# Patient Record
Sex: FEMALE | Race: WHITE | NOT HISPANIC OR LATINO | Employment: OTHER
[De-identification: names, ages, dates, MRNs, and addresses within clinical notes are randomized per-mention and may not be internally consistent; named-entity substitution may affect disease eponyms.]

---

## 2017-07-29 ENCOUNTER — HEALTH MAINTENANCE LETTER (OUTPATIENT)
Age: 62
End: 2017-07-29

## 2017-08-15 ENCOUNTER — TRANSFERRED RECORDS (OUTPATIENT)
Dept: HEALTH INFORMATION MANAGEMENT | Facility: CLINIC | Age: 62
End: 2017-08-15

## 2017-11-14 ENCOUNTER — TRANSFERRED RECORDS (OUTPATIENT)
Dept: HEALTH INFORMATION MANAGEMENT | Facility: CLINIC | Age: 62
End: 2017-11-14

## 2017-12-07 LAB — MAMMOGRAM: NORMAL

## 2018-11-13 ENCOUNTER — ALLIED HEALTH/NURSE VISIT (OUTPATIENT)
Dept: TRANSPLANT | Facility: CLINIC | Age: 63
End: 2018-11-13
Attending: INTERNAL MEDICINE
Payer: COMMERCIAL

## 2018-11-13 ENCOUNTER — MEDICAL CORRESPONDENCE (OUTPATIENT)
Dept: TRANSPLANT | Facility: CLINIC | Age: 63
End: 2018-11-13

## 2018-11-13 VITALS
DIASTOLIC BLOOD PRESSURE: 88 MMHG | OXYGEN SATURATION: 97 % | WEIGHT: 163.7 LBS | HEIGHT: 67 IN | HEART RATE: 68 BPM | SYSTOLIC BLOOD PRESSURE: 129 MMHG | BODY MASS INDEX: 25.69 KG/M2 | TEMPERATURE: 97 F

## 2018-11-13 DIAGNOSIS — C90.02 MULTIPLE MYELOMA IN RELAPSE (H): Primary | ICD-10-CM

## 2018-11-13 DIAGNOSIS — Z71.9 ENCOUNTER FOR COUNSELING: Primary | ICD-10-CM

## 2018-11-13 DIAGNOSIS — C90.00 MULTIPLE MYELOMA, REMISSION STATUS UNSPECIFIED (H): Primary | ICD-10-CM

## 2018-11-13 PROCEDURE — 40000268 ZZH STATISTIC NO CHARGES: Mod: ZF

## 2018-11-13 PROCEDURE — G0463 HOSPITAL OUTPT CLINIC VISIT: HCPCS | Mod: ZF

## 2018-11-13 RX ORDER — DEXAMETHASONE 4 MG/1
40 TABLET ORAL
COMMUNITY
Start: 2018-10-19 | End: 2019-03-20

## 2018-11-13 RX ORDER — TIZANIDINE 2 MG/1
2-4 TABLET ORAL
Status: ON HOLD | COMMUNITY
Start: 2018-09-10 | End: 2019-03-30

## 2018-11-13 RX ORDER — PROCHLORPERAZINE MALEATE 10 MG
10 TABLET ORAL
Status: ON HOLD | COMMUNITY
Start: 2018-10-19 | End: 2019-03-30

## 2018-11-13 RX ORDER — ACYCLOVIR 400 MG/1
400 TABLET ORAL 2 TIMES DAILY
Status: ON HOLD | COMMUNITY
Start: 2018-10-19 | End: 2019-03-30

## 2018-11-13 ASSESSMENT — PAIN SCALES - GENERAL: PAINLEVEL: NO PAIN (0)

## 2018-11-13 NOTE — PROGRESS NOTES
BMT Consultation      Adrianna Palomino is a 63 year old female referred by Dr. Crespo for relapsed plasma cell myeloma s/p autologous stem cell transplantation (ASCT) 4/5/2013     HPI: This is a pleasant 63 year old female with a Past Medical History of history of standard risk IgG kappa multiple myeloma s/p autologous stem cell transplantation 4/5/2013, now with relapsed disease referred back here for consideration of second ASCT. Oncology history summarized below:     Oncology history from last note from Rubia Vinson and Dr. Crespo, records reviewed by me as well:  - Initially diagnosed and with standard-risk cytogenetics in 2006  - Started on Dexamethasone for 6 cycles obtaining a complete remission and then maintenance with thalidomide and dexamethasone for 1 year.   - Stopped all therapy in the summer of 2007 due to neuropathy, and was followed on 2 years of maintenance dexamethasone.    - She was noted to slowly relapse but didn't require therapy until she was started on RVD in 11/2012.   - Completed 5 cycles before obtaining a partial remission and came to transplant.    - Underwent an autologous stem cell transplant on 04/05/2013,  total cell dose was 8 x10(6) cells/kg, and she has 8 x10(6) cells/kg left in storage. Post ASCT course was unremarkable.     - Maintenance Regimen post transplant  (10mg q28 days), off for 1 month in 9/2016 to review marrow, resumed: 9/30/2016 and continued till disease progression  - Clinical progression [new bone lesions detected on bone survey from 8/29/2018 with [facility]]  - 8/29/2018 Bone Marrow Biopsy: Normocellular BM (30%) with trilineage hematopoiesis showing a decreased myeloid to erythroid ratio (erythroid hyperplasia). - Plasma cells 5%, polytypic for light chain. Flow cytometry negative for monoclonal plasma cell population   - 10/3/2018 Bone marrow Biopsy: CT Guided Needle Core Biopsy: POSITIVE FOR MALIGNANCY - Plasma cell neoplasm, consistent with  recurrent plasma cell myeloma. Comment Large aggregates of + plasma cells are present. These are kappa monoclonal.   - 10/3/2018: FISH Myeloma Panel: Positive for additional copies of D5S23/S1F773 (5p15.2), CEP9 (centromere 9), CEP11 (centromere 11), and CEP15 (centromere 15) suggesting polysomy, and low level loss of IGH (14q32). FISH Negative for loss of CDKN2C (1p32.3), gain of CKS1B (1q21.3), loss of I24R543 (13q14.3), loss of TP53 (17p13.1), and additional copies of CEP7 (centromere 7), and IGH rearrangement. 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   DSS IIA, SPEP 10/18/2018: 0.9 g/dL, 10/18/2018: 6.69 , Kappa 10.03 mg/dl, lambda 1.5 mg/dl, albumin 3.4 g/dl, creatinine 0.82, calcium 9.3, LDH and B2 microglobulin Unknown/ not available   - 10/19/2018 C1D1 : VRd in relapsed setting (Velcade: 1.3mg/m2 subcutanously Day 1, 8, 15; Revlimid: 10mg (dose reduced) day 1-14; Dexamethasone: 40mg day 1,8,15)      Patient is here today with her  Charles for new patient evaluation.  We reviewed and confirmed the above medical and hematological history with her.  Patient reports that besides having mild fatigue, she has no bony pains or other signs of disease.  She denies specifically on review of system any fevers chills weight loss nausea vomiting bruises bleeding headaches vision changes shortness of breath or chest pain.  She does note however long-standing history of loose to watery bowel movements of 4-5 times a day ever since she was started on maintenance lenalidomide.  She reports taking occasional Imodium to help with that.  She denies any blood in the stool.  She denies any associated abdominal pain.  She reports occasional hot flashes that she attributes to postmenopausal status. She reports bilateral sole decreased sensation, numbness since thalidomide, no imbalance or falls.     For  social history, previous sidestepped noted below, she does admit to 15-pack-year smoking history last smoked in .  She also drinks 2 glasses of either wine or vodka every day.  She does admit to more heavy drinking in the past.  She reports no diagnosis of liver dysfunction.      ROS:    10 point ROS neg other than the symptoms noted above in the HPI.        Past Medical History:   Diagnosis Date     Anemia      Compression fracture     T12, L1, L4     Macular degeneration     Early AMD     Malignant neoplasm (H)      Migraine      Multiple myeloma (H)      Nonsenile cataract        Past Surgical History:   Procedure Laterality Date     ARTHROSCOPY KNEE WITH LATERAL MENISCECTOMY  2013    Procedure: ARTHROSCOPY KNEE WITH LATERAL MENISCECTOMY;  Left Knee Arthroscopy,  Partial Lateral Meniscectomy       EXCISE GANGLION FOOT  10/28/2011    Procedure:EXCISE GANGLION FOOT; Excision of Ganglion Cyst Left Foot; Surgeon:STEPHEN IBRAHIM; Location:[facility]      BONE MARROW/STEM TRANSPLANT AUTOLOGOUS  2013     HC COLONOSCOPY THRU STOMA, DIAGNOSTIC      needs repeat      HC REMOVAL OF TONSILS,12+ Y/O  1974    Tonsils 12+y.o.     ORTHOPEDIC SURGERY       SURGICAL HISTORY OF -       left bunionectomy and hammer toe correction       Family History   Problem Relation Age of Onset     C.A.D. Paternal Grandfather       of MI 51 yo     Diabetes No family hx of      Cerebrovascular Disease No family hx of      Glaucoma No family hx of      Macular Degeneration No family hx of      Breast Cancer Paternal Grandmother      Hypertension Father      Cancer - colorectal Maternal Grandfather      late 60's to early 70's     Circulatory Maternal Grandfather      migraines     Circulatory Mother      migraines     Circulatory Sister      migraines     Cancer Sister      hodgkins       Social History   Substance Use Topics     Smoking status: Former Smoker     Packs/day: 1.00     Years: 8.00     Quit date:  11/14/1990     Smokeless tobacco: Never Used      Comment: occ drags 17 years  smokes     Alcohol use 7.0 oz/week     14 drink(s) per week      Comment: CAGE neg         Allergies   Allergen Reactions     Amoxicillin Rash     Septra [Bactrim] Rash     Sulfa Drugs         Current Outpatient Prescriptions   Medication Sig Dispense Refill     acyclovir (ZOVIRAX) 400 MG tablet Take 400 mg by mouth       Ascorbic Acid (VITAMIN C PO) Take 500 mg by mouth daily       aspirin 325 MG tablet Take 325 mg by mouth daily       Calcium Carbonate (CALCIUM 600 PO) Take by mouth 2 times daily       dexamethasone (DECADRON) 4 MG tablet Take 40 mg by mouth       DOCUSATE SODIUM PO        ferrous fumarate (FERRETTS) 324 MG TABS Take by mouth daily        fish oil-omega-3 fatty acids (FISH OIL) 1000 MG capsule Take 2 g by mouth daily.       fluticasone (FLONASE) 50 MCG/ACT nasal spray Spray 2 sprays into both nostrils daily (Patient taking differently: Spray 2 sprays into both nostrils as needed ) 1 Package 1     LENalidomide (REVLIMID) 10 MG CAPS capsule CHEMOTHERAPY Take 1 capsule (10 mg) by mouth daily 28 capsule 0     loratadine (CLARITIN) 10 MG tablet Take 10 mg by mouth as needed        Misc Natural Products (GLUCOSAMINE CHOND COMPLEX/MSM) TABS Take  by mouth daily.       MULTIVITAMINS OR TABS ONE DAILY 100 3     oxyCODONE-acetaminophen (PERCOCET) 5-325 MG per tablet Take 1-2 tablets by mouth every 4 hours as needed for pain 28 tablet 0     prochlorperazine (COMPAZINE) 10 MG tablet Take 10 mg by mouth       tiZANidine (ZANAFLEX) 2 MG tablet Take 2-4 mg by mouth       triamcinolone (KENALOG) 0.1 % ointment Apply topically 2 times daily To skin lesions as they crop up until they heal. 60 g 3     cyclobenzaprine (FLEXERIL) 10 MG tablet Take 1 tablet by mouth 3 times daily as needed for muscle spasms. (Patient not taking: Reported on 11/13/2018) 30 tablet 2     IBUPROFEN PO Take 200 mg by mouth           Physical Exam:  "    Vital Signs: /88  Pulse 68  Temp 97  F (36.1  C) (Oral)  Ht 1.689 m (5' 6.5\")  Wt 74.3 kg (163 lb 11.2 oz)  LMP 03/01/2005  SpO2 97%  BMI 26.03 kg/m2        KPS:  90%    General Appearance: healthy, alert and no distress  Eyes: PERRL, conjunctiva and lids normal, sclera nonicteric  Ears/Nose/M/Throat: Oral mucosa and posterior oropharynx normal, moist mucous membranes  Neck supple, non-tender, free range of motion, no adenopathy  Cardio/Vascular:regular rate and rhythm, normal S1 and S2, no murmur  Resp Effort And Auscultation: Normal - Clear to auscultation without rales, rhonchi, or wheezing.  GI: soft, nontender, bowel sounds present in all four quadrants, no hepatosplenomegaly  Lymphatics:no significant enlargement of lymph nodes globally   Musculoskeletal: Musculoskeletal normal  Edema: none  Skin: Skin color, texture, turgor normal. No rashes or lesions.  Neurologic: Gait normal. Reflexes normal and symmetric. Sensation grossly WNL.  Psych/Affect: Mood and affect are appropriate.  Vascular Access:  none    Most recent labs reviewed from primary oncology note, summary of monoclonal spikes and free light chain ratios noted below:    SPEP monoclonal spike    9/12/2015: 0.4 g/dL  8/29/2016: 0.5g/dL  6/17/2016: 0.4 g/dL  10/26/2016: 0.4 g/dL  1/27/2017: 0.3 g/dL  5/5/2017: 0.4g/dL  7/31/2017: 0.6 g/dL  11/8/2017: 0.5g/dL  3/12/2018: 0.6 g/dL  5/18/2018: 0.8g/dL  8/17/2018: 0.9 g/dL  10/18/2018: 0.9 g/dL    SFLC ratio  9/12/2015: 3.46  8/29/2016: 3.56  6/17/2016: 2.79  11/1/2016: 3.11  1/27/2017: 3.27  5/5/2017 : 3.96  7/31/2017: 4.52  11/8/2017: 3.76  3/12/2018: 5.2  5/18/2018: 6.29  8/17/2018: 5.74  10/18/2018: 6.69       Active Problems:     Patient Active Problem List   Diagnosis     Other type of migraine     History of colonic polyps     Osteoporosis     Multiple myeloma (H)     Hyperlipidemia LDL goal <160     Dysfunctional alcohol use     Diverticulosis     Elevated liver enzymes     Health " Care Home     Ganglion cyst     Foot pain, left     Advanced directives, counseling/discussion     Compression fracture     Multiple myeloma (H)     Multiple myeloma (H)     Status post bone marrow transplant (H)     Nonexudative senile macular degeneration of retina     Cataracts, bilateral       ASSESSMENT AND PLAN:  This is a pleasant 63 year old female with a Past Medical History of history of standard risk IgG kappa multiple myeloma s/p autologous stem cell transplantation 4/5/2013, now with relapsed standard risk IgG kappa myeloma, with normal cytogenetics and additional copies of D5S23/R8T681 , CEP9 , CEP11, and CEP15 suggesting polysomy, with disease relapse (boiochenmical progression and new lytic lesions), started on VRd in relapsed setting (Velcade: 1.3mg/m2 subcutanously Day 1, 8, 15; Revlimid: 10mg (dose reduced) day 1-14; Dexamethasone: 40mg day 1,8,15) C1D1 10/19/2018  referred back here for consideration of second ASCT.    We reviewed with the patient and her  today the natural history of plasma cell myeloma, being an incurable disease despite recent advances in therapy and autologous stem cell transplantation.  We did discuss the recommendation in the position of the American Society as well as therapy and safety of bone marrow transplantation published in the BBMT in 2015, in particular regarding to using high-dose therapy and autologous HCT should be considered appropriate therapy for any patients relapsing after primary therapy that includes an autologous HCT with initial remission duration of more than 18 months.  We also did discuss the rationale of using this versus an allogeneic stem cell transplantation approach.  The patient understands this    We discussed the favorable prognosis with the absence of any high risk cytogenetics features on her most recent bone marrow biopsy.  We also discussed that the longer of about 5 years that she achieved were initiated for autologous stem cell  transplantation progression free survival is another favorable clinical feature and that we would therefore with the light of the evidence available to recommend a second autologous stem cell transplantation for her with improvement in progression free survival associated with this procedure.    I agree with her receiving reinduction therapy with triple agents including produce home inhibitors and eminence.  Would follow biochemical markers of disease after each cycle.  And aim for 4 cycles of therapy to achieve at least a partial response, preferably a complete response of possible.  We discussed that we would need to work with her primary oncologist to change her current regimen possibly if she is has signs of biochemical or radiological signs of disease progression while on the current therapy.    Of note patient received a total cell dose was 8 x10(6) cells/kg with her first August stem cell transplantation in 2013, and she has 8 x10(6) cells/kg left in storage, that we would use for her second transplantation without the need to mobilize or collect stem cells at this point.  And that procedure associated with organ function evaluation prior to taking her to a second autologous stem cell transplantation we also discussed that we would condition her again with melphalan.  We discussed the most common side effects associated with melphalan that will be reviewed again prior to her transplantation.    We reviewed the risks and benefits associated with autologous stem cell transplantation with a myeloablative dose of melphalan, including but not limited to cytopenia, severe neutropenia and need for blood product support with packed RBCs and platelets with possible side effects associated with blood transfusion and disease transmission, we also discussed risk of organ dysfunction and/or failure, and transverse to the complications of venoocclusive disease of the liver and idiopathic pneumonia syndrome of the lung risk  of relapse and also risk of secondary malignancies, and mortality in the range of 2%.    Other supportive care, managed by Dr. Crespo: Patient is on aspirin for venous thromboembolism prophylaxis while on immunomodulatory agent lenalidomide.  She is also on acyclovir prophylaxis while on 40s on inhibitor.  She is compliant with calcium and vitamin D and is receiving Zometa now that her disease is relapsed and she is being actively treated again.  We discussed with the patient decreasing or even stopping if possible alcohol intake to decrease possible liver organ dysfunction associated with high-dose melphalan specifically relating to venoocclusive disease.    Adrianna understood the above assessment and recommendations.  Multiple questions answered.  No barriers to learning identified.    Summary:  Patient will be followed closely by Dr. Crespo continue with induction chemotherapy before proceeding with evaluation for second autologous stem cell transplantation as detailed above.  We communicated today with  Dr. Crespo the above recommendations.  Patient met with Nasrin our nurse coordinator as well as social work.  She has our contact information and knows to call us with any questions or concerns in the meantime.    Abdi Browning MD    ------------------------------------------------------------------------------------------------------------------------------------------------    Patient Care Team       Relationship Specialty Notifications Start End    Logeais, Ely Rodriguez MD PCP - General Internal Medicine  2/18/13     Comment:  per pt 8/13/14    Phone: [phone] Fax: [phone] [address] [address]    Ananda Xavier MD Referring Physician Hematology  2/18/13     Phone: [phone] Fax: [phone]         [address] [place] [address] [address] [facility] [address]    Avani Mera, [facility]   Admissions 2/26/13     Comment:  BMT  pager [phone]    Phone:  [phone] Pager: [phone] Fax: [phone]       Keira Way, RN Clinic Care Coordinator Hematology Admissions 12/4/13     Phone: [phone] Pager: [phone]        Swati Lacy MD MD Ophthalmology  12/29/15     Phone: [phone] Fax: [phone]         [address] [place] [id number]

## 2018-11-13 NOTE — PATIENT INSTRUCTIONS
You will continue to follow up with  Dr. Crespo and come back to us after 4 cycles if you have adequate response to current therapy

## 2018-11-13 NOTE — NURSING NOTE
"Oncology Rooming Note    November 13, 2018 7:19 AM   Adrianna Palomino is a 63 year old female who presents for:    Chief Complaint   Patient presents with     Consult     NEW-MM     Initial Vitals: /88  Pulse 68  Temp 97  F (36.1  C) (Oral)  Ht 1.689 m (5' 6.5\")  Wt 74.3 kg (163 lb 11.2 oz)  LMP 03/01/2005  SpO2 97%  BMI 26.03 kg/m2 Estimated body mass index is 26.03 kg/(m^2) as calculated from the following:    Height as of this encounter: 1.689 m (5' 6.5\").    Weight as of this encounter: 74.3 kg (163 lb 11.2 oz). Body surface area is 1.87 meters squared.  No Pain (0) Comment: Data Unavailable   Patient's last menstrual period was 03/01/2005.  Allergies reviewed: Yes  Medications reviewed: Yes    Medications: Medication refills not needed today.  Pharmacy name entered into [facility]:    [facility] - [place], [place] - [address] [facility] [place] [facility] [place], [place] [address] [facility] [place] [address] [facility]  [facility] [place], [place] - [address] [facility] [address]  [facility] [address] [place], [place] - [address] [facility] [place], [place] - [address] [facility] [phone]    Clinical concerns: NONE     10 minutes for nursing intake (face to face time)     Kiana Coleman CMA              "

## 2018-11-13 NOTE — PROGRESS NOTES
Met with pt and her spouse to discuss follow up plan. She will return to [facility] for follow up care at this time. SHe was provided with Dr Ganesh Browning and BMT admin office contact phone number to call if she has questions.

## 2018-11-13 NOTE — MR AVS SNAPSHOT
After Visit Summary   11/13/2018    Adrianna Palomino    MRN: [id number]           Patient Information     Date Of Birth          1955        Visit Information        Provider Department      11/13/2018 8:45 AM Beth Pollack MSW;  2 119 CONSULT [facility] Blood and Marrow Transplant        Today's Diagnoses     Encounter for counseling    -  1          [facility] ([facility])  [address] [place] [address]  Phone: [phone]  Clinic Hours:   Monday-Thursday:7am to 7pm   Friday: 7am to 5pm   Weekends and holidays:    8am to noon (in general)  If your fever is 100.5  or greater,   call the clinic.  After hours call the   hospital at [phone] or   [phone]. Ask for the BMT   fellow on-call            Follow-ups after your visit        Who to contact     If you have questions or need follow up information about today's clinic visit or your schedule please contact [facility] BLOOD AND MARROW TRANSPLANT directly at [phone].  Normal or non-critical lab and imaging results will be communicated to you by Ouroboroshart, letter or phone within 4 business days after the clinic has received the results. If you do not hear from us within 7 days, please contact the clinic through Oligasis or phone. If you have a critical or abnormal lab result, we will notify you by phone as soon as possible.  Submit refill requests through Oligasis or call your pharmacy and they will forward the refill request to us. Please allow 3 business days for your refill to be completed.          Additional Information About Your Visit        Ouroboroshart Information     Oligasis gives you secure access to your electronic health record. If you see a primary care provider, you can also send messages to your care team and make appointments. If you have questions, please call your primary care clinic.  If you do not have a primary care provider, please call [phone] and they will assist you.        Care  EveryWhere ID     This is your Care EveryWhere ID. This could be used by other organizations to access your [place] medical records  [id number]        Your Vitals Were     Last Period                   03/01/2005            Blood Pressure from Last 3 Encounters:   11/13/18 129/88   04/21/16 112/79   12/17/15 117/83    Weight from Last 3 Encounters:   11/13/18 74.3 kg (163 lb 11.2 oz)   04/21/16 70.2 kg (154 lb 11.2 oz)   12/17/15 70.7 kg (155 lb 13.8 oz)              Today, you had the following     No orders found for display         Today's Medication Changes          These changes are accurate as of 11/13/18  2:18 PM.  If you have any questions, ask your nurse or doctor.               These medicines have changed or have updated prescriptions.        Dose/Directions    fluticasone 50 MCG/ACT spray   Commonly known as:  FLONASE   This may have changed:    - when to take this  - reasons to take this   Used for:  Rhinosinusitis        Dose:  2 spray   Spray 2 sprays into both nostrils daily   Quantity:  1 Package   Refills:  1                Recent Review Flowsheet Data     BMT Recent Results Latest Ref Rng & Units 6/4/2015 7/16/2015 8/20/2015 9/17/2015 10/16/2015 12/17/2015 4/21/2016    WBC 4.0 - 11.0 10e9/L 1.4(L) 1.9(L) 1.5(L) 1.7(L) 1.8(L) 1.7(L) 1.6(L)    Hemoglobin 11.7 - 15.7 g/dL 9.2(L) 11.9 12.6 12.1 11.3(L) 12.4 12.2    Platelet Count 150 - 450 10e9/L 211 231 177 177 194 189 174    Platelets 150 - 450 10:9/L - - - - - - -    Neutrophils (Absolute) 1.6 - 8.3 10e9/L 0.8(L) 0.9(L) 0.8(L) 0.8(L) 1.0(L) 0.8(L) 0.8(L)    Blasts (Absolute) 10e9/L - - - - - - -    INR 0.86 - 1.14 - - - - - - -    Sodium 133 - 144 mmol/L 136 141 139 - 138 138 140    Potassium 3.4 - 5.3 mmol/L 3.9 3.9 3.9 - 3.9 3.9 3.6    Chloride 94 - 109 mmol/L 105 106 103 - 105 104 103    Glucose 70 - 99 mg/dL 81 64(L) 65(L) - 82 48(LL) 82    Urea Nitrogen 7 - 30 mg/dL 14 11 13 - 17 12 12    Creatinine 0.52 - 1.04 mg/dL 0.91 0.93 0.86 - 0.88  1.02 0.88    Calcium (Total) 8.5 - 10.1 mg/dL 8.9 8.2(L) 8.6 - 8.7 8.8 8.9    Protein (Total) 6.8 - 8.8 g/dL - - 6.6(L) - 6.4(L) 7.0 6.8    Albumin 3.4 - 5.0 g/dL - - 3.6 - 3.6 3.8 3.6    Bilirubin (Direct) 0.0 - 0.2 mg/dL - - - - 0.2 - -    Alkaline Phosphatase 40 - 150 U/L - - 54 - 57 58 55    AST 0 - 45 U/L - - 21 - 31 23 27    ALT 0 - 50 U/L - - 30 - 34 26 36    MCV 78 - 100 fl 101(H) 107(H) 105(H) 107(H) 110(H) 111(H) 109(H)               Primary Care Provider Office Phone # Fax #    Ely Maldonado [phone]       [address] [address]        Equal Access to Services     [facility]: Hadii evan edmondso Sofer, waaxda luqadaha, qaybta kaalmakatelyn lubin, nicki rivera. So [facility] [phone].    ATENCIÓN: Si habla español, tiene a voss disposición servicios gratuitos de asistencia lingüística. [facility] [phone].    We comply with applicable federal civil rights laws and [place] laws. We do not discriminate on the basis of race, color, national origin, age, disability, sex, sexual orientation, or gender identity.            Thank you!     Thank you for choosing [facility] BLOOD AND MARROW TRANSPLANT  for your care. Our goal is always to provide you with excellent care. Hearing back from our patients is one way we can continue to improve our services. Please take a few minutes to complete the written survey that you may receive in the mail after your visit with us. Thank you!             Your Updated Medication List - Protect others around you: Learn how to safely use, store and throw away your medicines at www.disposemymeds.org.          This list is accurate as of 11/13/18  2:18 PM.  Always use your most recent med list.                   Brand Name Dispense Instructions for use Diagnosis    acyclovir 400 MG tablet    ZOVIRAX     Take 400 mg by mouth        aspirin 325 MG tablet      Take 325 mg by mouth daily    Multiple myeloma        CALCIUM 600 PO      Take by mouth 2 times daily    Multiple myeloma, without mention of having achieved remission       cyclobenzaprine 10 MG tablet    FLEXERIL    30 tablet    Take 1 tablet by mouth 3 times daily as needed for muscle spasms.    Multiple myeloma, without mention of having achieved remission, Back pain       dexamethasone 4 MG tablet    DECADRON     Take 40 mg by mouth        DOCUSATE SODIUM PO           ferrous fumarate 324 MG Tabs    FERRETTS     Take by mouth daily    Multiple myeloma, without mention of having achieved remission       fish oil-omega-3 fatty acids 1000 MG capsule      Take 2 g by mouth daily.        fluticasone 50 MCG/ACT spray    FLONASE    1 Package    Spray 2 sprays into both nostrils daily    Rhinosinusitis       GLUCOSAMINE CHOND COMPLEX/MSM Tabs      Take  by mouth daily.        IBUPROFEN PO      Take 200 mg by mouth        LENalidomide 10 MG Caps capsule CHEMO    REVLIMID    28 capsule    Take 1 capsule (10 mg) by mouth daily    Multiple myeloma (H)       loratadine 10 MG tablet    CLARITIN     Take 10 mg by mouth as needed        multivitamin per tablet     100    ONE DAILY    Osteoporosis, unspecified       oxyCODONE-acetaminophen 5-325 MG per tablet    PERCOCET    28 tablet    Take 1-2 tablets by mouth every 4 hours as needed for pain    Back pain, Macrocytosis, Multiple myeloma, without mention of having achieved remission       prochlorperazine 10 MG tablet    COMPAZINE     Take 10 mg by mouth        tiZANidine 2 MG tablet    ZANAFLEX     Take 2-4 mg by mouth        triamcinolone 0.1 % ointment    KENALOG    60 g    Apply topically 2 times daily To skin lesions as they crop up until they heal.    Lymphomatoid papulosis (H)       VITAMIN C PO      Take 500 mg by mouth daily    Multiple myeloma, without mention of having achieved remission

## 2018-11-13 NOTE — MR AVS SNAPSHOT
After Visit Summary   11/13/2018    Adrianna Palomino    MRN: [id number]           Patient Information     Date Of Birth          1955        Visit Information        Provider Department      11/13/2018 7:15 AM Abdi Bill MD [facility] Blood and Marrow Transplant        Today's Diagnoses     Multiple myeloma in relapse (H)    -  1          [facility] ([facility] [place]  [address] [place] [address]  Phone: [phone]  Clinic Hours:   Monday-Thursday:7am to 7pm   Friday: 7am to 5pm   Weekends and holidays:    8am to noon (in general)  If your fever is 100.5  or greater,   call the clinic.  After hours call the   hospital at [phone] or   [phone]. Ask for the BMT   fellow on-call           Care Instructions    You will continue to follow up with  Dr. Crespo and come back to us after 4 cycles if you have adequate response to current therapy          Follow-ups after your visit        Who to contact     If you have questions or need follow up information about today's clinic visit or your schedule please contact [facility] BLOOD AND MARROW TRANSPLANT directly at [phone].  Normal or non-critical lab and imaging results will be communicated to you by MyChart, letter or phone within 4 business days after the clinic has received the results. If you do not hear from us within 7 days, please contact the clinic through Truserahart or phone. If you have a critical or abnormal lab result, we will notify you by phone as soon as possible.  Submit refill requests through Wandoujia or call your pharmacy and they will forward the refill request to us. Please allow 3 business days for your refill to be completed.          Additional Information About Your Visit        Truserahart Information     Wandoujia gives you secure access to your electronic health record. If you see a primary care provider, you can also send messages to your care team and make appointments. If you have questions,  "please call your primary care clinic.  If you do not have a primary care provider, please call [phone] and they will assist you.        Care EveryWhere ID     This is your Care EveryWhere ID. This could be used by other organizations to access your [place] medical records  [id number]        Your Vitals Were     Pulse Temperature Height Last Period Pulse Oximetry BMI (Body Mass Index)    68 97  F (36.1  C) (Oral) 1.689 m (5' 6.5\") 03/01/2005 97% 26.03 kg/m2       Blood Pressure from Last 3 Encounters:   11/13/18 129/88   04/21/16 112/79   12/17/15 117/83    Weight from Last 3 Encounters:   11/13/18 74.3 kg (163 lb 11.2 oz)   04/21/16 70.2 kg (154 lb 11.2 oz)   12/17/15 70.7 kg (155 lb 13.8 oz)              Today, you had the following     No orders found for display         Today's Medication Changes          These changes are accurate as of 11/13/18  9:50 AM.  If you have any questions, ask your nurse or doctor.               These medicines have changed or have updated prescriptions.        Dose/Directions    fluticasone 50 MCG/ACT spray   Commonly known as:  FLONASE   This may have changed:    - when to take this  - reasons to take this   Used for:  Rhinosinusitis        Dose:  2 spray   Spray 2 sprays into both nostrils daily   Quantity:  1 Package   Refills:  1                Recent Review Flowsheet Data     BMT Recent Results Latest Ref Rng & Units 6/4/2015 7/16/2015 8/20/2015 9/17/2015 10/16/2015 12/17/2015 4/21/2016    WBC 4.0 - 11.0 10e9/L 1.4(L) 1.9(L) 1.5(L) 1.7(L) 1.8(L) 1.7(L) 1.6(L)    Hemoglobin 11.7 - 15.7 g/dL 9.2(L) 11.9 12.6 12.1 11.3(L) 12.4 12.2    Platelet Count 150 - 450 10e9/L 211 231 177 177 194 189 174    Platelets 150 - 450 10:9/L - - - - - - -    Neutrophils (Absolute) 1.6 - 8.3 10e9/L 0.8(L) 0.9(L) 0.8(L) 0.8(L) 1.0(L) 0.8(L) 0.8(L)    Blasts (Absolute) 10e9/L - - - - - - -    INR 0.86 - 1.14 - - - - - - -    Sodium 133 - 144 mmol/L 136 141 139 - 138 138 140    Potassium 3.4 - 5.3 " mmol/L 3.9 3.9 3.9 - 3.9 3.9 3.6    Chloride 94 - 109 mmol/L 105 106 103 - 105 104 103    Glucose 70 - 99 mg/dL 81 64(L) 65(L) - 82 48(LL) 82    Urea Nitrogen 7 - 30 mg/dL 14 11 13 - 17 12 12    Creatinine 0.52 - 1.04 mg/dL 0.91 0.93 0.86 - 0.88 1.02 0.88    Calcium (Total) 8.5 - 10.1 mg/dL 8.9 8.2(L) 8.6 - 8.7 8.8 8.9    Protein (Total) 6.8 - 8.8 g/dL - - 6.6(L) - 6.4(L) 7.0 6.8    Albumin 3.4 - 5.0 g/dL - - 3.6 - 3.6 3.8 3.6    Bilirubin (Direct) 0.0 - 0.2 mg/dL - - - - 0.2 - -    Alkaline Phosphatase 40 - 150 U/L - - 54 - 57 58 55    AST 0 - 45 U/L - - 21 - 31 23 27    ALT 0 - 50 U/L - - 30 - 34 26 36    MCV 78 - 100 fl 101(H) 107(H) 105(H) 107(H) 110(H) 111(H) 109(H)               Primary Care Provider Office Phone # Fax #    Ely Maldonado [phone]       [address] [address]        Equal Access to Services     [facility] SANCHE[facility]: Hadii aad ku hadasho Soryannali, waaxda luqadaha, qaybta kaalmada adeegyada, waxay jones giles adeamelia khdemi mann . So [facility] [phone].    ATENCIÓN: Si habla español, tiene a vsos disposición servicios gratuitos de asistencia lingüística. Llame al [phone].    We comply with applicable federal civil rights laws and [place] laws. We do not discriminate on the basis of race, color, national origin, age, disability, sex, sexual orientation, or gender identity.            Thank you!     Thank you for choosing [facility] BLOOD AND MARROW TRANSPLANT  for your care. Our goal is always to provide you with excellent care. Hearing back from our patients is one way we can continue to improve our services. Please take a few minutes to complete the written survey that you may receive in the mail after your visit with us. Thank you!             Your Updated Medication List - Protect others around you: Learn how to safely use, store and throw away your medicines at www.disposemymeds.org.          This list is accurate as of 11/13/18  9:50 AM.   Always use your most recent med list.                   Brand Name Dispense Instructions for use Diagnosis    acyclovir 400 MG tablet    ZOVIRAX     Take 400 mg by mouth        aspirin 325 MG tablet      Take 325 mg by mouth daily    Multiple myeloma       CALCIUM 600 PO      Take by mouth 2 times daily    Multiple myeloma, without mention of having achieved remission       cyclobenzaprine 10 MG tablet    FLEXERIL    30 tablet    Take 1 tablet by mouth 3 times daily as needed for muscle spasms.    Multiple myeloma, without mention of having achieved remission, Back pain       dexamethasone 4 MG tablet    DECADRON     Take 40 mg by mouth        DOCUSATE SODIUM PO           ferrous fumarate 324 MG Tabs    FERRETTS     Take by mouth daily    Multiple myeloma, without mention of having achieved remission       fish oil-omega-3 fatty acids 1000 MG capsule      Take 2 g by mouth daily.        fluticasone 50 MCG/ACT spray    FLONASE    1 Package    Spray 2 sprays into both nostrils daily    Rhinosinusitis       GLUCOSAMINE CHOND COMPLEX/MSM Tabs      Take  by mouth daily.        IBUPROFEN PO      Take 200 mg by mouth        LENalidomide 10 MG Caps capsule CHEMO    REVLIMID    28 capsule    Take 1 capsule (10 mg) by mouth daily    Multiple myeloma (H)       loratadine 10 MG tablet    CLARITIN     Take 10 mg by mouth as needed        multivitamin per tablet     100    ONE DAILY    Osteoporosis, unspecified       oxyCODONE-acetaminophen 5-325 MG per tablet    PERCOCET    28 tablet    Take 1-2 tablets by mouth every 4 hours as needed for pain    Back pain, Macrocytosis, Multiple myeloma, without mention of having achieved remission       prochlorperazine 10 MG tablet    COMPAZINE     Take 10 mg by mouth        tiZANidine 2 MG tablet    ZANAFLEX     Take 2-4 mg by mouth        triamcinolone 0.1 % ointment    KENALOG    60 g    Apply topically 2 times daily To skin lesions as they crop up until they heal.    Lymphomatoid  papulosis (H)       VITAMIN C PO      Take 500 mg by mouth daily    Multiple myeloma, without mention of having achieved remission

## 2018-11-13 NOTE — PROGRESS NOTES
Blood and Marrow Transplant   New Transplant Visit with   Clinical     Assessment completed on 11/13/2018 of living situation, support system, financial status, functional status, coping, stressors, need for resources and social work intervention provided as needed. Information for this assessment was provided by pt and pt's -Lupillo's report in addition to medical chart review and consultation with medical team.     Present:  Patient: Adrianna Palomino  Spouse: Lupillo Ervin  : LEVI Austin, [facility]    Medical Team   Nurse Coordinator: Nasrin Tabor RN  BMT Physician: Abdi Browning MD  Referring Physician: MD Zak    Presenting Information:  Pt is a 63 year old femeale diagnosed with multiple myeloma. Pt was diagnosed on 2006. Pt had an autologous transplant 4/5/2013. Pt presents today for a second stem cell transplant discussion.    Contact Information:  Cell Phone: [phone]  's Phone: [phone]    Special Needs: Pt lives in [place], [place] which is within the required distance of the hospital. Pt does not need to relocate.     Family Information:   Spouse: Lupillo Ervin  Parents: Mother (Lives in  [place], [place])  Siblings: 5 Siblings; 3 Live in [place]  Children: Son (Lives in [place], [place])    Education/Employment:  Currently employed: Yes  Employer: [employer]  Occupation: Patient Rep    Spouse/Partner Employed: No; pt's  is retired.    Finances/Insurance: No financial or insurance concerns identified at this time.    Source of Income: Payroll and Social Security [facility]     discussed juliet options and asked pt to let SUYAPA know if they would like to apply in the future. SUYAPA provided information regarding the insurance authorization process and the role of the BMT Financial . SUYAPA provided contact info for the [employer]  and referred pt to them for future insurance questions.     Caregiver:   SUYAPA discussed  with the pt and pt's -Lupillo the caregiver role and expectation at length. Pt is agreeable to having a full time caregiver for the minimum of 30 days until cleared by the BMT Physician. Pt's identified caregivers are her -Lupillo as he is retired. Pt has been through an autologous transplant before and is aware of the caregiver requirement. Caregiver education and information provided. No caregiver concerns identified.     Healthcare Directive: Yes. Copy already on file.    Resources Provided:  BMT Information Book  Transplant Unit Description and Information     Identified Concerns:  No concerns identified at this time.     Summary:  Pt presents to [facility] regarding a second autologous stem cell transplant. Pt had her first auto transplant on 4/5/2013. Pt and pt's -Lupillo asked good/appropriate questions regarding psychosocial factors related to BMT; all questions were addressed. Pt presented as very pleasant and calm. Pt does not need to relocate. No caregiver concerns at pt's  is retired and plans to be her caregiver. Pt said her son could be back-up caregiver.    Plan:   SW provided contact information and encouraged pt to contact SW with any additional questions, concerns, resources and/or for support. SW will continue to follow pt to provide support and guidance with resources as needed.     LEVI Austin, FELIBERTO  Phone: [phone]  Pager: [phone]

## 2018-11-13 NOTE — MR AVS SNAPSHOT
After Visit Summary   11/13/2018    Adrianna Palomino    MRN: [id number]           Patient Information     Date Of Birth          1955        Visit Information        Provider Department      11/13/2018 8:15 AM Coordinator, Sukumar Bmt Nurse [facility] Blood and Marrow Transplant        Today's Diagnoses     Multiple myeloma, remission status unspecified (H)    -  1          [facility] ([facility])  [address] [place] [address]  Phone: [phone]  Clinic Hours:   Monday-Thursday:7am to 7pm   Friday: 7am to 5pm   Weekends and holidays:    8am to noon (in general)  If your fever is 100.5  or greater,   call the clinic.  After hours call the   hospital at [phone] or   [phone]. Ask for the BMT   fellow on-call            Follow-ups after your visit        Your next 10 appointments already scheduled     Nov 13, 2018  8:45 AM CST   (Arrive by 8:30 AM)   BMT SUYAPA HENDRIX with LEVI Austin,  2 119 CONSULT [facility] Blood and Marrow Transplant ([facility])    [address] [place] [address]  [address] [place] [id number] [phone]              Who to contact     If you have questions or need follow up information about today's clinic visit or your schedule please contact [facility] BLOOD AND MARROW TRANSPLANT directly at [phone].  Normal or non-critical lab and imaging results will be communicated to you by MyChart, letter or phone within 4 business days after the clinic has received the results. If you do not hear from us within 7 days, please contact the clinic through MyChart or phone. If you have a critical or abnormal lab result, we will notify you by phone as soon as possible.  Submit refill requests through [employer] or call your pharmacy and they will forward the refill request to us. Please allow 3 business days for your refill to be completed.          Additional Information About Your Visit        MyChart Information      [employer] gives you secure access to your electronic health record. If you see a primary care provider, you can also send messages to your care team and make appointments. If you have questions, please call your primary care clinic.  If you do not have a primary care provider, please call [phone] and they will assist you.        Care EveryWhere ID     This is your Care EveryWhere ID. This could be used by other organizations to access your [facility] medical records  [id number]        Your Vitals Were     Last Period                   03/01/2005            Blood Pressure from Last 3 Encounters:   11/13/18 129/88   04/21/16 112/79   12/17/15 117/83    Weight from Last 3 Encounters:   11/13/18 74.3 kg (163 lb 11.2 oz)   04/21/16 70.2 kg (154 lb 11.2 oz)   12/17/15 70.7 kg (155 lb 13.8 oz)              Today, you had the following     No orders found for display         Today's Medication Changes          These changes are accurate as of 11/13/18  8:26 AM.  If you have any questions, ask your nurse or doctor.               These medicines have changed or have updated prescriptions.        Dose/Directions    fluticasone 50 MCG/ACT spray   Commonly known as:  FLONASE   This may have changed:    - when to take this  - reasons to take this   Used for:  Rhinosinusitis        Dose:  2 spray   Spray 2 sprays into both nostrils daily   Quantity:  1 Package   Refills:  1                Recent Review Flowsheet Data     BMT Recent Results Latest Ref Rng & Units 6/4/2015 7/16/2015 8/20/2015 9/17/2015 10/16/2015 12/17/2015 4/21/2016    WBC 4.0 - 11.0 10e9/L 1.4(L) 1.9(L) 1.5(L) 1.7(L) 1.8(L) 1.7(L) 1.6(L)    Hemoglobin 11.7 - 15.7 g/dL 9.2(L) 11.9 12.6 12.1 11.3(L) 12.4 12.2    Platelet Count 150 - 450 10e9/L 211 231 177 177 194 189 174    Platelets 150 - 450 10:9/L - - - - - - -    Neutrophils (Absolute) 1.6 - 8.3 10e9/L 0.8(L) 0.9(L) 0.8(L) 0.8(L) 1.0(L) 0.8(L) 0.8(L)    Blasts (Absolute) 10e9/L - - - - - - -    INR 0.86 -  1.14 - - - - - - -    Sodium 133 - 144 mmol/L 136 141 139 - 138 138 140    Potassium 3.4 - 5.3 mmol/L 3.9 3.9 3.9 - 3.9 3.9 3.6    Chloride 94 - 109 mmol/L 105 106 103 - 105 104 103    Glucose 70 - 99 mg/dL 81 64(L) 65(L) - 82 48(LL) 82    Urea Nitrogen 7 - 30 mg/dL 14 11 13 - 17 12 12    Creatinine 0.52 - 1.04 mg/dL 0.91 0.93 0.86 - 0.88 1.02 0.88    Calcium (Total) 8.5 - 10.1 mg/dL 8.9 8.2(L) 8.6 - 8.7 8.8 8.9    Protein (Total) 6.8 - 8.8 g/dL - - 6.6(L) - 6.4(L) 7.0 6.8    Albumin 3.4 - 5.0 g/dL - - 3.6 - 3.6 3.8 3.6    Bilirubin (Direct) 0.0 - 0.2 mg/dL - - - - 0.2 - -    Alkaline Phosphatase 40 - 150 U/L - - 54 - 57 58 55    AST 0 - 45 U/L - - 21 - 31 23 27    ALT 0 - 50 U/L - - 30 - 34 26 36    MCV 78 - 100 fl 101(H) 107(H) 105(H) 107(H) 110(H) 111(H) 109(H)               Primary Care Provider Office Phone # Fax #    Ely Maldonado [phone]       [address] [address]        Equal Access to Services     [facility] AH: Hadii evan ku hadjuneo Sofer, waaxda luqadaha, qaybta kaalmada adepedro whiteay jones rivera. So [facility] [phone].    ATENCIÓN: Si habla español, tiene a voss disposición servicios gratuitos de asistencia lingüística. Llame al [phone].    We comply with applicable federal civil rights laws and [place] laws. We do not discriminate on the basis of race, color, national origin, age, disability, sex, sexual orientation, or gender identity.            Thank you!     Thank you for choosing [facility] BLOOD AND MARROW TRANSPLANT  for your care. Our goal is always to provide you with excellent care. Hearing back from our patients is one way we can continue to improve our services. Please take a few minutes to complete the written survey that you may receive in the mail after your visit with us. Thank you!             Your Updated Medication List - Protect others around you: Learn how to safely use, store and throw away your  medicines at www.disposemymeds.org.          This list is accurate as of 11/13/18  8:26 AM.  Always use your most recent med list.                   Brand Name Dispense Instructions for use Diagnosis    acyclovir 400 MG tablet    ZOVIRAX     Take 400 mg by mouth        aspirin 325 MG tablet      Take 325 mg by mouth daily    Multiple myeloma       CALCIUM 600 PO      Take by mouth 2 times daily    Multiple myeloma, without mention of having achieved remission       cyclobenzaprine 10 MG tablet    FLEXERIL    30 tablet    Take 1 tablet by mouth 3 times daily as needed for muscle spasms.    Multiple myeloma, without mention of having achieved remission, Back pain       dexamethasone 4 MG tablet    DECADRON     Take 40 mg by mouth        DOCUSATE SODIUM PO           ferrous fumarate 324 MG Tabs    FERRETTS     Take by mouth daily    Multiple myeloma, without mention of having achieved remission       fish oil-omega-3 fatty acids 1000 MG capsule      Take 2 g by mouth daily.        fluticasone 50 MCG/ACT spray    FLONASE    1 Package    Spray 2 sprays into both nostrils daily    Rhinosinusitis       GLUCOSAMINE CHOND COMPLEX/MSM Tabs      Take  by mouth daily.        IBUPROFEN PO      Take 200 mg by mouth        LENalidomide 10 MG Caps capsule CHEMO    REVLIMID    28 capsule    Take 1 capsule (10 mg) by mouth daily    Multiple myeloma (H)       loratadine 10 MG tablet    CLARITIN     Take 10 mg by mouth as needed        multivitamin per tablet     100    ONE DAILY    Osteoporosis, unspecified       oxyCODONE-acetaminophen 5-325 MG per tablet    PERCOCET    28 tablet    Take 1-2 tablets by mouth every 4 hours as needed for pain    Back pain, Macrocytosis, Multiple myeloma, without mention of having achieved remission       prochlorperazine 10 MG tablet    COMPAZINE     Take 10 mg by mouth        tiZANidine 2 MG tablet    ZANAFLEX     Take 2-4 mg by mouth        triamcinolone 0.1 % ointment    KENALOG    60 g    Apply  topically 2 times daily To skin lesions as they crop up until they heal.    Lymphomatoid papulosis (H)       VITAMIN C PO      Take 500 mg by mouth daily    Multiple myeloma, without mention of having achieved remission

## 2019-02-08 ENCOUNTER — TRANSFERRED RECORDS (OUTPATIENT)
Dept: HEALTH INFORMATION MANAGEMENT | Facility: CLINIC | Age: 64
End: 2019-02-08

## 2019-02-22 ENCOUNTER — MEDICAL CORRESPONDENCE (OUTPATIENT)
Dept: TRANSPLANT | Facility: CLINIC | Age: 64
End: 2019-02-22

## 2019-03-01 DIAGNOSIS — Z86.2 PERSONAL HISTORY OF DISEASES OF BLOOD AND BLOOD-FORMING ORGANS: ICD-10-CM

## 2019-03-01 DIAGNOSIS — C90.00 MULTIPLE MYELOMA (H): Primary | ICD-10-CM

## 2019-03-04 ENCOUNTER — DOCUMENTATION ONLY (OUTPATIENT)
Dept: CARE COORDINATION | Facility: CLINIC | Age: 64
End: 2019-03-04

## 2019-03-05 DIAGNOSIS — C90.00 MULTIPLE MYELOMA (H): Primary | ICD-10-CM

## 2019-03-07 ENCOUNTER — OFFICE VISIT (OUTPATIENT)
Dept: TRANSPLANT | Facility: CLINIC | Age: 64
End: 2019-03-07
Attending: INTERNAL MEDICINE
Payer: COMMERCIAL

## 2019-03-07 ENCOUNTER — HOSPITAL ENCOUNTER (OUTPATIENT)
Dept: NUCLEAR MEDICINE | Facility: CLINIC | Age: 64
Setting detail: NUCLEAR MEDICINE
Discharge: HOME | End: 2019-03-07
Attending: INTERNAL MEDICINE | Admitting: INTERNAL MEDICINE
Payer: COMMERCIAL

## 2019-03-07 ENCOUNTER — HOSPITAL ENCOUNTER (OUTPATIENT)
Dept: GENERAL RADIOLOGY | Facility: CLINIC | Age: 64
End: 2019-03-07
Attending: INTERNAL MEDICINE
Payer: COMMERCIAL

## 2019-03-07 ENCOUNTER — HOSPITAL ENCOUNTER (OUTPATIENT)
Dept: GENERAL RADIOLOGY | Facility: CLINIC | Age: 64
Discharge: HOME | End: 2019-03-07
Attending: INTERNAL MEDICINE | Admitting: INTERNAL MEDICINE
Payer: COMMERCIAL

## 2019-03-07 ENCOUNTER — MEDICAL CORRESPONDENCE (OUTPATIENT)
Dept: TRANSPLANT | Facility: CLINIC | Age: 64
End: 2019-03-07

## 2019-03-07 ENCOUNTER — APPOINTMENT (OUTPATIENT)
Dept: LAB | Facility: CLINIC | Age: 64
End: 2019-03-07
Attending: INTERNAL MEDICINE
Payer: COMMERCIAL

## 2019-03-07 VITALS
OXYGEN SATURATION: 98 % | TEMPERATURE: 97.8 F | HEART RATE: 70 BPM | WEIGHT: 158.2 LBS | SYSTOLIC BLOOD PRESSURE: 115 MMHG | HEIGHT: 66 IN | BODY MASS INDEX: 25.43 KG/M2 | DIASTOLIC BLOOD PRESSURE: 76 MMHG

## 2019-03-07 DIAGNOSIS — C90.00 MULTIPLE MYELOMA (H): ICD-10-CM

## 2019-03-07 DIAGNOSIS — Z86.2 PERSONAL HISTORY OF DISEASES OF BLOOD AND BLOOD-FORMING ORGANS: ICD-10-CM

## 2019-03-07 LAB
ABO + RH BLD: NORMAL
ABO + RH BLD: NORMAL
ALBUMIN SERPL-MCNC: 3.3 G/DL (ref 3.4–5)
ALBUMIN UR-MCNC: >499 MG/DL
ALP SERPL-CCNC: 53 U/L (ref 40–150)
ALT SERPL W P-5'-P-CCNC: 19 U/L (ref 0–50)
ANION GAP SERPL CALCULATED.3IONS-SCNC: 6 MMOL/L (ref 3–14)
APPEARANCE UR: ABNORMAL
APTT PPP: 26 SEC (ref 22–37)
AST SERPL W P-5'-P-CCNC: 21 U/L (ref 0–45)
B2 MICROGLOB SERPL-MCNC: 2.1 MG/L
BASOPHILS # BLD AUTO: 0 10E9/L (ref 0–0.2)
BASOPHILS NFR BLD AUTO: 1 %
BILIRUB SERPL-MCNC: 0.4 MG/DL (ref 0.2–1.3)
BILIRUB UR QL STRIP: NEGATIVE
BLD GP AB SCN SERPL QL: NORMAL
BLOOD BANK CMNT PATIENT-IMP: NORMAL
BUN SERPL-MCNC: 10 MG/DL (ref 7–30)
CALCIUM SERPL-MCNC: 8.4 MG/DL (ref 8.5–10.1)
CHLORIDE SERPL-SCNC: 102 MMOL/L (ref 94–109)
CO2 SERPL-SCNC: 29 MMOL/L (ref 20–32)
COLOR UR AUTO: YELLOW
CREAT SERPL-MCNC: 0.84 MG/DL (ref 0.52–1.04)
DIFFERENTIAL METHOD BLD: ABNORMAL
EBV VCA IGG SER QL IA: >8 AI (ref 0–0.8)
EOSINOPHIL # BLD AUTO: 0.1 10E9/L (ref 0–0.7)
EOSINOPHIL NFR BLD AUTO: 5.2 %
ERYTHROCYTE [DISTWIDTH] IN BLOOD BY AUTOMATED COUNT: 13.2 % (ref 10–15)
GFR SERPL CREATININE-BSD FRML MDRD: 73 ML/MIN/{1.73_M2}
GLUCOSE SERPL-MCNC: 64 MG/DL (ref 70–99)
GLUCOSE UR STRIP-MCNC: NEGATIVE MG/DL
HCG SERPL QL: NEGATIVE
HCT VFR BLD AUTO: 33.9 % (ref 35–47)
HGB BLD-MCNC: 11.1 G/DL (ref 11.7–15.7)
HGB UR QL STRIP: ABNORMAL
HSV1 IGG SERPL QL IA: <0.2 AI (ref 0–0.8)
HSV2 IGG SERPL QL IA: >8 AI (ref 0–0.8)
IMM GRANULOCYTES # BLD: 0 10E9/L (ref 0–0.4)
IMM GRANULOCYTES NFR BLD: 0.5 %
INR PPP: 0.94 (ref 0.86–1.14)
KAPPA LC UR-MCNC: 2.42 MG/DL (ref 0.33–1.94)
KAPPA LC/LAMBDA SER: 1.79 {RATIO} (ref 0.26–1.65)
KETONES UR STRIP-MCNC: NEGATIVE MG/DL
LAMBDA LC SERPL-MCNC: 1.35 MG/DL (ref 0.57–2.63)
LDH SERPL L TO P-CCNC: 162 U/L (ref 81–234)
LEUKOCYTE ESTERASE UR QL STRIP: NEGATIVE
LYMPHOCYTES # BLD AUTO: 0.3 10E9/L (ref 0.8–5.3)
LYMPHOCYTES NFR BLD AUTO: 14.1 %
MAGNESIUM SERPL-MCNC: 2 MG/DL (ref 1.6–2.3)
MCH RBC QN AUTO: 35.8 PG (ref 26.5–33)
MCHC RBC AUTO-ENTMCNC: 32.7 G/DL (ref 31.5–36.5)
MCV RBC AUTO: 109 FL (ref 78–100)
MONOCYTES # BLD AUTO: 0.3 10E9/L (ref 0–1.3)
MONOCYTES NFR BLD AUTO: 17.7 %
MUCOUS THREADS #/AREA URNS LPF: PRESENT /LPF
NEUTROPHILS # BLD AUTO: 1.2 10E9/L (ref 1.6–8.3)
NEUTROPHILS NFR BLD AUTO: 61.5 %
NITRATE UR QL: NEGATIVE
NRBC # BLD AUTO: 0 10*3/UL
NRBC BLD AUTO-RTO: 0 /100
PH UR STRIP: 6 PH (ref 5–7)
PHOSPHATE SERPL-MCNC: 4 MG/DL (ref 2.5–4.5)
PLATELET # BLD AUTO: 233 10E9/L (ref 150–450)
POTASSIUM SERPL-SCNC: 3.6 MMOL/L (ref 3.4–5.3)
PROT SERPL-MCNC: 6 G/DL (ref 6.8–8.8)
RBC # BLD AUTO: 3.1 10E12/L (ref 3.8–5.2)
RBC #/AREA URNS AUTO: 22 /HPF (ref 0–2)
SODIUM SERPL-SCNC: 137 MMOL/L (ref 133–144)
SOURCE: ABNORMAL
SP GR UR STRIP: 1.01 (ref 1–1.03)
SPECIMEN EXP DATE BLD: NORMAL
SQUAMOUS #/AREA URNS AUTO: <1 /HPF (ref 0–1)
URATE SERPL-MCNC: 4 MG/DL (ref 2.6–6)
UROBILINOGEN UR STRIP-MCNC: 0 MG/DL (ref 0–2)
WBC # BLD AUTO: 1.9 10E9/L (ref 4–11)
WBC #/AREA URNS AUTO: 1 /HPF (ref 0–5)

## 2019-03-07 PROCEDURE — 82784 ASSAY IGA/IGD/IGG/IGM EACH: CPT | Performed by: INTERNAL MEDICINE

## 2019-03-07 PROCEDURE — 84703 CHORIONIC GONADOTROPIN ASSAY: CPT | Performed by: INTERNAL MEDICINE

## 2019-03-07 PROCEDURE — 86704 HEP B CORE ANTIBODY TOTAL: CPT | Performed by: INTERNAL MEDICINE

## 2019-03-07 PROCEDURE — 77075 RADEX OSSEOUS SURVEY COMPL: CPT

## 2019-03-07 PROCEDURE — 83735 ASSAY OF MAGNESIUM: CPT | Performed by: INTERNAL MEDICINE

## 2019-03-07 PROCEDURE — 81376 HLA II TYPING 1 LOCUS LR: CPT | Performed by: INTERNAL MEDICINE

## 2019-03-07 PROCEDURE — 86687 HTLV-I ANTIBODY: CPT | Performed by: INTERNAL MEDICINE

## 2019-03-07 PROCEDURE — 87340 HEPATITIS B SURFACE AG IA: CPT | Performed by: INTERNAL MEDICINE

## 2019-03-07 PROCEDURE — 86665 EPSTEIN-BARR CAPSID VCA: CPT | Performed by: INTERNAL MEDICINE

## 2019-03-07 PROCEDURE — 84100 ASSAY OF PHOSPHORUS: CPT | Performed by: INTERNAL MEDICINE

## 2019-03-07 PROCEDURE — 83883 ASSAY NEPHELOMETRY NOT SPEC: CPT | Performed by: INTERNAL MEDICINE

## 2019-03-07 PROCEDURE — 86780 TREPONEMA PALLIDUM: CPT | Performed by: INTERNAL MEDICINE

## 2019-03-07 PROCEDURE — 86803 HEPATITIS C AB TEST: CPT | Performed by: INTERNAL MEDICINE

## 2019-03-07 PROCEDURE — 86695 HERPES SIMPLEX TYPE 1 TEST: CPT | Performed by: INTERNAL MEDICINE

## 2019-03-07 PROCEDURE — 86900 BLOOD TYPING SEROLOGIC ABO: CPT | Performed by: INTERNAL MEDICINE

## 2019-03-07 PROCEDURE — 87535 HIV-1 PROBE&REVERSE TRNSCRPJ: CPT | Performed by: INTERNAL MEDICINE

## 2019-03-07 PROCEDURE — 81001 URINALYSIS AUTO W/SCOPE: CPT | Performed by: INTERNAL MEDICINE

## 2019-03-07 PROCEDURE — 87798 DETECT AGENT NOS DNA AMP: CPT | Performed by: INTERNAL MEDICINE

## 2019-03-07 PROCEDURE — 71046 X-RAY EXAM CHEST 2 VIEWS: CPT

## 2019-03-07 PROCEDURE — 86696 HERPES SIMPLEX TYPE 2 TEST: CPT | Performed by: INTERNAL MEDICINE

## 2019-03-07 PROCEDURE — 86850 RBC ANTIBODY SCREEN: CPT | Performed by: INTERNAL MEDICINE

## 2019-03-07 PROCEDURE — A9560 TC99M LABELED RBC: HCPCS | Performed by: INTERNAL MEDICINE

## 2019-03-07 PROCEDURE — 82232 ASSAY OF BETA-2 PROTEIN: CPT | Performed by: INTERNAL MEDICINE

## 2019-03-07 PROCEDURE — 84165 PROTEIN E-PHORESIS SERUM: CPT | Performed by: INTERNAL MEDICINE

## 2019-03-07 PROCEDURE — 87521 HEPATITIS C PROBE&RVRS TRNSC: CPT | Performed by: INTERNAL MEDICINE

## 2019-03-07 PROCEDURE — 87516 HEPATITIS B DNA AMP PROBE: CPT | Performed by: INTERNAL MEDICINE

## 2019-03-07 PROCEDURE — 86901 BLOOD TYPING SEROLOGIC RH(D): CPT | Performed by: INTERNAL MEDICINE

## 2019-03-07 PROCEDURE — 34300033 ZZH RX 343: Performed by: INTERNAL MEDICINE

## 2019-03-07 PROCEDURE — 00000402 ZZHCL STATISTIC TOTAL PROTEIN: Performed by: INTERNAL MEDICINE

## 2019-03-07 PROCEDURE — 86334 IMMUNOFIX E-PHORESIS SERUM: CPT | Performed by: INTERNAL MEDICINE

## 2019-03-07 PROCEDURE — 80053 COMPREHEN METABOLIC PANEL: CPT | Performed by: INTERNAL MEDICINE

## 2019-03-07 PROCEDURE — 86753 PROTOZOA ANTIBODY NOS: CPT | Performed by: INTERNAL MEDICINE

## 2019-03-07 PROCEDURE — 84550 ASSAY OF BLOOD/URIC ACID: CPT | Performed by: INTERNAL MEDICINE

## 2019-03-07 PROCEDURE — 86703 HIV-1/HIV-2 1 RESULT ANTBDY: CPT | Performed by: INTERNAL MEDICINE

## 2019-03-07 PROCEDURE — 78472 GATED HEART PLANAR SINGLE: CPT

## 2019-03-07 PROCEDURE — 82785 ASSAY OF IGE: CPT | Performed by: INTERNAL MEDICINE

## 2019-03-07 PROCEDURE — 85610 PROTHROMBIN TIME: CPT | Performed by: INTERNAL MEDICINE

## 2019-03-07 PROCEDURE — 83615 LACTATE (LD) (LDH) ENZYME: CPT | Performed by: INTERNAL MEDICINE

## 2019-03-07 PROCEDURE — 86644 CMV ANTIBODY: CPT | Performed by: INTERNAL MEDICINE

## 2019-03-07 PROCEDURE — 85730 THROMBOPLASTIN TIME PARTIAL: CPT | Performed by: INTERNAL MEDICINE

## 2019-03-07 PROCEDURE — 81370 HLA I & II TYPING LR: CPT | Performed by: INTERNAL MEDICINE

## 2019-03-07 PROCEDURE — 85025 COMPLETE CBC W/AUTO DIFF WBC: CPT | Performed by: INTERNAL MEDICINE

## 2019-03-07 RX ORDER — LOPERAMIDE HCL 2 MG
2 CAPSULE ORAL 4 TIMES DAILY PRN
COMMUNITY
End: 2019-09-19

## 2019-03-07 RX ORDER — SIMETHICONE 125 MG
125 TABLET,CHEWABLE ORAL 2 TIMES DAILY
COMMUNITY
End: 2019-09-19

## 2019-03-07 RX ADMIN — Medication 26.1 MCI.: at 11:43

## 2019-03-07 ASSESSMENT — PAIN SCALES - GENERAL: PAINLEVEL: NO PAIN (0)

## 2019-03-07 ASSESSMENT — MIFFLIN-ST. JEOR: SCORE: 1291.59

## 2019-03-07 NOTE — NURSING NOTE
"Oncology Rooming Note    March 7, 2019 8:52 AM   Adrianna Palomino is a 63 year old female who presents for:    Chief Complaint   Patient presents with     RECHECK     pt here for work up pre- bmt for MM     Initial Vitals: /76   Pulse 70   Temp 97.8  F (36.6  C) (Oral)   Ht 1.68 m (5' 6.14\")   Wt 71.8 kg (158 lb 3.2 oz)   LMP 03/01/2005   SpO2 98%   BMI 25.42 kg/m   Estimated body mass index is 25.42 kg/m  as calculated from the following:    Height as of this encounter: 1.68 m (5' 6.14\").    Weight as of this encounter: 71.8 kg (158 lb 3.2 oz). Body surface area is 1.83 meters squared.  No Pain (0) Comment: Data Unavailable   Patient's last menstrual period was 03/01/2005.  Allergies reviewed: Yes  Medications reviewed: Yes    Medications: Medication refills not needed today.  Pharmacy name entered into [facility]:    [facility] - [place], [place] - [address] [facility] [place], [place] - [address] [facility] [place] [address] [facility]  [facility] - [place], [place] - [address] [facility] [address] [place], [place] - [address] [place] [address] PHARMACY [place], [place] - [address] [facility] [phone]    Clinical concerns: Patient complained of a rash that started earlier this week. She reports it comes and goes on the legs, and is currently present on her arms and upper body. There a little bumps that are mostly skin color but slightly white. No pain itching, swelling present. She says she has had it in the past a while ago but not this severe. SABINA Perales looked at rash. Patient knows to put lotion on it for now and will watch.    ALAINA GUPTA RN              "

## 2019-03-07 NOTE — PROGRESS NOTES
Pharmacy Assessment - Pre-Stem Cell Transplant    Assessments & Recommendations:  1) Hold supplements (iron, fish oil, glucosamine) during acute phase of transplant  2) If not stopped prior, discontinue Aspirin on admission to hospital  3) Pt has sulfa allergy (rash), would suggest Pentamadine for PCP prophy.  4) Pt has been receiving Zometa (most recent dose 2/28/19), monitor Ca level closely and supplement as needed.      History of Present Illness:  Adrianna Palomino is a 63 year old year old female diagnosed with MM.  She has been treated with prior auto transplant in 2013, followed by Revlimid, and most recently Velcade/Revlimid/Dexamethasone.  She is now being work up for repeat Auto Stem Cell Transplant on protocol 2016-35, which utilizes Melphalan as a conditioning regimen.    Pertinent labs/tests:  Viral Testing:  pending  Ejection Fraction: pending  QTc:  pending    Weights:   Wt Readings from Last 3 Encounters:   03/07/19 71.8 kg (158 lb 3.2 oz)   11/13/18 74.3 kg (163 lb 11.2 oz)   04/21/16 70.2 kg (154 lb 11.2 oz)   Ideal body weight: 59.6 kg (131 lb 7.2 oz)  Adjusted ideal body weight: 64.5 kg (142 lb 2.4 oz)  % IBW:  120%  There is no height or weight on file to calculate BMI.    Primary BMT Physician: Ivone Real  BMT RN Coordinator:  Dr. Ganesh Browning    Past Medical History:  Past Medical History:   Diagnosis Date     Anemia      Compression fracture     T12, L1, L4     Macular degeneration 2011    Early AMD     Malignant neoplasm (H)      Migraine      Multiple myeloma (H)      Nonsenile cataract        Medication Allergies:  Allergies   Allergen Reactions     Amoxicillin Rash     Septra [Bactrim] Rash     Sulfa Drugs        Current Medications (pre-admit):  Current Outpatient Medications   Medication Sig Dispense Refill     acyclovir (ZOVIRAX) 400 MG tablet Take 400 mg by mouth 2 times daily        aspirin 325 MG tablet Take 325 mg by mouth daily       Calcium Carbonate (CALCIUM 600 PO) Take by  mouth 2 times daily       dexamethasone (DECADRON) 4 MG tablet Take 40 mg by mouth       ferrous fumarate (FERRETTS) 324 MG TABS Take 324 mg by mouth daily        fish oil-omega-3 fatty acids (FISH OIL) 1000 MG capsule Take 2 g by mouth daily.       fluticasone (FLONASE) 50 MCG/ACT nasal spray Spray 2 sprays into both nostrils daily (Patient not taking: Reported on 3/7/2019) 1 Package 1     LENalidomide (REVLIMID) 10 MG CAPS capsule CHEMOTHERAPY Take 1 capsule (10 mg) by mouth daily 28 capsule 0     loperamide (IMODIUM) 2 MG capsule Take 2 mg by mouth 4 times daily as needed for diarrhea       loratadine (CLARITIN) 10 MG tablet Take 10 mg by mouth as needed        Misc Natural Products (GLUCOSAMINE CHOND COMPLEX/MSM) TABS Take  by mouth daily.       MULTIVITAMINS OR TABS ONE DAILY 100 3     prochlorperazine (COMPAZINE) 10 MG tablet Take 10 mg by mouth       simethicone (MYLICON) 125 MG chewable tablet Take 125 mg by mouth 2 times daily       tiZANidine (ZANAFLEX) 2 MG tablet Take 2-4 mg by mouth         Herbal Medication/Nutritional Supplements:  Per med list above.     Smoking/Past Drug Use:  None reported    Nausea/Vomiting, Pain, or other issues:  Patient reports nausea well controlled in the past but does not remember which medication was most effective.  Patient denies pain and is not currently taking pain medications for breakthrough relief.    Summary:  I met with Adrianna Palomino for approximately 30 minutes. We discussed her current and upcoming transplant medication list including the conditioning chemotherapy, antiemetics, prophylactic antibiotics and vaccinations. Adrianna Palomino and her caregiver participated in our conversation and voiced their understanding of the topics discussed. Thank you for allowing me to participate in the care of this patient.    Pippa Hassan, PharmD

## 2019-03-07 NOTE — PROGRESS NOTES
BMT Teaching Flowsheet    Adrianna Palomino is a 63 year old female  Diagnoses of Multiple myeloma (H) and Personal history of diseases of blood and blood-forming organs were pertinent to this visit.    Teaching Topic: RN had patient sign Hippa form and consents for BMT before drawing work up labs. RN also had patient collect a urine specimen for a UA. RN gave patient a 24 hour urine jug and advised on how to complete this and also to complete it as soon as she is able to. Also RN went through calender of appointments for work up week as well as answered questions about where they are all located. Patient and significant other verbalized understanding and agreed with the plan.    Person(s) involved in teaching: Patient and Spouse  Motivation Level  Asks Questions: Yes  Eager to Learn: Yes  Cooperative: Yes  Receptive (willing/able to accept information): Yes  Any cultural factors/Shinto beliefs that may influence understanding or compliance? No    Patient demonstrates understanding of the following:  - Reason for the appointment, diagnosis and treatment plan: Yes  - Knowledge of proper use of medications and conditions for which they are ordered (with special attention to potential side effects or drug interactions): Yes  - Which situations necessitate calling provider and whom to contact: Yes    Teaching concerns addressed: all questions and concerns were answered    Proper use and care of (medical equipment, care aids, etc.) Yes  Pain management techniques: Yes  Patient instructed on hand hygiene: Yes  How and/when to access community resources: Yes    Infection Control:  Patient demonstrates understanding of the following:  Surgical procedure site care taught Yes  Signs and symptoms of infection taught Yes  Wound care taught Yes  Central venous catheter care taught Yes    Instructional Materials Used/Given: verbal instructions were given and a copy of the calender was given to the patient

## 2019-03-08 ENCOUNTER — OFFICE VISIT (OUTPATIENT)
Dept: TRANSPLANT | Facility: CLINIC | Age: 64
End: 2019-03-08
Attending: INTERNAL MEDICINE
Payer: COMMERCIAL

## 2019-03-08 ENCOUNTER — OFFICE VISIT (OUTPATIENT)
Dept: TRANSPLANT | Facility: CLINIC | Age: 64
End: 2019-03-08
Attending: PHYSICIAN ASSISTANT
Payer: COMMERCIAL

## 2019-03-08 ENCOUNTER — ANCILLARY PROCEDURE (OUTPATIENT)
Dept: CT IMAGING | Facility: CLINIC | Age: 64
End: 2019-03-08
Attending: INTERNAL MEDICINE
Payer: COMMERCIAL

## 2019-03-08 VITALS
HEART RATE: 60 BPM | RESPIRATION RATE: 18 BRPM | SYSTOLIC BLOOD PRESSURE: 129 MMHG | DIASTOLIC BLOOD PRESSURE: 86 MMHG | OXYGEN SATURATION: 96 % | TEMPERATURE: 99.1 F

## 2019-03-08 DIAGNOSIS — C90.00 MULTIPLE MYELOMA WITHOUT REMISSION (H): Primary | ICD-10-CM

## 2019-03-08 DIAGNOSIS — Z86.2 PERSONAL HISTORY OF DISEASES OF BLOOD AND BLOOD-FORMING ORGANS: ICD-10-CM

## 2019-03-08 DIAGNOSIS — C90.00 MULTIPLE MYELOMA, REMISSION STATUS UNSPECIFIED (H): ICD-10-CM

## 2019-03-08 DIAGNOSIS — C90.00 MULTIPLE MYELOMA (H): ICD-10-CM

## 2019-03-08 LAB
APTT PPP: 28 SEC (ref 22–37)
BASOPHILS # BLD AUTO: 0.1 10E9/L (ref 0–0.2)
BASOPHILS NFR BLD AUTO: 1.6 %
DIFFERENTIAL METHOD BLD: ABNORMAL
EOSINOPHIL # BLD AUTO: 0.1 10E9/L (ref 0–0.7)
EOSINOPHIL NFR BLD AUTO: 3.6 %
ERYTHROCYTE [DISTWIDTH] IN BLOOD BY AUTOMATED COUNT: 13.1 % (ref 10–15)
HCT VFR BLD AUTO: 34.5 % (ref 35–47)
HGB BLD-MCNC: 11.8 G/DL (ref 11.7–15.7)
IGA SERPL-MCNC: 9 MG/DL (ref 70–380)
IGE SERPL-ACNC: 14 KIU/L (ref 0–114)
IGG SERPL-MCNC: 537 MG/DL (ref 695–1620)
IGM SERPL-MCNC: 4 MG/DL (ref 60–265)
IMM GRANULOCYTES # BLD: 0 10E9/L (ref 0–0.4)
IMM GRANULOCYTES NFR BLD: 0.3 %
INR PPP: 0.91 (ref 0.86–1.14)
LYMPHOCYTES # BLD AUTO: 0.4 10E9/L (ref 0.8–5.3)
LYMPHOCYTES NFR BLD AUTO: 11.5 %
MCH RBC QN AUTO: 37.6 PG (ref 26.5–33)
MCHC RBC AUTO-ENTMCNC: 34.2 G/DL (ref 31.5–36.5)
MCV RBC AUTO: 110 FL (ref 78–100)
MONOCYTES # BLD AUTO: 0.5 10E9/L (ref 0–1.3)
MONOCYTES NFR BLD AUTO: 17.4 %
NEUTROPHILS # BLD AUTO: 2 10E9/L (ref 1.6–8.3)
NEUTROPHILS NFR BLD AUTO: 65.6 %
NRBC # BLD AUTO: 0 10*3/UL
NRBC BLD AUTO-RTO: 0 /100
PLATELET # BLD AUTO: 244 10E9/L (ref 150–450)
PROT PATTERN SERPL IFE-IMP: ABNORMAL
RADIOLOGIST FLAGS: NORMAL
RBC # BLD AUTO: 3.14 10E12/L (ref 3.8–5.2)
WBC # BLD AUTO: 3.1 10E9/L (ref 4–11)

## 2019-03-08 PROCEDURE — 85025 COMPLETE CBC W/AUTO DIFF WBC: CPT | Performed by: INTERNAL MEDICINE

## 2019-03-08 PROCEDURE — 25000128 H RX IP 250 OP 636: Mod: ZF | Performed by: PHYSICIAN ASSISTANT

## 2019-03-08 PROCEDURE — 88271 CYTOGENETICS DNA PROBE: CPT | Performed by: INTERNAL MEDICINE

## 2019-03-08 PROCEDURE — 88185 FLOWCYTOMETRY/TC ADD-ON: CPT | Performed by: INTERNAL MEDICINE

## 2019-03-08 PROCEDURE — 88237 TISSUE CULTURE BONE MARROW: CPT | Performed by: INTERNAL MEDICINE

## 2019-03-08 PROCEDURE — 40000611 ZZHCL STATISTIC MORPHOLOGY W/INTERP HEMEPATH TC 85060: Performed by: INTERNAL MEDICINE

## 2019-03-08 PROCEDURE — 88275 CYTOGENETICS 100-300: CPT | Performed by: INTERNAL MEDICINE

## 2019-03-08 PROCEDURE — 85610 PROTHROMBIN TIME: CPT | Performed by: INTERNAL MEDICINE

## 2019-03-08 PROCEDURE — 88342 IMHCHEM/IMCYTCHM 1ST ANTB: CPT | Performed by: INTERNAL MEDICINE

## 2019-03-08 PROCEDURE — 93010 ELECTROCARDIOGRAM REPORT: CPT | Mod: ZP | Performed by: INTERNAL MEDICINE

## 2019-03-08 PROCEDURE — 81261 IGH GENE REARRANGE AMP METH: CPT | Performed by: INTERNAL MEDICINE

## 2019-03-08 PROCEDURE — 88311 DECALCIFY TISSUE: CPT | Performed by: INTERNAL MEDICINE

## 2019-03-08 PROCEDURE — 88305 TISSUE EXAM BY PATHOLOGIST: CPT | Performed by: INTERNAL MEDICINE

## 2019-03-08 PROCEDURE — 40001004 ZZHCL STATISTIC FLOW INT 9-15 ABY TC 88188: Performed by: INTERNAL MEDICINE

## 2019-03-08 PROCEDURE — 85730 THROMBOPLASTIN TIME PARTIAL: CPT | Performed by: INTERNAL MEDICINE

## 2019-03-08 PROCEDURE — 40000951 ZZHCL STATISTIC BONE MARROW INTERP TC 85097: Performed by: INTERNAL MEDICINE

## 2019-03-08 PROCEDURE — 88341 IMHCHEM/IMCYTCHM EA ADD ANTB: CPT | Performed by: INTERNAL MEDICINE

## 2019-03-08 PROCEDURE — 00000161 ZZHCL STATISTIC H-SPHEME PROCESS B/S: Performed by: INTERNAL MEDICINE

## 2019-03-08 PROCEDURE — 93005 ELECTROCARDIOGRAM TRACING: CPT | Mod: ZF

## 2019-03-08 PROCEDURE — 88161 CYTOPATH SMEAR OTHER SOURCE: CPT | Performed by: INTERNAL MEDICINE

## 2019-03-08 PROCEDURE — 36415 COLL VENOUS BLD VENIPUNCTURE: CPT

## 2019-03-08 PROCEDURE — 00000058 ZZHCL STATISTIC BONE MARROW ASP PERF TC 38220: Performed by: INTERNAL MEDICINE

## 2019-03-08 PROCEDURE — 88184 FLOWCYTOMETRY/ TC 1 MARKER: CPT | Performed by: INTERNAL MEDICINE

## 2019-03-08 PROCEDURE — 88264 CHROMOSOME ANALYSIS 20-25: CPT | Performed by: INTERNAL MEDICINE

## 2019-03-08 PROCEDURE — 88280 CHROMOSOME KARYOTYPE STUDY: CPT | Performed by: INTERNAL MEDICINE

## 2019-03-08 PROCEDURE — 40000424 ZZHCL STATISTIC BONE MARROW CORE PERF TC 38221: Performed by: INTERNAL MEDICINE

## 2019-03-08 RX ORDER — IOPAMIDOL 755 MG/ML
78 INJECTION, SOLUTION INTRAVASCULAR ONCE
Status: COMPLETED | OUTPATIENT
Start: 2019-03-08 | End: 2019-03-08

## 2019-03-08 RX ADMIN — MIDAZOLAM HYDROCHLORIDE 2 MG: 2 INJECTION, SOLUTION INTRAMUSCULAR; INTRAVENOUS at 13:55

## 2019-03-08 RX ADMIN — IOPAMIDOL 78 ML: 755 INJECTION, SOLUTION INTRAVASCULAR at 16:01

## 2019-03-08 ASSESSMENT — PAIN SCALES - GENERAL
PAINLEVEL: NO PAIN (0)
PAINLEVEL: NO PAIN (0)

## 2019-03-08 NOTE — PROGRESS NOTES
BMT ONC Adult Bone Marrow Biopsy Procedure Note  March 8, 2019  /69 (BP Location: Right arm, Patient Position: Sitting, Cuff Size: Adult Regular)   Pulse 64   Temp 99.1  F (37.3  C) (Oral)   Resp 18   LMP 03/01/2005   SpO2 98%      Learning needs assessment complete within 12 months? YES    DIAGNOSIS: MM- work up prior to 2nd auto.      PROCEDURE: Unilateral Bone Marrow Biopsy and Unilateral Aspirate    LOCATION: [facility] 2nd Floor    Patient s identification was positively verified by verbal identification and invasive procedure safety checklist was completed. Informed consent was obtained. Following the administration of Midazolam as pre-medication, patient was placed in the prone position and prepped and draped in a sterile manner. Approximately 15 cc of 1% Lidocaine was used over the right posterior iliac spine. Following this a 3 mm incision was made. Trephine bone marrow core(s) was (were) obtained from the [facility]. Bone marrow aspirates were obtained from the [facility]. Aspirates were sent for morphology, immunophenotyping, cytogenetics and molecular diagnostics process and hold. A total of approximately 30 ml of marrow was aspirated. Following this procedure a sterile dressing was applied to the bone marrow biopsy site(s). The patient was placed in the supine position to maintain pressure on the biopsy site. Post-procedure wound care instructions were given.     Complications: hard bones- obtained core but had some compression (advanced at least 20-25mm) but only obtained 9mm core. Able to anchor for aspirates be needed Bolivar Berry to assist to muscle fatigue.     Pre-procedural pain: 0 out of 10 on the numeric pain rating scale.     Procedural pain: 4 out of 10 on the numeric pain rating scale.     Post-procedural pain assessment: 0 out of 10 on the numeric pain rating scale.     Interventions: NO    Length of procedure:46 minutes to 1 hour    Procedure performed by: Naomi Thayer PA-C  Assisted by Bolivar  SHELL Berry    [phone]

## 2019-03-08 NOTE — DISCHARGE INSTRUCTIONS

## 2019-03-08 NOTE — NURSING NOTE
Chief Complaint   Patient presents with     Blood Draw     Labs drawn via PIV by Rn in lab. Line flushed with saline. VS taken.      Hortencia Lucas RN

## 2019-03-08 NOTE — NURSING NOTE
Post biopsy vitals completed while pt in sitting position, IV removed successfully, pt instructed to leave coban on for 30 minutes, biopsy dressing dried and intact. Pt discharged to caregiver. Pt tolerated well.  Jennifer Soriano MA

## 2019-03-08 NOTE — NURSING NOTE
BMT Teaching Flowsheet    Adrianna Palomino is a 63 year old female  The primary encounter diagnosis was Multiple myeloma without remission (H). Diagnoses of Multiple myeloma (H) and Personal history of diseases of blood and blood-forming organs were also pertinent to this visit.    Teaching Topic: bmbx    Person(s) involved in teaching: Patient  Motivation Level  Asks Questions: Yes  Eager to Learn: Yes  Cooperative: Yes  Receptive (willing/able to accept information): Yes  Any cultural factors/Roman Catholic beliefs that may influence understanding or compliance? No    Patient demonstrates understanding of the following:  - Reason for the appointment, diagnosis and treatment plan: Yes  - Knowledge of proper use of medications and conditions for which they are ordered (with special attention to potential side effects or drug interactions): Yes  - Which situations necessitate calling provider and whom to contact: Yes    Teaching concerns addressed: activity level, pain management, site care    Time spent with patient: 30 minutes.    Specific Concerns: No, explain: Patient received Versed.  Tolerated well.  No complaints of pain post procedure.  Site is clean dry and intact.

## 2019-03-11 ENCOUNTER — HOSPITAL ENCOUNTER (OUTPATIENT)
Facility: CLINIC | Age: 64
Setting detail: SPECIMEN
Discharge: HOME | End: 2019-03-11
Attending: INTERNAL MEDICINE | Admitting: INTERNAL MEDICINE
Payer: COMMERCIAL

## 2019-03-11 DIAGNOSIS — C90.00 MULTIPLE MYELOMA (H): Primary | ICD-10-CM

## 2019-03-11 DIAGNOSIS — C90.00 MULTIPLE MYELOMA, REMISSION STATUS UNSPECIFIED (H): Primary | ICD-10-CM

## 2019-03-11 LAB
ALBUMIN SERPL ELPH-MCNC: 3.9 G/DL (ref 3.7–5.1)
ALPHA1 GLOB SERPL ELPH-MCNC: 0.3 G/DL (ref 0.2–0.4)
ALPHA2 GLOB SERPL ELPH-MCNC: 0.7 G/DL (ref 0.5–0.9)
B-GLOBULIN SERPL ELPH-MCNC: 0.6 G/DL (ref 0.6–1)
COLLECT DURATION TIME UR: 24 H
COLLECT DURATION TIME UR: 24 H
COPATH REPORT: NORMAL
COPATH REPORT: NORMAL
CREAT 24H UR-MRATE: 1.29 G/(24.H) (ref 0.8–1.8)
CREAT UR-MCNC: 49 MG/DL
DONOR CYTOMEGALOVIRUS ABY: NONREACTIVE
DONOR HEP B CORE ABY: NONREACTIVE
DONOR HEP B SURF AGN: NONREACTIVE
DONOR HEPATITIS C ABY: NONREACTIVE
DONOR HTLV 1&2 ANTIBODY: NONREACTIVE
DONOR TREPONEMA PAL ABY: NONREACTIVE
GAMMA GLOB SERPL ELPH-MCNC: 0.5 G/DL (ref 0.7–1.6)
HIV1+2 AB SERPL QL IA: NONREACTIVE
INTERPRETATION ECG - MUSE: NORMAL
M PROTEIN SERPL ELPH-MCNC: 0.3 G/DL
MPX SERIES: NONREACTIVE
PROT 24H UR-MRATE: 0.63 G/(24.H) (ref 0.04–0.23)
PROT PATTERN SERPL ELPH-IMP: ABNORMAL
PROT UR-MCNC: 0.24 G/L
PROT/CREAT 24H UR: 0.49 G/G CR (ref 0–0.2)
SPECIMEN VOL UR: 2610 ML
SPECIMEN VOL UR: 2610 ML
T CRUZI AB SER DONR QL: NONREACTIVE
WNV RNA SPEC QL NAA+PROBE: NONREACTIVE

## 2019-03-11 PROCEDURE — 81050 URINALYSIS VOLUME MEASURE: CPT | Performed by: INTERNAL MEDICINE

## 2019-03-11 PROCEDURE — 84166 PROTEIN E-PHORESIS/URINE/CSF: CPT | Performed by: INTERNAL MEDICINE

## 2019-03-11 PROCEDURE — 86335 IMMUNFIX E-PHORSIS/URINE/CSF: CPT | Performed by: INTERNAL MEDICINE

## 2019-03-11 PROCEDURE — 00000095 ZZHCL STATISTIC CREATININE CLEARANCE: Performed by: INTERNAL MEDICINE

## 2019-03-11 NOTE — PROGRESS NOTES
BMT Teaching Flowsheet    Adrianna Palomino is a 63 year old female  Diagnoses of Multiple myeloma (H) and Personal history of diseases of blood and blood-forming organs were pertinent to this visit.    Teaching Topic: auto transplant    Person(s) involved in teaching: Patient and Spouse  Motivation Level  Asks Questions: Yes  Eager to Learn: Yes  Cooperative: Yes  Receptive (willing/able to accept information): Yes  Any cultural factors/Hindu beliefs that may influence understanding or compliance? No    Patient and Family demonstrates understanding of the following:  - Reason for the appointment, diagnosis and treatment plan: Yes  - Knowledge of proper use of medications and conditions for which they are ordered (with special attention to potential side effects or drug interactions): Yes  - Which situations necessitate calling provider and whom to contact: Yes    Teaching concerns addressed: none at this time    Proper use and care of (medical equipment, care aids, etc.) Yes  Pain management techniques: Yes  Patient instructed on hand hygiene: Yes  How and/when to access community resources: Yes    Infection Control:  Patient and Family demonstrates understanding of the following:  Surgical procedure site care taught NA  Signs and symptoms of infection taught Yes  Wound care taught NA  Central venous catheter care taught NA    Instructional Materials Used/Given:  teaching book including calendars, consents, med info as well as contact information. Also reviewed the discharge teaching guidelines, and routines of both the inpt as well as outpt clinic. Teachback method  Used. RAC to be placed at admission as pt already has some cells stored from her first transplant.       Time spent with patient: 60 minutes.    Specific Concerns: NA

## 2019-03-12 ENCOUNTER — OFFICE VISIT (OUTPATIENT)
Dept: INTERVENTIONAL RADIOLOGY/VASCULAR | Facility: CLINIC | Age: 64
End: 2019-03-12
Attending: INTERNAL MEDICINE
Payer: COMMERCIAL

## 2019-03-12 ENCOUNTER — APPOINTMENT (OUTPATIENT)
Dept: EDUCATION SERVICES | Facility: CLINIC | Age: 64
End: 2019-03-12
Attending: CLINICAL NURSE SPECIALIST
Payer: COMMERCIAL

## 2019-03-12 VITALS — DIASTOLIC BLOOD PRESSURE: 69 MMHG | HEART RATE: 65 BPM | SYSTOLIC BLOOD PRESSURE: 116 MMHG | OXYGEN SATURATION: 97 %

## 2019-03-12 DIAGNOSIS — C90.00 MULTIPLE MYELOMA (H): ICD-10-CM

## 2019-03-12 DIAGNOSIS — Z86.2 PERSONAL HISTORY OF DISEASES OF BLOOD AND BLOOD-FORMING ORGANS: ICD-10-CM

## 2019-03-12 DIAGNOSIS — C90.00 MULTIPLE MYELOMA, REMISSION STATUS UNSPECIFIED (H): ICD-10-CM

## 2019-03-12 LAB
A* LOCUS: NORMAL
A*: NORMAL
ABSSOP COMMENTS: NORMAL
ABTEST METHOD: NORMAL
ALBUMIN MFR UR ELPH: 86.2 %
ALPHA1 GLOB MFR UR ELPH: 2.6 %
ALPHA2 GLOB MFR UR ELPH: 2.1 %
B* LOCUS: NORMAL
B*: NORMAL
B-GLOBULIN MFR UR ELPH: 5.7 %
BW-1: NORMAL
C* LOCUS: NORMAL
C*: NORMAL
CREAT CL 24H UR+SERPL-VRATE: 118 ML/MIN
CREAT CL/1.73 SQ M 24H UR+SERPL-ARVRAT: 113 ML/MIN/1.7M2 (ref 100–160)
CREAT SERPL-MCNC: 0.75 MG/DL (ref 0.52–1.04)
CREAT SERPL-MCNC: 0.75 MG/DL (ref 0.52–1.04)
CREAT UR-MCNC: 49 MG/DL
DPA1* NMDP: NORMAL
DPA1*: NORMAL
DPB1* LOCUS NMDP: NORMAL
DPB1* NMDP: NORMAL
DPB1*: NORMAL
DPB1*LOCUS: NORMAL
DQA1*: NORMAL
DQA1*LOCUS: NORMAL
DQB1* LOCUS: NORMAL
DQB1*: NORMAL
DRB1* LOCUS: NORMAL
DRB1*: NORMAL
DRB3* LOCUS: NORMAL
DRB5*: NORMAL
DRSSO COMMENTS: NORMAL
DRSSO TEST METHOD: NORMAL
GAMMA GLOB MFR UR ELPH: 3.4 %
GFR SERPL CREATININE-BSD FRML MDRD: 85 ML/MIN/{1.73_M2}
HEIGHT IN CM: 168 CM
M PROTEIN MFR UR ELPH: 0 %
PROT ELPH PNL UR ELPH: NORMAL
PROT PATTERN UR ELPH-IMP: ABNORMAL

## 2019-03-12 PROCEDURE — 82565 ASSAY OF CREATININE: CPT | Performed by: INTERNAL MEDICINE

## 2019-03-12 ASSESSMENT — ENCOUNTER SYMPTOMS
CONSTIPATION: 0
BRUISES/BLEEDS EASILY: 1
DISTURBANCES IN COORDINATION: 0
LIGHT-HEADEDNESS: 1
ABDOMINAL PAIN: 0
PALPITATIONS: 1
DIARRHEA: 1
NAUSEA: 0
JAUNDICE: 0
DIZZINESS: 1
BLOATING: 1
LOSS OF CONSCIOUSNESS: 0
SKIN CHANGES: 0
SLEEP DISTURBANCES DUE TO BREATHING: 0
SPEECH CHANGE: 0
PARALYSIS: 0
NAIL CHANGES: 0
VOMITING: 0
HYPERTENSION: 1
SYNCOPE: 0
HEADACHES: 1
HYPOTENSION: 1
ORTHOPNEA: 0
SWOLLEN GLANDS: 0
SEIZURES: 0
POOR WOUND HEALING: 0
BLOOD IN STOOL: 0
EXERCISE INTOLERANCE: 0
BOWEL INCONTINENCE: 0
LEG PAIN: 0
WEAKNESS: 0
HEARTBURN: 0
TINGLING: 0
NUMBNESS: 0
RECTAL PAIN: 0
MEMORY LOSS: 0
TREMORS: 0

## 2019-03-12 NOTE — LETTER
3/12/2019       RE: Adrianna Palomino  [address]  [place] [address]     Dear Colleague,    Thank you for referring your patient, Adrianna Palomino, to the [facility] INTERVENTIONAL RADIOLOGY at [facility]. Please see a copy of my visit note below.    First Name: Adrianna  Age: 63 year old   Referring Physician: Dr. Crespo   REASON FOR REFERRAL: Education and evaluation for tunneled catheter placement  Patient is undergoing w/u for autologous BMT, using her own stem cells as the donor  (cells have already been collected)    HPI:  This is a  patient with a Past Medical History of standard risk IgG kappa multiple myeloma s/p autologous stem cell transplantation 4/5/2013, now with relapsed standard risk IgG kappa myeloma, with normal cytogenetics and additional copies of D5S23/Y3P455 , CEP9 , CEP11, and CEP15 suggesting polysomy, with disease relapse (boiochenmical progression and new lytic lesions), started on  VRD therapy, has received 4 cycles.  She is now undergoing work up for autologous BMT (she collected the stem cells with her first transplant.    LINE HX:    1. 3/15/13-5/10/13:  Right internal jugular vein large bore double lumen tunneled catheter.  Removed when it was no longer needed.    PAST MEDICAL HISTORY:   Past Medical History:   Diagnosis Date     Anemia      Compression fracture     T12, L1, L4     Macular degeneration 2011    Early AMD     Malignant neoplasm (H)      Migraine      Multiple myeloma (H) 2006     Nonsenile cataract      PAST SURGICAL HISTORY:   Past Surgical History:   Procedure Laterality Date     ARTHROSCOPY KNEE WITH LATERAL MENISCECTOMY  9/27/2013    Procedure: ARTHROSCOPY KNEE WITH LATERAL MENISCECTOMY;  Left Knee Arthroscopy,  Partial Lateral Meniscectomy       EXCISE GANGLION FOOT  10/28/2011    Procedure:EXCISE GANGLION FOOT; Excision of Ganglion Cyst Left Foot; Surgeon:STEPHEN IBRAHIM; Location:[place] [facility]     HC BONE  MARROW/STEM TRANSPLANT AUTOLOGOUS  2013     HC COLONOSCOPY THRU STOMA, DIAGNOSTIC  2005    needs repeat 2010     HC REMOVAL OF TONSILS,12+ Y/O  1974    Tonsils 12+y.o.     ORTHOPEDIC SURGERY       SURGICAL HISTORY OF -       left bunionectomy and hammer toe correction     FAMILY HISTORY:   Family History   Problem Relation Age of Onset     C.A.D. Paternal Grandfather          of MI 51 yo     Breast Cancer Paternal Grandmother      Hypertension Father      Cancer - colorectal Maternal Grandfather         late 60's to early 70's     Circulatory Maternal Grandfather         migraines     Circulatory Mother         migraines     Circulatory Sister         migraines     Cancer Sister         hodgkins     Diabetes No family hx of      Cerebrovascular Disease No family hx of      Glaucoma No family hx of      Macular Degeneration No family hx of      SOCIAL HISTORY:   Social History     Tobacco Use     Smoking status: Former Smoker     Packs/day: 1.00     Years: 15.00     Pack years: 15.00     Last attempt to quit: 1995     Years since quittin.3     Smokeless tobacco: Never Used     Tobacco comment: occ drags 17 years  smokes   Substance Use Topics     Alcohol use: Yes     Alcohol/week: 7.0 oz     Types: 14 Standard drinks or equivalent per week     Comment: CAGE neg     PROBLEM LIST:   Patient Active Problem List    Diagnosis Date Noted     Multiple myeloma (H) 2006     Priority: High     2006 on thalidomide/steroids with good results, Ananda Cortes - hematology =- [facility]  updating diagnosis code for icd10 cutover       Nonexudative senile macular degeneration of retina 2014     Priority: Medium     Cataracts, bilateral 2014     Priority: Medium     Status post bone marrow transplant (H) 2013     Priority: Medium     Compression fracture 2012     Priority: Medium     T6, T8, T9, T12, L1, L4       Health Care Home 2011     Priority:  Medium     High priority patient - 4/19/11    DX V65.8 REPLACED WITH [id number] (04/08/2013)       Elevated liver enzymes 10/15/2010     Priority: Medium     ? secondary to ETOH use       Diverticulosis 10/08/2010     Priority: Medium     colonoscopy 2010  asymptomatic        Dysfunctional alcohol use 09/10/2010     Priority: Medium     Hyperlipidemia LDL goal <160 09/29/2008     Priority: Medium     Has great HDL       Osteoporosis 12/07/2005     Priority: Medium     Problem list name updated by automated process. Provider to review       Other type of migraine 05/03/2005     Priority: Medium     Diagnosis updated by automated process. Provider to review and confirm.       History of colonic polyps 05/03/2005     Priority: Medium     2005 - only diverticulosis, adenomatous polyps previous colonoscopy   Recheck 2010    Problem list name updated by automated process. Provider to review       Advanced directives, counseling/discussion 10/04/2011     Priority: Low     Advance Directive Problem List Overview:   Name Relationship Phone    Primary Health Care Agent            Alternative Health Care Agent          Discussed advance care planning with patient; information given to patient to review. 10/4/2011         MEDICATIONS:   Prescription Medications as of 3/12/2019       Rx Number Disp Refills Start End Last Dispensed Date Next Fill Date Owning Pharmacy    acyclovir (ZOVIRAX) 400 MG tablet    10/19/2018        Sig: Take 400 mg by mouth 2 times daily     Class: Historical    Route: Oral    aspirin 325 MG tablet            Sig: Take 325 mg by mouth daily    Class: Historical    Route: Oral    Calcium Carbonate (CALCIUM 600 PO)            Sig: Take by mouth 2 times daily    Class: Historical    Route: Oral    dexamethasone (DECADRON) 4 MG tablet    10/19/2018        Sig: Take 40 mg by mouth    Class: Historical    Route: Oral    ferrous fumarate (FERRETTS) 324 MG TABS            Sig: Take 324 mg by mouth  daily     Class: Historical    Route: Oral    fish oil-omega-3 fatty acids (FISH OIL) 1000 MG capsule            Sig: Take 2 g by mouth daily.    Class: Historical    Route: Oral    loperamide (IMODIUM) 2 MG capsule            Sig: Take 2 mg by mouth 4 times daily as needed for diarrhea    Class: Historical    Route: Oral    loratadine (CLARITIN) 10 MG tablet            Sig: Take 10 mg by mouth as needed     Class: Historical    Route: Oral    Misc Natural Products (GLUCOSAMINE CHOND COMPLEX/MSM) TABS            Sig: Take  by mouth daily.    Class: Historical    Route: Oral    MULTIVITAMINS OR TABS  100 3 8/25/2009    [facility] [place], [place] - [address] [facility]    Sig: ONE DAILY    Class: Historical    Route: Oral    prochlorperazine (COMPAZINE) 10 MG tablet    10/19/2018        Sig: Take 10 mg by mouth    Class: Historical    Route: Oral    simethicone (MYLICON) 125 MG chewable tablet            Sig: Take 125 mg by mouth 2 times daily    Class: Historical    Route: Oral    tiZANidine (ZANAFLEX) 2 MG tablet    9/10/2018        Sig: Take 2-4 mg by mouth    Class: Historical    Route: Oral    fluticasone (FLONASE) 50 MCG/ACT nasal spray  1 Package 1 1/9/2015    [facility] [place], [place] - [address] [place] [address] [id number]    Sig: Spray 2 sprays into both nostrils daily    Class: E-Prescribe    Route: Both Nostrils    LENalidomide (REVLIMID) 10 MG CAPS capsule CHEMOTHERAPY  28 capsule 0 5/17/2016    [facility] - [place], [place] - [address] [place] [address]    Sig: Take 1 capsule (10 mg) by mouth daily    Class: E-Prescribe    Notes to Pharmacy: auth # [id number].  Adult female not of reproductive capacity.    Route: Oral        ALLERGIES:    Allergies   Allergen Reactions     Amoxicillin Rash     Septra [Bactrim] Rash     Sulfa Drugs      VITALS: /69   Pulse 65   LMP 03/01/2005   SpO2 97%       Physical Examination: Vital signs are reviewed and the are  stable  Constitutional: Pleasant, older woman, in no acute physical distress, came with her , Charles to the appt  Neck: Neck supple. No adenopathy.   Musculoskeletal: extremities normal- no gross deformities noted, gait normal and normal muscle tone  Skin: no suspicious lesions or rashes  Neurologic: negative  Psychiatric: affect normal/bright and mentation appears normal.    RESULTS:  BMP RESULTS:  Lab Results   Component Value Date     03/07/2019    POTASSIUM 3.6 03/07/2019    CHLORIDE 102 03/07/2019    CO2 29 03/07/2019    ANIONGAP 6 03/07/2019    GLC 64 (L) 03/07/2019    BUN 10 03/07/2019    CR 0.75 03/12/2019    GFRESTIMATED 85 03/12/2019    GFRESTBLACK >90 03/12/2019    LEE 8.4 (L) 03/07/2019        CBC RESULTS:  Lab Results   Component Value Date    WBC 3.1 (L) 03/08/2019    RBC 3.14 (L) 03/08/2019    HGB 11.8 03/08/2019    HCT 34.5 (L) 03/08/2019     (H) 03/08/2019    MCH 37.6 (H) 03/08/2019    MCHC 34.2 03/08/2019    RDW 13.1 03/08/2019     03/08/2019       INR/PTT:  Lab Results   Component Value Date    INR 0.91 03/08/2019    PTT 28 03/08/2019       ASSESSMENT/PLAN:  Type of catheter: 9.5 Fr.  Double lumen tunneled power Carrion     Preferred Location: Right  Internal Jugular Vein     Platelet count is   Lab Results   Component Value Date     03/08/2019    , and coagulation factors are   Lab Results   Component Value Date    INR 0.91 03/08/2019    ,  Lab Results   Component Value Date    PTT 28 03/08/2019    The patient is at low risk for bleeding during the procedure.    PROVIDER NOTE:  The tunneled catheter placement procedure and its risks including but not limited to bleeding, infection, fibrin sheath formation and blood clots was explained to Maeve and her , Charles..    CONSENT: Affirmation of informed written consent was not obtained.     INSTRUCTIONS/GUIDELINES:   Eating and drinking restriction guidelines were reviewed., Anti-bacterial scrub was given and  instructions for its use were reviewed to decrease the risk of infection on the day of the procedure., I explained the expected length of time the tunneled catheter could remain in place., I explained that when they went to the [facility] they would be taught about exit site dressing care, flushing of the lumens and how to care for the catheter when bathing. and The role of Interventional Radiology was reviewed.     30 minutes was spent with Maeve and her , Charles.  25 minutes was spent in counseling.    Deisy Vila MS, APRN, CNS, CRN  Clinical Nurse Specialist  Interventional Radiology  [phone] (voice mail)  [phone] (pager)    CC  Patient Care Team:  Ely Maldonado MD as PCP - General (Internal Medicine)  Avani Mera LICSW as   Swati Lacy MD as MD (Ophthalmology)  Bro Crespo as Referring Physician (Hematology & Oncology)  BRO CRESPO

## 2019-03-12 NOTE — PROGRESS NOTES
First Name: Adrianna  Age: 63 year old   Referring Physician: Dr. Crespo   REASON FOR REFERRAL: Education and evaluation for tunneled catheter placement  Patient is undergoing w/u for autologous BMT, using her own stem cells as the donor  (cells have already been collected)    HPI:  This is a patient with a Past Medical History of standard risk IgG kappa multiple myeloma s/p autologous stem cell transplantation 4/5/2013, now with relapsed standard risk IgG kappa myeloma, with normal cytogenetics and additional copies of D5S23/N3B997 , CEP9 , CEP11, and CEP15 suggesting polysomy, with disease relapse (boiochenmical progression and new lytic lesions), started on VRD therapy, has received 4 cycles.  She is now undergoing work up for autologous BMT (she collected the stem cells with her first transplant.    LINE HX:    1. 3/15/13-5/10/13:  Right internal jugular vein large bore double lumen tunneled catheter.  Removed when it was no longer needed.    PAST MEDICAL HISTORY:   Past Medical History:   Diagnosis Date     Anemia      Compression fracture     T12, L1, L4     Macular degeneration 2011    Early AMD     Malignant neoplasm (H)      Migraine      Multiple myeloma (H) 2006     Nonsenile cataract      PAST SURGICAL HISTORY:   Past Surgical History:   Procedure Laterality Date     ARTHROSCOPY KNEE WITH LATERAL MENISCECTOMY  9/27/2013    Procedure: ARTHROSCOPY KNEE WITH LATERAL MENISCECTOMY;  Left Knee Arthroscopy,  Partial Lateral Meniscectomy       EXCISE GANGLION FOOT  10/28/2011    Procedure:EXCISE GANGLION FOOT; Excision of Ganglion Cyst Left Foot; Surgeon:STEPHEN IBRAHIM; Location:[facility]      BONE MARROW/STEM TRANSPLANT AUTOLOGOUS  4-5-2013     HC COLONOSCOPY THRU STOMA, DIAGNOSTIC  2005    needs repeat 2010      REMOVAL OF TONSILS,12+ Y/O  1974    Tonsils 12+y.o.     ORTHOPEDIC SURGERY       SURGICAL HISTORY OF -   1995    left bunionectomy and hammer toe correction     FAMILY HISTORY:   Family History    Problem Relation Age of Onset     C.A.D. Paternal Grandfather          of MI 49 yo     Breast Cancer Paternal Grandmother      Hypertension Father      Cancer - colorectal Maternal Grandfather         late 60's to early 70's     Circulatory Maternal Grandfather         migraines     Circulatory Mother         migraines     Circulatory Sister         migraines     Cancer Sister         hodgkins     Diabetes No family hx of      Cerebrovascular Disease No family hx of      Glaucoma No family hx of      Macular Degeneration No family hx of      SOCIAL HISTORY:   Social History     Tobacco Use     Smoking status: Former Smoker     Packs/day: 1.00     Years: 15.00     Pack years: 15.00     Last attempt to quit: 1995     Years since quittin.3     Smokeless tobacco: Never Used     Tobacco comment: occ drags 17 years  smokes   Substance Use Topics     Alcohol use: Yes     Alcohol/week: 7.0 oz     Types: 14 Standard drinks or equivalent per week     Comment: CAGE neg     PROBLEM LIST:   Patient Active Problem List    Diagnosis Date Noted     Multiple myeloma (H) 2006     Priority: High     2006 on thalidomide/steroids with good results, Ananda Cortes - hematology =- [facility]  updating diagnosis code for icd10 cutover       Nonexudative senile macular degeneration of retina 2014     Priority: Medium     Cataracts, bilateral 2014     Priority: Medium     Status post bone marrow transplant (H) 2013     Priority: Medium     Compression fracture 2012     Priority: Medium     T6, T8, T9, T12, L1, L4       Health Care Home 2011     Priority: Medium     High priority patient - 11    DX V65.8 REPLACED WITH [facility] (2013)       Elevated liver enzymes 10/15/2010     Priority: Medium     ? secondary to ETOH use       Diverticulosis 10/08/2010     Priority: Medium     colonoscopy 2010  asymptomatic        Dysfunctional alcohol use  09/10/2010     Priority: Medium     Hyperlipidemia LDL goal <160 09/29/2008     Priority: Medium     Has great HDL       Osteoporosis 12/07/2005     Priority: Medium     Problem list name updated by automated process. Provider to review       Other type of migraine 05/03/2005     Priority: Medium     Diagnosis updated by automated process. Provider to review and confirm.       History of colonic polyps 05/03/2005     Priority: Medium     2005 - only diverticulosis, adenomatous polyps previous colonoscopy   Recheck 2010    Problem list name updated by automated process. Provider to review       Advanced directives, counseling/discussion 10/04/2011     Priority: Low     Advance Directive Problem List Overview:   Name Relationship Phone    Primary Health Care Agent            Alternative Health Care Agent          Discussed advance care planning with patient; information given to patient to review. 10/4/2011         MEDICATIONS:   Prescription Medications as of 3/12/2019       Rx Number Disp Refills Start End Last Dispensed Date Next Fill Date Owning Pharmacy    acyclovir (ZOVIRAX) 400 MG tablet    10/19/2018        Sig: Take 400 mg by mouth 2 times daily     Class: Historical    Route: Oral    aspirin 325 MG tablet            Sig: Take 325 mg by mouth daily    Class: Historical    Route: Oral    Calcium Carbonate (CALCIUM 600 PO)            Sig: Take by mouth 2 times daily    Class: Historical    Route: Oral    dexamethasone (DECADRON) 4 MG tablet    10/19/2018        Sig: Take 40 mg by mouth    Class: Historical    Route: Oral    ferrous fumarate (FERRETTS) 324 MG TABS            Sig: Take 324 mg by mouth daily     Class: Historical    Route: Oral    fish oil-omega-3 fatty acids (FISH OIL) 1000 MG capsule            Sig: Take 2 g by mouth daily.    Class: Historical    Route: Oral    loperamide (IMODIUM) 2 MG capsule            Sig: Take 2 mg by mouth 4 times daily as needed for diarrhea    Class: Historical     Route: Oral    loratadine (CLARITIN) 10 MG tablet            Sig: Take 10 mg by mouth as needed     Class: Historical    Route: Oral    Misc Natural Products (GLUCOSAMINE CHOND COMPLEX/MSM) TABS            Sig: Take  by mouth daily.    Class: Historical    Route: Oral    MULTIVITAMINS OR TABS  100 3 8/25/2009    [facility] [place], [place] - [address]    Sig: ONE DAILY    Class: Historical    Route: Oral    prochlorperazine (COMPAZINE) 10 MG tablet    10/19/2018        Sig: Take 10 mg by mouth    Class: Historical    Route: Oral    simethicone (MYLICON) 125 MG chewable tablet            Sig: Take 125 mg by mouth 2 times daily    Class: Historical    Route: Oral    tiZANidine (ZANAFLEX) 2 MG tablet    9/10/2018        Sig: Take 2-4 mg by mouth    Class: Historical    Route: Oral    fluticasone (FLONASE) 50 MCG/ACT nasal spray  1 Package 1 1/9/2015    [facility] [place], [place] - [address] [place] [address] [id number]    Sig: Spray 2 sprays into both nostrils daily    Class: E-Prescribe    Route: Both Nostrils    LENalidomide (REVLIMID) 10 MG CAPS capsule CHEMOTHERAPY  28 capsule 0 5/17/2016    [facility] - [place], [place] - [address] [facility]    Sig: Take 1 capsule (10 mg) by mouth daily    Class: E-Prescribe    Notes to Pharmacy: auth # [id number].  Adult female not of reproductive capacity.    Route: Oral        ALLERGIES:    Allergies   Allergen Reactions     Amoxicillin Rash     Septra [Bactrim] Rash     Sulfa Drugs      VITALS: /69   Pulse 65   LMP 03/01/2005   SpO2 97%     ROS:  Answers for HPI/ROS submitted by the patient on 3/12/2019   General Symptoms: No  Skin Symptoms: Yes  HENT Symptoms: No  EYE SYMPTOMS: No  HEART SYMPTOMS: Yes  LUNG SYMPTOMS: No  INTESTINAL SYMPTOMS: Yes  URINARY SYMPTOMS: No  GYNECOLOGIC SYMPTOMS: No  BREAST SYMPTOMS: No  SKELETAL SYMPTOMS: No  BLOOD SYMPTOMS: Yes  NERVOUS SYSTEM SYMPTOMS: Yes  MENTAL HEALTH SYMPTOMS:  No  Changes in hair: No  Changes in moles/birth marks: No  Itching: No  Rashes: Yes  Changes in nails: No  Acne: No  Hair in places you don't want it: No  Change in facial hair: No  Warts: No  Non-healing sores: No  Scarring: No  Flaking of skin: No  Color changes of hands/feet in cold : No  Sun sensitivity: No  Skin thickening: No  Chest pain or pressure: No  Fast or irregular heartbeat: Yes  Pain in legs with walking: No  Trouble breathing while lying down: No  Fingers or toes appear blue: No  High blood pressure: Yes  Low blood pressure: Yes  Fainting: No  Murmurs: No  Pacemaker: No  Varicose veins: No  Edema or swelling: Yes  Wake up at night with shortness of breath: No  Light-headedness: Yes  Exercise intolerance: No  Heart burn or indigestion: No  Nausea: No  Vomiting: No  Abdominal pain: No  Bloating: Yes  Constipation: No  Diarrhea: Yes  Blood in stool: No  Black stools: No  Rectal or Anal pain: No  Fecal incontinence: No  Yellowing of skin or eyes: No  Vomit with blood: No  Change in stools: No  Anemia: No  Swollen glands: No  Easy bleeding or bruising: Yes  Trouble with coordination: No  Dizziness or trouble with balance: Yes  Fainting or black-out spells: No  Memory loss: No  Headache: Yes  Seizures: No  Speech problems: No  Tingling: No  Tremor: No  Weakness: No  Difficulty walking: No  Paralysis: No  Numbness: No    Physical Examination: Vital signs are reviewed and the are stable  Constitutional: Pleasant, older woman, in no acute physical distress, came with her , Charles to the appt  Neck: Neck supple. No adenopathy.   Musculoskeletal: extremities normal- no gross deformities noted, gait normal and normal muscle tone  Skin: no suspicious lesions or rashes  Neurologic: negative  Psychiatric: affect normal/bright and mentation appears normal.    RESULTS:  BMP RESULTS:  Lab Results   Component Value Date     03/07/2019    POTASSIUM 3.6 03/07/2019    CHLORIDE 102 03/07/2019    CO2 29  03/07/2019    ANIONGAP 6 03/07/2019    GLC 64 (L) 03/07/2019    BUN 10 03/07/2019    CR 0.75 03/12/2019    GFRESTIMATED 85 03/12/2019    GFRESTBLACK >90 03/12/2019    LEE 8.4 (L) 03/07/2019        CBC RESULTS:  Lab Results   Component Value Date    WBC 3.1 (L) 03/08/2019    RBC 3.14 (L) 03/08/2019    HGB 11.8 03/08/2019    HCT 34.5 (L) 03/08/2019     (H) 03/08/2019    MCH 37.6 (H) 03/08/2019    MCHC 34.2 03/08/2019    RDW 13.1 03/08/2019     03/08/2019       INR/PTT:  Lab Results   Component Value Date    INR 0.91 03/08/2019    PTT 28 03/08/2019         ASSESSMENT/PLAN:  Type of catheter: 9.5 Fr.  Double lumen tunneled power Carrion     Preferred Location: Right  Internal Jugular Vein     Platelet count is   Lab Results   Component Value Date     03/08/2019    , and coagulation factors are   Lab Results   Component Value Date    INR 0.91 03/08/2019    ,  Lab Results   Component Value Date    PTT 28 03/08/2019   . The patient is at low risk for bleeding during the procedure.    PROVIDER NOTE:  The tunneled catheter placement procedure and its risks including but not limited to bleeding, infection, fibrin sheath formation and blood clots was explained to Maeve and her , Charles..    CONSENT: Affirmation of informed written consent was not obtained.     INSTRUCTIONS/GUIDELINES:   Eating and drinking restriction guidelines were reviewed., Anti-bacterial scrub was given and instructions for its use were reviewed to decrease the risk of infection on the day of the procedure., I explained the expected length of time the tunneled catheter could remain in place., I explained that when they went to the [facility] they would be taught about exit site dressing care, flushing of the lumens and how to care for the catheter when bathing. and The role of Interventional Radiology was reviewed.     30 minutes was spent with Maeve and her , Charles.  25 minutes was spent in  counseling.  Deisy Vila MS, APRN, CNS, CRN  Clinical Nurse Specialist  Interventional Radiology  [phone] (voice mail)  [phone] (pager)    CC  Patient Care Team:  Ely Maldonado MD as PCP - General (Internal Medicine)  Avani Mera LICSW as   Upton, Swati Fournier MD as MD (Ophthalmology)  Bro Crespo as Referring Physician (Hematology & Oncology)  BRO CRESPO

## 2019-03-12 NOTE — PATIENT INSTRUCTIONS
Tunneled Catheter Placement Instructions    1. The BMT clinic will let you know the day of the procedure, where to    report to and the time to arrive.    2. No solid foods or milk products for 6 hours prior to the procedure    3. May have clear liquids up to 2 hours prior to the procedure (water, apple juice, broth, coffee or tea without milk or sugar, jell-o, white grape juice)    4. Anti-bacterial scrub  -Two times the day before your procedure, and once the day of your procedure  -Think of a big square:  mid chest to ear lobes, both sides of the chest and neck area  -Use a clean washcloth each time.  Wet the washcloth.  Place a capful of the scrub on the wet washcloth and rub it all in, wash the area and leave it on for 5 minutes    -After 5 minutes, rinse off the washcloth, then rinse off the skin and pat the skin dry.with a clean towel  -Or if you prefer, you may wash the scrub off in the shower  -No lotion or powders on the neck or chest area the day before or the day of the procedure.  But you may use deodorant.

## 2019-03-13 ENCOUNTER — ALLIED HEALTH/NURSE VISIT (OUTPATIENT)
Dept: TRANSPLANT | Facility: CLINIC | Age: 64
End: 2019-03-13
Attending: INTERNAL MEDICINE
Payer: COMMERCIAL

## 2019-03-13 VITALS
HEART RATE: 65 BPM | OXYGEN SATURATION: 95 % | TEMPERATURE: 98.6 F | DIASTOLIC BLOOD PRESSURE: 80 MMHG | WEIGHT: 159.6 LBS | SYSTOLIC BLOOD PRESSURE: 121 MMHG | BODY MASS INDEX: 25.65 KG/M2

## 2019-03-13 DIAGNOSIS — Z86.2 PERSONAL HISTORY OF DISEASES OF BLOOD AND BLOOD-FORMING ORGANS: ICD-10-CM

## 2019-03-13 DIAGNOSIS — C90.00 MULTIPLE MYELOMA (H): ICD-10-CM

## 2019-03-13 DIAGNOSIS — C90.00 MULTIPLE MYELOMA NOT HAVING ACHIEVED REMISSION (H): ICD-10-CM

## 2019-03-13 DIAGNOSIS — C90.00 MULTIPLE MYELOMA (H): Primary | ICD-10-CM

## 2019-03-13 DIAGNOSIS — C90.01 MULTIPLE MYELOMA IN REMISSION (H): Primary | ICD-10-CM

## 2019-03-13 DIAGNOSIS — R31.9 HEMATURIA: ICD-10-CM

## 2019-03-13 LAB
ALBUMIN UR-MCNC: NEGATIVE MG/DL
APPEARANCE UR: CLEAR
BACTERIA #/AREA URNS HPF: ABNORMAL /HPF
BILIRUB UR QL STRIP: NEGATIVE
COLOR UR AUTO: ABNORMAL
COPATH REPORT: NORMAL
GLUCOSE UR STRIP-MCNC: NEGATIVE MG/DL
HGB UR QL STRIP: ABNORMAL
KETONES UR STRIP-MCNC: NEGATIVE MG/DL
LEUKOCYTE ESTERASE UR QL STRIP: NEGATIVE
NITRATE UR QL: NEGATIVE
PH UR STRIP: 5 PH (ref 5–7)
RBC #/AREA URNS AUTO: 2 /HPF (ref 0–2)
SOURCE: ABNORMAL
SP GR UR STRIP: 1.01 (ref 1–1.03)
SQUAMOUS #/AREA URNS AUTO: <1 /HPF (ref 0–1)
UROBILINOGEN UR STRIP-MCNC: 0 MG/DL (ref 0–2)
WBC #/AREA URNS AUTO: <1 /HPF (ref 0–5)

## 2019-03-13 PROCEDURE — G0463 HOSPITAL OUTPT CLINIC VISIT: HCPCS | Mod: ZF

## 2019-03-13 PROCEDURE — 81001 URINALYSIS AUTO W/SCOPE: CPT | Performed by: INTERNAL MEDICINE

## 2019-03-13 PROCEDURE — 00000346 ZZHCL STATISTIC REVIEW OUTSIDE SLIDES TC 88321: Performed by: INTERNAL MEDICINE

## 2019-03-13 ASSESSMENT — PAIN SCALES - GENERAL: PAINLEVEL: NO PAIN (0)

## 2019-03-13 NOTE — NURSING NOTE
"Oncology Rooming Note    March 13, 2019 11:26 AM   Adrianna Palomino is a 63 year old female who presents for:    Chief Complaint   Patient presents with     RECHECK     RTN [facility]- MM     Initial Vitals: /80   Pulse 65   Temp 98.6  F (37  C) (Oral)   Wt 72.4 kg (159 lb 9.6 oz)   LMP 03/01/2005   SpO2 95%   BMI 25.65 kg/m   Estimated body mass index is 25.65 kg/m  as calculated from the following:    Height as of 3/7/19: 1.68 m (5' 6.14\").    Weight as of this encounter: 72.4 kg (159 lb 9.6 oz). Body surface area is 1.84 meters squared.  No Pain (0) Comment: Data Unavailable   Patient's last menstrual period was 03/01/2005.  Allergies reviewed: Yes  Medications reviewed: Yes    Medications: Medication refills not needed today.  Pharmacy name entered into [employer]:    [facility] [place], [place] - [address] [facility] - [place], [place] - [address] [facility] [place] [address] [facility]  [facility] - [place], [place] - [address]  [facility] [id number] [place], [place] - [address] [facility] [place], [place] - [phone]    Clinical concerns: None       Kiana Coleman CMA              "

## 2019-03-13 NOTE — PROGRESS NOTES
Blood and Marrow Transplant Program      Adrianna Palomino is a 63 year old female with IgG kappa MM, referred by Dr. Crespo for relapsed plasma cell myeloma s/p autologous stem cell transplantation (ASCT) 4/5/2013, here for formal review prior to HCT.      HPI: This is a pleasant 63 year old female with a Past Medical History of history of standard risk IgG kappa multiple myeloma and collagenous colitis s/p autologous stem cell transplantation 4/5/2013, now with relapsed disease referred back here for consideration of second ASCT. Oncology history summarized below:      Oncology history from notes from Rubia Vinson and Dr. Crespo, records reviewed by me as well:  - Initially diagnosed and with standard-risk cytogenetics in 2006  - Started on Dexamethasone for 6 cycles obtaining a complete remission and then maintenance with thalidomide and dexamethasone for 1 year.   - Stopped all therapy in the summer of 2007 due to neuropathy, and was followed on 2 years of maintenance dexamethasone.    - She was noted to slowly relapse but didn't require therapy until she was started on RVD in 11/2012.   - Completed 5 cycles before obtaining a partial remission and came to transplant.    - Underwent an autologous stem cell transplant on 04/05/2013,  total cell dose was 8 x10(6) cells/kg, and she has 8 x10(6) cells/kg left in storage. Post ASCT course was unremarkable.     - Maintenance Regimen post transplant  (10mg q28 days), off for 1 month in 9/2016 to review marrow, resumed: 9/30/2016 and continued till disease progression  - Clinical progression [new bone lesions detected on bone survey from 8/29/2018 with [facility] Jace]  - 8/29/2018 Bone Marrow Biopsy: Normocellular BM (30%) with trilineage hematopoiesis showing a decreased myeloid to erythroid ratio (erythroid hyperplasia). - Plasma cells 5%, polytypic for light chain. Flow cytometry negative for monoclonal plasma cell population   - 10/3/2018 Bone marrow Biopsy: CT  Guided Needle Core Biopsy: POSITIVE FOR MALIGNANCY - Plasma cell neoplasm, consistent with recurrent plasma cell myeloma. Comment Large aggregates of + plasma cells are present. These are kappa monoclonal.   - 10/3/2018: FISH Myeloma Panel: Positive for additional copies of D5S23/Z4I076 (5p15.2), CEP9 (centromere 9), CEP11 (centromere 11), and CEP15 (centromere 15) suggesting polysomy, and low level loss of IGH (14q32). FISH Negative for loss of CDKN2C (1p32.3), gain of CKS1B (1q21.3), loss of Q48N651 (13q14.3), loss of TP53 (17p13.1), and additional copies of CEP7 (centromere 7), and IGH rearrangement. 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   DSS IIA, SPEP 10/18/2018: 0.9 g/dL, 10/18/2018: 6.69 , Kappa 10.03 mg/dl, lambda 1.5 mg/dl, albumin 3.4 g/dl, creatinine 0.82, calcium 9.3, LDH and B2 microglobulin Unknown/ not available   - 10/19/2018 C1D1 : VRd in relapsed setting (Velcade: 1.3mg/m2 subcutanously Day 1, 8, 15; Revlimid: 10mg (dose reduced) day 1-14; Dexamethasone: 40mg day 1,8,15)- completed 4 cycles, complicated by salmonella stool infections prior to C3.  - M biochemical markers:  3/11/2019:   UPEP Predominantly albumin is seen which suggests glomerular damage. No obvious monoclonal protein seen and  the absence of monoclonal immunoglobulins was confirmed by immunofixation of this same urine sample.    Urine creatinine and creatiunine clearance are WNL, 24 hour urine total protein elevated at 0.63 and total urine  protein/creatiine is elevated at 0.49   SPEP 3/7/2019 Small monoclonal protein (0.3 g/dL) seen in the gamma fraction.  Monoclonal IgG immunoglobulin of  kappa light chain type.    Kappa slightly elevated at 2.42 mg/dl   Lambda light chain WNL   K/L ratio 1.79        Interval History        Patient is here today with her  Charles for workup close.  We reviewed and  confirmed the above medical and hematological history with her.    Patient reports that besides having mild fatigue, she has no bony pains or other signs of disease.  She recovered well from the Salmonella infection, now taking imodium once a day with formed BM, no blood no mucous- at baseline of note she reported having diagnosis of collagenous colitis many years ago, biopsy proven.  She denies specifically on review of system any fevers chills weight loss nausea vomiting bruises bleeding headaches vision changes shortness of breath or chest pain.  She does note however long-standing history of loose to watery bowel movements of 4-5 times a day ever since she was started on maintenance lenalidomide.  She reports taking occasional Imodium to help with that.  She denies any blood in the stool.  She denies any associated abdominal pain.  She reports occasional hot flashes that she attributes to postmenopausal status- not worse recently. She reports bilateral sole decreased sensation, numbness since thalidomide, no pain, not requiring therpay, no imbalance or falls.        PMH: collagenous colitis- inactive   PSH: tonsillectomy in her 20's, bunion removal  SH: For social history, previous sidestepped noted below, she does admit to 15-pack-year smoking history last smoked in 1995.  She also drinks 2 glasses of either wine or vodka every day.  She does admit to more heavy drinking in the past.  She reports no diagnosis of liver dysfunction.  Family History: Father with heart disease , no known history of blood cancers        Bone Marrow Workup Results:  Blood Counts Recent Labs   Lab Test 03/08/19  1244   WBC 3.1*   ANEU 2.0   ALYM 0.4*   MAO 0.5   AEOS 0.1   HGB 11.8   HCT 34.5*         Bone Marrow 3/8/2019  - Kappa monotypic plasma cells detected by flow cytometry, compatible   with minimal residual disease     - Variable marrow cellularity (overall 5%) with erythroid predominant   trilineage hematopoiesis;  subcortical   biopsy   -Concurrent flow cytometry ([id number]) detected 0.02% kappa monotypic   plasma cells. There is no morphologic   correlate for the flow cytometric results.   -Based on the cellularity of the aspirate smears, the core biopsy is not   representative of the patient's   overall cellularity.  The H&E sections appear subcortical.  In the part   that has marrow, there are no   obvious plasma cell clusters.     Flow cytometry:  The immunophenotypic findings are consistent with persistent/recurrent   plasma cell neoplasm.   Percentages reported below are based on the total number of CD45 positive   viable leukocytes. If applicable,   percentage of plasma cells is from total viable nucleated cells.   0.02% plasma cells which express dim CD19, CD38, dim CD45, CD56 and   monotypic cytoplasmic kappa immunoglobulin   light chains but lack CD20.  The plasma cell percentage by flow cytometry   is expected to be less than by   morphology due to specimen processing.    Blood Type Recent Labs   Lab Test 03/07/19  0927   ABO A      Chemistries Recent Labs   Lab Test 03/12/19  1055  03/07/19 0927   NA  --   --  137   POTASSIUM  --   --  3.6   CHLORIDE  --   --  102   CO2  --   --  29   BUN  --   --  10   CR 0.75   < > 0.84    < > = values in this interval not displayed.      Liver Tests Recent Labs   Lab Test 03/07/19 0927   BILITOTAL 0.4   ALKPHOS 53   AST 21   ALT 19      PET/CT:    Chest X-Ray:              X-ray Bone Survey 3/7/2019  Impression:  1. No focal airspace consolidation or suspicious nodules.  2. Torturous and likely ectatic aorta. Consider chest CT for further  evaluation.  3. Chronic appearing rib deformities.    3/7/2019  Impression:   Multiple lucent lesions involving the bilateral upper and lower  extremities, skull and pelvis, not substantially changed since  8/29/2018.   Chest CT 3/8/2019  FINDINGS: Mild peripheral fibrotic change. Right lower lobe linear atelectasis or scar. 4 mm nodule in  the right upper lobe image 84 series 4 stable since 2005.  The heart, Main pulmonary artery and aorta are not enlarged. The thoracic aorta is tortuous. No mediastinal, hilar or axillary adenopathy. No pleural or pericardial effusion.  Evaluation of the upper abdomen is limited. High origin of the celiac axis.  Bones: Multilevel compression deformities throughout the spine. Lytic foci throughout the bony thorax.                                                              IMPRESSION: No evidence of aortic aneurysm. Tortuous thoracic aorta  likely related to craniocaudal shortening due to multiple compression  deformities.     PFTs FVC%  Recent Labs   Lab Test 03/12/19  0953   20003 104       FEV1%  Recent Labs   Lab Test 03/12/19 0953   20016 87       DLCO%  Recent Labs   Lab Test 03/12/19  0953   20143 109      ECHO or MUGA: MUGA 3/7/2019    The left ventricular ejection fraction is normal at 64.2%.     There is normal left ventricular size.  There is normal wall motion of  the left ventricle. The right ventricle is moving normally. The  pulmonary arteries appear normal.     The wall motion on all views appears to be within normal limits.                                                                      IMPRESSION:     1. Normal left ventricular ejection fraction at 60 4.%.     2. A ciuv-kc-vqni comparison to the previous study does not show any  degradation in LV function.     EKG 3/8/2019  NSR QTc 458   Serologies: CMV nonreactive, EBV IgG postive, HSV 2 IgG positive ,Toxo (if Bactrim allergic)      Vitamin D Recent Labs   Lab Test 05/01/14  1010   VITDT 88*      MM biochemical markers:  3/11/2019:  UPEP Predominantly albumin is seen which suggests glomerular damage. No obvious monoclonal   protein seen and the absence of monoclonal immunoglobulins was confirmed by immunofixation    of this same urine sample.   Urine creatinine and creatiunine clearance are WNL, 24 hour urine total protein elevated at 0.63 and  total urine protein/creatiine is elevated at 0.49    SPEP 3/7/2019  Small monoclonal protein (0.3 g/dL) seen in the gamma fraction.  Monoclonal IgG immunoglobulin of kappa light chain type.     IgG 537  Kappa/lambda free light chains:  [place] slightlyu elevated at 2.42 mg/dl  Lambda light chain WNL  K/L ratio 1.79    UA with large urine and albumin >499, RBC 22  B2 microglobulin 2.1 WNL  LDH WNL    Family History:   Family History   Problem Relation Age of Onset     C.A.D. Paternal Grandfather          of MI 51 yo     Breast Cancer Paternal Grandmother      Hypertension Father      Cancer - colorectal Maternal Grandfather         late 60's to early 70's     Circulatory Maternal Grandfather         migraines     Circulatory Mother         migraines     Circulatory Sister         migraines     Cancer Sister         hodgkins     Diabetes No family hx of      Cerebrovascular Disease No family hx of      Glaucoma No family hx of      Macular Degeneration No family hx of        Social History:   Social History     Socioeconomic History     Marital status:      Spouse name: Not on file     Number of children: 1     Years of education: Not on file     Highest education level: Not on file   Occupational History     Occupation: patient rep     Employer: [employer]   Social Needs     Financial resource strain: Not on file     Food insecurity:     Worry: Not on file     Inability: Not on file     Transportation needs:     Medical: Not on file     Non-medical: Not on file   Tobacco Use     Smoking status: Former Smoker     Packs/day: 1.00     Years: 15.00     Pack years: 15.00     Last attempt to quit: 1995     Years since quittin.3     Smokeless tobacco: Never Used     Tobacco comment: occ drags 17 years  smokes   Substance and Sexual Activity     Alcohol use: Yes     Alcohol/week: 7.0 oz     Types: 14 Standard drinks or equivalent per week     Comment: CAGE neg     Drug use: No      Sexual activity: Yes     Partners: Male     Comment:    Lifestyle     Physical activity:     Days per week: Not on file     Minutes per session: Not on file     Stress: Not on file   Relationships     Social connections:     Talks on phone: Not on file     Gets together: Not on file     Attends [employer] service: Not on file     Active member of club or organization: Not on file     Attends meetings of clubs or organizations: Not on file     Relationship status: Not on file     Intimate partner violence:     Fear of current or ex partner: Not on file     Emotionally abused: Not on file     Physically abused: Not on file     Forced sexual activity: Not on file   Other Topics Concern      Service No     Blood Transfusions No     Caffeine Concern Yes     Comment: 2 cups a day     Occupational Exposure No     Hobby Hazards No     Sleep Concern No     Stress Concern No     Weight Concern Yes     Comment: steroid weight 1.5 years     Special Diet Yes     Comment: multi vit     Back Care No     Exercise Yes     Comment: garden     Bike Helmet No     Seat Belt Yes     Self-Exams No     Parent/sibling w/ CABG, MI or angioplasty before 65F 55M? No   Social History Narrative    Dairy/d 3 servings/d.     Caffeine 2 servings/d    Exercise 3 x week    Sunscreen used - Yes    Seatbelts used - Yes    Working smoke/CO detectors in the home - Yes    Guns stored in the home - Yes-BB gun    Self Breast Exams - Not on regular basis    Self Testicular Exam - NA    Eye Exam up to date - Yes    Dental Exam up to date - Yes    Pap Smear up to date - No    Mammogram up to date - Yes-last fall    PSA up to date - NA    Dexa Scan up to date - NA    Flex Sig / Colonoscopy up to date - Polyps in the past/dx with diverticulosis/needs colonoscopy this year.    Immunizations up to date - Yes-2004    Abuse: Current or Past(Physical, Sexual or Emotional)- No    Do you feel safe in your environment - Yes       Past Medical History:    Past Medical History:   Diagnosis Date     Anemia      Compression fracture     T12, L1, L4     Macular degeneration 2011    Early AMD     Malignant neoplasm (H)      Migraine      Multiple myeloma (H) 2006     Nonsenile cataract         Past Surgical History:   Past Surgical History:   Procedure Laterality Date     ARTHROSCOPY KNEE WITH LATERAL MENISCECTOMY  9/27/2013    Procedure: ARTHROSCOPY KNEE WITH LATERAL MENISCECTOMY;  Left Knee Arthroscopy,  Partial Lateral Meniscectomy       EXCISE GANGLION FOOT  10/28/2011    Procedure:EXCISE GANGLION FOOT; Excision of Ganglion Cyst Left Foot; Surgeon:STEPHEN IBRAHIM; Location:[facility]      BONE MARROW/STEM TRANSPLANT AUTOLOGOUS  4-5-2013     HC COLONOSCOPY THRU STOMA, DIAGNOSTIC  2005    needs repeat 2010     HC REMOVAL OF TONSILS,12+ Y/O  1974    Tonsils 12+y.o.     ORTHOPEDIC SURGERY       SURGICAL HISTORY OF -   1995    left bunionectomy and hammer toe correction       Allergies:   Allergies   Allergen Reactions     Amoxicillin Rash     Septra [Bactrim] Rash     Sulfa Drugs        Home Medications      Prior to Admission medications    Medication Sig Start Date End Date Taking? Authorizing Provider   acyclovir (ZOVIRAX) 400 MG tablet Take 400 mg by mouth 2 times daily  10/19/18   Reported, Patient   aspirin 325 MG tablet Take 325 mg by mouth daily    Reported, Patient   Calcium Carbonate (CALCIUM 600 PO) Take by mouth 2 times daily    Reported, Patient   dexamethasone (DECADRON) 4 MG tablet Take 40 mg by mouth 10/19/18   Reported, Patient   ferrous fumarate (FERRETTS) 324 MG TABS Take 324 mg by mouth daily     Reported, Patient   fish oil-omega-3 fatty acids (FISH OIL) 1000 MG capsule Take 2 g by mouth daily.    Reported, Patient   fluticasone (FLONASE) 50 MCG/ACT nasal spray Spray 2 sprays into both nostrils daily  Patient not taking: Reported on 3/7/2019 1/9/15   Karolyn Vo MD   LENalidomide (REVLIMID) 10 MG CAPS capsule CHEMOTHERAPY Take 1 capsule (10  mg) by mouth daily  Patient not taking: Reported on 3/12/2019 5/17/16   Ananda Xavier MD   loperamide (IMODIUM) 2 MG capsule Take 2 mg by mouth 4 times daily as needed for diarrhea    Reported, Patient   loratadine (CLARITIN) 10 MG tablet Take 10 mg by mouth as needed     Reported, Patient   Misc Natural Products (GLUCOSAMINE CHOND COMPLEX/MSM) TABS Take  by mouth daily.    Reported, Patient   MULTIVITAMINS OR TABS ONE DAILY 8/25/09   Jade Martinez MD   prochlorperazine (COMPAZINE) 10 MG tablet Take 10 mg by mouth 10/19/18   Reported, Patient   simethicone (MYLICON) 125 MG chewable tablet Take 125 mg by mouth 2 times daily    Reported, Patient   tiZANidine (ZANAFLEX) 2 MG tablet Take 2-4 mg by mouth 9/10/18   Reported, Patient       Review of Systems    Review of Systems:  11 point ROS negative except as above    PHYSICAL EXAM      Weight     Wt Readings from Last 3 Encounters:   03/07/19 71.8 kg (158 lb 3.2 oz)   11/13/18 74.3 kg (163 lb 11.2 oz)   04/21/16 70.2 kg (154 lb 11.2 oz)          LMP 03/01/2005      KPS: 80    General: NAD   Eyes: NISHA, sclera anicteric   Nose/Mouth/Throat: OP clear, buccal mucosa moist, no ulcerations   Lungs: CTA bilaterally  Cardiovascular: RRR, no M/R/G   Abdominal/Rectal: +BS, soft, NT, ND, No HSM   Lymphatics: No edema  Skin: No rashes or petechaie  Neuro: A&O     OVERALL ASSESSMENT AND PLAN       This is a pleasant 63 year old female with a Past Medical History of  collagenous colitis and history of standard risk IgG kappa multiple myeloma s/p autologous stem cell transplantation 4/5/2013, now with relapsed standard risk IgG kappa myeloma, with normal cytogenetics and additional copies of D5S23/Z3E377 , CEP9 , CEP11, and CEP15 suggesting polysomy, with disease relapse (boiochenmical progression and new lytic lesions),s/p VRd in relapsed setting (Velcade: 1.3mg/m2 subcutanously Day 1, 8, 15; Revlimid: 10mg (dose reduced) day 1-14; Dexamethasone: 40mg day 1,8,15) C1D1  10/19/2018, completed 4 cycles of re-induction with last dose of revlimid on 3/11/2019, in ID,  here for formal review prior to HCT.referred back here for consideration of second ASCT.     We reviewed with the patient and her  today the natural history of plasma cell myeloma, being an incurable disease despite recent advances in therapy and autologous stem cell transplantation.  We did discuss the recommendation in the position of the American Society as well as therapy and safety of bone marrow transplantation published in the BBMT in 2015, in particular regarding to using high-dose therapy and autologous HCT should be considered appropriate therapy for any patients relapsing after primary therapy that includes an autologous HCT with initial remission duration of more than 18 months.  We also did discuss the rationale of using this versus an allogeneic stem cell transplantation approach.  The patient understands this     We discussed the favorable prognosis with the absence of any high risk cytogenetics features on her most recent bone marrow biopsy.  We also discussed that the longer of about 5 years that she achieved were initiated for autologous stem cell transplantation progression free survival is another favorable clinical feature and that we would therefore with the light of the evidence available to recommend a second autologous stem cell transplantation for her with improvement in progression free survival associated with this procedure.      Of note patient received a total cell dose was 8 x10(6) cells/kg with her first August stem cell transplantation in 2013, and she has 8 x10(6) cells/kg left in storage, that we would use for her second transplantation without the need to mobilize or collect stem cells at this point.      We reviewed the risks and benefits associated with autologous stem cell transplantation with a myeloablative dose of melphalan, including but not limited to cytopenia, severe  neutropenia and need for blood product support with packed RBCs and platelets with possible side effects associated with blood transfusion and disease transmission, we also discussed risk of organ dysfunction and/or failure, and transverse to the complications of venoocclusive disease of the liver and idiopathic pneumonia syndrome of the lung risk of relapse and also risk of secondary malignancies, and mortality in the range of 2%      Adrianna Palomino is a 63 year old female with a malignancy that is currently incurable by standard chemotherapeutic approaches. To date, the only modality that offers a chance at long-term progression free survival is autologous hematopoietic stem cell transplantation.  Based upon my assessment of disease risk and comorbidities, Adrianna is a suitable candidate for Autologous HCT.      We had a detailed discussion about the rationale for transplant in this situation, requirements for proceeding, which include insurance approval, availability of a full-time caregiver along with residence within 30 minutes of the [facility]  through at least day +30 post transplant, and compliance with the supportive care medication regimen prescribed by providers at the [facility].      In today's visit, we reviewed with the patient the protocols that Adrianna Palomino is eligible for. We discussed the potential risks and potential benefits of each protocol, individually. We explained potential alternatives to the proposed treatment. We explained to the patient that participation is voluntary and that consent may be withdrawn at any time. The patient had opportunity to ask questions that were answered to the best of my ability and to the patient's apparent satisfaction.    The patient's testing shows no evidence of infection that require continue management during the treatment procedures.    I discussed with Dr. Jain CT chest and PFTs who kindly reviewed the images and chart: 4 mm nodule is not  concerning and does not need dedicated follow-up since it is < 6 mm and has been stable since 2005.  The peripheral fibrotic changes are very mild and should not effect her transplant. PFT's from 2013- mild obstruction with a significant change after albuterol suggesting some underlying asthma or a mixed COPD/asthma process; lung volumes also showed mild hyperinflation. These have improved on the most recent PFTS (likely related to smoking cessation dn improvement with time)- cleared to proceed with hematocrit and understands that her baseline PFTs going to ASCT is at normal bu meets parametns to proceed for hematocrit.    Urine studies, show microscopic hematuria, repeat with normal RBC but moderate blood. Urine also with proteinuria and increase urine protein/creatinine ratio- will ask for nephrology input prior to proceeding. Patient agreeable.    The patient completed the last round on treatment on 3/11/2019 (compelted C4 lenalidomide) need 2 week treatmet free interval before melphalan.   I discussed with her using a lower dose melphalan of 140mg m2 given the history for previous ASCT, risk of secondary maliagncies with alkylating agent exposure as well as proteinuria despite CrCl>30.     The patient's Karnofsky performance score was 80    The patient's HCT, CI, score was 0. We counseled the patient about the impact of this on the risk of treatment related and overall mortality. The score fit within treatment protocol eligibility criteria.    Link to HCT calculator: [email or web address]      The patient received necessary reproductive counseling and the need for effective contraception during the treatment procedures.    We then proceeded with reviewing and signing consents. The patient signed for the following consents for treatment and protocols BMT 2016-35, CIBMTR databse, blood product consent. The patient received a copy of the consents.      Adrianna Palomino had all  questions answered and is ready to proceed as discussed in detail today.    Abdi Browning     Plan:   - 3/11/2019 (compelted C4 lenalidomide) need 2 week treatmet free interval before melphalan.   - Urine studies, show microscopic hematuria, repeat with normal RBC but moderate blood. Urine also with proteinuria and increase urine protein/creatinine ratio- will ask for nephrology input prior to proceeding. Apt on Wednesday will follow up on recommendations.       Patient Care Team:  Ely Maldonado MD as PCP - General (Internal Medicine)  Avani Mera LICSW as   Swati Lacy MD as MD (Ophthalmology)  Bro Crespo as Referring Physician (Hematology & Oncology)

## 2019-03-14 LAB
DLCOCOR-%PRED-PRE: 109 %
DLCOCOR-PRE: 24.48 ML/MIN/MMHG
DLCOUNC-%PRED-PRE: 103 %
DLCOUNC-PRE: 23.19 ML/MIN/MMHG
DLCOUNC-PRED: 22.44 ML/MIN/MMHG
ERV-%PRED-PRE: 59 %
ERV-PRE: 0.55 L
ERV-PRED: 0.93 L
EXPTIME-PRE: 7.94 SEC
FEF2575-%PRED-POST: 96 %
FEF2575-%PRED-PRE: 53 %
FEF2575-POST: 2.11 L/SEC
FEF2575-PRE: 1.18 L/SEC
FEF2575-PRED: 2.19 L/SEC
FEFMAX-%PRED-PRE: 93 %
FEFMAX-PRE: 6.25 L/SEC
FEFMAX-PRED: 6.67 L/SEC
FEV1-%PRED-PRE: 87 %
FEV1-PRE: 2.2 L
FEV1FEV6-PRE: 68 %
FEV1FEV6-PRED: 80 %
FEV1FVC-PRE: 66 %
FEV1FVC-PRED: 79 %
FEV1SVC-PRE: 66 %
FEV1SVC-PRED: 69 %
FIFMAX-PRE: 5.14 L/SEC
FRCPLETH-%PRED-PRE: 137 %
FRCPLETH-PRE: 4 L
FRCPLETH-PRED: 2.9 L
FVC-%PRED-PRE: 104 %
FVC-PRE: 3.32 L
FVC-PRED: 3.19 L
IC-%PRED-PRE: 102 %
IC-PRE: 2.79 L
IC-PRED: 2.72 L
RVPLETH-%PRED-PRE: 163 %
RVPLETH-PRE: 3.45 L
RVPLETH-PRED: 2.11 L
TLCPLETH-%PRED-PRE: 122 %
TLCPLETH-PRE: 6.79 L
TLCPLETH-PRED: 5.53 L
VA-%PRED-PRE: 100 %
VA-PRE: 5.54 L
VC-%PRED-PRE: 91 %
VC-PRE: 3.34 L
VC-PRED: 3.65 L

## 2019-03-18 NOTE — PROGRESS NOTES
Blood and Marrow Transplant   Psychosocial Assessment with   Clinical     Assessment completed on 3/13/2019 of living situation, support system, financial status, functional status, coping, stressors, need for resources and social work intervention provided as needed. Information for this assessment was provided by pt and pt's -Lupillo's report in addition to medical chart review and consultation with medical team.     Present at assessment:   Patient: Adrianna Palomino  Spouse: Lupillo Ervin  : LEVI Austin, [facility]    Diagnosis: Multiple Myeloma     Date of Diagnosis: 2006    Transplant type: Autologous; this will be pt's 2nd autologous transplant. First auto was 4/5/13.    Donor: Autologous     Physician: Abdi Browning MD    Nurse Coordinator: Nasrin Gomez RN    Permanent Address:   [address] [place] [address]    Contact Information:  Pt's Cell Phone: [phone]  -Lupillo Ervin's Phone: [phone]  Son-Johnathon Ervin's Phone: [phone] (cell), [phone] (home)    Presenting Information:  Adrianna is a 63 year old female diagnosed with multiple myeloma who presents for evaluation for autologous transplant at the [facility] ([facility] Pt was accompanied to today's visit by her -Lupillo.     Decision Making: Self    Health Care Directive: Yes. Pt has a health care directive already on file.     Relationship Status: . Pt and pt's described relationship as stable and supportive.     Special Needs: None identified at this time.     Family/Support System: Pt endorsed a good support system including family and close friends who will be available to support pt throughout transplant process.     Spouse: Lupillo Ervin  Children: Son-Johnathon Ervin (Lives in [place], [place]; within a mile of parents)  Parents: Mother (Lives in [place], [place])  Siblings: 5 Siblings; 3 Live in the area, 1 Lives in  [place] and 1 Lives in [place]  Friends: Pt endorsed a good support system through her friends.    Caregiver: SW discussed with pt and pt's -Lupillo the caregiver role and expectation at length. Pt is agreeable to having a full time caregiver for a minimum of 30 days until cleared by the BMT physician. Pt and pt's  confirmed understanding of the caregiver requirement. Pt completed caregiver contract; SW to add to medical record. Pt's primary caregiver will be her -Lupillo with her son-Johnathon as a secondary. Pt signed the caregiver contract which will be scanned into the EMR. Caregiver education and resources provided. No caregiver concerns identified.     Caregiver Contact Information:  -Lupillo Ervin's Phone: [phone]  SonAnthony Ervin's Phone: [phone] (cell), [phone] (home)    Transportation Mode: Private Vehicle. Pt is aware of driving restrictions post-BMT and the need for the caregiver is to drive until cleared to drive by the BMT physician. SW provided information on parking info and monthly parking pass options.     Insurance: Pt has [employer] health insurance; pt has a -Philomena through [employer] Pt denied specific insurance concerns at this time. SW reiterated information about the [employer]  should specific insurance questions arise as pt moves through transplant process. Future Insurance questions referred to [employer] -Madina Melendez (P: [phone]).     Sources of Income: Pt is supported by PTO now and then will transition to Short Term Disability, 's Social Security alf and pension. Pt denied anticipation of financial hardship related to BMT at this time. SW provided information on juliet options and encouraged pt to contact this SW for support should financial situation change.     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     Mental  "Health: Pt denied a history of mental health concerns, specific diagnoses or medications at this time. SW explained that it's not uncommon for patients going through transplant to experience symptoms of depression/anxiety.     PHQ-9:  Pt scored a 3 which indicates no sign of depression on the depression severity scale. Pt endorses this is an accurate reflection of her emotional state.    GAD7:  Pt scored a 3 which indicates no sign of anxiety on the Generalized Anxiety Disorder Questionnaire. Pt endorses this is an accurate reflection of her emotional state.    Chemical Use:   Tobacco - Pt reported a hx of tobacco use. Pt said she quit smoking, fully, 5-6 years ago.   Alcohol - Pt reported 2-3 alcoholic beverages daily. SW discussed that pt cannot drink alcohol post-transplant until cleared by the MD. Pt reported that she has no concerns with being able to stop drinking.   Marijuana - No hx of marijuana.  Other Drugs - No hx of other drugs.    Trauma/Loss/Abuse History: Multiple losses associated with cancer diagnosis and treatment, including health, employment, changes to physical appearance, etc.     Spirituality: Pt belongs to a dayanna community in [place], [place]. SW explained that there are Chaplains on the unit and pt can request to meet with a  at anytime.    Coping: Pt noted that she is currently feeling \"hopeful\". Pt shared that her main coping mechanisms are talking with family/friends, prayer/spiritual practices, and working on her hobby. SW and pt discussed additional positive coping mechanisms that pt can utilize while in the hospital.     Caregiver Coping: Pt's -Lupillo noted that he is feeling \"positive, hopeful and nervous\" at this time. Pt's  noted that he renzo by positive self-talk, use of alcohol and taking naps. SW asked pt's  if he has any concerns with alcohol use as SW explained to pt's  that since he will be the primary caregiver and driving patient to clinic " on a regular basis. Pt's  has no concerns with nor using alcohol and being the primary caregiver.    Education Provided: Transplant process expectations, Caregiver requirements, Caregiver self-care, Financial issues related to transplant, Financial resources/grants available, Common psychosocial stressors pre/post transplant, Support group(s) available, Hospital resources available, and Social Work role.    Interventions Provided: Psychosocial Support and Education     Assessment and Recommendations for Team:  Pt is a 63 year old female diagnosed with MM who is here undergoing preparation for a planned her 2nd autologous transplant. Pt is pleasant, calm and able to articulate concerns/coping mechanisms in an appropriate manner. Pt feels comfortable communicating with the medical team. Pt has a strong supportive network of family and friends who are involved. Pt has developed good coping mechanisms. Pt and pt's family will benefit from ongoing psychosocial support in regards to coping with the adjustment to the BMT process.     Pt has a good support system and a well-developed caregiver plan. Pt verbalizes understanding of the transplant process and wanting to proceed. SW provided contact information and encouraged pt to contact SW with questions, concerns, resources and for support.    Per this assessment, SW did not identify any barriers to this patient moving forward with transplant.    Follow up Planned:   Psychosocial support    Beth SIMS, [facility] Center[facility]  Phone: [phone]  Pager: [phone]

## 2019-03-20 ENCOUNTER — OFFICE VISIT (OUTPATIENT)
Dept: NEPHROLOGY | Facility: CLINIC | Age: 64
End: 2019-03-20
Attending: INTERNAL MEDICINE
Payer: COMMERCIAL

## 2019-03-20 VITALS
BODY MASS INDEX: 25.23 KG/M2 | HEART RATE: 66 BPM | OXYGEN SATURATION: 94 % | DIASTOLIC BLOOD PRESSURE: 93 MMHG | SYSTOLIC BLOOD PRESSURE: 146 MMHG | TEMPERATURE: 97.8 F | WEIGHT: 157 LBS | RESPIRATION RATE: 16 BRPM

## 2019-03-20 DIAGNOSIS — C90.01 MULTIPLE MYELOMA IN REMISSION (H): ICD-10-CM

## 2019-03-20 DIAGNOSIS — R31.29 MICROSCOPIC HEMATURIA: ICD-10-CM

## 2019-03-20 DIAGNOSIS — R31.9 HEMATURIA: ICD-10-CM

## 2019-03-20 DIAGNOSIS — R80.3 BENCE JONES PROTEINURIA: Primary | ICD-10-CM

## 2019-03-20 DIAGNOSIS — N18.2 CHRONIC KIDNEY DISEASE, STAGE 2 (MILD): ICD-10-CM

## 2019-03-20 DIAGNOSIS — Z86.2 PERSONAL HISTORY OF DISEASES OF BLOOD AND BLOOD-FORMING ORGANS: ICD-10-CM

## 2019-03-20 DIAGNOSIS — R80.1 PERSISTENT PROTEINURIA: ICD-10-CM

## 2019-03-20 DIAGNOSIS — C90.00 MULTIPLE MYELOMA (H): Primary | ICD-10-CM

## 2019-03-20 DIAGNOSIS — M81.8 OTHER OSTEOPOROSIS WITHOUT CURRENT PATHOLOGICAL FRACTURE: ICD-10-CM

## 2019-03-20 LAB
ALBUMIN SERPL-MCNC: 3.5 G/DL (ref 3.4–5)
ALBUMIN UR-MCNC: 30 MG/DL
ANION GAP SERPL CALCULATED.3IONS-SCNC: 6 MMOL/L (ref 3–14)
APPEARANCE UR: CLEAR
BILIRUB UR QL STRIP: NEGATIVE
BUN SERPL-MCNC: 13 MG/DL (ref 7–30)
CALCIUM SERPL-MCNC: 8.8 MG/DL (ref 8.5–10.1)
CHLORIDE SERPL-SCNC: 105 MMOL/L (ref 94–109)
CO2 SERPL-SCNC: 27 MMOL/L (ref 20–32)
COLOR UR AUTO: YELLOW
CREAT SERPL-MCNC: 0.75 MG/DL (ref 0.52–1.04)
CREAT UR-MCNC: 75 MG/DL
CREAT UR-MCNC: 78 MG/DL
ERYTHROCYTE [DISTWIDTH] IN BLOOD BY AUTOMATED COUNT: 12.5 % (ref 10–15)
GFR SERPL CREATININE-BSD FRML MDRD: 84 ML/MIN/{1.73_M2}
GLUCOSE SERPL-MCNC: 86 MG/DL (ref 70–99)
GLUCOSE UR STRIP-MCNC: NEGATIVE MG/DL
HCT VFR BLD AUTO: 37.7 % (ref 35–47)
HGB BLD-MCNC: 12.1 G/DL (ref 11.7–15.7)
HGB UR QL STRIP: ABNORMAL
KETONES UR STRIP-MCNC: NEGATIVE MG/DL
LEUKOCYTE ESTERASE UR QL STRIP: NEGATIVE
MCH RBC QN AUTO: 35.6 PG (ref 26.5–33)
MCHC RBC AUTO-ENTMCNC: 32.1 G/DL (ref 31.5–36.5)
MCV RBC AUTO: 111 FL (ref 78–100)
MICROALBUMIN UR-MCNC: 185 MG/L
MICROALBUMIN/CREAT UR: 236.57 MG/G CR (ref 0–25)
MUCOUS THREADS #/AREA URNS LPF: PRESENT /LPF
NITRATE UR QL: NEGATIVE
PH UR STRIP: 5 PH (ref 5–7)
PHOSPHATE SERPL-MCNC: 3.1 MG/DL (ref 2.5–4.5)
PLATELET # BLD AUTO: 191 10E9/L (ref 150–450)
POTASSIUM SERPL-SCNC: 3.8 MMOL/L (ref 3.4–5.3)
PROT UR-MCNC: 0.25 G/L
PROT/CREAT 24H UR: 0.34 G/G CR (ref 0–0.2)
RBC # BLD AUTO: 3.4 10E12/L (ref 3.8–5.2)
RBC #/AREA URNS AUTO: 16 /HPF (ref 0–2)
SODIUM SERPL-SCNC: 137 MMOL/L (ref 133–144)
SOURCE: ABNORMAL
SP GR UR STRIP: 1.01 (ref 1–1.03)
SQUAMOUS #/AREA URNS AUTO: <1 /HPF (ref 0–1)
UROBILINOGEN UR STRIP-MCNC: 0 MG/DL (ref 0–2)
WBC # BLD AUTO: 2.5 10E9/L (ref 4–11)
WBC #/AREA URNS AUTO: 0 /HPF (ref 0–5)

## 2019-03-20 PROCEDURE — 81001 URINALYSIS AUTO W/SCOPE: CPT | Performed by: INTERNAL MEDICINE

## 2019-03-20 PROCEDURE — 84156 ASSAY OF PROTEIN URINE: CPT | Performed by: INTERNAL MEDICINE

## 2019-03-20 PROCEDURE — 36415 COLL VENOUS BLD VENIPUNCTURE: CPT | Performed by: INTERNAL MEDICINE

## 2019-03-20 PROCEDURE — 85027 COMPLETE CBC AUTOMATED: CPT | Performed by: INTERNAL MEDICINE

## 2019-03-20 PROCEDURE — 82043 UR ALBUMIN QUANTITATIVE: CPT | Performed by: INTERNAL MEDICINE

## 2019-03-20 PROCEDURE — G0463 HOSPITAL OUTPT CLINIC VISIT: HCPCS | Mod: ZF

## 2019-03-20 PROCEDURE — 80069 RENAL FUNCTION PANEL: CPT | Performed by: INTERNAL MEDICINE

## 2019-03-20 ASSESSMENT — PAIN SCALES - GENERAL: PAINLEVEL: NO PAIN (0)

## 2019-03-20 NOTE — PROGRESS NOTES
Nephrology Clinic    Adrianna Palomino MRN:[id number] YOB: 1955  Date of Service: 03/21/2019  Primary care provider: Ely Maldonado  Requesting physician: Abdi Bill    ASSESSMENT AND RECOMMENDATIONS:   Adrianna Palomino is a 63 year old female with PMH of MM , osteoporosis , lumbar fracture , chronic hematuria and collagenous collitis  who presents for evaluation of proteinuria and hematuria.    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   -- She will need close surveillance of her creatinine and urine proteins and ideally will benefit from an ACEI , counseled in appointment toay that she will need to check BP at home and call with reads in 3-4 days since the BP in office was high   -- ACEi not initiated today secondary to surgery plans for next week, will follow on home BP reads and initiate low dose diltiazem if needed  -- She will need to be monitored closely early post transplant and will follow in 1 month post transplant or sooner  -- complements , ARIADNA, ANCA added today are pending (can be done with next labs)  -- Renal USG also ordered today    Electrolytes stable    HTN  Hypervolemia  BP goal <130/80   BP elevated in office , isolated high read today, she describes a high BP at home sporadically, on no meds  Leg edema since dexamethasone reinitiated not with mild improvement since last 2 wks.   -- will monitor and address  BP with home reads after follow up call in 3-4 days    Multiple Myeloma since 2006  Last BM Bx 10/2018 with active disease,   Treated with thalidomide and dexa in 2006 and then in 2012 treated with RVD followed by Revlimid for maintenance till 3/2019, medications on hold for Sx plans next wk    She will follow up 1 month post op    Seen and discussed with Dr Mccray    REASON FOR CONSULT: proteinuria, hematuria    HISTORY OF PRESENT ILLNESS:   Adrianna Palomino is a 63 year old female who presents for evaluation of proteinuria and hematuria  PMH of MM , osteoporosis  MM diagnosed in 2006 when she had presented with back discomfort and abnormal Dexa scan, and induced with thalidomide and dexa . She was in remission and stopped thalidomide for neuropathy in 2007 and was on dexa for maintenance, relapsed in 2012 and received 5 cycles of RVD with autoTx 4/2013. Post Tx on maintenance with Revlamid 10mg since then.  She had new bone lesions in 8/2018 follow up and underwent bone Bx with normocellular BM with polyclonal plasma cells 5%. Repeat Bx in 10/2018 with diagnosis of recurrence with kappa monoclonal plasma cells in 10/2018 Bx treated with RVD started 10/2018 x 4 cycles and continued Revlimid and dexa till 3/12 now off all treatment with plans for auto Tx next week.  She also has been on Zometa S3teeptt for osteoporosis since 2013 and last dose 2/2019  Mspike increased to 0.9 in 8/2018. FLC increased kappa (2.4) and Kappa to lambda ratio (1.8), which is consistent with the MM ( light chain kappa)(3/7/19)  She reports no new issues today and feels well. She reports leg swelling that has been improving since she has been off Dexamethasone. No change in appetite and home BP have been around 130/80s. She has been off dexa and Revlimid since 3/12 and is planned for a BM auto Tx next wk.    PAST MEDICAL HISTORY:  Past Medical History:   Diagnosis Date     Anemia      Compression fracture     T12, L1, L4     Macular  degeneration 2011    Early AMD     Malignant neoplasm (H)      Migraine      Multiple myeloma (H) 2006     Nonsenile cataract      PAST SURGICAL HISTORY:  Past Surgical History:   Procedure Laterality Date     ARTHROSCOPY KNEE WITH LATERAL MENISCECTOMY  9/27/2013    Procedure: ARTHROSCOPY KNEE WITH LATERAL MENISCECTOMY;  Left Knee Arthroscopy,  Partial Lateral Meniscectomy       EXCISE GANGLION FOOT  10/28/2011    Procedure:EXCISE GANGLION FOOT; Excision of Ganglion Cyst Left Foot; Surgeon:STEPHEN IBRAHIM; Location:[facility] OR      BONE MARROW/STEM TRANSPLANT AUTOLOGOUS  4-5-2013     HC COLONOSCOPY THRU STOMA, DIAGNOSTIC  2005    needs repeat 2010     HC REMOVAL OF TONSILS,12+ Y/O  1974    Tonsils 12+y.o.     ORTHOPEDIC SURGERY       SURGICAL HISTORY OF -   1995    left bunionectomy and hammer toe correction     MEDICATIONS:  Prescription Medications as of 3/21/2019       Rx Number Disp Refills Start End Last Dispensed Date Next Fill Date Owning Pharmacy    acyclovir (ZOVIRAX) 400 MG tablet    10/19/2018        Sig: Take 400 mg by mouth 2 times daily     Class: Historical    Route: Oral    aspirin 325 MG tablet            Sig: Take 325 mg by mouth daily    Class: Historical    Route: Oral    Calcium Carbonate (CALCIUM 600 PO)            Sig: Take by mouth 2 times daily    Class: Historical    Route: Oral    ferrous fumarate (FERRETTS) 324 MG TABS            Sig: Take 324 mg by mouth 2 times daily     Class: Historical    Route: Oral    fish oil-omega-3 fatty acids (FISH OIL) 1000 MG capsule            Sig: Take 2 g by mouth daily.    Class: Historical    Route: Oral    fluticasone (FLONASE) 50 MCG/ACT nasal spray  1 Package 1 1/9/2015    [place], [place] - [address] [place] [address] [phone]    Sig: Spray 2 sprays into both nostrils daily    Class: E-Prescribe    Route: Both Nostrils    loperamide (IMODIUM) 2 MG capsule            Sig: Take 2 mg by mouth 4 times daily as needed for  diarrhea    Class: Historical    Route: Oral    loratadine (CLARITIN) 10 MG tablet            Sig: Take 10 mg by mouth as needed     Class: Historical    Route: Oral    Misc Natural Products (GLUCOSAMINE CHOND COMPLEX/MSM) TABS            Sig: Take  by mouth daily.    Class: Historical    Route: Oral    MULTIVITAMINS OR TABS  100 3 2009    [facility] [place] [address] [facility]    Sig: ONE DAILY    Class: Historical    Route: Oral    prochlorperazine (COMPAZINE) 10 MG tablet    10/19/2018        Sig: Take 10 mg by mouth    Class: Historical    Route: Oral    simethicone (MYLICON) 125 MG chewable tablet            Sig: Take 125 mg by mouth 2 times daily    Class: Historical    Route: Oral    tiZANidine (ZANAFLEX) 2 MG tablet    9/10/2018        Sig: Take 2-4 mg by mouth    Class: Historical    Route: Oral         ALLERGIES:    Allergies   Allergen Reactions     Amoxicillin Rash     Septra [Bactrim] Rash     Sulfa Drugs      REVIEW OF SYSTEMS:  A comprehensive review of systems was performed and found to be negative except as described here or above.   SOCIAL HISTORY:   Social History     Socioeconomic History     Marital status:      Spouse name: Not on file     Number of children: 1     Years of education: Not on file     Highest education level: Not on file   Occupational History     Occupation: patient rep     Employer: [employer]   Social Needs     Financial resource strain: Not on file     Food insecurity:     Worry: Not on file     Inability: Not on file     Transportation needs:     Medical: Not on file     Non-medical: Not on file   Tobacco Use     Smoking status: Former Smoker     Packs/day: 1.00     Years: 15.00     Pack years: 15.00     Last attempt to quit: 1995     Years since quittin.3     Smokeless tobacco: Never Used     Tobacco comment: occ drags 17 years  smokes   Substance and Sexual Activity     Alcohol use: Yes      Alcohol/week: 7.0 oz     Types: 14 Standard drinks or equivalent per week     Comment: CAGE neg     Drug use: No     Sexual activity: Yes     Partners: Male     Comment:    Lifestyle     Physical activity:     Days per week: Not on file     Minutes per session: Not on file     Stress: Not on file   Relationships     Social connections:     Talks on phone: Not on file     Gets together: Not on file     Attends Hindu service: Not on file     Active member of club or organization: Not on file     Attends meetings of clubs or organizations: Not on file     Relationship status: Not on file     Intimate partner violence:     Fear of current or ex partner: Not on file     Emotionally abused: Not on file     Physically abused: Not on file     Forced sexual activity: Not on file   Other Topics Concern      Service No     Blood Transfusions No     Caffeine Concern Yes     Comment: 2 cups a day     Occupational Exposure No     Hobby Hazards No     Sleep Concern No     Stress Concern No     Weight Concern Yes     Comment: steroid weight 1.5 years     Special Diet Yes     Comment: multi vit     Back Care No     Exercise Yes     Comment: garden     Bike Helmet No     Seat Belt Yes     Self-Exams No     Parent/sibling w/ CABG, MI or angioplasty before 65F 55M? No   Social History Narrative    Dairy/d 3 servings/d.     Caffeine 2 servings/d    Exercise 3 x week    Sunscreen used - Yes    Seatbelts used - Yes    Working smoke/CO detectors in the home - Yes    Guns stored in the home - Yes-BB gun    Self Breast Exams - Not on regular basis    Self Testicular Exam - NA    Eye Exam up to date - Yes    Dental Exam up to date - Yes    Pap Smear up to date - No    Mammogram up to date - Yes-last fall    PSA up to date - NA    Dexa Scan up to date - NA    Flex Sig / Colonoscopy up to date - Polyps in the past/dx with diverticulosis/needs colonoscopy this year.    Immunizations up to date - Yes-2004    Abuse: Current or  Past(Physical, Sexual or Emotional)- No    Do you feel safe in your environment - Yes     FAMILY MEDICAL HISTORY:   Family History   Problem Relation Age of Onset     C.A.D. Paternal Grandfather          of MI 51 yo     Breast Cancer Paternal Grandmother      Hypertension Father      Cancer - colorectal Maternal Grandfather         late 60's to early 70's     Circulatory Maternal Grandfather         migraines     Circulatory Mother         migraines     Circulatory Sister         migraines     Cancer Sister         hodgkins     Diabetes No family hx of      Cerebrovascular Disease No family hx of      Glaucoma No family hx of      Macular Degeneration No family hx of      PHYSICAL EXAM:   BP (!) 146/93   Pulse 66   Temp 97.8  F (36.6  C) (Oral)   Resp 16   Wt 71.2 kg (157 lb)   LMP 2005   SpO2 94%   BMI 25.23 kg/m    GENERAL APPEARANCE: alert and no distress  EYES: nonicteric  HENT: mouth without ulcers or lesions  NECK: supple, no adenopathy  RESP: lungs clear to auscultation   CV: regular rhythm, normal rate, no rub  ABDOMEN: soft, nontender, normal bowel sounds, no HSM   Extremities: no clubbing, cyanosis, or edema  MS: no evidence of inflammation in joints, no muscle tenderness  SKIN: no rash  NEURO: mentation intact and speech normal  PSYCH: affect normal/bright   LABS:   CMP  Recent Labs   Lab Test 19  1414 19  1055 19  0535 19  0927 16  1245 12/17/15  0815 10/16/15  1045  13  0936  13  0339  03/15/13  1220  13  0955     --   --  137 140 138 138   < > 135   < > 135   < > 137  --  137   POTASSIUM 3.8  --   --  3.6 3.6 3.9 3.9   < > 4.0   < > 4.3   < > 3.7  --  3.8   CHLORIDE 105  --   --  102 103 104 105   < > 103   < > 100   < > 102  --  98   CO2 27  --   --  29 28 28 27   < > 23   < > 24   < > 25  --  29   ANIONGAP 6  --   --  6 9 7 6   < > 10   < > 10   < > 11  --  10   GLC 86  --   --  64* 82 48* 82   < > 100*   < > 121*   < > 105*  --   66   BUN 13  --   --  10 12 12 17   < > 6*   < > 13   < > 8  --  12   CR 0.75 0.75 0.75 0.84 0.88 1.02 0.88   < > 0.62   < > 0.67   < > 0.67   < > 0.80   GFRESTIMATED 84 85  --  73 65 55* 66   < > >90   < > >90   < > >90   < > 74   GFRESTBLACK >90 >90  --  85 79 67 80   < > >90   < > >90   < > >90   < > 89   LEE 8.8  --   --  8.4* 8.9 8.8 8.7   < > 9.0   < > 8.4*   < > 8.9   < > 9.3   MAG  --   --   --  2.0  --   --   --   --  1.7  --  2.1  --  2.0  --  2.1   PHOS 3.1  --   --  4.0  --   --   --   --   --   --   --   --   --   --  3.9   PROTTOTAL  --   --   --  6.0* 6.8 7.0 6.4*   < > 6.2*   < >  --    < > 7.4  --  7.2   ALBUMIN 3.5  --   --  3.3* 3.6 3.8 3.6   < > 3.4   < >  --    < > 4.3   < > 4.0   BILITOTAL  --   --   --  0.4 0.6 0.6 1.0   < > 0.5   < >  --    < > 0.9  --  0.4   ALKPHOS  --   --   --  53 55 58 57   < > 35*   < >  --    < > 54  --  60   AST  --   --   --  21 27 23 31   < > 35   < >  --    < > 31  --  30   ALT  --   --   --  19 36 26 34   < > 39   < >  --    < > 23  --  24    < > = values in this interval not displayed.     CBC  Recent Labs   Lab Test 03/20/19  1414 03/08/19  1244 03/07/19  0927 04/21/16  1245   HGB 12.1 11.8 11.1* 12.2   WBC 2.5* 3.1* 1.9* 1.6*   RBC 3.40* 3.14* 3.10* 3.33*   HCT 37.7 34.5* 33.9* 36.3   * 110* 109* 109*   MCH 35.6* 37.6* 35.8* 36.6*   MCHC 32.1 34.2 32.7 33.6   RDW 12.5 13.1 13.2 12.3    244 233 174     INR  Recent Labs   Lab Test 03/08/19  1132 03/07/19  0927 04/03/13  1215 03/15/13  1220   INR 0.91 0.94 1.01 1.05   PTT 28 26 38* 35     ABG  Recent Labs   Lab Test 02/25/13  0833   PH 7.49*   PCO2 35   PO2 71*   HCO3 28   O2PER 21      URINE STUDIES  Recent Labs   Lab Test 03/20/19  1418 03/13/19  1131 03/07/19  0927 06/11/15  1133   COLOR Yellow Straw Yellow Light Yellow   APPEARANCE Clear Clear Slightly Cloudy Clear   URINEGLC Negative Negative Negative Negative   URINEBILI Negative Negative Negative Negative   URINEKETONE Negative Negative  Negative Negative   SG 1.013 1.008 1.015 1.006   UBLD Large* Moderate* Large* Moderate*   URINEPH 5.0 5.0 6.0 5.0   PROTEIN 30* Negative >499* Negative   NITRITE Negative Negative Negative Negative   LEUKEST Negative Negative Negative Negative   RBCU 16* 2 22* 3*   WBCU 0 <1 1 0     Recent Labs   Lab Test 03/20/19  1418 03/11/19  0535 04/28/15  0615 04/04/14  0645 10/02/13  0640 07/08/13  0750 05/04/13  0834 02/21/13  0825   UTPG 0.34* 0.49* 0.18 0.23* 0.27* 0.17 0.14 0.19     PTH  No lab results found.  IRON STUDIES  Recent Labs   Lab Test 12/17/15  0815 08/20/15  0836 07/16/15  1048 06/04/15  0952 05/06/15  0844 02/09/15  0927   IRON 104 98 63 55 93 17*    402 410 516* 453* 352   IRONSAT 27 24 15 11* 21 5*   KORIN 117 72 55 6* 9 213     IMAGING:  All imaging studies reviewed by me.   Tobi Rendon MD

## 2019-03-20 NOTE — Clinical Note
3/20/2019      RE: Adrianna Palomino  [address]  [place] [address]       Nephrology Clinic    Adrianna Palomino MRN:[id number] YOB: 1955  Date of Service: 03/21/2019  Primary care provider: Ely Maldonado  Requesting physician: Abdi Bill    ASSESSMENT AND RECOMMENDATIONS:   Adrianna Palomino is a 63 year old female with PMH of MM , osteoporosis , lumbar fracture , chronic hematuria and collagenous collitis  who presents for evaluation of proteinuria and hematuria.    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   -- She will need close surveillance of her creatinine and urine proteins and ideally will benefit from an ACEI , counseled in appointment toay that she will need to check BP at home and call with reads in 3-4 days since the BP in office was high   -- ACEi not initiated today secondary to surgery plans for next week, will follow on home BP reads and initiate low dose diltiazem if needed  -- She will need to be monitored closely early post transplant and will follow in 1 month post transplant or sooner  -- complements , ARIADNA, ANCA added today are pending (can be done with next labs)  -- Renal USG also ordered today    Electrolytes stable    HTN  Hypervolemia  BP goal <130/80   BP elevated in office , isolated high read today, she describes a high BP at home sporadically, on no meds  Leg edema since  dexamethasone reinitiated not with mild improvement since last 2 wks.   -- will monitor and address BP with home reads after follow up call in 3-4 days    Multiple Myeloma since 2006  Last BM Bx 10/2018 with active disease,   Treated with thalidomide and dexa in 2006 and then in 2012 treated with RVD followed by Revlimid for maintenance till 3/2019, medications on hold for Sx plans next wk    She will follow up 1 month post op    Seen and discussed with Dr Mccray    REASON FOR CONSULT: proteinuria, hematuria    HISTORY OF PRESENT ILLNESS:   Adrianna Palomino is a 63 year old female who presents for evaluation of proteinuria and hematuria  PMH of MM , osteoporosis  MM diagnosed in 2006 when she had presented with back discomfort and abnormal Dexa scan, and induced with thalidomide and dexa . She was in remission and stopped thalidomide for neuropathy in 2007 and was on dexa for maintenance, relapsed in 2012 and received 5 cycles of RVD with autoTx 4/2013. Post Tx on maintenance with Revlamid 10mg since then.  She had new bone lesions in 8/2018 follow up and underwent bone Bx with normocellular BM with polyclonal plasma cells 5%. Repeat Bx in 10/2018 with diagnosis of recurrence with kappa monoclonal plasma cells in 10/2018 Bx treated with RVD started 10/2018 x 4 cycles and continued Revlimid and dexa till 3/12 now off all treatment with plans for auto Tx next week.  She also has been on Zometa [id number] for osteoporosis since 2013 and last dose 2/2019  Mspike increased to 0.9 in 8/2018. FLC increased kappa (2.4) and Kappa to lambda ratio (1.8), which is consistent with the MM ( light chain kappa)(3/7/19)  She reports no new issues today and feels well. She reports leg swelling that has been improving since she has been off Dexamethasone. No change in appetite and home BP have been around 130/80s. She has been off dexa and Revlimid since 3/12 and is planned for a BM auto Tx next wk.    PAST MEDICAL HISTORY:  Past  Medical History:   Diagnosis Date     Anemia      Compression fracture     T12, L1, L4     Macular degeneration 2011    Early AMD     Malignant neoplasm (H)      Migraine      Multiple myeloma (H) 2006     Nonsenile cataract      PAST SURGICAL HISTORY:  Past Surgical History:   Procedure Laterality Date     ARTHROSCOPY KNEE WITH LATERAL MENISCECTOMY  9/27/2013    Procedure: ARTHROSCOPY KNEE WITH LATERAL MENISCECTOMY;  Left Knee Arthroscopy,  Partial Lateral Meniscectomy       EXCISE GANGLION FOOT  10/28/2011    Procedure:EXCISE GANGLION FOOT; Excision of Ganglion Cyst Left Foot; Surgeon:STEPHEN IBRAHIM; Location:[facility]      BONE MARROW/STEM TRANSPLANT AUTOLOGOUS  4-5-2013     HC COLONOSCOPY THRU STOMA, DIAGNOSTIC  2005    needs repeat 2010     HC REMOVAL OF TONSILS,12+ Y/O  1974    Tonsils 12+y.o.     ORTHOPEDIC SURGERY       SURGICAL HISTORY OF -   1995    left bunionectomy and hammer toe correction     MEDICATIONS:  Prescription Medications as of 3/21/2019       Rx Number Disp Refills Start End Last Dispensed Date Next Fill Date Owning Pharmacy    acyclovir (ZOVIRAX) 400 MG tablet    10/19/2018        Sig: Take 400 mg by mouth 2 times daily     Class: Historical    Route: Oral    aspirin 325 MG tablet            Sig: Take 325 mg by mouth daily    Class: Historical    Route: Oral    Calcium Carbonate (CALCIUM 600 PO)            Sig: Take by mouth 2 times daily    Class: Historical    Route: Oral    ferrous fumarate (FERRETTS) 324 MG TABS            Sig: Take 324 mg by mouth 2 times daily     Class: Historical    Route: Oral    fish oil-omega-3 fatty acids (FISH OIL) 1000 MG capsule            Sig: Take 2 g by mouth daily.    Class: Historical    Route: Oral    fluticasone (FLONASE) 50 MCG/ACT nasal spray  1 Package 1 1/9/2015    [place], [place] - [address] [place] [address] [phone]    Sig: Spray 2 sprays into both nostrils daily    Class: E-Prescribe    Route: Both Nostrils     loperamide (IMODIUM) 2 MG capsule            Sig: Take 2 mg by mouth 4 times daily as needed for diarrhea    Class: Historical    Route: Oral    loratadine (CLARITIN) 10 MG tablet            Sig: Take 10 mg by mouth as needed     Class: Historical    Route: Oral    Misc Natural Products (GLUCOSAMINE CHOND COMPLEX/MSM) TABS            Sig: Take  by mouth daily.    Class: Historical    Route: Oral    MULTIVITAMINS OR TABS  100 3 2009    [facility] - [place], [place] - [address]    Sig: ONE DAILY    Class: Historical    Route: Oral    prochlorperazine (COMPAZINE) 10 MG tablet    10/19/2018        Sig: Take 10 mg by mouth    Class: Historical    Route: Oral    simethicone (MYLICON) 125 MG chewable tablet            Sig: Take 125 mg by mouth 2 times daily    Class: Historical    Route: Oral    tiZANidine (ZANAFLEX) 2 MG tablet    9/10/2018        Sig: Take 2-4 mg by mouth    Class: Historical    Route: Oral         ALLERGIES:    Allergies   Allergen Reactions     Amoxicillin Rash     Septra [Bactrim] Rash     Sulfa Drugs      REVIEW OF SYSTEMS:  A comprehensive review of systems was performed and found to be negative except as described here or above.   SOCIAL HISTORY:   Social History     Socioeconomic History     Marital status:      Spouse name: Not on file     Number of children: 1     Years of education: Not on file     Highest education level: Not on file   Occupational History     Occupation: patient rep     Employer: [employer]   Social Needs     Financial resource strain: Not on file     Food insecurity:     Worry: Not on file     Inability: Not on file     Transportation needs:     Medical: Not on file     Non-medical: Not on file   Tobacco Use     Smoking status: Former Smoker     Packs/day: 1.00     Years: 15.00     Pack years: 15.00     Last attempt to quit: 1995     Years since quittin.3     Smokeless tobacco: Never Used     Tobacco comment: occ  drags 17 years  smokes   Substance and Sexual Activity     Alcohol use: Yes     Alcohol/week: 7.0 oz     Types: 14 Standard drinks or equivalent per week     Comment: CAGE neg     Drug use: No     Sexual activity: Yes     Partners: Male     Comment:    Lifestyle     Physical activity:     Days per week: Not on file     Minutes per session: Not on file     Stress: Not on file   Relationships     Social connections:     Talks on phone: Not on file     Gets together: Not on file     Attends Shinto service: Not on file     Active member of club or organization: Not on file     Attends meetings of clubs or organizations: Not on file     Relationship status: Not on file     Intimate partner violence:     Fear of current or ex partner: Not on file     Emotionally abused: Not on file     Physically abused: Not on file     Forced sexual activity: Not on file   Other Topics Concern      Service No     Blood Transfusions No     Caffeine Concern Yes     Comment: 2 cups a day     Occupational Exposure No     Hobby Hazards No     Sleep Concern No     Stress Concern No     Weight Concern Yes     Comment: steroid weight 1.5 years     Special Diet Yes     Comment: multi vit     Back Care No     Exercise Yes     Comment: garden     Bike Helmet No     Seat Belt Yes     Self-Exams No     Parent/sibling w/ CABG, MI or angioplasty before 65F 55M? No   Social History Narrative    Dairy/d 3 servings/d.     Caffeine 2 servings/d    Exercise 3 x week    Sunscreen used - Yes    Seatbelts used - Yes    Working smoke/CO detectors in the home - Yes    Guns stored in the home - Yes-BB gun    Self Breast Exams - Not on regular basis    Self Testicular Exam - NA    Eye Exam up to date - Yes    Dental Exam up to date - Yes    Pap Smear up to date - No    Mammogram up to date - Yes-last fall    PSA up to date - NA    Dexa Scan up to date - NA    Flex Sig / Colonoscopy up to date - Polyps in the past/dx with  diverticulosis/needs colonoscopy this year.    Immunizations up to date - Yes-    Abuse: Current or Past(Physical, Sexual or Emotional)- No    Do you feel safe in your environment - Yes     FAMILY MEDICAL HISTORY:   Family History   Problem Relation Age of Onset     C.A.D. Paternal Grandfather          of MI 51 yo     Breast Cancer Paternal Grandmother      Hypertension Father      Cancer - colorectal Maternal Grandfather         late 60's to early 70's     Circulatory Maternal Grandfather         migraines     Circulatory Mother         migraines     Circulatory Sister         migraines     Cancer Sister         hodgkins     Diabetes No family hx of      Cerebrovascular Disease No family hx of      Glaucoma No family hx of      Macular Degeneration No family hx of      PHYSICAL EXAM:   BP (!) 146/93   Pulse 66   Temp 97.8  F (36.6  C) (Oral)   Resp 16   Wt 71.2 kg (157 lb)   LMP 2005   SpO2 94%   BMI 25.23 kg/m     GENERAL APPEARANCE: alert and no distress  EYES: nonicteric  HENT: mouth without ulcers or lesions  NECK: supple, no adenopathy  RESP: lungs clear to auscultation   CV: regular rhythm, normal rate, no rub  ABDOMEN: soft, nontender, normal bowel sounds, no HSM   Extremities: no clubbing, cyanosis, or edema  MS: no evidence of inflammation in joints, no muscle tenderness  SKIN: no rash  NEURO: mentation intact and speech normal  PSYCH: affect normal/bright   LABS:   CMP  Recent Labs   Lab Test 19  1414 19  1055 19  0535 19  0927 16  1245 12/17/15  0815 10/16/15  1045  13  0936  13  0339  03/15/13  1220  13  0955     --   --  137 140 138 138   < > 135   < > 135   < > 137  --  137   POTASSIUM 3.8  --   --  3.6 3.6 3.9 3.9   < > 4.0   < > 4.3   < > 3.7  --  3.8   CHLORIDE 105  --   --  102 103 104 105   < > 103   < > 100   < > 102  --  98   CO2 27  --   --  29 28 28 27   < > 23   < > 24   < > 25  --  29   ANIONGAP 6  --   --  6 9 7 6    < > 10   < > 10   < > 11  --  10   GLC 86  --   --  64* 82 48* 82   < > 100*   < > 121*   < > 105*  --  66   BUN 13  --   --  10 12 12 17   < > 6*   < > 13   < > 8  --  12   CR 0.75 0.75 0.75 0.84 0.88 1.02 0.88   < > 0.62   < > 0.67   < > 0.67   < > 0.80   GFRESTIMATED 84 85  --  73 65 55* 66   < > >90   < > >90   < > >90   < > 74   GFRESTBLACK >90 >90  --  85 79 67 80   < > >90   < > >90   < > >90   < > 89   LEE 8.8  --   --  8.4* 8.9 8.8 8.7   < > 9.0   < > 8.4*   < > 8.9   < > 9.3   MAG  --   --   --  2.0  --   --   --   --  1.7  --  2.1  --  2.0  --  2.1   PHOS 3.1  --   --  4.0  --   --   --   --   --   --   --   --   --   --  3.9   PROTTOTAL  --   --   --  6.0* 6.8 7.0 6.4*   < > 6.2*   < >  --    < > 7.4  --  7.2   ALBUMIN 3.5  --   --  3.3* 3.6 3.8 3.6   < > 3.4   < >  --    < > 4.3   < > 4.0   BILITOTAL  --   --   --  0.4 0.6 0.6 1.0   < > 0.5   < >  --    < > 0.9  --  0.4   ALKPHOS  --   --   --  53 55 58 57   < > 35*   < >  --    < > 54  --  60   AST  --   --   --  21 27 23 31   < > 35   < >  --    < > 31  --  30   ALT  --   --   --  19 36 26 34   < > 39   < >  --    < > 23  --  24    < > = values in this interval not displayed.     CBC  Recent Labs   Lab Test 03/20/19  1414 03/08/19  1244 03/07/19  0927 04/21/16  1245   HGB 12.1 11.8 11.1* 12.2   WBC 2.5* 3.1* 1.9* 1.6*   RBC 3.40* 3.14* 3.10* 3.33*   HCT 37.7 34.5* 33.9* 36.3   * 110* 109* 109*   MCH 35.6* 37.6* 35.8* 36.6*   MCHC 32.1 34.2 32.7 33.6   RDW 12.5 13.1 13.2 12.3    244 233 174     INR  Recent Labs   Lab Test 03/08/19  1132 03/07/19  0927 04/03/13  1215 03/15/13  1220   INR 0.91 0.94 1.01 1.05   PTT 28 26 38* 35     ABG  Recent Labs   Lab Test 02/25/13  0833   PH 7.49*   PCO2 35   PO2 71*   HCO3 28   O2PER 21      URINE STUDIES  Recent Labs   Lab Test 03/20/19  1418 03/13/19  1131 03/07/19  0927 06/11/15  1133   COLOR Yellow Straw Yellow Light Yellow   APPEARANCE Clear Clear Slightly Cloudy Clear   URINEGLC Negative Negative  Negative Negative   URINEBILI Negative Negative Negative Negative   URINEKETONE Negative Negative Negative Negative   SG 1.013 1.008 1.015 1.006   UBLD Large* Moderate* Large* Moderate*   URINEPH 5.0 5.0 6.0 5.0   PROTEIN 30* Negative >499* Negative   NITRITE Negative Negative Negative Negative   LEUKEST Negative Negative Negative Negative   RBCU 16* 2 22* 3*   WBCU 0 <1 1 0     Recent Labs   Lab Test 03/20/19  1418 03/11/19  0535 04/28/15  0615 04/04/14  0645 10/02/13  0640 07/08/13  0750 05/04/13  0834 02/21/13  0825   UTPG 0.34* 0.49* 0.18 0.23* 0.27* 0.17 0.14 0.19     PTH  No lab results found.  IRON STUDIES  Recent Labs   Lab Test 12/17/15  0815 08/20/15  0836 07/16/15  1048 06/04/15  0952 05/06/15  0844 02/09/15  0927   IRON 104 98 63 55 93 17*    402 410 516* 453* 352   IRONSAT 27 24 15 11* 21 5*   KORIN 117 72 55 6* 9 213     IMAGING:  All imaging studies reviewed by me.   MD Tobi Major MD

## 2019-03-20 NOTE — PATIENT INSTRUCTIONS
BP goal is to keep <130/80, your BP today was 146/93, monitor blood pressures at home and call us if they are persistently in this range  We would ideally like to start a lose dose of LISINOPRIL for long term control of proteinuria but I will hold ethel for now consideering surgery next wk , if BP is high we can start a different medication(Ditiazem)   You will follow up in 1 month post transplant or sooner for post op follow up  Avoid NSAIDs  We may consider a biopsy if the kidney functions are deranged or after MM treatment you still have proteins and blood in urine

## 2019-03-20 NOTE — NURSING NOTE
"Chief Complaint   Patient presents with     Consult     Pre BMT pt here due to Hematuria and Proteinuria        Vital signs:  Temp: 97.8  F (36.6  C) Temp src: Oral BP: (!) 146/93 Pulse: 66   Resp: 16 SpO2: 94 %       Weight: 71.2 kg (157 lb)  Estimated body mass index is 25.23 kg/m  as calculated from the following:    Height as of 3/7/19: 1.68 m (5' 6.14\").    Weight as of this encounter: 71.2 kg (157 lb).        [facility]  3/20/2019 3:06 PM      "

## 2019-03-20 NOTE — LETTER
3/20/2019     RE: Adrianna Palomino  [address]  [place] [address]     Dear Colleague,    Thank you for referring your patient, Adrianna Palomino, to the [facility] NEPHROLOGY at [facility]. Please see a copy of my visit note below.    Nephrology Clinic    Adrianna Palomino MRN:[id number] YOB: 1955  Date of Service: 03/21/2019  Primary care provider: Ely Maldonado  Requesting physician: Abdi Bill    ASSESSMENT AND RECOMMENDATIONS:   Adrianna Palomino is a 63 year old female with PMH of MM , osteoporosis , lumbar fracture , chronic hematuria and collagenous collitis  who presents for evaluation of proteinuria and hematuria.    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   -- She will need close surveillance of her creatinine and urine proteins and ideally will benefit from an ACEI , counseled in appointment toay that she will need to check BP at home and call with reads in 3-4 days since the BP in office was high   -- ACEi not initiated today secondary to surgery plans for next week, will follow on home BP reads and initiate low dose diltiazem if needed  -- She will need to be monitored closely early post transplant and will follow in 1 month post transplant or sooner  -- complements , ARIADNA, ANCA added today are pending (can be done with next labs)  -- Renal USG also ordered  today    Electrolytes stable    HTN  Hypervolemia  BP goal <130/80   BP elevated in office , isolated high read today, she describes a high BP at home sporadically, on no meds  Leg edema since dexamethasone reinitiated not with mild improvement since last 2 wks.   -- will monitor and address BP with home reads after follow up call in 3-4 days    Multiple Myeloma since 2006  Last BM Bx 10/2018 with active disease,   Treated with thalidomide and dexa in 2006 and then in 2012 treated with RVD followed by Revlimid for maintenance till 3/2019, medications on hold for Sx plans next wk    She will follow up 1 month post op    Seen and discussed with Dr Mccray    REASON FOR CONSULT: proteinuria, hematuria    HISTORY OF PRESENT ILLNESS:   Adrianna Palomino is a 63 year old female who presents for evaluation of proteinuria and hematuria  PMH of MM , osteoporosis  MM diagnosed in 2006 when she had presented with back discomfort and abnormal Dexa scan, and induced with thalidomide and dexa . She was in remission and stopped thalidomide for neuropathy in 2007 and was on dexa for maintenance, relapsed in 2012 and received 5 cycles of RVD with autoTx 4/2013. Post Tx on maintenance with Revlamid 10mg since then.  She had new bone lesions in 8/2018 follow up and underwent bone Bx with normocellular BM with polyclonal plasma cells 5%. Repeat Bx in 10/2018 with diagnosis of recurrence with kappa monoclonal plasma cells in 10/2018 Bx treated with RVD started 10/2018 x 4 cycles and continued Revlimid and dexa till 3/12 now off all treatment with plans for auto Tx next week.  She also has been on Zometa [id number] for osteoporosis since 2013 and last dose 2/2019  Mspike increased to 0.9 in 8/2018. FLC increased kappa (2.4) and Kappa to lambda ratio (1.8), which is consistent with the MM ( light chain kappa)(3/7/19)  She reports no new issues today and feels well. She reports leg swelling that has been improving since she has been off  Dexamethasone. No change in appetite and home BP have been around 130/80s. She has been off dexa and Revlimid since 3/12 and is planned for a BM auto Tx next wk.    PAST MEDICAL HISTORY:  Past Medical History:   Diagnosis Date     Anemia      Compression fracture     T12, L1, L4     Macular degeneration 2011    Early AMD     Malignant neoplasm (H)      Migraine      Multiple myeloma (H) 2006     Nonsenile cataract      PAST SURGICAL HISTORY:  Past Surgical History:   Procedure Laterality Date     ARTHROSCOPY KNEE WITH LATERAL MENISCECTOMY  9/27/2013    Procedure: ARTHROSCOPY KNEE WITH LATERAL MENISCECTOMY;  Left Knee Arthroscopy,  Partial Lateral Meniscectomy       EXCISE GANGLION FOOT  10/28/2011    Procedure:EXCISE GANGLION FOOT; Excision of Ganglion Cyst Left Foot; Surgeon:STEPHEN IBRAHIM; Location:[facility]      BONE MARROW/STEM TRANSPLANT AUTOLOGOUS  4-5-2013     HC COLONOSCOPY THRU STOMA, DIAGNOSTIC  2005    needs repeat 2010     HC REMOVAL OF TONSILS,12+ Y/O  1974    Tonsils 12+y.o.     ORTHOPEDIC SURGERY       SURGICAL HISTORY OF -   1995    left bunionectomy and hammer toe correction     MEDICATIONS:  Prescription Medications as of 3/21/2019       Rx Number Disp Refills Start End Last Dispensed Date Next Fill Date Owning Pharmacy    acyclovir (ZOVIRAX) 400 MG tablet    10/19/2018        Sig: Take 400 mg by mouth 2 times daily     Class: Historical    Route: Oral    aspirin 325 MG tablet            Sig: Take 325 mg by mouth daily    Class: Historical    Route: Oral    Calcium Carbonate (CALCIUM 600 PO)            Sig: Take by mouth 2 times daily    Class: Historical    Route: Oral    ferrous fumarate (FERRETTS) 324 MG TABS            Sig: Take 324 mg by mouth 2 times daily     Class: Historical    Route: Oral    fish oil-omega-3 fatty acids (FISH OIL) 1000 MG capsule            Sig: Take 2 g by mouth daily.    Class: Historical    Route: Oral    fluticasone (FLONASE) 50 MCG/ACT nasal spray  1 Package 1  1/9/2015    [facility] [place], [place] - [id number]    Sig: Spray 2 sprays into both nostrils daily    Class: E-Prescribe    Route: Both Nostrils    loperamide (IMODIUM) 2 MG capsule            Sig: Take 2 mg by mouth 4 times daily as needed for diarrhea    Class: Historical    Route: Oral    loratadine (CLARITIN) 10 MG tablet            Sig: Take 10 mg by mouth as needed     Class: Historical    Route: Oral    Misc Natural Products (GLUCOSAMINE CHOND COMPLEX/MSM) TABS            Sig: Take  by mouth daily.    Class: Historical    Route: Oral    MULTIVITAMINS OR TABS  100 3 8/25/2009    [facility] [place], [place] - [address]    Sig: ONE DAILY    Class: Historical    Route: Oral    prochlorperazine (COMPAZINE) 10 MG tablet    10/19/2018        Sig: Take 10 mg by mouth    Class: Historical    Route: Oral    simethicone (MYLICON) 125 MG chewable tablet            Sig: Take 125 mg by mouth 2 times daily    Class: Historical    Route: Oral    tiZANidine (ZANAFLEX) 2 MG tablet    9/10/2018        Sig: Take 2-4 mg by mouth    Class: Historical    Route: Oral         ALLERGIES:    Allergies   Allergen Reactions     Amoxicillin Rash     Septra [Bactrim] Rash     Sulfa Drugs      REVIEW OF SYSTEMS:  A comprehensive review of systems was performed and found to be negative except as described here or above.   SOCIAL HISTORY:   Social History     Socioeconomic History     Marital status:      Spouse name: Not on file     Number of children: 1     Years of education: Not on file     Highest education level: Not on file   Occupational History     Occupation: patient rep     Employer: [employer]   Social Needs     Financial resource strain: Not on file     Food insecurity:     Worry: Not on file     Inability: Not on file     Transportation needs:     Medical: Not on file     Non-medical: Not on file   Tobacco Use     Smoking status: Former  Smoker     Packs/day: 1.00     Years: 15.00     Pack years: 15.00     Last attempt to quit: 1995     Years since quittin.3     Smokeless tobacco: Never Used     Tobacco comment: occ drags 17 years  smokes   Substance and Sexual Activity     Alcohol use: Yes     Alcohol/week: 7.0 oz     Types: 14 Standard drinks or equivalent per week     Comment: CAGE neg     Drug use: No     Sexual activity: Yes     Partners: Male     Comment:    Lifestyle     Physical activity:     Days per week: Not on file     Minutes per session: Not on file     Stress: Not on file   Relationships     Social connections:     Talks on phone: Not on file     Gets together: Not on file     Attends [employer] service: Not on file     Active member of club or organization: Not on file     Attends meetings of clubs or organizations: Not on file     Relationship status: Not on file     Intimate partner violence:     Fear of current or ex partner: Not on file     Emotionally abused: Not on file     Physically abused: Not on file     Forced sexual activity: Not on file   Other Topics Concern      Service No     Blood Transfusions No     Caffeine Concern Yes     Comment: 2 cups a day     Occupational Exposure No     Hobby Hazards No     Sleep Concern No     Stress Concern No     Weight Concern Yes     Comment: steroid weight 1.5 years     Special Diet Yes     Comment: multi vit     Back Care No     Exercise Yes     Comment: garden     Bike Helmet No     Seat Belt Yes     Self-Exams No     Parent/sibling w/ CABG, MI or angioplasty before 65F 55M? No   Social History Narrative    Dairy/d 3 servings/d.     Caffeine 2 servings/d    Exercise 3 x week    Sunscreen used - Yes    Seatbelts used - Yes    Working smoke/CO detectors in the home - Yes    Guns stored in the home - Yes-BB gun    Self Breast Exams - Not on regular basis    Self Testicular Exam - NA    Eye Exam up to date - Yes    Dental Exam up to date - Yes    Pap Smear  up to date - No    Mammogram up to date - Yes-last fall    PSA up to date - NA    Dexa Scan up to date - NA    Flex Sig / Colonoscopy up to date - Polyps in the past/dx with diverticulosis/needs colonoscopy this year.    Immunizations up to date - Yes-    Abuse: Current or Past(Physical, Sexual or Emotional)- No    Do you feel safe in your environment - Yes     FAMILY MEDICAL HISTORY:   Family History   Problem Relation Age of Onset     C.A.D. Paternal Grandfather          of MI 51 yo     Breast Cancer Paternal Grandmother      Hypertension Father      Cancer - colorectal Maternal Grandfather         late 60's to early 70's     Circulatory Maternal Grandfather         migraines     Circulatory Mother         migraines     Circulatory Sister         migraines     Cancer Sister         hodgkins     Diabetes No family hx of      Cerebrovascular Disease No family hx of      Glaucoma No family hx of      Macular Degeneration No family hx of      PHYSICAL EXAM:   BP (!) 146/93   Pulse 66   Temp 97.8  F (36.6  C) (Oral)   Resp 16   Wt 71.2 kg (157 lb)   LMP 2005   SpO2 94%   BMI 25.23 kg/m     GENERAL APPEARANCE: alert and no distress  EYES: nonicteric  HENT: mouth without ulcers or lesions  NECK: supple, no adenopathy  RESP: lungs clear to auscultation   CV: regular rhythm, normal rate, no rub  ABDOMEN: soft, nontender, normal bowel sounds, no HSM   Extremities: no clubbing, cyanosis, or edema  MS: no evidence of inflammation in joints, no muscle tenderness  SKIN: no rash  NEURO: mentation intact and speech normal  PSYCH: affect normal/bright   LABS:   CMP  Recent Labs   Lab Test 19  1414 19  1055 19  0535 19  0927 16  1245 12/17/15  0815 10/16/15  1045  13  0936  13  0339  03/15/13  1220  13  0955     --   --  137 140 138 138   < > 135   < > 135   < > 137  --  137   POTASSIUM 3.8  --   --  3.6 3.6 3.9 3.9   < > 4.0   < > 4.3   < > 3.7  --  3.8    CHLORIDE 105  --   --  102 103 104 105   < > 103   < > 100   < > 102  --  98   CO2 27  --   --  29 28 28 27   < > 23   < > 24   < > 25  --  29   ANIONGAP 6  --   --  6 9 7 6   < > 10   < > 10   < > 11  --  10   GLC 86  --   --  64* 82 48* 82   < > 100*   < > 121*   < > 105*  --  66   BUN 13  --   --  10 12 12 17   < > 6*   < > 13   < > 8  --  12   CR 0.75 0.75 0.75 0.84 0.88 1.02 0.88   < > 0.62   < > 0.67   < > 0.67   < > 0.80   GFRESTIMATED 84 85  --  73 65 55* 66   < > >90   < > >90   < > >90   < > 74   GFRESTBLACK >90 >90  --  85 79 67 80   < > >90   < > >90   < > >90   < > 89   LEE 8.8  --   --  8.4* 8.9 8.8 8.7   < > 9.0   < > 8.4*   < > 8.9   < > 9.3   MAG  --   --   --  2.0  --   --   --   --  1.7  --  2.1  --  2.0  --  2.1   PHOS 3.1  --   --  4.0  --   --   --   --   --   --   --   --   --   --  3.9   PROTTOTAL  --   --   --  6.0* 6.8 7.0 6.4*   < > 6.2*   < >  --    < > 7.4  --  7.2   ALBUMIN 3.5  --   --  3.3* 3.6 3.8 3.6   < > 3.4   < >  --    < > 4.3   < > 4.0   BILITOTAL  --   --   --  0.4 0.6 0.6 1.0   < > 0.5   < >  --    < > 0.9  --  0.4   ALKPHOS  --   --   --  53 55 58 57   < > 35*   < >  --    < > 54  --  60   AST  --   --   --  21 27 23 31   < > 35   < >  --    < > 31  --  30   ALT  --   --   --  19 36 26 34   < > 39   < >  --    < > 23  --  24    < > = values in this interval not displayed.     CBC  Recent Labs   Lab Test 03/20/19  1414 03/08/19  1244 03/07/19  0927 04/21/16  1245   HGB 12.1 11.8 11.1* 12.2   WBC 2.5* 3.1* 1.9* 1.6*   RBC 3.40* 3.14* 3.10* 3.33*   HCT 37.7 34.5* 33.9* 36.3   * 110* 109* 109*   MCH 35.6* 37.6* 35.8* 36.6*   MCHC 32.1 34.2 32.7 33.6   RDW 12.5 13.1 13.2 12.3    244 233 174     INR  Recent Labs   Lab Test 03/08/19  1132 03/07/19  0927 04/03/13  1215 03/15/13  1220   INR 0.91 0.94 1.01 1.05   PTT 28 26 38* 35     ABG  Recent Labs   Lab Test 02/25/13  0833   PH 7.49*   PCO2 35   PO2 71*   HCO3 28   O2PER 21      URINE STUDIES  Recent Labs   Lab Test  03/20/19  1418 03/13/19  1131 03/07/19  0927 06/11/15  1133   COLOR Yellow Straw Yellow Light Yellow   APPEARANCE Clear Clear Slightly Cloudy Clear   URINEGLC Negative Negative Negative Negative   URINEBILI Negative Negative Negative Negative   URINEKETONE Negative Negative Negative Negative   SG 1.013 1.008 1.015 1.006   UBLD Large* Moderate* Large* Moderate*   URINEPH 5.0 5.0 6.0 5.0   PROTEIN 30* Negative >499* Negative   NITRITE Negative Negative Negative Negative   LEUKEST Negative Negative Negative Negative   RBCU 16* 2 22* 3*   WBCU 0 <1 1 0     Recent Labs   Lab Test 03/20/19  1418 03/11/19  0535 04/28/15  0615 04/04/14  0645 10/02/13  0640 07/08/13  0750 05/04/13  0834 02/21/13  0825   UTPG 0.34* 0.49* 0.18 0.23* 0.27* 0.17 0.14 0.19     PTH  No lab results found.  IRON STUDIES  Recent Labs   Lab Test 12/17/15  0815 08/20/15  0836 07/16/15  1048 06/04/15  0952 05/06/15  0844 02/09/15  0927   IRON 104 98 63 55 93 17*    402 410 516* 453* 352   IRONSAT 27 24 15 11* 21 5*   KORIN 117 72 55 6* 9 213     IMAGING:  All imaging studies reviewed by me.   Tobi Rendon MD

## 2019-03-21 PROBLEM — R80.1 PERSISTENT PROTEINURIA: Status: ACTIVE | Noted: 2019-03-21

## 2019-03-21 PROBLEM — R31.29 MICROSCOPIC HEMATURIA: Status: ACTIVE | Noted: 2019-03-21

## 2019-03-21 PROBLEM — N18.2 CHRONIC KIDNEY DISEASE, STAGE 2 (MILD): Status: ACTIVE | Noted: 2019-03-21

## 2019-03-21 ASSESSMENT — ANXIETY QUESTIONNAIRES
7. FEELING AFRAID AS IF SOMETHING AWFUL MIGHT HAPPEN: NOT AT ALL
GAD7 TOTAL SCORE: 3
5. BEING SO RESTLESS THAT IT IS HARD TO SIT STILL: NOT AT ALL
6. BECOMING EASILY ANNOYED OR IRRITABLE: SEVERAL DAYS
1. FEELING NERVOUS, ANXIOUS, OR ON EDGE: SEVERAL DAYS
IF YOU CHECKED OFF ANY PROBLEMS ON THIS QUESTIONNAIRE, HOW DIFFICULT HAVE THESE PROBLEMS MADE IT FOR YOU TO DO YOUR WORK, TAKE CARE OF THINGS AT HOME, OR GET ALONG WITH OTHER PEOPLE: NOT DIFFICULT AT ALL
3. WORRYING TOO MUCH ABOUT DIFFERENT THINGS: NOT AT ALL
2. NOT BEING ABLE TO STOP OR CONTROL WORRYING: NOT AT ALL

## 2019-03-21 ASSESSMENT — PATIENT HEALTH QUESTIONNAIRE - PHQ9
SUM OF ALL RESPONSES TO PHQ QUESTIONS 1-9: 3
5. POOR APPETITE OR OVEREATING: SEVERAL DAYS

## 2019-03-22 LAB — COPATH REPORT: NORMAL

## 2019-03-22 ASSESSMENT — ANXIETY QUESTIONNAIRES: GAD7 TOTAL SCORE: 3

## 2019-03-27 ENCOUNTER — TRANSFERRED RECORDS (OUTPATIENT)
Dept: TRANSPLANT | Facility: CLINIC | Age: 64
End: 2019-03-27

## 2019-03-27 DIAGNOSIS — C90.00 MULTIPLE MYELOMA (H): Primary | ICD-10-CM

## 2019-03-28 ENCOUNTER — APPOINTMENT (OUTPATIENT)
Dept: INTERVENTIONAL RADIOLOGY/VASCULAR | Facility: CLINIC | Age: 64
DRG: 016 | End: 2019-03-28
Attending: INTERNAL MEDICINE
Payer: COMMERCIAL

## 2019-03-28 ENCOUNTER — AMBULATORY - HEALTHEAST (OUTPATIENT)
Dept: OTHER | Facility: CLINIC | Age: 64
End: 2019-03-28

## 2019-03-28 ENCOUNTER — HOSPITAL ENCOUNTER (INPATIENT)
Facility: CLINIC | Age: 64
LOS: 2 days | Discharge: HOME | DRG: 016 | End: 2019-03-30
Attending: INTERNAL MEDICINE | Admitting: INTERNAL MEDICINE
Payer: COMMERCIAL

## 2019-03-28 DIAGNOSIS — C90.00 MULTIPLE MYELOMA (H): ICD-10-CM

## 2019-03-28 DIAGNOSIS — Z94.81 STATUS POST BONE MARROW TRANSPLANT (H): ICD-10-CM

## 2019-03-28 DIAGNOSIS — C90.00 MULTIPLE MYELOMA NOT HAVING ACHIEVED REMISSION (H): Primary | ICD-10-CM

## 2019-03-28 LAB
ABO + RH BLD: NORMAL
ABO + RH BLD: NORMAL
ALBUMIN SERPL-MCNC: 3.6 G/DL (ref 3.4–5)
ALP SERPL-CCNC: 57 U/L (ref 40–150)
ALT SERPL W P-5'-P-CCNC: 21 U/L (ref 0–50)
ANION GAP SERPL CALCULATED.3IONS-SCNC: 10 MMOL/L (ref 3–14)
APTT PPP: 33 SEC (ref 22–37)
AST SERPL W P-5'-P-CCNC: 26 U/L (ref 0–45)
BASOPHILS # BLD AUTO: 0 10E9/L (ref 0–0.2)
BASOPHILS NFR BLD AUTO: 1.3 %
BILIRUB SERPL-MCNC: 0.5 MG/DL (ref 0.2–1.3)
BLD GP AB SCN SERPL QL: NORMAL
BLOOD BANK CMNT PATIENT-IMP: NORMAL
BUN SERPL-MCNC: 11 MG/DL (ref 7–30)
CALCIUM SERPL-MCNC: 8.7 MG/DL (ref 8.5–10.1)
CHLORIDE SERPL-SCNC: 105 MMOL/L (ref 94–109)
CO2 SERPL-SCNC: 25 MMOL/L (ref 20–32)
CREAT SERPL-MCNC: 0.77 MG/DL (ref 0.52–1.04)
DIFFERENTIAL METHOD BLD: ABNORMAL
EOSINOPHIL # BLD AUTO: 0.1 10E9/L (ref 0–0.7)
EOSINOPHIL NFR BLD AUTO: 2.2 %
ERYTHROCYTE [DISTWIDTH] IN BLOOD BY AUTOMATED COUNT: 12.2 % (ref 10–15)
GFR SERPL CREATININE-BSD FRML MDRD: 82 ML/MIN/{1.73_M2}
GLUCOSE SERPL-MCNC: 85 MG/DL (ref 70–99)
HCT VFR BLD AUTO: 37.9 % (ref 35–47)
HGB BLD-MCNC: 12.5 G/DL (ref 11.7–15.7)
IMM GRANULOCYTES # BLD: 0 10E9/L (ref 0–0.4)
IMM GRANULOCYTES NFR BLD: 0 %
INR PPP: 0.95 (ref 0.86–1.14)
LYMPHOCYTES # BLD AUTO: 0.4 10E9/L (ref 0.8–5.3)
LYMPHOCYTES NFR BLD AUTO: 19.2 %
MAGNESIUM SERPL-MCNC: 2 MG/DL (ref 1.6–2.3)
MCH RBC QN AUTO: 36.4 PG (ref 26.5–33)
MCHC RBC AUTO-ENTMCNC: 33 G/DL (ref 31.5–36.5)
MCV RBC AUTO: 111 FL (ref 78–100)
MONOCYTES # BLD AUTO: 0.4 10E9/L (ref 0–1.3)
MONOCYTES NFR BLD AUTO: 16.5 %
NEUTROPHILS # BLD AUTO: 1.4 10E9/L (ref 1.6–8.3)
NEUTROPHILS NFR BLD AUTO: 60.8 %
NRBC # BLD AUTO: 0 10*3/UL
NRBC BLD AUTO-RTO: 0 /100
PLATELET # BLD AUTO: 165 10E9/L (ref 150–450)
POTASSIUM SERPL-SCNC: 3.8 MMOL/L (ref 3.4–5.3)
PROT SERPL-MCNC: 6.5 G/DL (ref 6.8–8.8)
RBC # BLD AUTO: 3.43 10E12/L (ref 3.8–5.2)
SODIUM SERPL-SCNC: 140 MMOL/L (ref 133–144)
SPECIMEN EXP DATE BLD: NORMAL
URATE SERPL-MCNC: 4 MG/DL (ref 2.6–6)
WBC # BLD AUTO: 2.2 10E9/L (ref 4–11)

## 2019-03-28 PROCEDURE — 85730 THROMBOPLASTIN TIME PARTIAL: CPT | Performed by: PHYSICIAN ASSISTANT

## 2019-03-28 PROCEDURE — C1769 GUIDE WIRE: HCPCS

## 2019-03-28 PROCEDURE — 85610 PROTHROMBIN TIME: CPT | Performed by: PHYSICIAN ASSISTANT

## 2019-03-28 PROCEDURE — 02H633Z INSERTION OF INFUSION DEVICE INTO RIGHT ATRIUM, PERCUTANEOUS APPROACH: ICD-10-PCS | Performed by: PHYSICIAN ASSISTANT

## 2019-03-28 PROCEDURE — 99153 MOD SED SAME PHYS/QHP EA: CPT

## 2019-03-28 PROCEDURE — 25000132 ZZH RX MED GY IP 250 OP 250 PS 637: Performed by: INTERNAL MEDICINE

## 2019-03-28 PROCEDURE — 85025 COMPLETE CBC W/AUTO DIFF WBC: CPT | Performed by: PHYSICIAN ASSISTANT

## 2019-03-28 PROCEDURE — 27210738 ZZH ACCESSORY CR2

## 2019-03-28 PROCEDURE — 86900 BLOOD TYPING SEROLOGIC ABO: CPT | Performed by: PHYSICIAN ASSISTANT

## 2019-03-28 PROCEDURE — 25000131 ZZH RX MED GY IP 250 OP 636 PS 637: Performed by: INTERNAL MEDICINE

## 2019-03-28 PROCEDURE — 84550 ASSAY OF BLOOD/URIC ACID: CPT | Performed by: PHYSICIAN ASSISTANT

## 2019-03-28 PROCEDURE — 27210904 ZZH KIT CR6

## 2019-03-28 PROCEDURE — 25800030 ZZH RX IP 258 OP 636: Performed by: INTERNAL MEDICINE

## 2019-03-28 PROCEDURE — 83735 ASSAY OF MAGNESIUM: CPT | Performed by: PHYSICIAN ASSISTANT

## 2019-03-28 PROCEDURE — 25000125 ZZHC RX 250: Performed by: PHYSICIAN ASSISTANT

## 2019-03-28 PROCEDURE — 27210908 ZZH NEEDLE CR4

## 2019-03-28 PROCEDURE — 20600000 ZZH R&B BMT

## 2019-03-28 PROCEDURE — 36558 INSERT TUNNELED CV CATH: CPT | Mod: LT

## 2019-03-28 PROCEDURE — 25000128 H RX IP 250 OP 636: Performed by: PHYSICIAN ASSISTANT

## 2019-03-28 PROCEDURE — 25000128 H RX IP 250 OP 636: Performed by: INTERNAL MEDICINE

## 2019-03-28 PROCEDURE — 25800025 ZZH RX 258: Performed by: INTERNAL MEDICINE

## 2019-03-28 PROCEDURE — 25000132 ZZH RX MED GY IP 250 OP 250 PS 637: Performed by: PHYSICIAN ASSISTANT

## 2019-03-28 PROCEDURE — 99152 MOD SED SAME PHYS/QHP 5/>YRS: CPT

## 2019-03-28 PROCEDURE — 40000141 ZZH STATISTIC PERIPHERAL IV START W/O US GUIDANCE

## 2019-03-28 PROCEDURE — 80053 COMPREHEN METABOLIC PANEL: CPT | Performed by: PHYSICIAN ASSISTANT

## 2019-03-28 PROCEDURE — C1751 CATH, INF, PER/CENT/MIDLINE: HCPCS

## 2019-03-28 PROCEDURE — 3E03305 INTRODUCTION OF OTHER ANTINEOPLASTIC INTO PERIPHERAL VEIN, PERCUTANEOUS APPROACH: ICD-10-PCS | Performed by: FAMILY MEDICINE

## 2019-03-28 PROCEDURE — 27210732 ZZH ACCESSORY CR1

## 2019-03-28 PROCEDURE — 86850 RBC ANTIBODY SCREEN: CPT | Performed by: PHYSICIAN ASSISTANT

## 2019-03-28 PROCEDURE — 86901 BLOOD TYPING SEROLOGIC RH(D): CPT | Performed by: PHYSICIAN ASSISTANT

## 2019-03-28 RX ORDER — DEXAMETHASONE 4 MG/1
8 TABLET ORAL DAILY
Status: COMPLETED | OUTPATIENT
Start: 2019-03-29 | End: 2019-03-30

## 2019-03-28 RX ORDER — SODIUM CHLORIDE 9 MG/ML
INJECTION, SOLUTION INTRAVENOUS CONTINUOUS
Status: ACTIVE | OUTPATIENT
Start: 2019-03-29 | End: 2019-03-29

## 2019-03-28 RX ORDER — DIPHENHYDRAMINE HYDROCHLORIDE 50 MG/ML
25 INJECTION INTRAMUSCULAR; INTRAVENOUS ONCE
Status: COMPLETED | OUTPATIENT
Start: 2019-03-28 | End: 2019-03-28

## 2019-03-28 RX ORDER — DIPHENHYDRAMINE HCL 25 MG
25 CAPSULE ORAL ONCE
Status: CANCELLED
Start: 2019-03-29 | End: 2019-03-29

## 2019-03-28 RX ORDER — FLUCONAZOLE 200 MG/1
200 TABLET ORAL DAILY
Status: DISCONTINUED | OUTPATIENT
Start: 2019-03-28 | End: 2019-03-30 | Stop reason: HOSPADM

## 2019-03-28 RX ORDER — LOPERAMIDE HCL 2 MG
2 CAPSULE ORAL 4 TIMES DAILY PRN
Status: DISCONTINUED | OUTPATIENT
Start: 2019-03-28 | End: 2019-03-30 | Stop reason: HOSPADM

## 2019-03-28 RX ORDER — ACETAMINOPHEN 325 MG/1
650 TABLET ORAL ONCE
Status: CANCELLED
Start: 2019-03-29 | End: 2019-03-29

## 2019-03-28 RX ORDER — POTASSIUM CHLORIDE 29.8 MG/ML
20 INJECTION INTRAVENOUS
Status: DISCONTINUED | OUTPATIENT
Start: 2019-03-28 | End: 2019-03-30 | Stop reason: HOSPADM

## 2019-03-28 RX ORDER — DEXAMETHASONE 0.5 MG/1
8 TABLET ORAL DAILY
Status: CANCELLED
Start: 2019-03-29

## 2019-03-28 RX ORDER — LORAZEPAM 0.5 MG/1
.5-1 TABLET ORAL EVERY 4 HOURS PRN
Status: DISCONTINUED | OUTPATIENT
Start: 2019-03-28 | End: 2019-03-30 | Stop reason: HOSPADM

## 2019-03-28 RX ORDER — ACETAMINOPHEN 325 MG/1
325-650 TABLET ORAL EVERY 4 HOURS PRN
Status: DISCONTINUED | OUTPATIENT
Start: 2019-03-28 | End: 2019-03-30 | Stop reason: HOSPADM

## 2019-03-28 RX ORDER — PROCHLORPERAZINE MALEATE 5 MG
5-10 TABLET ORAL EVERY 6 HOURS PRN
Status: DISCONTINUED | OUTPATIENT
Start: 2019-03-28 | End: 2019-03-30 | Stop reason: HOSPADM

## 2019-03-28 RX ORDER — FLUMAZENIL 0.1 MG/ML
0.2 INJECTION, SOLUTION INTRAVENOUS
Status: DISCONTINUED | OUTPATIENT
Start: 2019-03-28 | End: 2019-03-28

## 2019-03-28 RX ORDER — ONDANSETRON 4 MG/1
8 TABLET, FILM COATED ORAL EVERY 8 HOURS
Status: CANCELLED
Start: 2019-03-28

## 2019-03-28 RX ORDER — DIPHENHYDRAMINE HCL 25 MG
25 CAPSULE ORAL ONCE
Status: COMPLETED | OUTPATIENT
Start: 2019-03-29 | End: 2019-03-29

## 2019-03-28 RX ORDER — NICOTINE POLACRILEX 4 MG
15-30 LOZENGE BUCCAL
Status: DISCONTINUED | OUTPATIENT
Start: 2019-03-28 | End: 2019-03-30 | Stop reason: HOSPADM

## 2019-03-28 RX ORDER — DEXTROSE MONOHYDRATE 25 G/50ML
25-50 INJECTION, SOLUTION INTRAVENOUS
Status: DISCONTINUED | OUTPATIENT
Start: 2019-03-28 | End: 2019-03-28

## 2019-03-28 RX ORDER — DEXAMETHASONE 0.5 MG/1
10 TABLET ORAL ONCE
Status: CANCELLED
Start: 2019-03-28

## 2019-03-28 RX ORDER — HEPARIN SODIUM,PORCINE 10 UNIT/ML
5 VIAL (ML) INTRAVENOUS EVERY 24 HOURS
Status: DISCONTINUED | OUTPATIENT
Start: 2019-03-28 | End: 2019-03-30 | Stop reason: HOSPADM

## 2019-03-28 RX ORDER — SODIUM CHLORIDE 9 MG/ML
INJECTION, SOLUTION INTRAVENOUS CONTINUOUS
Status: ACTIVE | OUTPATIENT
Start: 2019-03-28 | End: 2019-03-28

## 2019-03-28 RX ORDER — POTASSIUM CL/LIDO/0.9 % NACL 10MEQ/0.1L
10 INTRAVENOUS SOLUTION, PIGGYBACK (ML) INTRAVENOUS
Status: DISCONTINUED | OUTPATIENT
Start: 2019-03-28 | End: 2019-03-30 | Stop reason: HOSPADM

## 2019-03-28 RX ORDER — MAGNESIUM SULFATE HEPTAHYDRATE 40 MG/ML
4 INJECTION, SOLUTION INTRAVENOUS EVERY 4 HOURS PRN
Status: DISCONTINUED | OUTPATIENT
Start: 2019-03-28 | End: 2019-03-30 | Stop reason: HOSPADM

## 2019-03-28 RX ORDER — LEVOFLOXACIN 250 MG/1
250 TABLET, FILM COATED ORAL
Status: DISCONTINUED | OUTPATIENT
Start: 2019-03-28 | End: 2019-03-30 | Stop reason: HOSPADM

## 2019-03-28 RX ORDER — NICOTINE POLACRILEX 4 MG
15-30 LOZENGE BUCCAL
Status: DISCONTINUED | OUTPATIENT
Start: 2019-03-28 | End: 2019-03-28

## 2019-03-28 RX ORDER — LIDOCAINE 40 MG/G
CREAM TOPICAL
Status: DISCONTINUED | OUTPATIENT
Start: 2019-03-28 | End: 2019-03-30 | Stop reason: HOSPADM

## 2019-03-28 RX ORDER — ALLOPURINOL 300 MG/1
300 TABLET ORAL ONCE
Status: COMPLETED | OUTPATIENT
Start: 2019-03-28 | End: 2019-03-28

## 2019-03-28 RX ORDER — POTASSIUM CHLORIDE 1.5 G/1.58G
20-40 POWDER, FOR SOLUTION ORAL
Status: DISCONTINUED | OUTPATIENT
Start: 2019-03-28 | End: 2019-03-30 | Stop reason: HOSPADM

## 2019-03-28 RX ORDER — NALOXONE HYDROCHLORIDE 0.4 MG/ML
.1-.4 INJECTION, SOLUTION INTRAMUSCULAR; INTRAVENOUS; SUBCUTANEOUS
Status: DISCONTINUED | OUTPATIENT
Start: 2019-03-28 | End: 2019-03-28

## 2019-03-28 RX ORDER — PANTOPRAZOLE SODIUM 40 MG/1
40 TABLET, DELAYED RELEASE ORAL DAILY
Status: DISCONTINUED | OUTPATIENT
Start: 2019-03-28 | End: 2019-03-30 | Stop reason: HOSPADM

## 2019-03-28 RX ORDER — PENTAMIDINE ISETHIONATE 300 MG/300MG
300 INHALANT RESPIRATORY (INHALATION)
Status: DISCONTINUED | OUTPATIENT
Start: 2019-04-27 | End: 2019-03-30 | Stop reason: HOSPADM

## 2019-03-28 RX ORDER — HEPARIN SODIUM,PORCINE 10 UNIT/ML
5-10 VIAL (ML) INTRAVENOUS
Status: DISCONTINUED | OUTPATIENT
Start: 2019-03-28 | End: 2019-03-30 | Stop reason: HOSPADM

## 2019-03-28 RX ORDER — MEPERIDINE HYDROCHLORIDE 25 MG/ML
25-50 INJECTION INTRAMUSCULAR; INTRAVENOUS; SUBCUTANEOUS
Status: CANCELLED | OUTPATIENT
Start: 2019-03-29 | End: 2019-03-30

## 2019-03-28 RX ORDER — ALLOPURINOL 100 MG/1
300 TABLET ORAL ONCE
Status: CANCELLED
Start: 2019-03-28 | End: 2019-03-28

## 2019-03-28 RX ORDER — SIMETHICONE 80 MG
125 TABLET,CHEWABLE ORAL 2 TIMES DAILY PRN
Status: DISCONTINUED | OUTPATIENT
Start: 2019-03-28 | End: 2019-03-30 | Stop reason: HOSPADM

## 2019-03-28 RX ORDER — POTASSIUM CHLORIDE 750 MG/1
20-40 TABLET, EXTENDED RELEASE ORAL
Status: DISCONTINUED | OUTPATIENT
Start: 2019-03-28 | End: 2019-03-30 | Stop reason: HOSPADM

## 2019-03-28 RX ORDER — ACETAMINOPHEN 325 MG/1
650 TABLET ORAL ONCE
Status: COMPLETED | OUTPATIENT
Start: 2019-03-29 | End: 2019-03-29

## 2019-03-28 RX ORDER — CEFAZOLIN SODIUM 2 G/100ML
2 INJECTION, SOLUTION INTRAVENOUS
Status: DISCONTINUED | OUTPATIENT
Start: 2019-03-28 | End: 2019-03-28

## 2019-03-28 RX ORDER — ACYCLOVIR 800 MG/1
800 TABLET ORAL 2 TIMES DAILY
Status: DISCONTINUED | OUTPATIENT
Start: 2019-03-28 | End: 2019-03-30 | Stop reason: HOSPADM

## 2019-03-28 RX ORDER — FENTANYL CITRATE 50 UG/ML
25-50 INJECTION, SOLUTION INTRAMUSCULAR; INTRAVENOUS EVERY 5 MIN PRN
Status: DISCONTINUED | OUTPATIENT
Start: 2019-03-28 | End: 2019-03-28

## 2019-03-28 RX ORDER — ALBUTEROL SULFATE 0.83 MG/ML
2.5 SOLUTION RESPIRATORY (INHALATION)
Status: DISCONTINUED | OUTPATIENT
Start: 2019-04-27 | End: 2019-03-30 | Stop reason: HOSPADM

## 2019-03-28 RX ORDER — MEPERIDINE HYDROCHLORIDE 25 MG/ML
25-50 INJECTION INTRAMUSCULAR; INTRAVENOUS; SUBCUTANEOUS
Status: ACTIVE | OUTPATIENT
Start: 2019-03-29 | End: 2019-03-30

## 2019-03-28 RX ORDER — ONDANSETRON 8 MG/1
8 TABLET, FILM COATED ORAL EVERY 8 HOURS
Status: COMPLETED | OUTPATIENT
Start: 2019-03-28 | End: 2019-03-29

## 2019-03-28 RX ORDER — POTASSIUM CHLORIDE 7.45 MG/ML
10 INJECTION INTRAVENOUS
Status: DISCONTINUED | OUTPATIENT
Start: 2019-03-28 | End: 2019-03-30 | Stop reason: HOSPADM

## 2019-03-28 RX ORDER — HEPARIN SODIUM,PORCINE 10 UNIT/ML
5-10 VIAL (ML) INTRAVENOUS EVERY 24 HOURS
Status: DISCONTINUED | OUTPATIENT
Start: 2019-03-28 | End: 2019-03-30 | Stop reason: HOSPADM

## 2019-03-28 RX ORDER — CLINDAMYCIN PHOSPHATE 900 MG/50ML
900 INJECTION, SOLUTION INTRAVENOUS ONCE
Status: DISCONTINUED | OUTPATIENT
Start: 2019-03-28 | End: 2019-03-28

## 2019-03-28 RX ORDER — SODIUM CHLORIDE 9 MG/ML
INJECTION, SOLUTION INTRAVENOUS CONTINUOUS
Status: CANCELLED
Start: 2019-03-28

## 2019-03-28 RX ORDER — LORATADINE 10 MG/1
10 TABLET ORAL DAILY PRN
Status: DISCONTINUED | OUTPATIENT
Start: 2019-03-28 | End: 2019-03-30 | Stop reason: HOSPADM

## 2019-03-28 RX ORDER — FLUTICASONE PROPIONATE 50 MCG
2 SPRAY, SUSPENSION (ML) NASAL DAILY
Status: DISCONTINUED | OUTPATIENT
Start: 2019-03-28 | End: 2019-03-30 | Stop reason: HOSPADM

## 2019-03-28 RX ORDER — TIZANIDINE 2 MG/1
2-4 TABLET ORAL EVERY 6 HOURS PRN
Status: DISCONTINUED | OUTPATIENT
Start: 2019-03-28 | End: 2019-03-30 | Stop reason: HOSPADM

## 2019-03-28 RX ORDER — DEXTROSE MONOHYDRATE 25 G/50ML
25-50 INJECTION, SOLUTION INTRAVENOUS
Status: DISCONTINUED | OUTPATIENT
Start: 2019-03-28 | End: 2019-03-30 | Stop reason: HOSPADM

## 2019-03-28 RX ORDER — LORAZEPAM 2 MG/ML
.5-1 INJECTION INTRAMUSCULAR EVERY 4 HOURS PRN
Status: DISCONTINUED | OUTPATIENT
Start: 2019-03-28 | End: 2019-03-30 | Stop reason: HOSPADM

## 2019-03-28 RX ADMIN — FENTANYL CITRATE 25 MCG: 50 INJECTION INTRAMUSCULAR; INTRAVENOUS at 12:45

## 2019-03-28 RX ADMIN — DIPHENHYDRAMINE HYDROCHLORIDE 25 MG: 50 INJECTION, SOLUTION INTRAMUSCULAR; INTRAVENOUS at 12:44

## 2019-03-28 RX ADMIN — DEXTROSE AND SODIUM CHLORIDE: 5; 450 INJECTION, SOLUTION INTRAVENOUS at 19:52

## 2019-03-28 RX ADMIN — DEXTROSE AND SODIUM CHLORIDE: 5; 450 INJECTION, SOLUTION INTRAVENOUS at 10:33

## 2019-03-28 RX ADMIN — PANTOPRAZOLE SODIUM 40 MG: 40 TABLET, DELAYED RELEASE ORAL at 14:33

## 2019-03-28 RX ADMIN — Medication 600 MG: at 14:35

## 2019-03-28 RX ADMIN — ALLOPURINOL 300 MG: 300 TABLET ORAL at 14:33

## 2019-03-28 RX ADMIN — ONDANSETRON HYDROCHLORIDE 8 MG: 8 TABLET, FILM COATED ORAL at 14:34

## 2019-03-28 RX ADMIN — FLUCONAZOLE 200 MG: 200 TABLET ORAL at 17:36

## 2019-03-28 RX ADMIN — LEVOFLOXACIN 250 MG: 250 TABLET, FILM COATED ORAL at 17:36

## 2019-03-28 RX ADMIN — DEXAMETHASONE 10 MG: 2 TABLET ORAL at 14:34

## 2019-03-28 RX ADMIN — ONDANSETRON HYDROCHLORIDE 8 MG: 8 TABLET, FILM COATED ORAL at 23:04

## 2019-03-28 RX ADMIN — MIDAZOLAM 0.5 MG: 1 INJECTION INTRAMUSCULAR; INTRAVENOUS at 12:45

## 2019-03-28 RX ADMIN — SODIUM CHLORIDE: 9 INJECTION, SOLUTION INTRAVENOUS at 15:56

## 2019-03-28 RX ADMIN — LIDOCAINE HYDROCHLORIDE 5 ML: 10 INJECTION, SOLUTION EPIDURAL; INFILTRATION; INTRACAUDAL; PERINEURAL at 13:23

## 2019-03-28 RX ADMIN — ACYCLOVIR 800 MG: 800 TABLET ORAL at 19:52

## 2019-03-28 RX ADMIN — Medication 600 MG: at 17:36

## 2019-03-28 RX ADMIN — CLINDAMYCIN PHOSPHATE 900 MG: 18 INJECTION, SOLUTION INTRAVENOUS at 12:43

## 2019-03-28 RX ADMIN — CEFAZOLIN SODIUM 2 G: 2 INJECTION, SOLUTION INTRAVENOUS at 12:35

## 2019-03-28 RX ADMIN — SODIUM CHLORIDE, PRESERVATIVE FREE 5 ML: 5 INJECTION INTRAVENOUS at 12:48

## 2019-03-28 RX ADMIN — MELPHALAN HYDROCHLORIDE 250 MG: KIT at 15:52

## 2019-03-28 ASSESSMENT — ACTIVITIES OF DAILY LIVING (ADL)
DRESS: 0-->INDEPENDENT
TRANSFERRING: 0-->INDEPENDENT
BATHING: 0-->INDEPENDENT
RETIRED_EATING: 0-->INDEPENDENT
TRANSFERRING: 0-->INDEPENDENT
TOILETING: 0-->INDEPENDENT
RETIRED_COMMUNICATION: 0-->UNDERSTANDS/COMMUNICATES WITHOUT DIFFICULTY
AMBULATION: 0-->INDEPENDENT
BATHING: 0-->INDEPENDENT
SWALLOWING: 0-->SWALLOWS FOODS/LIQUIDS WITHOUT DIFFICULTY
COGNITION: 0 - NO COGNITION ISSUES REPORTED
ADLS_ACUITY_SCORE: 13
FALL_HISTORY_WITHIN_LAST_SIX_MONTHS: NO
COGNITION: 0 - NO COGNITION ISSUES REPORTED
DRESS: 0-->INDEPENDENT
AMBULATION: 0-->INDEPENDENT
RETIRED_COMMUNICATION: 0-->UNDERSTANDS/COMMUNICATES WITHOUT DIFFICULTY
ADLS_ACUITY_SCORE: 11
SWALLOWING: 0-->SWALLOWS FOODS/LIQUIDS WITHOUT DIFFICULTY
FALL_HISTORY_WITHIN_LAST_SIX_MONTHS: NO
ADLS_ACUITY_SCORE: 11
TOILETING: 0-->INDEPENDENT
RETIRED_EATING: 0-->INDEPENDENT

## 2019-03-28 ASSESSMENT — MIFFLIN-ST. JEOR: SCORE: 1265.43

## 2019-03-28 NOTE — PROGRESS NOTES
Integrative Therapies Encounter   I was asked to see Adrianna today by the  medical staff.      The patient indicates openness to Massage Therapy or other Integrative therapies if massage is contraindicated.  I administered the appropriate pre-session survey per established protocol.    Summary of Visit  Adrianna tells me she has pain at a particular spot on her left upper back, especially when inhaling or doing certain movements.  I provide manual therapies appropriate for her complaints, platelet status, and overall condition.      Objective Findings  Trigger points and areas of palpatory pain identified in the left Rhomboid, Trapezius, Levator scapula, Scalene, and cervical paraspinals.     Therapies Administered  Pain Neutralization Technique      Duration of Visit: 10  Total time spent: 15      Thank you for including Massage Therapy in the care of this patient    DANIELLE Cheek on 3/28/2019 at 2:55 PM

## 2019-03-28 NOTE — CONSULTS
Patient is on IR schedule 3/28/2019 for a Image guided placement of DL tunneled CVC.   Past labs have been WNLs, team planning to update INR and Platelets labs today.  Orders for NPO, scrubs and antibiotics have been entered.  Consent will be done prior to procedure.     Please contact the IR charge RN at [phone] for estimated time of procedure.     Case discussed with Dr. Madrigal from IR and Tita Cummings PA-C. Pt with hx of multiple myeloma who presents for planned admission for BMT and requires double lumen CVC placement for IV medications and blood products. Line does not need to be apheresis capable.     Jess Campuzano, JOSEFINA, APRN  Interventional Radiology  Pager: [phone]

## 2019-03-28 NOTE — PROGRESS NOTES
SPIRITUAL HEALTH SERVICES  SPIRITUAL ASSESSMENT Progress Note  [facility] 5C     REFERRAL SOURCE: BMT [place] Screen    I met with Adrianna De Los Santso to introduce myself and explain the role of spiritual health services. I also offered a blessing service prior to transplant. Adrianna's  has been notified and will be providing spiritual support to her.    PLAN: No further [facility] follow up is anticipated.    Siri Bermudez  Oncology   Pager [phone]    [facility] remains available 24/7 for emergent requests/referrals, either by having the switchboard page the on-call  or by entering an ASAP/STAT consult in Epic (this will also page the on-call ).

## 2019-03-28 NOTE — PLAN OF CARE
Admitted from home for auto transplant, after admission, went IR to RAC placement, patient tolerated well for RAC placement, after that administrated high dose of chemo (Melphalan), tolerated well, tomorrow patient receive auto stem cell

## 2019-03-28 NOTE — H&P
BMT History & Physical     Admission  Name: Adrianna Palomino  Date:  3/28/2019  Service: BMT   Chief Complaint: multiple myeloma    Informant:  Patient and Chart  Resuscitation Status: Full Code    Patient ID:  Adrianna Palomino is a 63 year old female, currently day -1 of her HCT.    Transplant Essential Data:  Diagnosis MM Multiple myeloma  HCT Type Autologous    Prep Regimen Melphalan   Donor Source No data was found    GVHD Prophylaxis No  Clinical Trials None     Oncology Treatment History:   - Initially diagnosed and with standard-risk cytogenetics in 2006  - Started on Dexamethasone for 6 cycles obtaining a complete remission and then maintenance with thalidomide and dexamethasone for 1 year.   - Stopped all therapy in the summer of 2007 due to neuropathy, and was followed on 2 years of maintenance dexamethasone.    - She was noted to slowly relapse but didn't require therapy until she was started on RVD in 11/2012.   - Completed 5 cycles before obtaining a partial remission and came to transplant.    - Underwent an autologous stem cell transplant on 04/05/2013,  total cell dose was 8 x10(6) cells/kg, and she has 8 x10(6) cells/kg left in storage. Post ASCT course was unremarkable.     - Maintenance Regimen post transplant  (10mg q28 days), off for 1 month in 9/2016 to review marrow, resumed: 9/30/2016 and continued till disease progression  - Clinical progression [new bone lesions detected on bone survey from 8/29/2018 with [facility]]  - 8/29/2018 Bone Marrow Biopsy: Normocellular BM (30%) with trilineage hematopoiesis showing a decreased myeloid to erythroid ratio (erythroid hyperplasia). - Plasma cells 5%, polytypic for light chain. Flow cytometry negative for monoclonal plasma cell population   - 10/3/2018 Bone marrow Biopsy: CT Guided Needle Core Biopsy: POSITIVE FOR MALIGNANCY - Plasma cell neoplasm, consistent with recurrent plasma cell myeloma. Comment Large aggregates of + plasma  cells are present. These are kappa monoclonal.   - 10/3/2018: FISH Myeloma Panel: Positive for additional copies of D5S23/Z0G274 (5p15.2), CEP9 (centromere 9), CEP11 (centromere 11), and CEP15 (centromere 15) suggesting polysomy, and low level loss of IGH (14q32). FISH Negative for loss of CDKN2C (1p32.3), gain of CKS1B (1q21.3), loss of Q14P459 (13q14.3), loss of TP53 (17p13.1), and additional copies of CEP7 (centromere 7), and IGH rearrangement. 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   DSS IIA, SPEP 10/18/2018: 0.9 g/dL, 10/18/2018: 6.69 , Kappa 10.03 mg/dl, lambda 1.5 mg/dl, albumin 3.4 g/dl, creatinine 0.82, calcium 9.3, LDH and B2 microglobulin Unknown/ not available   - 10/19/2018 C1D1 : VRd in relapsed setting (Velcade: 1.3mg/m2 subcutanously Day 1, 8, 15; Revlimid: 10mg (dose reduced) day 1-14; Dexamethasone: 40mg day 1,8,15)- completed 4 cycles, complicated by salmonella stool infections prior to C3.  - M biochemical markers:  3/11/2019:              UPEP Predominantly albumin is seen which suggests glomerular damage. No obvious monoclonal protein seen and           the absence of monoclonal immunoglobulins was confirmed by immunofixation of this same urine sample.               Urine creatinine and creatiunine clearance are WNL, 24 hour urine total protein elevated at 0.63 and total urine         protein/creatiine is elevated at 0.49              SPEP 3/7/2019 Small monoclonal protein (0.3 g/dL) seen in the gamma fraction.  Monoclonal IgG immunoglobulin of  kappa light chain type.               Kappa slightly elevated at 2.42 mg/dl              Lambda light chain WNL              K/L ratio 1.79    Admitted for transplant. Will have her line placed this afternoon. She is NPO, last thing to eat was dinner last night around 5pm. No fevers or cold symptoms. No n/v/d/c. Had hx colitis and  usually has loose stools, but none this morning. Doesn't believe she has had any reactions to cephalosporins in the past, thinks she had keflex and tolerated well. Left sided shoulder pain comes and goes. Wonders if related to bulging disc in her back.      Treatment/Chemotherapy Number of Cycles Date Range Outcomes & Complications   Dex, thalidomide 6 [id number] neuropathy   RVD 5 2012 MS   Auto BMT  4/5/13    revlimid maintenance      RVD 4 Oct 2018- Feb 2019        Bone Marrow Workup Results:  Blood Counts Recent Labs   Lab Test 03/20/19  1414 03/08/19  1244   WBC 2.5* 3.1*   ANEU  --  2.0   ALYM  --  0.4*   MAO  --  0.5   AEOS  --  0.1   HGB 12.1 11.8   HCT 37.7 34.5*    244      Bone Marrow 3/8/2019  - Kappa monotypic plasma cells detected by flow cytometry, compatible   with minimal residual disease     - Variable marrow cellularity (overall 5%) with erythroid predominant   trilineage hematopoiesis; subcortical   biopsy   -Concurrent flow cytometry ([id number]) detected 0.02% kappa monotypic   plasma cells. There is no morphologic   correlate for the flow cytometric results.   -Based on the cellularity of the aspirate smears, the core biopsy is not   representative of the patient's   overall cellularity.  The H&E sections appear subcortical.  In the part   that has marrow, there are no   obvious plasma cell clusters.      Flow cytometry:  The immunophenotypic findings are consistent with persistent/recurrent   plasma cell neoplasm.   Percentages reported below are based on the total number of CD45 positive   viable leukocytes. If applicable,   percentage of plasma cells is from total viable nucleated cells.   0.02% plasma cells which express dim CD19, CD38, dim CD45, CD56 and   monotypic cytoplasmic kappa immunoglobulin   light chains but lack CD20.  The plasma cell percentage by flow cytometry   is expected to be less than by   morphology due to specimen processing.    Blood Type Recent Labs   Lab Test  03/07/19  0927   ABO A      Chemistries Recent Labs   Lab Test 03/20/19  1414      POTASSIUM 3.8   CHLORIDE 105   CO2 27   BUN 13   CR 0.75      Liver Tests Recent Labs   Lab Test 03/07/19 0927   BILITOTAL 0.4   ALKPHOS 53   AST 21   ALT 19      Chest X-Ray: 3/7/2019  Impression:  1. No focal airspace consolidation or suspicious nodules.  2. Torturous and likely ectatic aorta. Consider chest CT for further  evaluation.  3. Chronic appearing rib deformities.     Bone Survey 3/7/2019  Impression:   Multiple lucent lesions involving the bilateral upper and lower  extremities, skull and pelvis, not substantially changed since  8/29/2018.   PFTs FVC%  Recent Labs   Lab Test 03/12/19 0953 20003 104       FEV1%  Recent Labs   Lab Test 03/12/19 0953 20016 87       DLCO%  Recent Labs   Lab Test 03/12/19 0953 20143 109      ECHO or MUGA: MUGA 3/7/2019     The left ventricular ejection fraction is normal at 64.2%.     There is normal left ventricular size.  There is normal wall motion of  the left ventricle. The right ventricle is moving normally. The  pulmonary arteries appear normal.     The wall motion on all views appears to be within normal limits.        IMPRESSION:     1. Normal left ventricular ejection fraction at 60 4.%.     2. A avhl-dy-sann comparison to the previous study does not show any  degradation in LV function.     EKG 3/8/2019  NSR QTc 458   Serologies: CMV nonreactive, EBV IgG postive, HSV 2 IgG positive      Vitamin D Recent Labs   Lab Test 05/01/14  1010   VITDT 88*        Chest CT: 3/8/2019  FINDINGS: Mild peripheral fibrotic change. Right lower lobe linear atelectasis or scar. 4 mm nodule in the right upper lobe image 84 series 4 stable since 2005.  The heart, Main pulmonary artery and aorta are not enlarged. The thoracic aorta is tortuous. No mediastinal, hilar or axillary adenopathy. No pleural or pericardial effusion.  Evaluation of the upper abdomen is limited. High origin of the  celiac axis.  Bones: Multilevel compression deformities throughout the spine. Lytic foci throughout the bony thorax.                                                              IMPRESSION: No evidence of aortic aneurysm. Tortuous thoracic aorta  likely related to craniocaudal shortening due to multiple compression  deformities.      MM biochemical markers:  3/11/2019:  UPEP Predominantly albumin is seen which suggests glomerular damage. No obvious monoclonal   protein seen and the absence of monoclonal immunoglobulins was confirmed by immunofixation    of this same urine sample.   Urine creatinine and creatiunine clearance are WNL, 24 hour urine total protein elevated at 0.63 and total urine protein/creatiine is elevated at 0.49     SPEP 3/7/2019  Small monoclonal protein (0.3 g/dL) seen in the gamma fraction.  Monoclonal IgG immunoglobulin of kappa light chain type.      IgG 537  Kappa/lambda free light chains:  [place] slightlyu elevated at 2.42 mg/dl  Lambda light chain WNL  K/L ratio 1.79     UA with large urine and albumin >499, RBC 22  B2 microglobulin 2.1 WNL  LDH WNL    Family History:   Family History   Problem Relation Age of Onset     C.A.D. Paternal Grandfather          of MI 49 yo     Breast Cancer Paternal Grandmother      Hypertension Father      Cancer - colorectal Maternal Grandfather         late 60's to early 70's     Circulatory Maternal Grandfather         migraines     Circulatory Mother         migraines     Circulatory Sister         migraines     Cancer Sister         hodgkins     Diabetes No family hx of      Cerebrovascular Disease No family hx of      Glaucoma No family hx of      Macular Degeneration No family hx of        Social History:   Social History     Socioeconomic History     Marital status:      Spouse name: Not on file     Number of children: 1     Years of education: Not on file     Highest education level: Not on file   Occupational History     Occupation: patient  rep     Employer: [employer]   Social Needs     Financial resource strain: Not on file     Food insecurity:     Worry: Not on file     Inability: Not on file     Transportation needs:     Medical: Not on file     Non-medical: Not on file   Tobacco Use     Smoking status: Former Smoker     Packs/day: 1.00     Years: 15.00     Pack years: 15.00     Last attempt to quit: 1995     Years since quittin.3     Smokeless tobacco: Never Used     Tobacco comment: occ drags 17 years  smokes   Substance and Sexual Activity     Alcohol use: Yes     Alcohol/week: 7.0 oz     Types: 14 Standard drinks or equivalent per week     Comment: CAGE neg     Drug use: No     Sexual activity: Yes     Partners: Male     Comment:    Lifestyle     Physical activity:     Days per week: Not on file     Minutes per session: Not on file     Stress: Not on file   Relationships     Social connections:     Talks on phone: Not on file     Gets together: Not on file     Attends Islam service: Not on file     Active member of club or organization: Not on file     Attends meetings of clubs or organizations: Not on file     Relationship status: Not on file     Intimate partner violence:     Fear of current or ex partner: Not on file     Emotionally abused: Not on file     Physically abused: Not on file     Forced sexual activity: Not on file   Other Topics Concern      Service No     Blood Transfusions No     Caffeine Concern Yes     Comment: 2 cups a day     Occupational Exposure No     Hobby Hazards No     Sleep Concern No     Stress Concern No     Weight Concern Yes     Comment: steroid weight 1.5 years     Special Diet Yes     Comment: multi vit     Back Care No     Exercise Yes     Comment: garden     Bike Helmet No     Seat Belt Yes     Self-Exams No     Parent/sibling w/ CABG, MI or angioplasty before 65F 55M? No   Social History Narrative    Dairy/d 3 servings/d.     Caffeine 2 servings/d     Exercise 3 x week    Sunscreen used - Yes    Seatbelts used - Yes    Working smoke/CO detectors in the home - Yes    Guns stored in the home - Yes-BB gun    Self Breast Exams - Not on regular basis    Self Testicular Exam - NA    Eye Exam up to date - Yes    Dental Exam up to date - Yes    Pap Smear up to date - No    Mammogram up to date - Yes-last fall    PSA up to date - NA    Dexa Scan up to date - NA    Flex Sig / Colonoscopy up to date - Polyps in the past/dx with diverticulosis/needs colonoscopy this year.    Immunizations up to date - Yes-2004    Abuse: Current or Past(Physical, Sexual or Emotional)- No    Do you feel safe in your environment - Yes       Past Medical History:   Past Medical History:   Diagnosis Date     Anemia      Compression fracture     T12, L1, L4     Macular degeneration 2011    Early AMD     Malignant neoplasm (H)      Migraine      Multiple myeloma (H) 2006     Nonsenile cataract         Past Surgical History:   Past Surgical History:   Procedure Laterality Date     ARTHROSCOPY KNEE WITH LATERAL MENISCECTOMY  9/27/2013    Procedure: ARTHROSCOPY KNEE WITH LATERAL MENISCECTOMY;  Left Knee Arthroscopy,  Partial Lateral Meniscectomy       EXCISE GANGLION FOOT  10/28/2011    Procedure:EXCISE GANGLION FOOT; Excision of Ganglion Cyst Left Foot; Surgeon:STEPHEN IBRAHIM; Location:[facility]      BONE MARROW/STEM TRANSPLANT AUTOLOGOUS  4-5-2013      COLONOSCOPY THRU STOMA, DIAGNOSTIC  2005    needs repeat 2010      REMOVAL OF TONSILS,12+ Y/O  1974    Tonsils 12+y.o.     ORTHOPEDIC SURGERY       SURGICAL HISTORY OF -   1995    left bunionectomy and hammer toe correction       Allergies:   Allergies   Allergen Reactions     Amoxicillin Rash     Septra [Bactrim] Rash     Sulfa Drugs        Home Medications      Prior to Admission medications    Medication Sig Start Date End Date Taking? Authorizing Provider   acyclovir (ZOVIRAX) 400 MG tablet Take 400 mg by mouth 2 times daily  10/19/18    Reported, Patient   aspirin 325 MG tablet Take 325 mg by mouth daily    Reported, Patient   Calcium Carbonate (CALCIUM 600 PO) Take by mouth 2 times daily    Reported, Patient   ferrous fumarate (FERRETTS) 324 MG TABS Take 324 mg by mouth 2 times daily     Reported, Patient   fish oil-omega-3 fatty acids (FISH OIL) 1000 MG capsule Take 2 g by mouth daily.    Reported, Patient   fluticasone (FLONASE) 50 MCG/ACT nasal spray Spray 2 sprays into both nostrils daily 1/9/15   Karolyn Vo MD   loperamide (IMODIUM) 2 MG capsule Take 2 mg by mouth 4 times daily as needed for diarrhea    Reported, Patient   loratadine (CLARITIN) 10 MG tablet Take 10 mg by mouth as needed     Reported, Patient   Misc Natural Products (GLUCOSAMINE CHOND COMPLEX/MSM) TABS Take  by mouth daily.    Reported, Patient   MULTIVITAMINS OR TABS ONE DAILY 8/25/09   Jade Martinez MD   prochlorperazine (COMPAZINE) 10 MG tablet Take 10 mg by mouth 10/19/18   Reported, Patient   simethicone (MYLICON) 125 MG chewable tablet Take 125 mg by mouth 2 times daily    Reported, Patient   tiZANidine (ZANAFLEX) 2 MG tablet Take 2-4 mg by mouth 9/10/18   Reported, Patient       Review of Systems    Review of Systems:  CONSTITUTIONAL: NEGATIVE for fever, chills, change in weight  INTEGUMENTARY/SKIN: NEGATIVE for worrisome rashes, moles or lesions  EYES: NEGATIVE for vision changes or irritation  ENT/MOUTH: NEGATIVE for ear, mouth and throat problems  RESP: NEGATIVE for significant cough or SOB  BREAST: NEGATIVE for masses, tenderness or discharge  CV: NEGATIVE for chest pain, palpitations or peripheral edema  GI: NEGATIVE for nausea, abdominal pain, heartburn, or change in bowel habits  : NEGATIVE for frequency, dysuria, or hematuria  MUSCULOSKELETAL: NEGATIVE for significant arthralgias or myalgia  NEURO: NEGATIVE for weakness, dizziness or paresthesias  ENDOCRINE: NEGATIVE for temperature intolerance, skin/hair changes  HEME/ALLERGY: NEGATIVE for  bleeding problems  PSYCHIATRIC: NEGATIVE for changes in mood or affect    PHYSICAL EXAM      Weight     Wt Readings from Last 3 Encounters:   03/20/19 71.2 kg (157 lb)   03/13/19 72.4 kg (159 lb 9.6 oz)   03/07/19 71.8 kg (158 lb 3.2 oz)          LMP 03/01/2005      General: NAD   Eyes: NISHA, sclera anicteric   Nose/Mouth/Throat: OP clear, buccal mucosa moist, no ulcerations   Lungs: CTA bilaterally  Cardiovascular: RRR, no M/R/G   Abdominal/Rectal: +BS, soft, NT, ND, No HSM   Lymphatics: No edema  Skin: No rashes or petechaie  M/S: left shoulder nontender to palpation. Active ROM normal without limitation  Neuro: A&O   Additional Findings: Carrion site NT, no drainage.    ASSESSMENT BY SYSTEMS   Adrianna VALVERDE Georgette is a 63 year old female with standard risk IgG kappa multiple myeloma, s/p auto BMT in 2013. 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 with melphalan prep    D-1 melphalan  D0 = transplant with 8 million CD34+ cells/kg (from prior collections in 2013 - stored)  *must be  from melphalan by 24 hours. Flush ordered for transplant on Friday from noon to 8pm, adjust per final melphalan timing    1.  BMT:   - Prep as above. Allopurinol through day 0.   - Flush and pre-meds prior to transplant.   - GCSF starts d+5 and cont to until ANC>2500 x2 consecutive days.   - Re-stage per protocol.    2.  HEME: Keep Hgb>8 and plts>10K. No pre-meds.                            3.  ID:   - ancef for prophy with line placement -> order was changed to clinda in IR  - prophy levo, Fluc, and LD ACV (CMV-, HSV+, EBV+) prophy. Bactrim or appropriate PCP therapy to start d+28.                                             4.  GI:   - Zofran and dexamethasone prior to chemo to prevent N/V. Ativan and compazine available PRN break-through symptoms.   - Protonix for GI prophy.     5.  FEN/Renal:   - Monitor creat and lytes. Replete lytes PRN per SS.  - Monitor weight, I+O, lytes per protocol with IVF flush.    Tita Cummings        BMT Attending Admit Note  The patient was discussed on morning rounds with the nurses and mid-level provider, BMT fellow and was seen and examined by me. All labs and imaging were reviewed from her transplant workup. I agree with the above note and have been responsible for the care plan and interpretation of progress. Overall, the patient is a 62 y/o women with IgG K MM here for her 2nd auto HCT; the first being April 2013; which is when her PBSC graft was collected and still has a dose of 8 x 10**6 CD34+ cells/kg. She got a new line today.  On exam, lungs are clear, abdomen is benign and there is no lower extremity edema. There are no rashes. BP is stable and the patient is afebrile. Blood counts are adequate.  I spent time discussing the risks, options and benefits of 2nd auto. She understands and is well informed.  The patient has been informed on progress and all questions have been answered. We also discussed plans for the next few days.  Ghanshyam Romero MD

## 2019-03-28 NOTE — PROGRESS NOTES
Interventional Radiology Pre-Procedure Sedation Assessment   Time of Assessment: 12:09 PM    Expected Level: Moderate Sedation    Indication: Sedation is required for the following type of Procedure: Venous Access    Sedation and procedural consent: Risks, benefits and alternatives were discussed with Patient    PO Intake: Appropriately NPO for procedure    ASA Class: Class 2 - MILD SYSTEMIC DISEASE, NO ACUTE PROBLEMS, NO FUNCTIONAL LIMITATIONS.    Mallampati: Grade 2:  Soft palate, base of uvula, tonsillar pillars, and portion of posterior pharyngeal wall visible    Lungs: Lungs Clear with good breath sounds bilaterally    Heart: Normal heart sounds and rate    History and physical reviewed and no updates needed. I have reviewed the lab findings, diagnostic data, medications, and the plan for sedation. I have determined this patient to be an appropriate candidate for the planned sedation and procedure and have reassessed the patient IMMEDIATELY PRIOR to sedation and procedure.    Shayan Magana PA-C

## 2019-03-28 NOTE — PROGRESS NOTES
Psychosocial Assessment completed in the BMT Clinic and copied here for staff review.    Blood and Marrow Transplant   Psychosocial Assessment with   Clinical      Assessment completed on 3/13/2019 of living situation, support system, financial status, functional status, coping, stressors, need for resources and social work intervention provided as needed. Information for this assessment was provided by pt and pt's -Lupillo's report in addition to medical chart review and consultation with medical team.      Present at assessment:   Patient: Adrianna Palomino  Spouse: Lupillo Ervin  : LEVI [place], [facility]     Diagnosis: Multiple Myeloma      Date of Diagnosis: 2006     Transplant type: Autologous; this will be pt's 2nd autologous transplant. First auto was 4/5/13.     Donor: Autologous      Physician: Abdi Browning MD     Nurse Coordinator: Nasrin Gomez RN     Permanent Address:   [address] [place] [address]     Contact Information:  Pt's Cell Phone: [phone]  -Lupillo Ervin's Phone: [phone]  Son-Johnathon Ervin's Phone: [phone] (cell), [phone] (home)     Presenting Information:  Adrianna is a 63 year old female diagnosed with multiple myeloma who presents for evaluation for autologous transplant at the [facility] ([facility] Pt was accompanied to today's visit by her -Lupillo.      Decision Making: Self     Health Care Directive: Yes. Pt has a health care directive already on file.      Relationship Status: . Pt and pt's described relationship as stable and supportive.      Special Needs: None identified at this time.      Family/Support System: Pt endorsed a good support system including family and close friends who will be available to support pt throughout transplant process.      Spouse: Lupillo Ervin  Children: Son-Johnathon Ervin (Lives in [place], [place]; within a mile of  parents)  Parents: Mother (Lives in [place], [place])  Siblings: 5 Siblings; 3 Live in the area, 1 Lives in [place] and 1 Lives in [place]  Friends: Pt endorsed a good support system through her friends.     Caregiver: SW discussed with pt and pt's Felicitas the caregiver role and expectation at length. Pt is agreeable to having a full time caregiver for a minimum of 30 days until cleared by the BMT physician. Pt and pt's  confirmed understanding of the caregiver requirement. Pt completed caregiver contract; SW to add to medical record. Pt's primary caregiver will be her -Lupillo with her son-Johnathon as a secondary. Pt signed the caregiver contract which will be scanned into the EMR. Caregiver education and resources provided. No caregiver concerns identified.      Caregiver Contact Information:  Felicitas Ervin's Phone: [phone]  SonAnthony Ervin's Phone: [phone] (cell), [phone] (home)     Transportation Mode: Private Vehicle. Pt is aware of driving restrictions post-BMT and the need for the caregiver is to drive until cleared to drive by the BMT physician. SW provided information on parking info and monthly parking pass options.      Insurance: Pt has [employer] health insurance; pt has a -[place] through [employer]. Pt denied specific insurance concerns at this time. SW reiterated information about the [employer]  should specific insurance questions arise as pt moves through transplant process. Future Insurance questions referred to [employer] -Madina Melendez (P: [phone]).      Sources of Income: Pt is supported by PTO now and then will transition to Short Term Disability, 's Social Security correction and pension. Pt denied anticipation of financial hardship related to BMT at this time. SW provided information on juliet options and encouraged pt to contact this SW for support should financial  "situation change.      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 Pt's  is retired.      Mental Health: Pt denied a history of mental health concerns, specific diagnoses or medications at this time. SW explained that it's not uncommon for patients going through transplant to experience symptoms of depression/anxiety.      PHQ-9:  Pt scored a 3 which indicates no sign of depression on the depression severity scale. Pt endorses this is an accurate reflection of her emotional state.     GAD7:  Pt scored a 3 which indicates no sign of anxiety on the Generalized Anxiety Disorder Questionnaire. Pt endorses this is an accurate reflection of her emotional state.     Chemical Use:   Tobacco - Pt reported a hx of tobacco use. Pt said she quit smoking, fully, 5-6 years ago.   Alcohol - Pt reported 2-3 alcoholic beverages daily. SW discussed that pt cannot drink alcohol post-transplant until cleared by the MD. Pt reported that she has no concerns with being able to stop drinking.   Marijuana - No hx of marijuana.  Other Drugs - No hx of other drugs.     Trauma/Loss/Abuse History: Multiple losses associated with cancer diagnosis and treatment, including health, employment, changes to physical appearance, etc.      Spirituality: Pt belongs to a dayanna community in [place], [place]. SW explained that there are Chaplains on the unit and pt can request to meet with a  at anytime.     Coping: Pt noted that she is currently feeling \"hopeful\". Pt shared that her main coping mechanisms are talking with family/friends, prayer/spiritual practices, and working on her hobby. SW and pt discussed additional positive coping mechanisms that pt can utilize while in the hospital.      Caregiver Coping: Pt's -Lupillo noted that he is feeling \"positive, hopeful and nervous\" at this time. Pt's  noted that he renzo by positive self-talk, use of alcohol and taking naps. SW asked pt's  if he has any " concerns with alcohol use as SW explained to pt's  that since he will be the primary caregiver and driving patient to clinic on a regular basis. Pt's  has no concerns with nor using alcohol and being the primary caregiver.     Education Provided: Transplant process expectations, Caregiver requirements, Caregiver self-care, Financial issues related to transplant, Financial resources/grants available, Common psychosocial stressors pre/post transplant, Support group(s) available, Hospital resources available, and Social Work role.     Interventions Provided: Psychosocial Support and Education      Assessment and Recommendations for Team:  Pt is a 63 year old female diagnosed with MM who is here undergoing preparation for a planned her 2nd autologous transplant. Pt is pleasant, calm and able to articulate concerns/coping mechanisms in an appropriate manner. Pt feels comfortable communicating with the medical team. Pt has a strong supportive network of family and friends who are involved. Pt has developed good coping mechanisms. Pt and pt's family will benefit from ongoing psychosocial support in regards to coping with the adjustment to the BMT process.      Pt has a good support system and a well-developed caregiver plan. Pt verbalizes understanding of the transplant process and wanting to proceed. SW provided contact information and encouraged pt to contact SW with questions, concerns, resources and for support.     Per this assessment, SW did not identify any barriers to this patient moving forward with transplant.     Follow up Planned:   Psychosocial support     Beth SIMS, [facility]  Phone: [phone]  Pager: [phone]

## 2019-03-28 NOTE — PROGRESS NOTES
Patient Name: Adrianna Palomino  Medical Record Number: [id number]  Today's Date: 3/28/2019    Procedure: Tunneled Line Placement  Proceduralist: Alban Magana PA-C    Sedation start time: 1220  Sedation end time: 1250  Sedation medications administered: 0.5 mg Versed, 25 mcg Fentanyl   Total sedation time: 30 min     Procedure start time: 1220  Puncture time: 1225  Procedure end time: 1250    Report given to: 5C RN    Other Notes: Pt arrived to IR room 1 from . Consent reviewed. Pt denies any questions or concerns regarding procedure. Pt positioned supine and monitored per protocol. Pt tolerated procedure without any noted complications. Pt transferred back to .

## 2019-03-28 NOTE — PROCEDURES
Interventional Radiology Brief Post Procedure Note    Procedure: IR CVC TUNNEL PLACEMENT > 5 YRS OF AGE    Proceduralist: MACIEJ Ferrara PA-C    Assistant: None    Time Out: Prior to the start of the procedure and with procedural staff participation, I verbally confirmed the patient s identity using two indicators, relevant allergies, that the procedure was appropriate and matched the consent or emergent situation, and that the correct equipment/implants were available. Immediately prior to starting the procedure I conducted the Time Out with the procedural staff and re-confirmed the patient s name, procedure, and site/side. (The Joint Commission universal protocol was followed.)  Yes    Medications   Medication Event Details Admin User Admin Time   ceFAZolin (ANCEF) intermittent infusion 2 g in 100 mL dextrose PRE-MIX Medication Given Dose: 2 g; Rate: 200 mL/hr; Route: Intravenous; Scheduled Time: 12:35 PM Darwin Bob RN 3/28/2019 12:35 PM   ceFAZolin (ANCEF) intermittent infusion 2 g in 100 mL dextrose PRE-MIX Medication Hold Dose: 0 g; Rate: 0 mL/hr; Route: Intravenous; Reason: Contraindicated; Scheduled Time: 12:36 PM; Comment: held for reaction to amoxicillin Darwin Bob RN 3/28/2019 12:36 PM   clindamycin (CLEOCIN) infusion 900 mg Medication New Bag Dose: 900 mg; Rate: 50 mL/hr; Route: Intravenous; Scheduled Time: 12:45 PM Darwin Bob RN 3/28/2019 12:43 PM   diphenhydrAMINE (BENADRYL) injection 25 mg Medication Given Dose: 25 mg; Route: Intravenous; Scheduled Time: 12:45 PM Darwin Bob RN 3/28/2019 12:44 PM   fentaNYL (PF) (SUBLIMAZE) injection 25-50 mcg Medication Given Dose: 25 mcg; Route: Intravenous Darwin Bob RN 3/28/2019 12:45 PM   midazolam (VERSED) injection 0.5-1 mg Medication Given Dose: 0.5 mg; Route: Intravenous Darwin Bob RN 3/28/2019 12:45 PM   heparin lock flush 10 UNIT/ML injection 5-10 mL Medication Given Dose: 5 mL; Route: Intracatheter; Scheduled Time:  12:48 PM Darwin Bob, RN 3/28/2019 12:48 PM       Sedation: IR Nurse Monitored Care   Post Procedure Summary:  Prior to the start of the procedure and with procedural staff participation, I verbally confirmed the patient s identity using two indicators, relevant allergies, that the procedure was appropriate and matched the consent or emergent situation, and that the correct equipment/implants were available. Immediately prior to starting the procedure I conducted the Time Out with the procedural staff and re-confirmed the patient s name, procedure, and site/side. (The Joint Commission universal protocol was followed.)  Yes       Sedatives: Fentanyl and Midazolam (Versed)    Vital signs, airway and pulse oximetry were monitored and remained stable throughout the procedure and sedation was maintained until the procedure was complete.  The patient was monitored by staff until sedation discharge criteria were met.    Patient tolerance: Patient tolerated the procedure well with no immediate complications.    Time of sedation in minutes: 30 Minutes minutes from beginning to end of physician one to one monitoring.          Findings: Completed placement of 9.5 Serbian 25 cm dual lumen, Power Carrion brand, tunneled central venous access catheter via RIJV. Tip lying in the right atrium. Okay to use immediately. Dx: Multiple myeloma, remission status unspecified. [place]. 30 0.5 25 <2    Estimated Blood Loss: Minimal    Fluoroscopy Time:  minute(s)    SPECIMENS: None    Complications: 1. None     Condition: Stable    Plan:     Comments: See dictated procedure note for full details.    Shayan Magana PA-C

## 2019-03-29 ENCOUNTER — APPOINTMENT (OUTPATIENT)
Dept: OCCUPATIONAL THERAPY | Facility: CLINIC | Age: 64
DRG: 016 | End: 2019-03-29
Attending: PHYSICIAN ASSISTANT
Payer: COMMERCIAL

## 2019-03-29 ENCOUNTER — CARE COORDINATION (OUTPATIENT)
Dept: ONCOLOGY | Facility: CLINIC | Age: 64
End: 2019-03-29

## 2019-03-29 LAB
ANION GAP SERPL CALCULATED.3IONS-SCNC: 11 MMOL/L (ref 3–14)
BASOPHILS # BLD AUTO: 0 10E9/L (ref 0–0.2)
BASOPHILS NFR BLD AUTO: 0 %
BUN SERPL-MCNC: 9 MG/DL (ref 7–30)
CALCIUM SERPL-MCNC: 8.5 MG/DL (ref 8.5–10.1)
CHLORIDE SERPL-SCNC: 104 MMOL/L (ref 94–109)
CO2 SERPL-SCNC: 24 MMOL/L (ref 20–32)
CREAT SERPL-MCNC: 0.8 MG/DL (ref 0.52–1.04)
DIFFERENTIAL METHOD BLD: ABNORMAL
EOSINOPHIL # BLD AUTO: 0 10E9/L (ref 0–0.7)
EOSINOPHIL NFR BLD AUTO: 0 %
ERYTHROCYTE [DISTWIDTH] IN BLOOD BY AUTOMATED COUNT: 11.9 % (ref 10–15)
GFR SERPL CREATININE-BSD FRML MDRD: 78 ML/MIN/{1.73_M2}
GLUCOSE SERPL-MCNC: 142 MG/DL (ref 70–99)
HCT VFR BLD AUTO: 36.7 % (ref 35–47)
HGB BLD-MCNC: 12.1 G/DL (ref 11.7–15.7)
IMM GRANULOCYTES # BLD: 0 10E9/L (ref 0–0.4)
IMM GRANULOCYTES NFR BLD: 0 %
LYMPHOCYTES # BLD AUTO: 0.2 10E9/L (ref 0.8–5.3)
LYMPHOCYTES NFR BLD AUTO: 8.3 %
MAGNESIUM SERPL-MCNC: 1.8 MG/DL (ref 1.6–2.3)
MCH RBC QN AUTO: 36.2 PG (ref 26.5–33)
MCHC RBC AUTO-ENTMCNC: 33 G/DL (ref 31.5–36.5)
MCV RBC AUTO: 110 FL (ref 78–100)
MONOCYTES # BLD AUTO: 0.1 10E9/L (ref 0–1.3)
MONOCYTES NFR BLD AUTO: 2.9 %
NEUTROPHILS # BLD AUTO: 2.2 10E9/L (ref 1.6–8.3)
NEUTROPHILS NFR BLD AUTO: 88.8 %
NRBC # BLD AUTO: 0 10*3/UL
NRBC BLD AUTO-RTO: 0 /100
PLATELET # BLD AUTO: 159 10E9/L (ref 150–450)
POTASSIUM SERPL-SCNC: 4 MMOL/L (ref 3.4–5.3)
RBC # BLD AUTO: 3.34 10E12/L (ref 3.8–5.2)
SODIUM SERPL-SCNC: 139 MMOL/L (ref 133–144)
WBC # BLD AUTO: 2.4 10E9/L (ref 4–11)

## 2019-03-29 PROCEDURE — 97165 OT EVAL LOW COMPLEX 30 MIN: CPT | Mod: GO

## 2019-03-29 PROCEDURE — 25000131 ZZH RX MED GY IP 250 OP 636 PS 637: Performed by: INTERNAL MEDICINE

## 2019-03-29 PROCEDURE — 97530 THERAPEUTIC ACTIVITIES: CPT | Mod: GO

## 2019-03-29 PROCEDURE — 97110 THERAPEUTIC EXERCISES: CPT | Mod: GO

## 2019-03-29 PROCEDURE — 83735 ASSAY OF MAGNESIUM: CPT | Performed by: PHYSICIAN ASSISTANT

## 2019-03-29 PROCEDURE — 85025 COMPLETE CBC W/AUTO DIFF WBC: CPT | Performed by: PHYSICIAN ASSISTANT

## 2019-03-29 PROCEDURE — 25000132 ZZH RX MED GY IP 250 OP 250 PS 637: Performed by: INTERNAL MEDICINE

## 2019-03-29 PROCEDURE — 20600000 ZZH R&B BMT

## 2019-03-29 PROCEDURE — 40000893 ZZH STATISTIC PT IP EVAL DEFER: Performed by: PHYSICAL THERAPIST

## 2019-03-29 PROCEDURE — 25000128 H RX IP 250 OP 636: Performed by: NURSE PRACTITIONER

## 2019-03-29 PROCEDURE — 25800030 ZZH RX IP 258 OP 636: Performed by: PHYSICIAN ASSISTANT

## 2019-03-29 PROCEDURE — 25000132 ZZH RX MED GY IP 250 OP 250 PS 637: Performed by: PHYSICIAN ASSISTANT

## 2019-03-29 PROCEDURE — 30243Y0 TRANSFUSION OF AUTOLOGOUS HEMATOPOIETIC STEM CELLS INTO CENTRAL VEIN, PERCUTANEOUS APPROACH: ICD-10-PCS | Performed by: INTERNAL MEDICINE

## 2019-03-29 PROCEDURE — 38208 THAW PRESERVED STEM CELLS: CPT | Performed by: INTERNAL MEDICINE

## 2019-03-29 PROCEDURE — 80048 BASIC METABOLIC PNL TOTAL CA: CPT | Performed by: PHYSICIAN ASSISTANT

## 2019-03-29 PROCEDURE — 25000128 H RX IP 250 OP 636: Performed by: PHYSICIAN ASSISTANT

## 2019-03-29 PROCEDURE — 40000871 ZZH STATISTIC AUTOLOGOUS BM-INITIAL INFUSION

## 2019-03-29 RX ORDER — HYDRALAZINE HYDROCHLORIDE 20 MG/ML
5-10 INJECTION INTRAMUSCULAR; INTRAVENOUS EVERY 4 HOURS PRN
Status: DISCONTINUED | OUTPATIENT
Start: 2019-03-29 | End: 2019-03-30 | Stop reason: HOSPADM

## 2019-03-29 RX ADMIN — ACYCLOVIR 800 MG: 800 TABLET ORAL at 20:39

## 2019-03-29 RX ADMIN — ONDANSETRON HYDROCHLORIDE 8 MG: 8 TABLET, FILM COATED ORAL at 06:50

## 2019-03-29 RX ADMIN — HYDRALAZINE HYDROCHLORIDE 5 MG: 20 INJECTION INTRAMUSCULAR; INTRAVENOUS at 16:33

## 2019-03-29 RX ADMIN — DIPHENHYDRAMINE HYDROCHLORIDE 25 MG: 25 CAPSULE ORAL at 16:03

## 2019-03-29 RX ADMIN — ACETAMINOPHEN 650 MG: 325 TABLET, FILM COATED ORAL at 16:03

## 2019-03-29 RX ADMIN — LORAZEPAM 0.5 MG: 0.5 TABLET ORAL at 15:56

## 2019-03-29 RX ADMIN — ACETAMINOPHEN 650 MG: 325 TABLET, FILM COATED ORAL at 08:13

## 2019-03-29 RX ADMIN — ACYCLOVIR 800 MG: 800 TABLET ORAL at 08:13

## 2019-03-29 RX ADMIN — Medication 600 MG: at 08:12

## 2019-03-29 RX ADMIN — FLUCONAZOLE 200 MG: 200 TABLET ORAL at 08:13

## 2019-03-29 RX ADMIN — LEVOFLOXACIN 250 MG: 250 TABLET, FILM COATED ORAL at 10:13

## 2019-03-29 RX ADMIN — LOPERAMIDE HYDROCHLORIDE 2 MG: 2 CAPSULE ORAL at 08:13

## 2019-03-29 RX ADMIN — Medication 10 ML: at 08:04

## 2019-03-29 RX ADMIN — HYDRALAZINE HYDROCHLORIDE 5 MG: 20 INJECTION INTRAMUSCULAR; INTRAVENOUS at 13:24

## 2019-03-29 RX ADMIN — DEXAMETHASONE 8 MG: 4 TABLET ORAL at 08:13

## 2019-03-29 RX ADMIN — PANTOPRAZOLE SODIUM 40 MG: 40 TABLET, DELAYED RELEASE ORAL at 08:13

## 2019-03-29 RX ADMIN — PROCHLORPERAZINE EDISYLATE 10 MG: 5 INJECTION INTRAMUSCULAR; INTRAVENOUS at 16:48

## 2019-03-29 RX ADMIN — SODIUM CHLORIDE: 9 INJECTION, SOLUTION INTRAVENOUS at 12:12

## 2019-03-29 RX ADMIN — SIMETHICONE CHEW TAB 80 MG 160 MG: 80 TABLET ORAL at 08:13

## 2019-03-29 ASSESSMENT — ACTIVITIES OF DAILY LIVING (ADL)
ADLS_ACUITY_SCORE: 11
PREVIOUS_RESPONSIBILITIES: MEAL PREP;HOUSEKEEPING;LAUNDRY;SHOPPING;MEDICATION MANAGEMENT;YARDWORK;FINANCES;DRIVING;WORK
ADLS_ACUITY_SCORE: 11

## 2019-03-29 ASSESSMENT — PAIN DESCRIPTION - DESCRIPTORS: DESCRIPTORS: HEADACHE

## 2019-03-29 ASSESSMENT — MIFFLIN-ST. JEOR: SCORE: 1262.25

## 2019-03-29 NOTE — PROGRESS NOTES
Type of transplant: Donor: Autologous  Product:      BMT INFUSION DOCUMENTATION (last 48 hours)      BMT/Cellular Product Infusion     Row Name 03/29/19 1300                [REMOVED] Product 03/29/19 1400 HPC, Apheresis    Product Details Product Release Date: 03/29/19 -AD Product Release Time: 1400  -CS Product Type: HPC, Apheresis  -AD DIN: [id number]  -AD Product Description Code: N/A  -AD Volume Dispensed (mL): 92 mL  -AD Completion Date (RN to complete): 03/29/19  -SR Completion Time (RN to complete): 1659  -SR    Checked by (Patient RN)  Tita Gilliam  -SR       Checked by (Witness)  ANSLEY MCKEON  -BABITA       Product Volume Infused (mL)  92 mL  -SR       Flush Volume (mL)  150 mL  -SR       Volume Dispensed (mL)  --         User Key  (r) = Recorded By, (t) = Taken By, (c) = Cosigned By    Initials Name Effective Dates    AD , Sayra HOWARD 04/29/14 -     Josephine Garcia 04/29/14 -     Tita Maya RN 08/21/17 -     Ansley Dinh 04/29/14 -         Preparation: RN Documentation  Patient was premedicated as ordered: yes  Line Type: central line, right  Patient Stable Prior to Infusion: yes  Time Infusion Started: 1639  Teaching: side effects/monitoring  Tolerated/Reaction: Patient tolerance of product infusion  Immediate suspected transfusion reaction to the product: none  Did patient have prior history of similar signs/symptoms during this hospitalization?: no  Symptoms during/after infusion: (n/a)  Did the patient tolerate the infusion well: yes  Medications and treatment for symptoms: compazine for nausea  Did the symptoms resolve?: yes  Enter comments if clots, leaks, broken bag, infusion delays, other issues with bag/infusion: n/a  Flush until: 2300  Plan: Continue to monitor, possible discharge tomorrow.

## 2019-03-29 NOTE — PLAN OF CARE
Afebrile. Slightly hypertensive last evening, better this morning. OVSS. Denies pain. Denies n/v. Post melphalan flushed until 0215 this morning. Planning for auto transplant this afternoon at 1615. Voiding adequately. Did report 2 small loose stools overnight (normal for her). Moving independently. Continue plan of care.         Adult Inpatient Plan of Care  Plan of Care Review  3/29/2019 0615 - No Change by Amber Wood RN  Flowsheets  Taken 3/29/2019 0615   Plan of Care Reviewed With  patient  Progress  no change     Adult Inpatient Plan of Care  Patient-Specific Goal (Individualization)  3/29/2019 0615 - No Change by Amber Wood RN     Adult Inpatient Plan of Care  Absence of Hospital-Acquired Illness or Injury  3/29/2019 0615 - No Change by Amber Wood RN     Adult Inpatient Plan of Care  Optimal Comfort and Wellbeing  3/29/2019 0615 - No Change by Amber Wood RN     Adult Inpatient Plan of Care  Readiness for Transition of Care  3/29/2019 0615 - No Change by Amber Wood RN     Adult Inpatient Plan of Care  Rounds/Family Conference  3/29/2019 0615 - No Change by Amber Wood RN     Adjustment to Transplant (Stem Cell/Bone Marrow Transplant)  Optimal Coping with Transplant  3/29/2019 0615 - No Change by Amber Wood RN     Bladder Irritation (Stem Cell/Bone Marrow Transplant)  Symptom-Free Urinary Elimination  3/29/2019 0615 - No Change by Amber Wood RN     Diarrhea (Stem Cell/Bone Marrow Transplant)  Diarrhea Symptom Control  3/29/2019 0615 - No Change by Amber Wood RN     Fatigue (Stem Cell/Bone Marrow Transplant)  Energy Level Supports Daily Activity  3/29/2019 0615 - No Change by Amber Wood RN     Hematologic Alteration (Stem Cell/Bone Marrow Transplant)  Blood Counts Within Acceptable Range  3/29/2019 0615 - No Change by Amber Wood RN     Hypersensitivity Reaction (Stem Cell/Bone Marrow Transplant)  Absence of Hypersensitivity Reaction  3/29/2019 0615  - No Change by Amber Wood RN     Infection Risk (Stem Cell/Bone Marrow Transplant)  Absence of Infection Signs/Symptoms  3/29/2019 0615 - No Change by Amber Wood RN     Mucositis (Stem Cell/Bone Marrow Transplant)  Mucous Membrane Health and Integrity  3/29/2019 0615 - No Change by Amber Wood RN     Nausea and Vomiting (Stem Cell/Bone Marrow Transplant)  Nausea and Vomiting Symptom Relief  3/29/2019 0615 - No Change by Amber Wood RN     Nutrition Intake Altered (Stem Cell/Bone Marrow Transplant)  Optimal Nutrition Intake  3/29/2019 0615 - No Change by Amber Wood RN

## 2019-03-29 NOTE — PLAN OF CARE
Afebrile, hypertensive 140/100s given 5mg IV hydralazine x2, other VSS. Pt tolerated transplant with some nausea, had small emesis during transplant, given ativan and IV compazine. Pre-medicated with tylenol and benadryl. Continue NS flush at 150ml/hr until 2300. Reported one loose stool, given imodium x1. Continue to monitor.         Fatigue (Stem Cell/Bone Marrow Transplant)  Energy Level Supports Daily Activity  3/29/2019 1835 - No Change by Tita Gilliam RN     Adjustment to Transplant (Stem Cell/Bone Marrow Transplant)  Optimal Coping with Transplant  3/29/2019 1835 - Improving by Tita Gilliam RN     Nausea and Vomiting (Stem Cell/Bone Marrow Transplant)  Nausea and Vomiting Symptom Relief  3/29/2019 1835 - Declining by Tita Gilliam RN     Nutrition Intake Altered (Stem Cell/Bone Marrow Transplant)  Optimal Nutrition Intake  3/29/2019 1835 - Declining by Tita Gilliam, RN

## 2019-03-29 NOTE — PROGRESS NOTES
"  BMT Progress Note    Patient ID:  Adrianna Palomino is a 63 year old female, currently day 0 with BMT today for Multiple Myeloma.     Interval History: Afebrile overnight, denies chills, fatigue or lethargy. Denies any oral sores after Melphalan yesterday, increased ice chip intake. Endorses mild, intermittent nausea that resolved with a dose of Zofran; still maintaining oral intake. Patient states she did have a couple of loose stools overnight, but also has a history of colitis so this is not unusual for her. Patient is ready for stem cell transplant today (has half of her original collection from 2013 available for infusion).     Review of Systems    10-point review of systems negative except as above.     PHYSICAL EXAM      Weight     Wt Readings from Last 3 Encounters:   03/29/19 69.8 kg (153 lb 12.8 oz)   03/20/19 71.2 kg (157 lb)   03/13/19 72.4 kg (159 lb 9.6 oz)          BP (!) 124/91 (BP Location: Right arm)   Pulse 96   Temp 96.7  F (35.9  C) (Axillary)   Resp 18   Ht 1.665 m (5' 5.55\")   Wt 69.8 kg (153 lb 12.8 oz)   LMP 03/01/2005   SpO2 94%   BMI 25.17 kg/m       General: NAD   Eyes: PERRL, sclera anicteric   Nose/Mouth/Throat: OP clear, buccal mucosa moist, no ulcerations   Lungs: CTA bilaterally  Cardiovascular: RRR, no M/R/G   Abdominal/Rectal: +BS, soft, NT, ND  Lymphatics: No edema  Skin: No rashes or petechaie  M/S: left shoulder nontender to palpation. Active ROM normal without limitation  Neuro: A&O x 4  Psych: Pleasant & cooperative  Additional Findings: Carrion site NT, no drainage.    ASSESSMENT BY SYSTEMS   Adrianna Palomino is a 63 year old female with standard risk IgG kappa multiple myeloma, s/p auto BMT in 2013. Now day 0 of her second auto PBSCT with melphalan prep    1.  BMT:   - D-1 melphalan  - D0 (3/29/19) Confirmed with cell therapy planned dose of 8.00 x 10^6 CD34+ cells/kg, one bag (from prior collections in 2013 - stored) *must be  from melphalan by 24 " hours - transplant planned for 1615 today. Flush ordered for transplant, adjusted per Melphalan timing.   - One dose of Allopurinol yesterday (3/28).   - Flush and pre-meds prior to transplant.   - GCSF starts d+5 and cont to until ANC>2500 x2 consecutive days.   - Re-stage per protocol.  - Likely discharge tomorrow    2.  HEME: Keep Hgb>8 and plts>10K. No pre-meds.  - WBC 2.4, ANC 2.2, platelets 159 as of 3/29/19                            3.  ID:   - ancef for prophy with line placement -> order was changed to clinda in IR  - prophy levo, Fluc, and LD ACV (CMV-, HSV+, EBV+) prophy. Bactrim or appropriate PCP therapy to start d+28.            4. Cardiac:       - Hypertension: 127-162/; added Hydralazine PRN order. Consider diuresis if needed later today/tomorrow after additional fluids with transplant (no peripheral edema on exam today, although net + 1.5 L in last 24-hours).                              5.  GI: Mild nausea, resolved with scheduled Zofran. Maintaining PO intake.   - Zofran and dexamethasone prior to chemo to prevent N/V. Ativan and compazine available PRN break-through symptoms.   - Protonix for GI prophy.   - Monitor for mucositis - no symptoms currrently    6.  FEN/Renal:   - Monitor creat and lytes. Replete lytes PRN per SS.  - Monitor weight, I+O, lytes per protocol with IVF flush.    The above evaluation was completed by EZIO Miller and written by DARIEL Hillman student.       EZIO Miller  [phone]     BMT Attending Admit Note  The patient was discussed on morning rounds with the nurses and mid-level provider, BMT fellow and was seen and examined by me. All labs and imaging were reviewed. I reviewed events over the last 24 hours including vitals and flow sheets. I agree with the above note and have been responsible for the care plan and interpretation of progress.Overall, the patient is a 64 y/o women with IgG K MM here for her 2nd auto HCT; the first being  April 2013; which is when her PBSC graft was collected and still has a dose of 8 x 10**6 CD34+ cells/kg. Today she is doing well. She tolerated her melphalan and has had minimal acute GI side effects.    Procedure Note: At 430 today, I saw her for a second time and she was doing well. After unit identification and a review of side effects, her autograft was infused. She tolerated this well except for some expected nausea associated with the DSMO. Her  was present for the infusion.     On exam, lungs are clear, abdomen is benign and there is no lower extremity edema. There are no rashes. BP is stable and the patient is afebrile. Blood counts are stable as is renal function.    The patient has been informed on progress and all questions have been answered. We also discussed plans for the next few days.    Ghanshyam Romero MD

## 2019-03-29 NOTE — PROGRESS NOTES
03/29/19 1200   Quick Adds   Type of Visit Initial Occupational Therapy Evaluation   Living Environment   Lives With spouse   Living Arrangements house   Home Accessibility stairs to enter home;stairs within home   Number of Stairs, Main Entrance 2   Number of Stairs, Within Home, Primary other (see comments)  (15)   Transportation Anticipated car, drives self;family or friend will provide   Living Environment Comment Pt lives in a house with her . She reports there are 2 stairs to enter and 15 stairs within the home to reach the upper level where her bed and bath are located and down to the basement where laundry is located.    Self-Care   Usual Activity Tolerance good   Current Activity Tolerance moderate   Regular Exercise No   Equipment Currently Used at Home none   Activity/Exercise/Self-Care Comment Pt does not report using any AE at baseline.    Functional Level   Ambulation 0-->independent   Transferring 0-->independent   Toileting 0-->independent   Bathing 0-->independent   Dressing 0-->independent   Eating 0-->independent   Communication 0-->understands/communicates without difficulty   Swallowing 0-->swallows foods/liquids without difficulty   Cognition 0 - no cognition issues reported   Fall history within last six months no   Which of the above functional risks had a recent onset or change? none   Prior Functional Level Comment Pt was previously independent in all functional mobility and ADLs at baseline.    General Information   Onset of Illness/Injury or Date of Surgery - Date 03/28/19   Referring Physician Tita Cummings PA-C   Patient/Family Goals Statement return home    Additional Occupational Profile Info/Pertinent History of Current Problem per chart, Adrianna Palomino is a 63 year old female with standard risk IgG kappa multiple myeloma, s/p auto BMT in 2013. 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 with melphalan prep   Precautions/Limitations spinal precautions;immunosuppressed    Weight-Bearing Status - LUE full weight-bearing   Weight-Bearing Status - RUE full weight-bearing   Weight-Bearing Status - LLE full weight-bearing   Weight-Bearing Status - RLE full weight-bearing   General Info Comments Activity: up ad jazzy    Cognitive Status Examination   Orientation orientation to person, place and time   Level of Consciousness alert   Follows Commands (Cognition) WNL   Memory intact   Attention No deficits were identified   Visual Perception   Visual Perception Wears glasses   Visual Perception Comments Pt reports her vision has become mildly more blurry since going through chemotherapy.    Sensory Examination   Sensory Comments Pt has some peripheral neuropathy in feet at baseline.    Pain Assessment   Patient Currently in Pain No   Integumentary/Edema   Integumentary/Edema no deficits were identifed   Posture   Posture not impaired   Range of Motion (ROM)   ROM Quick Adds No deficits were identified   Strength   Manual Muscle Testing Quick Adds No deficits were identified   Hand Strength   Hand Strength Comments Bilateral  strength WFL    Coordination   Upper Extremity Coordination No deficits were identified   Gross Motor Coordination No deficits identified    Fine Motor Coordination Not tested    Mobility   Bed Mobility Comments independent    Transfer Skill: Bed to Chair/Chair to Bed   Level of [place]: Bed to Chair independent   Transfer Skill: Sit to Stand   Level of [place]: Sit/Stand independent   Transfer Skill: Toilet Transfer   Level of [place]: Toilet independent   Balance   Balance Comments No balance deficits identified with ambulation today.    Instrumental Activities of Daily Living (IADL)   Previous Responsibilities meal prep;housekeeping;laundry;shopping;medication management;yardwork;finances;driving;work   IADL Comments Pt was previously independent in all IADLs at baseline, is active in her garden and reports she shovels snow regularly.    Activities  "of Daily Living Analysis   Impairments Contributing to Impaired Activities of Daily Living strength decreased  (fatigue, immunosuppression, spinal precautions)   General Therapy Interventions   Planned Therapy Interventions progressive activity/exercise;home program guidelines;risk factor education;strengthening   Clinical Impression   Criteria for Skilled Therapeutic Interventions Met yes, treatment indicated   OT Diagnosis Decreased IADL-I    Influenced by the following impairments general deconditioning, fatigue, immunsuppression and spinal precautions    Assessment of Occupational Performance 1-3 Performance Deficits   Identified Performance Deficits home management, community mobility   Clinical Decision Making (Complexity) Low complexity   Therapy Frequency other (see comments)  (1x eval and treat)   Predicted Duration of Therapy Intervention (days/wks) 3/29/2019   Anticipated Discharge Disposition Home with Assist   Risks and Benefits of Treatment have been explained. Yes   Patient, Family & other staff in agreement with plan of care Yes   Clinical Impression Comments Pt presents to OT today with general deconditioning, fatigue, immunosupression, and spinal precautions, all leading to decreased IADL-I. Pt to benefit from skilled OT services to address the following problem list.    [facility] AM-PAC  \"6 Clicks\" Daily Activity Inpatient Short Form   1. Putting on and taking off regular lower body clothing? 4 - None   2. Bathing (including washing, rinsing, drying)? 4 - None   3. Toileting, which includes using toilet, bedpan or urinal? 4 - None   4. Putting on and taking off regular upper body clothing? 4 - None   5. Taking care of personal grooming such as brushing teeth? 4 - None   6. Eating meals? 4 - None   Daily Activity Raw Score (Score out of 24.Lower scores equate to lower levels of function) 24   Total Evaluation Time   Total Evaluation Time (Minutes) 3     "

## 2019-03-29 NOTE — PLAN OF CARE
Discharge Planner OT   Patient plan for discharge: home with assist   Current status: Eval completed and tx initiated. Pt was provided with written handout titled activity guidelines for pts with cancer and went over collaboratively. Pt was educated on pertinent lab values, their normal ranges, and implications for safe activity, discussed how low HgB can impact energy levels and to avoid resistive activity when platelets are less than 50. Th wrote current lab values into chart on back of handout and provided pt with normal reference ranges of lab values. Th also provided pt with written handout on proper posture and body mechanics. Went over basic guidelines of spinal precautions 2/2 MM diagnosis and importance of avoiding excessive bending and twisting of the spine. Th discussed how to perform all functional mobility and ADLs/IADLs safely. Pt ambulated 400 feet with SBA and no AE, steady on feet overall.   Barriers to return to prior living situation: medical status, deconditioning, spinal precautions   Recommendations for discharge: home with assist  Rationale for recommendations: Assist as needed for heavier IADLs pending lab values and to maintain spinal precautions.        Entered by: Valentina Mccarty 03/29/2019 12:47 PM     Occupational Therapy Discharge Summary    Reason for therapy discharge:    Discharged to home.    Progress towards therapy goal(s). [facility] for goal details.  Goals met    Therapy recommendation(s):    No further therapy is recommended.

## 2019-03-29 NOTE — PROCEDURES
BMT/Cellular Autologous Product Infusion       Patient Vitals for the past 24 hrs:   Temp Temp src Pulse Resp Heart Rate BP   03/28/19 1230 -- -- 62 16 -- (!) 159/99   03/28/19 1240 -- -- 62 14 62 (!) 155/101   03/28/19 1250 -- -- 61 18 -- (!) 157/105   03/28/19 1535 97.4  F (36.3  C) Axillary 65 18 -- (!) 145/99   03/1955 98  F (36.7  C) Axillary 102 18 -- (!) 150/103   03/28/19 2038 -- -- 110 -- -- (!) 162/106   03/28/19 2305 97.8  F (36.6  C) Axillary 106 18 -- (!) 145/107   03/29/19 0411 97.8  F (36.6  C) Axillary 78 18 -- 127/86   03/29/19 0833 96.7  F (35.9  C) Axillary 96 18 -- (!) 124/91         BMT INFUSION DOCUMENTATION (last 48 hours)      BMT/Cellular Product Infusion    No documentation.                       Baseline Pre-Infusion Evaluation (to be completed by Provider):   Dyspnea: Grade 0 - none  Hypoxia: Grade 0 - not present  Fever: Grade 0 - afebrile  Chills: Grade 0 - none  Febrile Neutropenia: Grade 0 - not present  Sinus Bradycardia: Grade 0 - none  Hypertension: Grade 0 - none  Hypotension: Grade 0 - none  Chest Pain: Grade 0 - none  Bronchospasm: Grade 0 - none  Pain: Grade 0 - none  Rash: Grade 0 - None  Neurologic Specify: none    If adverse reactions, events or complications occur (fever greater than 2 degrees fahrenheit increase, and severe reactions of the following types: chills, dyspnea, bronchospasm, hyper/hypotension, hypoxia, bradycardia, chest pain, back/flank pain, hypoxia, and any other reaction deemed severe or life threatening; any instance of product bag breakage or unusual product appearance)    Any other events that are >= grade 3, then immediately contact the BMT Attending physician, the Cell Therapy Laboratory Medical Director (pager [phone]) and the Cell Therapy Laboratory ([phone]).  After midnight, holidays & weekends contact the [facility] Blood Bank on the appropriate campus ([facility] [place]: [phone]; [facility]: [phone]).    BMT/Cellular  Autologous Product Infusion         Patient Vitals for the past 24 hrs:   Temp Temp src Pulse Resp Heart Rate BP   03/29/19 1240 98.9  F (37.2  C) Oral 103 18 -- (!) 146/105   03/29/19 1624 98  F (36.7  C) Axillary 104 18 -- (!) 141/100   03/29/19 1659 97.9  F (36.6  C) Axillary 100 18 -- 136/89   03/29/19 1809 97.8  F (36.6  C) Axillary -- 18 91 135/87   03/29/19 2024 98.3  F (36.8  C) Axillary 91 18 92 120/85   03/29/19 2340 98  F (36.7  C) Axillary 81 16 -- 127/86   03/30/19 0315 98  F (36.7  C) Axillary 80 16 -- 122/88   03/30/19 0908 96.9  F (36.1  C) Axillary 105 16 -- 124/85         BMT INFUSION DOCUMENTATION (last 48 hours)      BMT/Cellular Product Infusion     Row Name 03/29/19 1300                [REMOVED] Product 03/29/19 1400 HPC, Apheresis    Product Details Product Release Date: 03/29/19  -AD Product Release Time: 1400  -CS Product Type: HPC, Apheresis  -AD DIN: [id number]  -AD Product Description Code: N/A  -AD Volume Dispensed (mL): 92 mL  -AD Completion Date (RN to complete): 03/29/19  -SR Completion Time (RN to complete): 1659  -SR    Checked by (Patient RN)  Tita Gilliam  -SR       Checked by (Witness)  ANSLEY MCKEON  -BABITA       Product Volume Infused (mL)  92 mL  -SR       Flush Volume (mL)  150 mL  -SR       Volume Dispensed (mL)  --         User Key  (r) = Recorded By, (t) = Taken By, (c) = Cosigned By    Initials Name Effective Dates    Sayra DAVE Do 04/29/14 -     Josephine Garcia 04/29/14 -     SR Tita Gilliam RN 08/21/17 -     Ansley Dinh 04/29/14 -         Allogeneic Donor Eligibility Determination and Summary of Records: N/A - Autologous        Type of Infusion: Autologous      Baseline Pre-Infusion Evaluation (to be completed by Provider):   Dyspnea: Grade 0 - none  Hypoxia: Grade 0 - not present  Fever: Grade 0 - afebrile  Chills: Grade 0 - none  Febrile Neutropenia: Grade 0 - not present  Sinus Bradycardia: Grade 0 - none  Hypertension: Grade 2 - stage 1 hypertension  (systolic -159 mm Hg or diastolic BP 90-99 mm Hg); medical intervention indicated; recurrent or persistent (>/ 24 hours); symptomatic increase by >/ 20 mm Hg (diastolic) or to > 140/90 mm Hg if previously WNL; monotherapy indicated  Hypotension: Grade 0 - none  Chest Pain: Grade 0 - none  Bronchospasm: Grade 0 - none  Pain: Grade 0 - none  Rash: Grade 0 - None  Neurologic Specify: none    If adverse reactions, events or complications occur (fever greater than 2 degrees fahrenheit increase, and severe reactions of the following types: chills, dyspnea, bronchospasm, hyper/hypotension, hypoxia, bradycardia, chest pain, back/flank pain, hypoxia, and any other reaction deemed severe or life threatening; any instance of product bag breakage or unusual product appearance)    Any other events that are >= grade 3, then immediately contact the BMT Attending physician, the Cell Therapy Laboratory Medical Director (pager [phone]) and the Cell Therapy Laboratory ([phone]).  After midnight, holidays & weekends contact the [facility] Blood Bank on the appropriate campus ([facility] [place]: [phone]; [facility] [place]: [phone]).    EZIO Nino CNP, APRN CNP

## 2019-03-29 NOTE — PROGRESS NOTES
BMT SOCIAL WORK DISCHARGE NOTE:    Focus: Discharge Plan    Data: Pt is a 63 year old female who is Day 0 Autologous PBSCT for her diagnosis of Multiple Myeloma.    Readmission Risk (SALO): Elevated    Interventions: Per medical team, Adrianna is likely medically stable for discharge tomorrow. SW met with pt to assess coping, provide psychosocial support and provide discharge packet with post-transplant resources for patient and caregiver. Pt shared she was familiar with the process from her previous PBSCT. SW provided empathetic listening, validation of concerns, and encouragement. SW encouraged pt to contact SW for support, questions and/or resources.     Education Provided: Discharge Resource Packet, Caregiver Self-Care Education, Expected Emotional Responses to Transition Home, and how to contact BMT SW in clinic.    Assessment: Pt presented as engaged and interactive.  Pt appears to be coping appropriately. Pt continues to be supported by her friends and family.    Plan: Pt to discharge home with her  as primary caregiver. She had a question about FMLA paperwork and will follow-up with BMT NC next week. SW will continue to provide psychosocial support and assistance with resources as needed. SUYAPA will continue to collaborate with multidisciplinary team regarding pt's plan of care.     Avani SIMS [facility]  Phone: [phone]  Pager: [phone]

## 2019-03-29 NOTE — PROGRESS NOTES
BMT Teaching Flowsheet    Adrianna Palomino is a 63 year old female  The primary encounter diagnosis was Multiple myeloma not having achieved remission (H). A diagnosis of Multiple myeloma (H) was also pertinent to this visit.    Teaching Topic: Autologous stem cell transplant discharge teaching     Person(s) involved in teaching: Patient and Spouse  Motivation Level  Asks Questions: Yes  Eager to Learn: Yes  Cooperative: Yes  Receptive (willing/able to accept information): Yes  Any cultural factors/[place]ars[place] beliefs that may influence understanding or compliance? No    Patient and Caregiver demonstrates understanding of the following:  - Reason for the appointment, diagnosis and treatment plan: Yes  - Knowledge of proper use of medications and conditions for which they are ordered (with special attention to potential side effects or drug interactions): No, explain: Bedside RN to complete medication teaching with patient and caregiver prior to discharge.   - Which situations necessitate calling provider and whom to contact: Yes    Teaching concerns addressed: None    Proper use and care of (medical equipment, care aids, etc.) Yes  Pain management techniques: Yes  Patient instructed on hand hygiene: Yes  How and/when to access community resources: Yes    Infection Control:  Patient and Caregiver demonstrates understanding of the following:  Surgical procedure site care taught NA  Signs and symptoms of infection taught Yes  Wound care taught NA  Central venous catheter care taught No, explain: Patient and caregiver previously received teaching, and decline further need     Instructional Materials Used/Given: Discharge teaching completed with patient and family. Written guidelines provided including clinic follow up, signs and symptoms to report, infection prevention, and contact list. Reviewed potential side effects s/p transplant and what to expect during recovery period. Discussed clinic routines. Discussed  activities to avoid to prevent infections and mask guidelines. [facility] for line care supplies. Pt and family verbalize understanding of material via teach back and all questions have been answered.     Time spent with patient: 40 minutes.    Specific Concerns: NA

## 2019-03-29 NOTE — PROGRESS NOTES
Care Coordination - Discharge Note     Line Company:  [facility] [phone] for line care supplies   Referral made for line care:  Yes  IV medications needed at discharge:  None   Pharmacy concerns:  None (of note: Voriconazole requires prior auth)    PT/OT recommended:  No  Referral made for PT/OT:  NA    D/c location:  Home - [place]  Has placement need been communicated to Social Work?  NA    Teaching time arranged with family:  Completed 3/29  Notify nurse to schedule line care class/ DM teaching, prior to d/c:  No, Patient and caregiver previously received teaching, and decline further need   Medicare form required:  No    Caregiver:  Lupillo Ervin () [phone]  Johnathonjackeline Ervin (son) [phone] (cell), [phone] (home)    Outpatient Nurse Coordinator will be notified upon patient discharge.

## 2019-03-29 NOTE — PLAN OF CARE
PT 5C: DEFER- Orders received for PT evaluation. Per chart review and discussion with OT, pt up without assist, no IP PT needs prior to discharge home likely today. OT completed 1x eval/treatment for education on lab values, activity/education modification, spinal precautions and strength/endurance programs. PT order completed.

## 2019-03-30 ENCOUNTER — HOME INFUSION (PRE-WILLOW HOME INFUSION) (OUTPATIENT)
Dept: PHARMACY | Facility: CLINIC | Age: 64
End: 2019-03-30

## 2019-03-30 VITALS
HEART RATE: 92 BPM | BODY MASS INDEX: 25.22 KG/M2 | DIASTOLIC BLOOD PRESSURE: 89 MMHG | OXYGEN SATURATION: 96 % | SYSTOLIC BLOOD PRESSURE: 140 MMHG | HEIGHT: 66 IN | WEIGHT: 156.9 LBS | RESPIRATION RATE: 18 BRPM | TEMPERATURE: 97.6 F

## 2019-03-30 LAB
ANION GAP SERPL CALCULATED.3IONS-SCNC: 8 MMOL/L (ref 3–14)
BASOPHILS # BLD AUTO: 0 10E9/L (ref 0–0.2)
BASOPHILS NFR BLD AUTO: 0 %
BUN SERPL-MCNC: 14 MG/DL (ref 7–30)
CA-I BLD-MCNC: 4.3 MG/DL (ref 4.4–5.2)
CALCIUM SERPL-MCNC: 7.4 MG/DL (ref 8.5–10.1)
CHLORIDE SERPL-SCNC: 106 MMOL/L (ref 94–109)
CO2 SERPL-SCNC: 25 MMOL/L (ref 20–32)
CREAT SERPL-MCNC: 0.76 MG/DL (ref 0.52–1.04)
DIFFERENTIAL METHOD BLD: ABNORMAL
EOSINOPHIL # BLD AUTO: 0 10E9/L (ref 0–0.7)
EOSINOPHIL NFR BLD AUTO: 0 %
ERYTHROCYTE [DISTWIDTH] IN BLOOD BY AUTOMATED COUNT: 12.2 % (ref 10–15)
GFR SERPL CREATININE-BSD FRML MDRD: 84 ML/MIN/{1.73_M2}
GLUCOSE SERPL-MCNC: 126 MG/DL (ref 70–99)
HCT VFR BLD AUTO: 32.5 % (ref 35–47)
HGB BLD-MCNC: 10.7 G/DL (ref 11.7–15.7)
LYMPHOCYTES # BLD AUTO: 0.2 10E9/L (ref 0.8–5.3)
LYMPHOCYTES NFR BLD AUTO: 2.6 %
MACROCYTES BLD QL SMEAR: PRESENT
MCH RBC QN AUTO: 36.5 PG (ref 26.5–33)
MCHC RBC AUTO-ENTMCNC: 32.9 G/DL (ref 31.5–36.5)
MCV RBC AUTO: 111 FL (ref 78–100)
MONOCYTES # BLD AUTO: 0.2 10E9/L (ref 0–1.3)
MONOCYTES NFR BLD AUTO: 3.5 %
NEUTROPHILS # BLD AUTO: 5.7 10E9/L (ref 1.6–8.3)
NEUTROPHILS NFR BLD AUTO: 93.9 %
OVALOCYTES BLD QL SMEAR: SLIGHT
PLATELET # BLD AUTO: 156 10E9/L (ref 150–450)
PLATELET # BLD EST: ABNORMAL 10*3/UL
POIKILOCYTOSIS BLD QL SMEAR: SLIGHT
POTASSIUM SERPL-SCNC: 3.7 MMOL/L (ref 3.4–5.3)
RBC # BLD AUTO: 2.93 10E12/L (ref 3.8–5.2)
SODIUM SERPL-SCNC: 140 MMOL/L (ref 133–144)
WBC # BLD AUTO: 6.1 10E9/L (ref 4–11)

## 2019-03-30 PROCEDURE — 25000128 H RX IP 250 OP 636: Performed by: NURSE PRACTITIONER

## 2019-03-30 PROCEDURE — 25000131 ZZH RX MED GY IP 250 OP 636 PS 637: Performed by: INTERNAL MEDICINE

## 2019-03-30 PROCEDURE — 25000132 ZZH RX MED GY IP 250 OP 250 PS 637: Performed by: PHYSICIAN ASSISTANT

## 2019-03-30 PROCEDURE — 80048 BASIC METABOLIC PNL TOTAL CA: CPT | Performed by: PHYSICIAN ASSISTANT

## 2019-03-30 PROCEDURE — 25800030 ZZH RX IP 258 OP 636: Performed by: NURSE PRACTITIONER

## 2019-03-30 PROCEDURE — 82330 ASSAY OF CALCIUM: CPT | Performed by: NURSE PRACTITIONER

## 2019-03-30 PROCEDURE — 85025 COMPLETE CBC W/AUTO DIFF WBC: CPT | Performed by: PHYSICIAN ASSISTANT

## 2019-03-30 PROCEDURE — 25000128 H RX IP 250 OP 636: Performed by: PHYSICIAN ASSISTANT

## 2019-03-30 PROCEDURE — 25000131 ZZH RX MED GY IP 250 OP 636 PS 637: Performed by: NURSE PRACTITIONER

## 2019-03-30 RX ORDER — PENTAMIDINE ISETHIONATE 300 MG/300MG
300 INHALANT RESPIRATORY (INHALATION)
COMMUNITY
Start: 2019-04-27 | End: 2019-09-19

## 2019-03-30 RX ORDER — ONDANSETRON 8 MG/1
8 TABLET, FILM COATED ORAL EVERY 6 HOURS PRN
Qty: 90 TABLET | Refills: 1 | Status: SHIPPED | OUTPATIENT
Start: 2019-03-30 | End: 2019-09-19

## 2019-03-30 RX ORDER — TIZANIDINE 2 MG/1
2-4 TABLET ORAL EVERY 6 HOURS PRN
COMMUNITY
Start: 2019-03-30 | End: 2019-03-30

## 2019-03-30 RX ORDER — ACETAMINOPHEN 325 MG/1
325-650 TABLET ORAL EVERY 4 HOURS PRN
COMMUNITY
Start: 2019-03-30

## 2019-03-30 RX ORDER — LEVOFLOXACIN 250 MG/1
250 TABLET, FILM COATED ORAL DAILY
Qty: 30 TABLET | Refills: 3 | Status: SHIPPED | OUTPATIENT
Start: 2019-03-30 | End: 2019-04-12

## 2019-03-30 RX ORDER — HEPARIN SODIUM,PORCINE 10 UNIT/ML
5 VIAL (ML) INTRAVENOUS
Status: CANCELLED | OUTPATIENT
Start: 2019-04-03

## 2019-03-30 RX ORDER — TIZANIDINE 2 MG/1
2-4 TABLET ORAL EVERY 6 HOURS PRN
COMMUNITY
Start: 2019-03-30 | End: 2019-04-12

## 2019-03-30 RX ORDER — PANTOPRAZOLE SODIUM 40 MG/1
40 TABLET, DELAYED RELEASE ORAL DAILY
Qty: 30 TABLET | Refills: 1 | Status: SHIPPED | OUTPATIENT
Start: 2019-03-31 | End: 2019-04-12

## 2019-03-30 RX ORDER — PROCHLORPERAZINE MALEATE 5 MG
5-10 TABLET ORAL EVERY 6 HOURS PRN
Qty: 30 TABLET | Refills: 1 | Status: SHIPPED | OUTPATIENT
Start: 2019-03-30 | End: 2019-09-19

## 2019-03-30 RX ORDER — ACYCLOVIR 800 MG/1
800 TABLET ORAL 2 TIMES DAILY
Qty: 150 TABLET | Refills: 3 | Status: SHIPPED | OUTPATIENT
Start: 2019-03-30

## 2019-03-30 RX ORDER — FLUCONAZOLE 200 MG/1
200 TABLET ORAL DAILY
Qty: 30 TABLET | Refills: 1 | Status: SHIPPED | OUTPATIENT
Start: 2019-03-31 | End: 2019-04-12

## 2019-03-30 RX ORDER — ONDANSETRON 8 MG/1
8 TABLET, FILM COATED ORAL EVERY 6 HOURS PRN
Status: DISCONTINUED | OUTPATIENT
Start: 2019-03-30 | End: 2019-03-30 | Stop reason: HOSPADM

## 2019-03-30 RX ADMIN — PANTOPRAZOLE SODIUM 40 MG: 40 TABLET, DELAYED RELEASE ORAL at 08:34

## 2019-03-30 RX ADMIN — ACYCLOVIR 800 MG: 800 TABLET ORAL at 08:33

## 2019-03-30 RX ADMIN — LEVOFLOXACIN 250 MG: 250 TABLET, FILM COATED ORAL at 09:52

## 2019-03-30 RX ADMIN — Medication 5 ML: at 05:48

## 2019-03-30 RX ADMIN — Medication 600 MG: at 08:34

## 2019-03-30 RX ADMIN — PROCHLORPERAZINE EDISYLATE 10 MG: 5 INJECTION INTRAMUSCULAR; INTRAVENOUS at 12:46

## 2019-03-30 RX ADMIN — FLUCONAZOLE 200 MG: 200 TABLET ORAL at 08:34

## 2019-03-30 RX ADMIN — ONDANSETRON HYDROCHLORIDE 8 MG: 8 TABLET, FILM COATED ORAL at 13:53

## 2019-03-30 RX ADMIN — DEXAMETHASONE 8 MG: 4 TABLET ORAL at 08:34

## 2019-03-30 RX ADMIN — CALCIUM GLUCONATE 1 G: 98 INJECTION, SOLUTION INTRAVENOUS at 09:52

## 2019-03-30 RX ADMIN — SODIUM CHLORIDE, PRESERVATIVE FREE 10 ML: 5 INJECTION INTRAVENOUS at 08:34

## 2019-03-30 ASSESSMENT — ACTIVITIES OF DAILY LIVING (ADL)
ADLS_ACUITY_SCORE: 11

## 2019-03-30 ASSESSMENT — MIFFLIN-ST. JEOR: SCORE: 1276.31

## 2019-03-30 NOTE — PLAN OF CARE
Afebrile, VSS. One small emesis, intermittent nausea but able to eat breakfast. Given zofran and IV compazine, encouraged pt to use anti-emetics at home to prevent nausea.  No bowel movement reported. Spouse and pt present for discharge medications review, pill box set up completed and general discharge education, no further questions/concerns. Plan for clinic visit 3/31 at 0930.     Pt discharged to: Home  Via: Own vehicle  Accompanied by: Spouse, Charles  Belongings: Sent with pt  Teaching: Discharge medication review, pill box set up complete, BMT discharge education  Clinic appointment: 3/31 0930        Adequate for Discharge  Adult Inpatient Plan of Care  Plan of Care Review  3/30/2019 1350 - Adequate for Discharge by Tita Gilliam RN  3/30/2019 0705 - No Change by Liza Correia RN  Patient-Specific Goal (Individualization)  3/30/2019 1350 - Adequate for Discharge by Tita Gilliam RN  3/30/2019 0705 - No Change by Liza Corriea RN  Absence of Hospital-Acquired Illness or Injury  3/30/2019 1350 - Adequate for Discharge by Tita Gilliam RN  Optimal Comfort and Wellbeing  3/30/2019 1350 - Adequate for Discharge by Tita Gilliam RN  Readiness for Transition of Care  3/30/2019 1350 - Adequate for Discharge by Tita Gilliam RN  Rounds/Family Conference  3/30/2019 1350 - Adequate for Discharge by Tita Gilliam RN  Adjustment to Transplant (Stem Cell/Bone Marrow Transplant)  Optimal Coping with Transplant  3/30/2019 1350 - Adequate for Discharge by Tita Gilliam RN  Bladder Irritation (Stem Cell/Bone Marrow Transplant)  Symptom-Free Urinary Elimination  3/30/2019 1350 - Adequate for Discharge by Tita Gilliam RN  Diarrhea (Stem Cell/Bone Marrow Transplant)  Diarrhea Symptom Control  3/30/2019 1350 - Adequate for Discharge by Tita Gilliam RN  3/30/2019 0705 - No Change by Liza Correia RN  Fatigue (Stem Cell/Bone Marrow Transplant)  Energy Level Supports Daily Activity  3/30/2019 1350 - Adequate  for Discharge by Tita Gilliam RN  3/30/2019 0705 - No Change by Liza Correia RN  Hematologic Alteration (Stem Cell/Bone Marrow Transplant)  Blood Counts Within Acceptable Range  3/30/2019 1350 - Adequate for Discharge by Tita Gilliam RN  3/30/2019 0705 - No Change by Liza Correia RN  Hypersensitivity Reaction (Stem Cell/Bone Marrow Transplant)  Absence of Hypersensitivity Reaction  3/30/2019 1350 - Adequate for Discharge by Tita Gilliam RN  Infection Risk (Stem Cell/Bone Marrow Transplant)  Absence of Infection Signs/Symptoms  3/30/2019 1350 - Adequate for Discharge by Tita Gilliam RN  3/30/2019 0705 - No Change by Liza Correia RN  Mucositis (Stem Cell/Bone Marrow Transplant)  Mucous Membrane Health and Integrity  3/30/2019 1350 - Adequate for Discharge by Tita Gilliam RN  Nausea and Vomiting (Stem Cell/Bone Marrow Transplant)  Nausea and Vomiting Symptom Relief  3/30/2019 1350 - Adequate for Discharge by Tita Gilliam RN  3/30/2019 0705 - No Change by Liza Correia RN  Nutrition Intake Altered (Stem Cell/Bone Marrow Transplant)  Optimal Nutrition Intake  3/30/2019 1350 - Adequate for Discharge by Tita Gilliam RN  3/30/2019 0705 - No Change by Liza Correia RN

## 2019-03-30 NOTE — PROGRESS NOTES
"  BMT Progress Note    Patient ID:  Adrianna Palomino is a 63 year old female, currently day + 1 with BMT today for Multiple Myeloma.     Interval History: Adrianna states she felt nauseated and vomited with the stem cell infusion yesterday afternoon. Symptoms resolved. Mild nausea this morning well controlled with PRN zofran. Able to eat breakfast and take medications without issues this morning. No oral sores or new consitutional symptoms. Aware for discharge and follow up daily in the BMT clinic today.     Review of Systems    10-point review of systems negative except as above.     PHYSICAL EXAM      Weight     Wt Readings from Last 3 Encounters:   03/30/19 71.2 kg (156 lb 14.4 oz)   03/20/19 71.2 kg (157 lb)   03/13/19 72.4 kg (159 lb 9.6 oz)          /85 (BP Location: Right arm)   Pulse 105   Temp 96.9  F (36.1  C) (Axillary)   Resp 16   Ht 1.665 m (5' 5.55\")   Wt 71.2 kg (156 lb 14.4 oz)   LMP 03/01/2005   SpO2 97%   BMI 25.67 kg/m       General: NAD   Eyes: PERRL, sclera anicteric   Nose/Mouth/Throat: OP clear, buccal mucosa moist, no ulcerations   Lungs: CTA bilaterally  Cardiovascular: RRR, no M/R/G   Abdominal/Rectal: +BS, soft, NT, ND  Lymphatics: No edema  Skin: No rashes or petechaie  M/S: left shoulder nontender to palpation. Active ROM normal without limitation  Neuro: A&O x 4  Psych: Pleasant & cooperative  Additional Findings: Carrion site NT, no drainage.    ASSESSMENT BY SYSTEMS   Adrianna Palomino is a 63 year old female with standard risk IgG kappa multiple myeloma, s/p auto BMT in 2013. Now day +1 of her second auto PBSCT with melphalan prep    1.  BMT:   - D-1 melphalan  - D0 (3/29/19) Confirmed with cell therapy planned dose of 8.00 x 10^6 CD34+ cells/kg, one bag (from prior collections in 2013 - stored). Transplant completed at 1615 3/30, experienced HTN requiring IV hydralazine administration and nausea with emesis. No other acute complications.   - One dose of " Allopurinol yesterday (3/28).   - Flush and pre-meds prior to transplant.   - GCSF starts d+5 and cont to until ANC>2500 x2 consecutive days.   - Re-stage per protocol.    2.  HEME: Keep Hgb>8 and plts>10K. No pre-meds.  - No transfusion needs                             3.  ID: No focal s/sx infectious process  - ancef for prophy with line placement -> order was changed to clinda in IR  - prophy levo, Fluc, and LD ACV (CMV-, HSV+, EBV+) prophy. Bactrim or appropriate PCP therapy to start d+28.            4. Cardiac:       - Hypertension: 's with transplant 3/29, s/p PRN hydralazine x 2. Normotensive prior to discharge 3/30.                               5.  GI: Nausea with emesis x 1 during transplant transfusion, intermittent mild nausea well controlled  - Discharged with PRN Zofran and complazine  - Protonix for GI prophy.   - Monitor for mucositis - no symptoms currrently    6.  FEN/Renal: Cr and electrolytes WNL, eating and drinking adequate amounts  - Monitor creat and lytes. Replete lytes PRN per SS.  - Monitor weight, I+O, lytes per protocol with IVF flush.    Carmen Ramos, MSN, APRN, ACNP-BC  Pager: [phone]      BMT Attending Admit Note  The patient was discussed on morning rounds with the nurses and mid-level provider, BMT fellow and was seen and examined by me. All labs and imaging were reviewed. I reviewed events over the last 24 hours including vitals and flow sheets. I agree with the above note and have been responsible for the care plan and interpretation of progress.Overall, the patient is a 62 y/o women with IgG K MM here for her 2nd auto HCT; the first being April 2013; which is when her PBSC graft was collected and still has a dose of 8 x 10**6 CD34+ cells/kg. Today she is doing well. She tolerated her melphalan and has had minimal acute GI side effects. Today she is day +1.    She feels well and has minimal GI side effects and zofran has helped. She did have HTN with her infusion and  was treated with hydralazine. She had no other side effects. She is ready for discontinue today. I spent 45 minutes on discontinue planning including a review of the hospital course, meds, Follow-up and what to do should she become ill. She is well informed.     On exam, lungs are clear, abdomen is benign and there is no lower extremity edema. There are no rashes. BP is stable and the patient is afebrile. Blood counts are stable as is renal function. She will leave this afternoon.    The patient has been informed on progress and all questions have been answered. We also discussed plans for the next few days.    Ghanshyam Romero MD

## 2019-03-30 NOTE — PLAN OF CARE
"23:00 to 07:30. Blood pressure 122/88, pulse 80, temperature 98  F (36.7  C), temperature source Axillary, resp. rate 16, height 1.665 m (5' 5.55\"), weight 69.8 kg (153 lb 12.8 oz), last menstrual period 03/01/2005, SpO2 95 %, not currently breastfeeding.  Pt had uneventful night. Slept good and offered no complaints. Denies pain/N/V/D. Did not need any replacements this morning. Heparin Locked. Possible discharge today. Continue to monitor   Adult Inpatient Plan of Care  Plan of Care Review  3/30/2019 0705 - No Change by Liza Correia RN  3/29/2019 1835 - Improving by Tita Gilliam RN     Diarrhea (Stem Cell/Bone Marrow Transplant)  Diarrhea Symptom Control  3/30/2019 0705 - No Change by Liza Correia RN     Fatigue (Stem Cell/Bone Marrow Transplant)  Energy Level Supports Daily Activity  3/30/2019 0705 - No Change by Liza Correia RN  3/29/2019 2155 - No Change by Elda Shetty RN  3/29/2019 1835 - No Change by Tita Gilliam RN     Hematologic Alteration (Stem Cell/Bone Marrow Transplant)  Blood Counts Within Acceptable Range  3/30/2019 0705 - No Change by Liza Correia RN     Infection Risk (Stem Cell/Bone Marrow Transplant)  Absence of Infection Signs/Symptoms  3/30/2019 0705 - No Change by Liza Correia RN     Nausea and Vomiting (Stem Cell/Bone Marrow Transplant)  Nausea and Vomiting Symptom Relief  3/30/2019 0705 - No Change by Liza Correia RN  3/29/2019 1835 - Declining by Tita Gilliam RN     Improving  Adjustment to Transplant (Stem Cell/Bone Marrow Transplant)  Optimal Coping with Transplant  3/29/2019 1835 - Improving by Tita Gilliam RN     "

## 2019-03-30 NOTE — DISCHARGE SUMMARY
[facility] Discharge Summary   Adrianna Palomino MRN# [id number]   Age: 63 year old  YOB: 1955   Date of Admission: 3/28/2019  Date of Discharge:  March 30, 2019  Admitting Physician: Abdi Browning MD  Discharge Physician: Dr. Nick MD   Discharge Diagnoses:      1. History of Multiple Myeloma, underwent autologous stem cell transplant this admission  2. CINV  3. History of CRI    Discharge Medications:       Adrianna Palomino   Home Medication Instructions DORETHA:[id number]    Printed on:03/30/19 1002   Medication Information                      acetaminophen (TYLENOL) 325 MG tablet  Take 1-2 tablets (325-650 mg) by mouth every 4 hours as needed for mild pain             acyclovir (ZOVIRAX) 800 MG tablet  Take 1 tablet (800 mg) by mouth 2 times daily             Calcium Carbonate (CALCIUM 600 PO)  Take by mouth 2 times daily             ferrous fumarate (FERRETTS) 324 MG TABS  Take 324 mg by mouth 2 times daily              filgrastim 15 mcg/mL, in Dextrose, (NEUPOGEN) 15 mcg/ml  GCSF will be given daily in the BMT clinic starting on day + 5 after transplant             fluconazole (DIFLUCAN) 200 MG tablet  Take 1 tablet (200 mg) by mouth daily             fluticasone (FLONASE) 50 MCG/ACT nasal spray  Spray 2 sprays into both nostrils daily             levofloxacin (LEVAQUIN) 250 MG tablet  Take 1 tablet (250 mg) by mouth daily             loperamide (IMODIUM) 2 MG capsule  Take 2 mg by mouth 4 times daily as needed for diarrhea             loratadine (CLARITIN) 10 MG tablet  Take 10 mg by mouth as needed              MULTIVITAMINS OR TABS  ONE DAILY             pantoprazole (PROTONIX) 40 MG EC tablet  Take 1 tablet (40 mg) by mouth daily             pentamidine (NEBUPENT) 300 MG neb solution  Inhale 300 mg into the lungs every 30 days This will be given in the BMT clinic.             prochlorperazine (COMPAZINE) 5 MG tablet  Take 1-2 tablets (5-10 mg) by mouth every 6 hours as  "needed for nausea or vomiting             simethicone (MYLICON) 125 MG chewable tablet  Take 125 mg by mouth 2 times daily             tiZANidine (ZANAFLEX) 2 MG tablet  Take 1-2 tablets (2-4 mg) by mouth every 6 hours as needed for muscle spasms               Brief History of Illness:    **Adopted from H&P:  Admitted for transplant. She had her line placed for cell infusion that morning.  No fevers or cold symptoms. No n/v/d/c. Had hx colitis and usually has loose stools, but none this morning. Doesn't believe she has had any reactions to cephalosporins in the past, thinks she had keflex and tolerated well. Left sided shoulder pain comes and goes.    Her disease treatment history prior to admission is detailed below.   Treatment/Chemotherapy Number of Cycles Date Range Outcomes & Complications   Dex, thalidomide 6 [id number] neuropathy   RVD 5 2012 MO   Auto BMT   4/5/13     revlimid maintenance         RVD 4 Oct 2018- Feb 2019        Physical Exam:    /85 (BP Location: Right arm)   Pulse 105   Temp 96.9  F (36.1  C) (Axillary)   Resp 16   Ht 1.665 m (5' 5.55\")   Wt 71.2 kg (156 lb 14.4 oz)   LMP 03/01/2005   SpO2 97%   BMI 25.67 kg/m    # Discharge Pain Plan:     - Patient currently has NO PAIN and is not being prescribed pain medications on discharge.    VASCULAR ACCESS: Central line in place   Hospital Course:    Adrianna was admitted for an autologous stem cell transplant. She was administered a single dose of Melphalan 24 hours prior to administering 8 x 10^6 CD34+ stem cells. Transplant was completed 3/29 in the afternoon. Her only acute complication was hypertension and nausea with emesis during the transfusion. Mild CINV controlled with oral Zofran. Eating, drinking, and voiding spontaneously. She was discharged 3/30 and will be followed closely in the BMT clinic for sings of chemotherapy related toxicity.    CODE STATUS: Full Code   Discharge Instructions and Follow-Up:    Discharge diet: " Regular diet as tolerated  Discharge activity: Activity as tolerated   Discharge follow-up:   - 3/31 at 9:30 am check in time for a provider visit and labs in the BMT clinic. Future appointments can be scheduled after your visit tomorrow based on a schedule that will work best for you and your caregivers.     Discharge Disposition:    Discharged to home.    Carmen Ramos  3/30/2019

## 2019-03-30 NOTE — INTERIM SUMMARY
BMT Hospital Discharge Summary of Care  Treatment Team:  Patient Care Team:  Katiuska Dawson as PCP - General (Internal Medicine)  Avani Mera LICSW as   Swati Lacy MD as MD (Ophthalmology)  Bro Crespo as Referring Physician (Hematology & Oncology)  Abdi Bill MD as BMT Physician (Transplant)  Ivone Real RN as BMT Nurse Coordinator (Transplant)  Beth Pollack MSW as  (BMT - Adult)  Discharge Diagnosis: S/P BMT    Hospital Discharge on 03/30/19  Discharge Location Home under the care of family    Activity at Discharge As tolerated    Nutrition Regular diet as tolerated     Blood Transfusion Parameters Transfuse if Hemoglobin < or equal 8.0 mg/dL  Red Blood Cell Order: 2 units, irradiated and leukoreduced   Transfuse if Platelet count < or equal 10,000 uL  Platelet order: 1 adult dose, irradiated and leukoreduced  Transfusion Pre-meds:  None     Blood Counts Recent Labs   Lab Test 03/30/19  0313   WBC 6.1   ANEU 5.7   ALYM 0.2*   MAO 0.2   AEOS 0.0   HGB 10.7*   HCT 32.5*         IV Medications Outpatient Pharmacy G-CSF to be given in clinic: (dose) 480 mcg/SQ daily starting day + 5 in the BMT clinic on 4/3 until ANC > 2,500 for 2 consecutive days      Electrolyte Replacement  Parameters in Clinic Intravenous Electrolyte Replacement:    Potassium Chloride  Give only if serum creatinine <2. Reference range 3.4-5.3 mmol/L        3.0 - 3.3 mmol/L Oral: 40 mEq by mouth x 1  PIV/CVC on TPN: 30 mEq over 1 hour x 1 doses & 20 mEq by mouth  CVC NOT on TPN: 40 mEq IV x 1   2.5-2.9 mmol/L  Oral: 40 mEq by mouth every 2 hrs x 2  PIV/CVC on TPN: 30 mEq IV & 40 mEq by mouth   CVC NOT on TPN: 60 mEq IV        <2.5 PIV/CVC on TPN: 30 mEq IV & 40 mEq by mouth   CVC NOT on TPN: 60 mEq IV      Magnesium Sulfate  Reference range 1.6-2.3 mg/dl       1.2-1.5 mg/dl  Oral:400 mg by mouth twice daily x 2 days   IV: 2 gm over 1 hour        0.9-1.1 IV Only: 4 gm IV over 2  hours   < 0.9  Discretion of provider and pharmacist        Home Infusion  IV Medications through home infusion: None   Line Care Care: [place] - Flush each line with 5mL of 10 unit/mL heparin every 24 hours  Supplies Through: [facility]  Fax: [phone]  Ph: [phone]      Physical Therapy & Support Services None     Laboratory Tests at Next Clinic Visit Hemogram (CBC) differential, platelet count  Basic Metabolic Panel  Magnesium     Healthy Lifestyle Follow general guidelines for physical activity as recommended by the Office of Disease Prevention & Health Promotion (2017) www.health.gov/paguidelines:    Avoid Inactivity  Some physical activity is better than none -- and any amount has health benefits.    Do Aerobic Activity  For substantial health benefits, do one of the followin minutes (2 hours and 30 minutes) each week of moderate-intensity aerobic physical activity (such as brisk walking or tennis)    75 minutes (1 hour and 15 minutes) each week of vigorous-intensity aerobic physical activity (such as jogging or swimming laps)    An equivalent combination of moderate- and vigorous-intensity aerobic physical activity  Do aerobic physical activity in episodes of at least 10 minutes and, if possible, spread it out through the week.  For even greater health benefits, do one of the following:    Increase moderate-intensity aerobic physical activity to 300 minutes (5 hours) each week    Increase vigorous-intensity aerobic physical activity for 150 minutes (2 hours and 30 minutes) each week  Doing more will lead to even greater health benefits.    Strengthen Muscles  Do muscle-strengthening activities (such as lifting weights or using resistance bands) that are moderate or high intensity and involve all major muscle groups on 2 or more days a week.    Healthy Lifestyle    Eat a healthy diet with a wide variety of foods    Don t smoke (passive or active exposure), chew tobacco, or use  illegal drugs.     Discuss drinking alcohol with your provider. If cleared to use alcohol, do so in moderation, generally less than two drinks per day.     Maintain a healthy weight    Avoid excessive sun exposure and wear sunscreen protection for anticipated periods of long exposure.    Information above directly cited from these sources:  1. MAITE Muniz., LILLY Metzger, JOSE Beach, SAMINA Holland, ES Finn, ES Goode.,   IBRAHIMA Self. (2013). Prevalence of Hematopoietic Cell Transplant Survivors in the [place]. Biology of Blood and Marrow Transplantation?: Journal of the American Society for Blood and Marrow Transplantation, 19(10), 9293-4854. doi   10.1016/j.bbmt.2013.07.020  2. Office of Disease Prevention and Health Promotion. (2017). Physical activity   guidelines. Retrieved from https://health.gov/paguidelBrieFix/.     When to Call  (Refer to Discharge Teaching Materials)   Fever > 100.5 F    Bleeding that does not stop after a few minutes    Severe nausea, vomiting, or diarrhea     New fall or injury    Change in designated caregiver    Medication question    Any other urgent concern   Follow Up Visits BMT Clinic Appointment Date/Time:   BMT Clinic (date, time, provider):   - [place] 3/31 for labs and provider visit at 9:30 am      Hospital Discharge Medications:   Adrianna Palomino   Home Medication Instructions DORETHA:[id number]    Printed on:03/30/19 0934   Medication Information                      acetaminophen (TYLENOL) 325 MG tablet  Take 1-2 tablets (325-650 mg) by mouth every 4 hours as needed for mild pain             acyclovir (ZOVIRAX) 800 MG tablet  Take 1 tablet (800 mg) by mouth 2 times daily             Calcium Carbonate (CALCIUM 600 PO)  Take by mouth 2 times daily             ferrous fumarate (FERRETTS) 324 MG TABS  Take 324 mg by mouth 2 times daily              filgrastim 15 mcg/mL, in Dextrose, (NEUPOGEN) 15 mcg/ml  GCSF will be given daily in the BMT clinic starting on day + 5 after  transplant             fluconazole (DIFLUCAN) 200 MG tablet  Take 1 tablet (200 mg) by mouth daily             fluticasone (FLONASE) 50 MCG/ACT nasal spray  Spray 2 sprays into both nostrils daily             levofloxacin (LEVAQUIN) 250 MG tablet  Take 1 tablet (250 mg) by mouth daily             loperamide (IMODIUM) 2 MG capsule  Take 2 mg by mouth 4 times daily as needed for diarrhea             loratadine (CLARITIN) 10 MG tablet  Take 10 mg by mouth as needed              MULTIVITAMINS OR TABS  ONE DAILY             pantoprazole (PROTONIX) 40 MG EC tablet  Take 1 tablet (40 mg) by mouth daily             pentamidine (NEBUPENT) 300 MG neb solution  Inhale 300 mg into the lungs every 30 days This will be given in the BMT clinic.             prochlorperazine (COMPAZINE) 5 MG tablet  Take 1-2 tablets (5-10 mg) by mouth every 6 hours as needed for nausea or vomiting             simethicone (MYLICON) 125 MG chewable tablet  Take 125 mg by mouth 2 times daily             tiZANidine (ZANAFLEX) 2 MG tablet  Take 1-2 tablets (2-4 mg) by mouth every 6 hours as needed for muscle spasms                    Carmen Ramos

## 2019-03-31 ENCOUNTER — ONCOLOGY VISIT (OUTPATIENT)
Dept: TRANSPLANT | Facility: CLINIC | Age: 64
End: 2019-03-31
Attending: INTERNAL MEDICINE
Payer: COMMERCIAL

## 2019-03-31 VITALS
SYSTOLIC BLOOD PRESSURE: 138 MMHG | TEMPERATURE: 98.2 F | OXYGEN SATURATION: 96 % | RESPIRATION RATE: 18 BRPM | DIASTOLIC BLOOD PRESSURE: 87 MMHG | HEART RATE: 77 BPM | BODY MASS INDEX: 25.53 KG/M2 | WEIGHT: 156 LBS

## 2019-03-31 DIAGNOSIS — C90.00 MULTIPLE MYELOMA NOT HAVING ACHIEVED REMISSION (H): ICD-10-CM

## 2019-03-31 DIAGNOSIS — C90.01 MULTIPLE MYELOMA IN REMISSION (H): Primary | ICD-10-CM

## 2019-03-31 LAB
ANION GAP SERPL CALCULATED.3IONS-SCNC: 7 MMOL/L (ref 3–14)
BASOPHILS # BLD AUTO: 0 10E9/L (ref 0–0.2)
BASOPHILS NFR BLD AUTO: 0 %
BUN SERPL-MCNC: 17 MG/DL (ref 7–30)
CALCIUM SERPL-MCNC: 8.8 MG/DL (ref 8.5–10.1)
CHLORIDE SERPL-SCNC: 103 MMOL/L (ref 94–109)
CO2 SERPL-SCNC: 26 MMOL/L (ref 20–32)
CREAT SERPL-MCNC: 0.7 MG/DL (ref 0.52–1.04)
DIFFERENTIAL METHOD BLD: ABNORMAL
EOSINOPHIL # BLD AUTO: 0 10E9/L (ref 0–0.7)
EOSINOPHIL NFR BLD AUTO: 0 %
ERYTHROCYTE [DISTWIDTH] IN BLOOD BY AUTOMATED COUNT: 11.8 % (ref 10–15)
GFR SERPL CREATININE-BSD FRML MDRD: >90 ML/MIN/{1.73_M2}
GLUCOSE SERPL-MCNC: 98 MG/DL (ref 70–99)
HCT VFR BLD AUTO: 35.5 % (ref 35–47)
HGB BLD-MCNC: 11.7 G/DL (ref 11.7–15.7)
IMM GRANULOCYTES # BLD: 0 10E9/L (ref 0–0.4)
IMM GRANULOCYTES NFR BLD: 0.3 %
LYMPHOCYTES # BLD AUTO: 0.1 10E9/L (ref 0.8–5.3)
LYMPHOCYTES NFR BLD AUTO: 1 %
MAGNESIUM SERPL-MCNC: 2.3 MG/DL (ref 1.6–2.3)
MCH RBC QN AUTO: 35.9 PG (ref 26.5–33)
MCHC RBC AUTO-ENTMCNC: 33 G/DL (ref 31.5–36.5)
MCV RBC AUTO: 109 FL (ref 78–100)
MONOCYTES # BLD AUTO: 0.1 10E9/L (ref 0–1.3)
MONOCYTES NFR BLD AUTO: 1 %
NEUTROPHILS # BLD AUTO: 5.6 10E9/L (ref 1.6–8.3)
NEUTROPHILS NFR BLD AUTO: 97.7 %
NRBC # BLD AUTO: 0 10*3/UL
NRBC BLD AUTO-RTO: 0 /100
PLATELET # BLD AUTO: 139 10E9/L (ref 150–450)
POTASSIUM SERPL-SCNC: 3.3 MMOL/L (ref 3.4–5.3)
RBC # BLD AUTO: 3.26 10E12/L (ref 3.8–5.2)
SODIUM SERPL-SCNC: 135 MMOL/L (ref 133–144)
WBC # BLD AUTO: 5.8 10E9/L (ref 4–11)

## 2019-03-31 PROCEDURE — 25000132 ZZH RX MED GY IP 250 OP 250 PS 637: Mod: ZF | Performed by: INTERNAL MEDICINE

## 2019-03-31 PROCEDURE — 80048 BASIC METABOLIC PNL TOTAL CA: CPT | Performed by: NURSE PRACTITIONER

## 2019-03-31 PROCEDURE — 83735 ASSAY OF MAGNESIUM: CPT | Performed by: NURSE PRACTITIONER

## 2019-03-31 PROCEDURE — 25000128 H RX IP 250 OP 636: Mod: ZF | Performed by: INTERNAL MEDICINE

## 2019-03-31 PROCEDURE — 85025 COMPLETE CBC W/AUTO DIFF WBC: CPT | Performed by: NURSE PRACTITIONER

## 2019-03-31 PROCEDURE — G0463 HOSPITAL OUTPT CLINIC VISIT: HCPCS

## 2019-03-31 PROCEDURE — 36591 DRAW BLOOD OFF VENOUS DEVICE: CPT

## 2019-03-31 RX ORDER — HEPARIN SODIUM,PORCINE 10 UNIT/ML
5 VIAL (ML) INTRAVENOUS
Status: DISCONTINUED | OUTPATIENT
Start: 2019-03-31 | End: 2019-03-31 | Stop reason: HOSPADM

## 2019-03-31 RX ORDER — POTASSIUM CHLORIDE 1500 MG/1
40 TABLET, EXTENDED RELEASE ORAL ONCE
Status: COMPLETED | OUTPATIENT
Start: 2019-03-31 | End: 2019-03-31

## 2019-03-31 RX ADMIN — POTASSIUM CHLORIDE 40 MEQ: 1500 TABLET, EXTENDED RELEASE ORAL at 10:11

## 2019-03-31 RX ADMIN — Medication 5 ML: at 09:29

## 2019-03-31 RX ADMIN — Medication 5 ML: at 09:28

## 2019-03-31 ASSESSMENT — PAIN SCALES - GENERAL: PAINLEVEL: NO PAIN (0)

## 2019-03-31 NOTE — PROGRESS NOTES
"Patient ID:  Adrianna Palomino is a 63 year old female, currently day + 2 with BMT today for Multiple Myeloma.      Interval History: Adrianna states she felt nauseated and vomited with the stem cell infusion yesterday afternoon. Symptoms resolved. Mild nausea this morning well controlled with PRN zofran. Able to eat breakfast and take medications without issues this morning. No oral sores or new consitutional symptoms. Aware for discharge and follow up daily in the BMT clinic today.      Review of Systems                                                                                                                           10-point review of systems negative except as above.      PHYSICAL EXAM                                                                                                                                      Weight          Wt Readings from Last 3 Encounters:   03/30/19 71.2 kg (156 lb 14.4 oz)   03/20/19 71.2 kg (157 lb)   03/13/19 72.4 kg (159 lb 9.6 oz)             /85 (BP Location: Right arm)   Pulse 105   Temp 96.9  F (36.1  C) (Axillary)   Resp 16   Ht 1.665 m (5' 5.55\")   Wt 71.2 kg (156 lb 14.4 oz)   LMP 03/01/2005   SpO2 97%   BMI 25.67 kg/m        General: NAD   Eyes: PERRL, sclera anicteric   Nose/Mouth/Throat: OP clear, buccal mucosa moist, no ulcerations   Lungs: CTA bilaterally  Cardiovascular: RRR, no M/R/G   Abdominal/Rectal: +BS, soft, NT, ND  Lymphatics: No edema  Skin: No rashes or petechaie  M/S: left shoulder nontender to palpation. Active ROM normal without limitation  Neuro: A&O x 4  Psych: Pleasant & cooperative  Additional Findings: Carrion site NT, no drainage.     ASSESSMENT BY SYSTEMS   Adrianna Palomino is a 63 year old female with standard risk IgG kappa multiple myeloma, s/p auto BMT in 2013. Now day +1 of her second auto PBSCT with melphalan prep     1.  BMT:   - D-1 melphalan  - D0 (3/29/19) Confirmed with cell therapy planned dose of 8.00 x " 10^6 CD34+ cells/kg, one bag (from prior collections in 2013 - stored). Transplant completed at 1615 3/30, experienced HTN requiring IV hydralazine administration and nausea with emesis. No other acute complications.   - One dose of Allopurinol yesterday (3/28).   - Flush and pre-meds prior to transplant.   - GCSF starts d+5 and cont to until ANC>2500 x2 consecutive days.   - Re-stage per protocol.     2.  HEME: Keep Hgb>8 and plts>10K. No pre-meds.  - No transfusion needs                             3.  ID: No focal s/sx infectious process  - ancef for prophy with line placement -> order was changed to clinda in IR  - prophy levo, Fluc, and LD ACV (CMV-, HSV+, EBV+) prophy. Bactrim or appropriate PCP therapy to start d+28.             4. Cardiac:       - Hypertension: 's with transplant 3/29, s/p PRN hydralazine x 2. Normotensive prior to discharge 3/30.                               5.  GI: Nausea with emesis x 1 during transplant transfusion, intermittent mild nausea well controlled  - Discharged with PRN Zofran and complazine  - Protonix for GI prophy.   - Monitor for mucositis - no symptoms currrently     6.  FEN/Renal: Cr and electrolytes WNL, eating and drinking adequate amounts  - Monitor creat and lytes. Replete lytes PRN per SS.  - Monitor weight, I+O, lytes per protocol with IVF flush.   K+ 3.3: start oral potassium 20 meq po BID.    Galileo Shah M.D.

## 2019-03-31 NOTE — NURSING NOTE
"Oncology Rooming Note    March 31, 2019 10:34 AM   Adrianna Palomino is a 63 year old female who presents for:    Chief Complaint   Patient presents with     Blood Draw     Labs drawn from CVC by RN in lab. Line flushed with saline and heparin. Vs taken     RECHECK     MM~ here for provider visit     Initial Vitals: /87 (BP Location: Right arm, Patient Position: Sitting, Cuff Size: Adult Regular)   Pulse 77   Temp 98.2  F (36.8  C) (Oral)   Resp 18   Wt 70.8 kg (156 lb)   LMP 03/01/2005   SpO2 96%   BMI 25.53 kg/m   Estimated body mass index is 25.53 kg/m  as calculated from the following:    Height as of 3/28/19: 1.665 m (5' 5.55\").    Weight as of this encounter: 70.8 kg (156 lb). Body surface area is 1.81 meters squared.  No Pain (0) Comment: Data Unavailable   Patient's last menstrual period was 03/01/2005.  Allergies reviewed: Yes  Medications reviewed: Yes    Medications: Medication refills not needed today.  Pharmacy name entered into [employer]:    [facility]  [facility] [place], [place] -  [facility] [id number]    Clinical concerns: no md was NOT notified.  Patient received Potassium oral for low level.  Patient has no other complaints.      Kinza Gayle RN              "

## 2019-03-31 NOTE — NURSING NOTE
Chief Complaint   Patient presents with     Blood Draw     Labs drawn from CVC by RN in lab. Line flushed with saline and heparin. Vs taken     Diane Malone RN

## 2019-04-01 ENCOUNTER — APPOINTMENT (OUTPATIENT)
Dept: LAB | Facility: CLINIC | Age: 64
End: 2019-04-01
Attending: INTERNAL MEDICINE
Payer: COMMERCIAL

## 2019-04-01 ENCOUNTER — ONCOLOGY VISIT (OUTPATIENT)
Dept: TRANSPLANT | Facility: CLINIC | Age: 64
End: 2019-04-01
Attending: PHYSICIAN ASSISTANT
Payer: COMMERCIAL

## 2019-04-01 VITALS
TEMPERATURE: 98.1 F | BODY MASS INDEX: 25.05 KG/M2 | RESPIRATION RATE: 16 BRPM | SYSTOLIC BLOOD PRESSURE: 129 MMHG | HEART RATE: 94 BPM | DIASTOLIC BLOOD PRESSURE: 89 MMHG | WEIGHT: 153.1 LBS | OXYGEN SATURATION: 96 %

## 2019-04-01 DIAGNOSIS — C90.01 MULTIPLE MYELOMA IN REMISSION (H): ICD-10-CM

## 2019-04-01 LAB
ALBUMIN SERPL-MCNC: 3.4 G/DL (ref 3.4–5)
ALP SERPL-CCNC: 49 U/L (ref 40–150)
ALT SERPL W P-5'-P-CCNC: 18 U/L (ref 0–50)
ANION GAP SERPL CALCULATED.3IONS-SCNC: 8 MMOL/L (ref 3–14)
AST SERPL W P-5'-P-CCNC: 19 U/L (ref 0–45)
BASOPHILS # BLD AUTO: 0 10E9/L (ref 0–0.2)
BASOPHILS NFR BLD AUTO: 0 %
BILIRUB SERPL-MCNC: 0.8 MG/DL (ref 0.2–1.3)
BUN SERPL-MCNC: 14 MG/DL (ref 7–30)
CALCIUM SERPL-MCNC: 8.8 MG/DL (ref 8.5–10.1)
CHLORIDE SERPL-SCNC: 102 MMOL/L (ref 94–109)
CO2 SERPL-SCNC: 25 MMOL/L (ref 20–32)
CREAT SERPL-MCNC: 0.73 MG/DL (ref 0.52–1.04)
DIFFERENTIAL METHOD BLD: ABNORMAL
EOSINOPHIL # BLD AUTO: 0 10E9/L (ref 0–0.7)
EOSINOPHIL NFR BLD AUTO: 0 %
ERYTHROCYTE [DISTWIDTH] IN BLOOD BY AUTOMATED COUNT: 11.6 % (ref 10–15)
GFR SERPL CREATININE-BSD FRML MDRD: 87 ML/MIN/{1.73_M2}
GLUCOSE SERPL-MCNC: 86 MG/DL (ref 70–99)
HCT VFR BLD AUTO: 37.9 % (ref 35–47)
HGB BLD-MCNC: 12.5 G/DL (ref 11.7–15.7)
LYMPHOCYTES # BLD AUTO: 0 10E9/L (ref 0.8–5.3)
LYMPHOCYTES NFR BLD AUTO: 0 %
MACROCYTES BLD QL SMEAR: PRESENT
MAGNESIUM SERPL-MCNC: 2 MG/DL (ref 1.6–2.3)
MCH RBC QN AUTO: 35.9 PG (ref 26.5–33)
MCHC RBC AUTO-ENTMCNC: 33 G/DL (ref 31.5–36.5)
MCV RBC AUTO: 109 FL (ref 78–100)
MONOCYTES # BLD AUTO: 0 10E9/L (ref 0–1.3)
MONOCYTES NFR BLD AUTO: 0 %
NEUTROPHILS # BLD AUTO: 3.1 10E9/L (ref 1.6–8.3)
NEUTROPHILS NFR BLD AUTO: 100 %
NRBC # BLD AUTO: 0 10*3/UL
NRBC BLD AUTO-RTO: 1 /100
PLATELET # BLD AUTO: 134 10E9/L (ref 150–450)
PLATELET # BLD EST: ABNORMAL 10*3/UL
POTASSIUM SERPL-SCNC: 3.3 MMOL/L (ref 3.4–5.3)
PROT SERPL-MCNC: 6 G/DL (ref 6.8–8.8)
RBC # BLD AUTO: 3.48 10E12/L (ref 3.8–5.2)
SODIUM SERPL-SCNC: 135 MMOL/L (ref 133–144)
WBC # BLD AUTO: 3.1 10E9/L (ref 4–11)

## 2019-04-01 PROCEDURE — 83735 ASSAY OF MAGNESIUM: CPT | Performed by: INTERNAL MEDICINE

## 2019-04-01 PROCEDURE — 25000128 H RX IP 250 OP 636: Mod: ZF | Performed by: PHYSICIAN ASSISTANT

## 2019-04-01 PROCEDURE — G0463 HOSPITAL OUTPT CLINIC VISIT: HCPCS

## 2019-04-01 PROCEDURE — 80053 COMPREHEN METABOLIC PANEL: CPT | Performed by: INTERNAL MEDICINE

## 2019-04-01 PROCEDURE — 36592 COLLECT BLOOD FROM PICC: CPT

## 2019-04-01 PROCEDURE — 85025 COMPLETE CBC W/AUTO DIFF WBC: CPT | Performed by: INTERNAL MEDICINE

## 2019-04-01 PROCEDURE — 36591 DRAW BLOOD OFF VENOUS DEVICE: CPT

## 2019-04-01 RX ORDER — HEPARIN SODIUM,PORCINE 10 UNIT/ML
5 VIAL (ML) INTRAVENOUS
Status: DISCONTINUED | OUTPATIENT
Start: 2019-04-01 | End: 2019-04-01 | Stop reason: HOSPADM

## 2019-04-01 RX ADMIN — Medication 5 ML: at 06:43

## 2019-04-01 RX ADMIN — Medication 5 ML: at 06:42

## 2019-04-01 ASSESSMENT — PAIN SCALES - GENERAL: PAINLEVEL: NO PAIN (0)

## 2019-04-01 NOTE — PROGRESS NOTES
Patient ID:  Adrianna Palomino is a 63 year old female, currently day +3 with BMT today for Multiple Myeloma.      Interval History: Overall doing okay. No major issues. Taking meds with no problem.      Review of Systems                                                                                                                           10-point review of systems negative except as above.      PHYSICAL EXAM                                                                                                                                   /89 (BP Location: Right arm, Patient Position: Sitting, Cuff Size: Adult Regular)   Pulse 94   Temp 98.1  F (36.7  C) (Oral)   Resp 16   Wt 69.4 kg (153 lb 1.6 oz)   LMP 03/01/2005   SpO2 96%   BMI 25.05 kg/m     General: NAD   Eyes: PERRL, sclera anicteric   Nose/Mouth/Throat: OP clear, buccal mucosa moist, no ulcerations   Lungs: CTA bilaterally  Cardiovascular: RRR, no M/R/G   Abdominal/Rectal: +BS, soft, NT, ND  Lymphatics: No edema  Skin: No rashes or petechaie  Additional Findings: Carrion site NT, no drainage.  ASSESSMENT BY SYSTEMS   Adrianna Palomino is a 63 year old female with standard risk IgG kappa multiple myeloma, s/p auto BMT in 2013. Now day +3 of her second auto PBSCT with melphalan prep     1.  BMT:   - Received 8 million cells.   - GCSF starts d+5 and cont to until ANC>2500 x2 consecutive days.   - Re-stage per protocol.     2.  HEME: Keep Hgb>8 and plts>10K. No pre-meds.                            3.  ID:   - prophy levo, Fluc, and LD ACV (CMV-, HSV+, EBV+) prophy. Bactrim or appropriate PCP therapy to start d+28.                                       4.  GI: Nausea with emesis x 1 during transplant transfusion, intermittent mild nausea well controlled  - Discharged with PRN Zofran and complazine  - Protonix for GI prophy.   - Monitor for mucositis - no symptoms currrently     5.  FEN/Renal:   - Monitor creat and lytes. Replete lytes PRN per  SS.  - Hypokalemia: Just started oral potassium 20 meq po BID.    RTC: daily. Requested appts through Sunday. No pharm D appt made for today--briefly reviewed meds patient okay with everything.     Kathi Sheets PA-C  #[id number]

## 2019-04-01 NOTE — NURSING NOTE
Chief Complaint   Patient presents with     Blood Draw     labs drawn from cvc by rn.  vs taken     Labs drawn from CVC by rn.  Good blood return noted in both lumens.  Both lumens flushed with NS and heparin.  Pt tolerated well.  VS taken.  Pt checked in for next appt.  Charlene Landry RN

## 2019-04-01 NOTE — PROGRESS NOTES
This is a recent snapshot of the patient's [facility] medical record.  For current drug dose and complete information and questions, call [phone] or In Basket pool, [facility] home infusion ([id number])  CSN Number:  [id number]

## 2019-04-01 NOTE — NURSING NOTE
"Oncology Rooming Note    April 1, 2019 7:09 AM   Adrianna Palomino is a 63 year old female who presents for:    Chief Complaint   Patient presents with     Blood Draw     labs drawn from cvc by rn.  vs taken     RECHECK     RTN- multiple myeloma     Initial Vitals: /89 (BP Location: Right arm, Patient Position: Sitting, Cuff Size: Adult Regular)   Pulse 94   Temp 98.1  F (36.7  C) (Oral)   Resp 16   Wt 69.4 kg (153 lb 1.6 oz)   LMP 03/01/2005   SpO2 96%   BMI 25.05 kg/m   Estimated body mass index is 25.05 kg/m  as calculated from the following:    Height as of 3/28/19: 1.665 m (5' 5.55\").    Weight as of this encounter: 69.4 kg (153 lb 1.6 oz). Body surface area is 1.79 meters squared.  No Pain (0) Comment: Data Unavailable   Patient's last menstrual period was 03/01/2005.  Allergies reviewed: Yes  Medications reviewed: Yes    Medications: Medication refills not needed today.  Pharmacy name entered into amazingtunes:    [facility]  [facility] [place], [place] - [phone]    Clinical concerns: none      Sulma Plasencia CMA              "

## 2019-04-02 ENCOUNTER — ONCOLOGY VISIT (OUTPATIENT)
Dept: TRANSPLANT | Facility: CLINIC | Age: 64
End: 2019-04-02
Attending: PHYSICIAN ASSISTANT
Payer: COMMERCIAL

## 2019-04-02 ENCOUNTER — APPOINTMENT (OUTPATIENT)
Dept: LAB | Facility: CLINIC | Age: 64
End: 2019-04-02
Attending: PHYSICIAN ASSISTANT
Payer: COMMERCIAL

## 2019-04-02 VITALS
WEIGHT: 150 LBS | OXYGEN SATURATION: 97 % | RESPIRATION RATE: 16 BRPM | HEART RATE: 101 BPM | DIASTOLIC BLOOD PRESSURE: 88 MMHG | TEMPERATURE: 99.3 F | BODY MASS INDEX: 24.54 KG/M2 | SYSTOLIC BLOOD PRESSURE: 125 MMHG

## 2019-04-02 DIAGNOSIS — C90.01 MULTIPLE MYELOMA IN REMISSION (H): ICD-10-CM

## 2019-04-02 LAB
ANION GAP SERPL CALCULATED.3IONS-SCNC: 8 MMOL/L (ref 3–14)
BASOPHILS # BLD AUTO: 0 10E9/L (ref 0–0.2)
BASOPHILS NFR BLD AUTO: 0 %
BUN SERPL-MCNC: 14 MG/DL (ref 7–30)
CALCIUM SERPL-MCNC: 8.8 MG/DL (ref 8.5–10.1)
CHLORIDE SERPL-SCNC: 98 MMOL/L (ref 94–109)
CO2 SERPL-SCNC: 26 MMOL/L (ref 20–32)
COPATH REPORT: NORMAL
CREAT SERPL-MCNC: 0.69 MG/DL (ref 0.52–1.04)
DIFFERENTIAL METHOD BLD: ABNORMAL
EOSINOPHIL # BLD AUTO: 0 10E9/L (ref 0–0.7)
EOSINOPHIL NFR BLD AUTO: 0.5 %
ERYTHROCYTE [DISTWIDTH] IN BLOOD BY AUTOMATED COUNT: 11.3 % (ref 10–15)
GFR SERPL CREATININE-BSD FRML MDRD: >90 ML/MIN/{1.73_M2}
GLUCOSE SERPL-MCNC: 124 MG/DL (ref 70–99)
HCT VFR BLD AUTO: 37.6 % (ref 35–47)
HGB BLD-MCNC: 12.9 G/DL (ref 11.7–15.7)
IMM GRANULOCYTES # BLD: 0 10E9/L (ref 0–0.4)
IMM GRANULOCYTES NFR BLD: 0 %
LYMPHOCYTES # BLD AUTO: 0.1 10E9/L (ref 0.8–5.3)
LYMPHOCYTES NFR BLD AUTO: 2.6 %
MCH RBC QN AUTO: 36.4 PG (ref 26.5–33)
MCHC RBC AUTO-ENTMCNC: 34.3 G/DL (ref 31.5–36.5)
MCV RBC AUTO: 106 FL (ref 78–100)
MONOCYTES # BLD AUTO: 0 10E9/L (ref 0–1.3)
MONOCYTES NFR BLD AUTO: 0.5 %
NEUTROPHILS # BLD AUTO: 1.8 10E9/L (ref 1.6–8.3)
NEUTROPHILS NFR BLD AUTO: 96.4 %
NRBC # BLD AUTO: 0 10*3/UL
NRBC BLD AUTO-RTO: 0 /100
PLATELET # BLD AUTO: 124 10E9/L (ref 150–450)
POTASSIUM SERPL-SCNC: 3.3 MMOL/L (ref 3.4–5.3)
RBC # BLD AUTO: 3.54 10E12/L (ref 3.8–5.2)
SODIUM SERPL-SCNC: 132 MMOL/L (ref 133–144)
WBC # BLD AUTO: 1.9 10E9/L (ref 4–11)

## 2019-04-02 PROCEDURE — 85025 COMPLETE CBC W/AUTO DIFF WBC: CPT | Performed by: PHYSICIAN ASSISTANT

## 2019-04-02 PROCEDURE — 80048 BASIC METABOLIC PNL TOTAL CA: CPT | Performed by: PHYSICIAN ASSISTANT

## 2019-04-02 PROCEDURE — 25000128 H RX IP 250 OP 636: Mod: ZF | Performed by: PHYSICIAN ASSISTANT

## 2019-04-02 PROCEDURE — 36592 COLLECT BLOOD FROM PICC: CPT

## 2019-04-02 PROCEDURE — 36591 DRAW BLOOD OFF VENOUS DEVICE: CPT

## 2019-04-02 PROCEDURE — G0463 HOSPITAL OUTPT CLINIC VISIT: HCPCS | Mod: ZF

## 2019-04-02 RX ORDER — HEPARIN SODIUM,PORCINE 10 UNIT/ML
5 VIAL (ML) INTRAVENOUS
Status: DISCONTINUED | OUTPATIENT
Start: 2019-04-02 | End: 2019-04-02 | Stop reason: HOSPADM

## 2019-04-02 RX ADMIN — Medication 5 ML: at 06:42

## 2019-04-02 RX ADMIN — Medication 5 ML: at 06:43

## 2019-04-02 ASSESSMENT — PAIN SCALES - GENERAL: PAINLEVEL: NO PAIN (0)

## 2019-04-02 NOTE — NURSING NOTE
"Oncology Rooming Note    April 2, 2019 7:03 AM   Adrianna Palomino is a 63 year old female who presents for:    Chief Complaint   Patient presents with     Blood Draw     labs drawn from cvc by rn.  vs taken     RECHECK     RTN- Multiple Myeloma     Initial Vitals: /88 (BP Location: Right arm, Patient Position: Sitting, Cuff Size: Adult Regular)   Pulse 101   Temp 99.3  F (37.4  C) (Oral)   Resp 16   Wt 68 kg (150 lb)   LMP 03/01/2005   SpO2 97%   BMI 24.54 kg/m   Estimated body mass index is 24.54 kg/m  as calculated from the following:    Height as of 3/28/19: 1.665 m (5' 5.55\").    Weight as of this encounter: 68 kg (150 lb). Body surface area is 1.77 meters squared.  No Pain (0) Comment: Data Unavailable   Patient's last menstrual period was 03/01/2005.  Allergies reviewed: Yes  Medications reviewed: Yes    Medications: Medication refills not needed today.  Pharmacy name entered into G[employer]:    [facility]  [facility] [place], [place] - [address] [place] [address] [phone]    Clinical concerns: None       Kiana Coleman CMA              "

## 2019-04-02 NOTE — PROGRESS NOTES
Patient ID:  Adrianna Palomino is a 63 year old female, currently day +4 with BMT today for Multiple Myeloma.      Interval History: Nausea this am. Took zofran at 530 going to try compazine. No vomiting/diarrhea. No fevers.      Review of Systems                                                                                                                           10-point review of systems negative except as above.      PHYSICAL EXAM                                                                                                                                   /88 (BP Location: Right arm, Patient Position: Sitting, Cuff Size: Adult Regular)   Pulse 101   Temp 99.3  F (37.4  C) (Oral)   Resp 16   Wt 68 kg (150 lb)   LMP 03/01/2005   SpO2 97%   BMI 24.54 kg/m     General: NAD   Eyes: PERRL, sclera anicteric   Nose/Mouth/Throat: OP clear, buccal mucosa moist, no ulcerations   Lungs: CTA bilaterally  Cardiovascular: RRR, no M/R/G   Abdominal/Rectal: +BS, soft, NT, ND  Lymphatics: No edema  Skin: No rashes or petechaie  Additional Findings: Carrion site NT, no drainage.  ASSESSMENT BY SYSTEMS   Adrianna Palomino is a 63 year old female with standard risk IgG kappa multiple myeloma, s/p auto BMT in 2013. Now day +4 of her second auto PBSCT with melphalan prep     1.  BMT:   - Received 8 million cells.   - GCSF starts d+5 and cont to until ANC>2500 x2 consecutive days.   - Re-stage per protocol.     2.  HEME: Keep Hgb>8 and plts>10K. No pre-meds.                            3.  ID:   - prophy levo, Fluc, and LD ACV (CMV-, HSV+, EBV+) prophy. Bactrim or appropriate PCP therapy to start d+28.                                       4.  GI:   - Nausea: Can schedule Zofran and complazine if needed.   - Protonix for GI prophy.      5.  FEN/Renal:   - Monitor creat and lytes. Replete lytes PRN per SS.  - Hypokalemia: Just started oral potassium 20 meq po BID.    RTC: Daily.     Kathi Sheets,  SHELL  #[id number]

## 2019-04-03 ENCOUNTER — ONCOLOGY VISIT (OUTPATIENT)
Dept: TRANSPLANT | Facility: CLINIC | Age: 64
End: 2019-04-03
Attending: PHYSICIAN ASSISTANT
Payer: COMMERCIAL

## 2019-04-03 ENCOUNTER — TELEPHONE (OUTPATIENT)
Dept: ONCOLOGY | Facility: CLINIC | Age: 64
End: 2019-04-03

## 2019-04-03 VITALS
DIASTOLIC BLOOD PRESSURE: 80 MMHG | HEART RATE: 101 BPM | OXYGEN SATURATION: 96 % | BODY MASS INDEX: 24.04 KG/M2 | RESPIRATION RATE: 20 BRPM | SYSTOLIC BLOOD PRESSURE: 119 MMHG | TEMPERATURE: 98 F | WEIGHT: 146.9 LBS

## 2019-04-03 DIAGNOSIS — C90.01 MULTIPLE MYELOMA IN REMISSION (H): Primary | ICD-10-CM

## 2019-04-03 DIAGNOSIS — Z94.81 STATUS POST BONE MARROW TRANSPLANT (H): ICD-10-CM

## 2019-04-03 LAB
ANION GAP SERPL CALCULATED.3IONS-SCNC: 7 MMOL/L (ref 3–14)
BASOPHILS # BLD AUTO: 0 10E9/L (ref 0–0.2)
BASOPHILS NFR BLD AUTO: 0 %
BUN SERPL-MCNC: 16 MG/DL (ref 7–30)
CALCIUM SERPL-MCNC: 9 MG/DL (ref 8.5–10.1)
CHLORIDE SERPL-SCNC: 99 MMOL/L (ref 94–109)
CO2 SERPL-SCNC: 26 MMOL/L (ref 20–32)
CREAT SERPL-MCNC: 0.72 MG/DL (ref 0.52–1.04)
DIFFERENTIAL METHOD BLD: ABNORMAL
EOSINOPHIL # BLD AUTO: 0 10E9/L (ref 0–0.7)
EOSINOPHIL NFR BLD AUTO: 0 %
ERYTHROCYTE [DISTWIDTH] IN BLOOD BY AUTOMATED COUNT: 11.2 % (ref 10–15)
GFR SERPL CREATININE-BSD FRML MDRD: 88 ML/MIN/{1.73_M2}
GLUCOSE SERPL-MCNC: 126 MG/DL (ref 70–99)
HCT VFR BLD AUTO: 38.2 % (ref 35–47)
HGB BLD-MCNC: 13.1 G/DL (ref 11.7–15.7)
IMM GRANULOCYTES # BLD: 0 10E9/L (ref 0–0.4)
IMM GRANULOCYTES NFR BLD: 0.9 %
LYMPHOCYTES # BLD AUTO: 0 10E9/L (ref 0.8–5.3)
LYMPHOCYTES NFR BLD AUTO: 2.6 %
MCH RBC QN AUTO: 36.5 PG (ref 26.5–33)
MCHC RBC AUTO-ENTMCNC: 34.3 G/DL (ref 31.5–36.5)
MCV RBC AUTO: 106 FL (ref 78–100)
MONOCYTES # BLD AUTO: 0 10E9/L (ref 0–1.3)
MONOCYTES NFR BLD AUTO: 0 %
NEUTROPHILS # BLD AUTO: 1.1 10E9/L (ref 1.6–8.3)
NEUTROPHILS NFR BLD AUTO: 96.5 %
NRBC # BLD AUTO: 0 10*3/UL
NRBC BLD AUTO-RTO: 0 /100
PLATELET # BLD AUTO: 109 10E9/L (ref 150–450)
POTASSIUM SERPL-SCNC: 3.2 MMOL/L (ref 3.4–5.3)
RBC # BLD AUTO: 3.59 10E12/L (ref 3.8–5.2)
SODIUM SERPL-SCNC: 133 MMOL/L (ref 133–144)
WBC # BLD AUTO: 1.2 10E9/L (ref 4–11)

## 2019-04-03 PROCEDURE — 25000128 H RX IP 250 OP 636: Performed by: PHYSICIAN ASSISTANT

## 2019-04-03 PROCEDURE — 80048 BASIC METABOLIC PNL TOTAL CA: CPT | Performed by: PHYSICIAN ASSISTANT

## 2019-04-03 PROCEDURE — 96361 HYDRATE IV INFUSION ADD-ON: CPT

## 2019-04-03 PROCEDURE — 96365 THER/PROPH/DIAG IV INF INIT: CPT

## 2019-04-03 PROCEDURE — 85025 COMPLETE CBC W/AUTO DIFF WBC: CPT | Performed by: PHYSICIAN ASSISTANT

## 2019-04-03 PROCEDURE — 96372 THER/PROPH/DIAG INJ SC/IM: CPT

## 2019-04-03 PROCEDURE — 25000128 H RX IP 250 OP 636: Mod: ZF | Performed by: PHYSICIAN ASSISTANT

## 2019-04-03 PROCEDURE — G0463 HOSPITAL OUTPT CLINIC VISIT: HCPCS | Mod: 25

## 2019-04-03 PROCEDURE — 96374 THER/PROPH/DIAG INJ IV PUSH: CPT

## 2019-04-03 PROCEDURE — 25000128 H RX IP 250 OP 636: Mod: ZF | Performed by: NURSE PRACTITIONER

## 2019-04-03 RX ORDER — LORAZEPAM 0.5 MG/1
.5-1 TABLET ORAL EVERY 6 HOURS PRN
Qty: 30 TABLET | Refills: 0 | Status: SHIPPED | OUTPATIENT
Start: 2019-04-03 | End: 2019-09-19

## 2019-04-03 RX ORDER — HEPARIN SODIUM,PORCINE 10 UNIT/ML
5 VIAL (ML) INTRAVENOUS ONCE
Status: COMPLETED | OUTPATIENT
Start: 2019-04-03 | End: 2019-04-03

## 2019-04-03 RX ORDER — POTASSIUM CHLORIDE 14.9 MG/ML
20 INJECTION INTRAVENOUS ONCE
Status: DISCONTINUED | OUTPATIENT
Start: 2019-04-03 | End: 2019-04-03 | Stop reason: HOSPADM

## 2019-04-03 RX ORDER — HEPARIN SODIUM,PORCINE 10 UNIT/ML
5 VIAL (ML) INTRAVENOUS
Status: DISCONTINUED | OUTPATIENT
Start: 2019-04-03 | End: 2019-04-03 | Stop reason: HOSPADM

## 2019-04-03 RX ORDER — HEPARIN SODIUM,PORCINE 10 UNIT/ML
5 VIAL (ML) INTRAVENOUS
Status: CANCELLED | OUTPATIENT
Start: 2019-04-07

## 2019-04-03 RX ORDER — POTASSIUM CHLORIDE 14.9 MG/ML
20 INJECTION INTRAVENOUS ONCE
Status: COMPLETED | OUTPATIENT
Start: 2019-04-03 | End: 2019-04-03

## 2019-04-03 RX ORDER — LORAZEPAM 2 MG/ML
0.5 INJECTION INTRAMUSCULAR ONCE
Status: COMPLETED | OUTPATIENT
Start: 2019-04-03 | End: 2019-04-03

## 2019-04-03 RX ADMIN — LORAZEPAM 0.5 MG: 2 INJECTION INTRAMUSCULAR; INTRAVENOUS at 07:46

## 2019-04-03 RX ADMIN — POTASSIUM CHLORIDE 20 MEQ: 200 INJECTION, SOLUTION INTRAVENOUS at 08:00

## 2019-04-03 RX ADMIN — Medication 5 ML: at 09:05

## 2019-04-03 RX ADMIN — SODIUM CHLORIDE 1000 ML: 9 INJECTION, SOLUTION INTRAVENOUS at 07:47

## 2019-04-03 RX ADMIN — Medication 5 ML: at 06:37

## 2019-04-03 RX ADMIN — Medication 5 ML: at 06:35

## 2019-04-03 RX ADMIN — FILGRASTIM 300 MCG: 300 INJECTION, SOLUTION INTRAVENOUS; SUBCUTANEOUS at 07:32

## 2019-04-03 ASSESSMENT — PAIN SCALES - GENERAL: PAINLEVEL: NO PAIN (0)

## 2019-04-03 NOTE — PROGRESS NOTES
Patient ID:  Adrianna Palomino is a 63 year old female, currently day +5 with BMT today for Multiple Myeloma.      Interval History: Nausea and vomited x 2. Taken all pills. Hasn't seem them come back up. NO fevers or bleeding. No diarrhea. Trying to eat/drink.    Review of Systems                                                                                                                           10-point review of systems negative except as above.      PHYSICAL EXAM                                                                                                                                   /80 (BP Location: Right arm, Patient Position: Sitting)   Pulse 101   Temp 98  F (36.7  C) (Oral)   Resp 20   Wt 66.6 kg (146 lb 14.4 oz)   LMP 03/01/2005   SpO2 96%   BMI 24.04 kg/m     General: NAD   Eyes: PERRL, sclera anicteric   Nose/Mouth/Throat: OP clear, buccal mucosa moist, no ulcerations   Lungs: CTA bilaterally  Cardiovascular: RRR, no M/R/G   Abdominal/Rectal: +BS, soft, NT, ND  Lymphatics: No edema  Skin: No rashes or petechaie  Additional Findings: Carrion site NT, no drainage.  ASSESSMENT BY SYSTEMS   Adrianna Palomino is a 63 year old female with standard risk IgG kappa multiple myeloma, s/p auto BMT in 2013. Now day +5 of her second auto PBSCT with melphalan prep     1.  BMT:   - Received 8 million cells.   - GCSF starts d+5 and cont to until ANC>2500 x2 consecutive days.   - Re-stage per protocol.     2.  HEME: Keep Hgb>8 and plts>10K. No pre-meds.                            3.  ID:   - prophy levo, Fluc, and LD ACV (CMV-, HSV+, EBV+) prophy. Bactrim or appropriate PCP therapy to start d+28.                                       4.  GI:   - Nausea/Vomiting: Taking Zofran and complazine. Given script for ativan today and a dose IV. 1L IVF today and ordered for tomorrow.  - Protonix for GI prophy.      5.  FEN/Renal:   - Monitor creat and lytes. Replete lytes PRN per SS.  - Hypokalemia:  Just started oral potassium 20 meq po BID. Extra 20meq given IV and ordered for tomorrow. Told her if she can't take her oral we will give all IV tomorrow not to worry.     RTC: Daily.     Kathi Sheets PA-C  #[id number]

## 2019-04-03 NOTE — PROGRESS NOTES
Infusion Nursing Note:  Adrianna Palomino presents today for add on fluids,ativan, and potassium .    Patient seen by provider today: Yes: Kathi Diamond   present during visit today: Not Applicable.    Note: Received  one liter of fluids and ativan for nausea with relief.  Potassium 20 meq IV given for K-3.2.    Intravenous Access:  Carrion.    Treatment Conditions:  Lab Results   Component Value Date     04/03/2019                   Lab Results   Component Value Date    POTASSIUM 3.2 04/03/2019           Lab Results   Component Value Date    MAG 2.0 04/01/2019            Lab Results   Component Value Date    CR 0.72 04/03/2019                   Lab Results   Component Value Date    LEE 9.0 04/03/2019                Lab Results   Component Value Date    BILITOTAL 0.8 04/01/2019           Lab Results   Component Value Date    ALBUMIN 3.4 04/01/2019                    Lab Results   Component Value Date    ALT 18 04/01/2019           Lab Results   Component Value Date    AST 19 04/01/2019       Results reviewed, labs MET treatment parameters, ok to proceed with treatment.      Post Infusion Assessment:  Patient tolerated infusion without incident.  Site patent and intact, free from redness, edema or discomfort.  No evidence of extravasations.  Access discontinued per protocol.       Discharge Plan:   Patient declined prescription refills.  Discharge instructions reviewed with: Patient.  Patient and/or family verbalized understanding of discharge instructions and all questions answered.  Copy of AVS reviewed with patient and/or family.   Patient discharged in stable condition accompanied by: self.  Departure Mode: Ambulatory.    Ute Hill RN                       ultrasound results

## 2019-04-03 NOTE — TELEPHONE ENCOUNTER
[facility] Prior Authorization Team   Phone: [phone]  Fax: [phone]  PA Initiation    Medication: Lorazepam 0.5mg-pa pending  Insurance Company:    Pharmacy Filling the Rx: [facility] [place], [place] - [address] [place] [address] [phone]  Filling Pharmacy Phone: [phone]  Filling Pharmacy Fax: [phone]  Start Date: 4/3/2019

## 2019-04-03 NOTE — PROGRESS NOTES
BMT Clinic Progress Note    Patient ID:  Adrianna Palomino is a 63 year old female, currently day +6 with BMT today for Multiple Myeloma.      Interval History:   No vomiting overnight. Compazine and 1 ativan at home enough to keep nausea at bay and ate some dinner with an ensure. One looser stool yesterday, no ld diarrhea. She has a small bump on her left shin, nontender. She denies fevers, chills, cough or congestion.       Review of Systems                                                                                                                           10-point review of systems negative except as above.      PHYSICAL EXAM                                                                                                                                   /77 (BP Location: Left arm, Patient Position: Chair, Cuff Size: Adult Regular)   Pulse 102   Temp 97.4  F (36.3  C) (Oral)   Wt 67.3 kg (148 lb 6.4 oz)   LMP 03/01/2005   SpO2 95%   BMI 24.28 kg/m     General: NAD   Eyes: PERRL, sclera anicteric   Nose/Mouth/Throat: OP clear, buccal mucosa moist, no ulcerations. A few petechiae noted left oral buccal mucosa  Lungs: CTA bilaterally  Cardiovascular: mildly tachycardic, regular, no M/R/G   Abdominal/Rectal: +BS, soft, NT, ND  Lymphatics: No edema  Skin: No rashes or petechaie. 1mm skin colored firm nodule at mid left shin, very close to the tibia bone. Fixed, non moveable.  Additional Findings: Carrion site NT, no drainage.      Labs:   0.9>12.4<69  K 3.1   Other lytes and Cr wnl    ASSESSMENT BY SYSTEMS   Adrianna Palomino is a 63 year old female with standard risk IgG kappa multiple myeloma, s/p auto BMT in 2013. Now day +6 of her second auto PBSCT with melphalan prep     1.  BMT:   - Received 8 million cells.   - GCSF started d+5 and cont to until ANC>2500 x2 consecutive days.   - Re-stage per protocol.     2.  HEME: Keep Hgb>8 and plts>10K. No pre-meds.  4/4 Plt dropped precipitously,  few scattered petechiae oral mucosa, no ld bleeding                            3.  ID:   - prophy levo, Fluc, and LD ACV (CMV-, HSV+, EBV+) prophy. Bactrim or appropriate PCP therapy to start d+28.                                       4.  GI:   - Nausea/Vomiting: improved 4/4, tolerating some oral intake, water and meds. No diarrhea.   - Protonix for GI prophy.      5.  FEN/Renal:   - Monitor creat and lytes. Replete lytes PRN per SS.  - Hypokalemia: Just started oral potassium 20 meq po BID. 20 meq oral and 20 meq IV (pt preference)    6. Skin:  Small, firm immobile nodule left shin, follow      Plan: gcsf, IV K/oral K replacement with 1L NS  RTC: Daily.     Renetta Nice PAC  [phone]

## 2019-04-03 NOTE — NURSING NOTE
"Oncology Rooming Note    April 3, 2019 7:10 AM   Adrianna Palomino is a 63 year old female who presents for:    Chief Complaint   Patient presents with     Blood Draw     Central line labs collected by RN.      RECHECK     Pt is here for labs and to see NP for S/P BMT MM     Initial Vitals: /80 (BP Location: Right arm, Patient Position: Sitting)   Pulse 101   Temp 98  F (36.7  C) (Oral)   Resp 20   Wt 66.6 kg (146 lb 14.4 oz)   LMP 03/01/2005   SpO2 96%   BMI 24.04 kg/m   Estimated body mass index is 24.04 kg/m  as calculated from the following:    Height as of 3/28/19: 1.665 m (5' 5.55\").    Weight as of this encounter: 66.6 kg (146 lb 14.4 oz). Body surface area is 1.76 meters squared.  No Pain (0) Comment: Data Unavailable   Patient's last menstrual period was 03/01/2005.  Allergies reviewed: Yes  Medications reviewed: Yes    Medications: Medication refills not needed today.  Pharmacy name entered into [employer]:    [facility]  [facility] [place], [place] - [phone]    Clinical concerns: none       Lisa Dorsey MA              "

## 2019-04-03 NOTE — NURSING NOTE
Pt was given her GCSF as it is her Day +5 after Transplant. She tolerated this well.  Please See MAR.    Lisa Dorsey MA

## 2019-04-04 ENCOUNTER — ONCOLOGY VISIT (OUTPATIENT)
Dept: TRANSPLANT | Facility: CLINIC | Age: 64
End: 2019-04-04
Attending: PHYSICIAN ASSISTANT
Payer: COMMERCIAL

## 2019-04-04 ENCOUNTER — APPOINTMENT (OUTPATIENT)
Dept: LAB | Facility: CLINIC | Age: 64
End: 2019-04-04
Attending: PHYSICIAN ASSISTANT
Payer: COMMERCIAL

## 2019-04-04 VITALS
DIASTOLIC BLOOD PRESSURE: 77 MMHG | BODY MASS INDEX: 24.28 KG/M2 | TEMPERATURE: 97.4 F | OXYGEN SATURATION: 95 % | SYSTOLIC BLOOD PRESSURE: 110 MMHG | WEIGHT: 148.4 LBS | HEART RATE: 102 BPM

## 2019-04-04 DIAGNOSIS — C90.01 MULTIPLE MYELOMA IN REMISSION (H): Primary | ICD-10-CM

## 2019-04-04 DIAGNOSIS — Z94.81 STATUS POST BONE MARROW TRANSPLANT (H): Primary | ICD-10-CM

## 2019-04-04 DIAGNOSIS — Z94.81 STATUS POST BONE MARROW TRANSPLANT (H): ICD-10-CM

## 2019-04-04 LAB
ANION GAP SERPL CALCULATED.3IONS-SCNC: 9 MMOL/L (ref 3–14)
BASOPHILS # BLD AUTO: 0 10E9/L (ref 0–0.2)
BASOPHILS NFR BLD AUTO: 0 %
BUN SERPL-MCNC: 16 MG/DL (ref 7–30)
CALCIUM SERPL-MCNC: 9.1 MG/DL (ref 8.5–10.1)
CHLORIDE SERPL-SCNC: 100 MMOL/L (ref 94–109)
CO2 SERPL-SCNC: 24 MMOL/L (ref 20–32)
CREAT SERPL-MCNC: 0.67 MG/DL (ref 0.52–1.04)
DIFFERENTIAL METHOD BLD: ABNORMAL
EOSINOPHIL # BLD AUTO: 0 10E9/L (ref 0–0.7)
EOSINOPHIL NFR BLD AUTO: 0.9 %
ERYTHROCYTE [DISTWIDTH] IN BLOOD BY AUTOMATED COUNT: 11.4 % (ref 10–15)
GFR SERPL CREATININE-BSD FRML MDRD: >90 ML/MIN/{1.73_M2}
GLUCOSE SERPL-MCNC: 120 MG/DL (ref 70–99)
HCT VFR BLD AUTO: 36 % (ref 35–47)
HGB BLD-MCNC: 12.4 G/DL (ref 11.7–15.7)
LYMPHOCYTES # BLD AUTO: 0 10E9/L (ref 0.8–5.3)
LYMPHOCYTES NFR BLD AUTO: 3.4 %
MCH RBC QN AUTO: 36.7 PG (ref 26.5–33)
MCHC RBC AUTO-ENTMCNC: 34.4 G/DL (ref 31.5–36.5)
MCV RBC AUTO: 107 FL (ref 78–100)
MONOCYTES # BLD AUTO: 0 10E9/L (ref 0–1.3)
MONOCYTES NFR BLD AUTO: 0 %
NEUTROPHILS # BLD AUTO: 0.9 10E9/L (ref 1.6–8.3)
NEUTROPHILS NFR BLD AUTO: 95.7 %
PLATELET # BLD AUTO: 69 10E9/L (ref 150–450)
PLATELET # BLD EST: ABNORMAL 10*3/UL
POTASSIUM SERPL-SCNC: 3.1 MMOL/L (ref 3.4–5.3)
RBC # BLD AUTO: 3.38 10E12/L (ref 3.8–5.2)
RBC MORPH BLD: NORMAL
SODIUM SERPL-SCNC: 134 MMOL/L (ref 133–144)
WBC # BLD AUTO: 0.9 10E9/L (ref 4–11)

## 2019-04-04 PROCEDURE — 25000128 H RX IP 250 OP 636: Mod: ZF | Performed by: NURSE PRACTITIONER

## 2019-04-04 PROCEDURE — G0463 HOSPITAL OUTPT CLINIC VISIT: HCPCS | Mod: 25

## 2019-04-04 PROCEDURE — 96360 HYDRATION IV INFUSION INIT: CPT

## 2019-04-04 PROCEDURE — 85025 COMPLETE CBC W/AUTO DIFF WBC: CPT | Performed by: PHYSICIAN ASSISTANT

## 2019-04-04 PROCEDURE — 80048 BASIC METABOLIC PNL TOTAL CA: CPT | Performed by: PHYSICIAN ASSISTANT

## 2019-04-04 PROCEDURE — 25000132 ZZH RX MED GY IP 250 OP 250 PS 637: Mod: ZF | Performed by: STUDENT IN AN ORGANIZED HEALTH CARE EDUCATION/TRAINING PROGRAM

## 2019-04-04 PROCEDURE — 25000128 H RX IP 250 OP 636: Mod: ZF | Performed by: PHYSICIAN ASSISTANT

## 2019-04-04 PROCEDURE — 36415 COLL VENOUS BLD VENIPUNCTURE: CPT

## 2019-04-04 PROCEDURE — 96372 THER/PROPH/DIAG INJ SC/IM: CPT

## 2019-04-04 RX ORDER — HEPARIN SODIUM,PORCINE 10 UNIT/ML
5 VIAL (ML) INTRAVENOUS ONCE
Status: COMPLETED | OUTPATIENT
Start: 2019-04-04 | End: 2019-04-04

## 2019-04-04 RX ORDER — HEPARIN SODIUM,PORCINE 10 UNIT/ML
5 VIAL (ML) INTRAVENOUS
Status: CANCELLED | OUTPATIENT
Start: 2019-04-08

## 2019-04-04 RX ORDER — SODIUM CHLORIDE AND POTASSIUM CHLORIDE 150; 900 MG/100ML; MG/100ML
INJECTION, SOLUTION INTRAVENOUS CONTINUOUS
Status: DISCONTINUED | OUTPATIENT
Start: 2019-04-04 | End: 2019-04-04 | Stop reason: HOSPADM

## 2019-04-04 RX ORDER — POTASSIUM CHLORIDE 1500 MG/1
20 TABLET, EXTENDED RELEASE ORAL ONCE
Status: COMPLETED | OUTPATIENT
Start: 2019-04-04 | End: 2019-04-04

## 2019-04-04 RX ADMIN — FILGRASTIM 300 MCG: 300 INJECTION, SOLUTION INTRAVENOUS; SUBCUTANEOUS at 07:22

## 2019-04-04 RX ADMIN — POTASSIUM CHLORIDE AND SODIUM CHLORIDE: 900; 150 INJECTION, SOLUTION INTRAVENOUS at 07:47

## 2019-04-04 RX ADMIN — POTASSIUM CHLORIDE 20 MEQ: 1500 TABLET, EXTENDED RELEASE ORAL at 07:52

## 2019-04-04 RX ADMIN — Medication 5 ML: at 06:51

## 2019-04-04 ASSESSMENT — PAIN SCALES - GENERAL: PAINLEVEL: NO PAIN (0)

## 2019-04-04 NOTE — NURSING NOTE
Chief Complaint   Patient presents with     Blood Draw     Dressing change needed; labs drawn via cvc by RN     /77 (BP Location: Left arm, Patient Position: Chair, Cuff Size: Adult Regular)   Pulse 102   Temp 97.4  F (36.3  C) (Oral)   Wt 67.3 kg (148 lb 6.4 oz)   LMP 03/01/2005   SpO2 95%   BMI 24.28 kg/m      Lines accessed by RN in lab. Labs collected through red lumen and sent. Both caps changed, lines flushed with NS & Heparin. Pt tolerated well.   Pt checked in for next appointment.    Jillian Reeder, RN

## 2019-04-04 NOTE — TELEPHONE ENCOUNTER
[facility] Prior Authorization Team   Phone: [phone]  Fax: [phone]  Prior Authorization Approval    Authorization Effective Date: 3/4/2019  Authorization Expiration Date: 4/4/2022  Medication: Lorazepam 0.5mg-pa approved  Approved Dose/Quantity: up to 10 tablets per day  Reference #: Key: [id number]   Insurance Company: [email or web address] - Phone [phone] Fax [phone]  Expected CoPay:       CoPay Card Available:      Foundation Assistance Needed:    Which Pharmacy is filling the prescription (Not needed for infusion/clinic administered): [facility] [place], [place] - [address] [place] [address] [phone]  Pharmacy Notified: Yes  Patient Notified: Yes

## 2019-04-04 NOTE — NURSING NOTE
Dressing change completed sterile technique used. Site WDL. Patient tolerated dressing change. See Dock Flowsheet.   Sulma Plasencia CMA    Administered Neupogen 300 mcg without any complications. Pt tolerated well. See Sulma Oseguera CMA

## 2019-04-04 NOTE — NURSING NOTE
"Oncology Rooming Note    April 4, 2019 7:05 AM   Adrianna Palomino is a 63 year old female who presents for:    Chief Complaint   Patient presents with     Blood Draw     Dressing change needed; labs drawn via cvc by RN     RECHECK     RTN- multiple myeloma     Initial Vitals: /77 (BP Location: Left arm, Patient Position: Chair, Cuff Size: Adult Regular)   Pulse 102   Temp 97.4  F (36.3  C) (Oral)   Wt 67.3 kg (148 lb 6.4 oz)   LMP 03/01/2005   SpO2 95%   BMI 24.28 kg/m   Estimated body mass index is 24.28 kg/m  as calculated from the following:    Height as of 3/28/19: 1.665 m (5' 5.55\").    Weight as of this encounter: 67.3 kg (148 lb 6.4 oz). Body surface area is 1.76 meters squared.  No Pain (0) Comment: Data Unavailable   Patient's last menstrual period was 03/01/2005.  Allergies reviewed: Yes  Medications reviewed: Yes    Medications: Medication refills not needed today.  Pharmacy name entered into [employer]:    [email or web address]  [facility] [place], [place] - [phone]    Clinical concerns: none     Sulma Plasencia CMA              "

## 2019-04-04 NOTE — PROGRESS NOTES
Infusion Nursing Note:  Adrianna ABRAM Palomino presents today for scheduled infusion.    Patient seen by provider today: Yes: Renetta Nice   present during visit today: Not Applicable.    Note: Labs were monitored.  Potassium level was 3.1 today.    Intravenous Access:  Carrion.    Treatment Conditions:  Per Provider order, Patient received scheduled 1 liter IV fluid plus 20 mEq IV potassium over one hour.  She received 20 mEq oral potassium.        Post Infusion Assessment:  Patient tolerated infusion without incident.       Discharge Plan:   Patient discharged in stable condition accompanied by: .    AIMEE FISCHER RN

## 2019-04-05 ENCOUNTER — ONCOLOGY VISIT (OUTPATIENT)
Dept: TRANSPLANT | Facility: CLINIC | Age: 64
End: 2019-04-05
Attending: PHYSICIAN ASSISTANT
Payer: COMMERCIAL

## 2019-04-05 ENCOUNTER — APPOINTMENT (OUTPATIENT)
Dept: LAB | Facility: CLINIC | Age: 64
End: 2019-04-05
Attending: PHYSICIAN ASSISTANT
Payer: COMMERCIAL

## 2019-04-05 VITALS
OXYGEN SATURATION: 95 % | SYSTOLIC BLOOD PRESSURE: 128 MMHG | HEIGHT: 66 IN | DIASTOLIC BLOOD PRESSURE: 86 MMHG | WEIGHT: 149.8 LBS | BODY MASS INDEX: 24.08 KG/M2 | HEART RATE: 106 BPM | TEMPERATURE: 97.7 F | RESPIRATION RATE: 16 BRPM

## 2019-04-05 DIAGNOSIS — Z94.81 STATUS POST BONE MARROW TRANSPLANT (H): Primary | ICD-10-CM

## 2019-04-05 LAB
ANION GAP SERPL CALCULATED.3IONS-SCNC: 11 MMOL/L (ref 3–14)
BUN SERPL-MCNC: 13 MG/DL (ref 7–30)
CALCIUM SERPL-MCNC: 8.9 MG/DL (ref 8.5–10.1)
CHLORIDE SERPL-SCNC: 100 MMOL/L (ref 94–109)
CO2 SERPL-SCNC: 24 MMOL/L (ref 20–32)
CREAT SERPL-MCNC: 0.63 MG/DL (ref 0.52–1.04)
DIFFERENTIAL METHOD BLD: ABNORMAL
ERYTHROCYTE [DISTWIDTH] IN BLOOD BY AUTOMATED COUNT: 11.2 % (ref 10–15)
GFR SERPL CREATININE-BSD FRML MDRD: >90 ML/MIN/{1.73_M2}
GLUCOSE SERPL-MCNC: 106 MG/DL (ref 70–99)
HCT VFR BLD AUTO: 35.2 % (ref 35–47)
HGB BLD-MCNC: 11.9 G/DL (ref 11.7–15.7)
MCH RBC QN AUTO: 36.1 PG (ref 26.5–33)
MCHC RBC AUTO-ENTMCNC: 33.8 G/DL (ref 31.5–36.5)
MCV RBC AUTO: 107 FL (ref 78–100)
PLATELET # BLD AUTO: 49 10E9/L (ref 150–450)
POTASSIUM SERPL-SCNC: 3.1 MMOL/L (ref 3.4–5.3)
RBC # BLD AUTO: 3.3 10E12/L (ref 3.8–5.2)
SODIUM SERPL-SCNC: 135 MMOL/L (ref 133–144)
WBC # BLD AUTO: 0.1 10E9/L (ref 4–11)

## 2019-04-05 PROCEDURE — 25800030 ZZH RX IP 258 OP 636: Mod: ZF | Performed by: PHYSICIAN ASSISTANT

## 2019-04-05 PROCEDURE — 25000128 H RX IP 250 OP 636: Mod: ZF | Performed by: PHYSICIAN ASSISTANT

## 2019-04-05 PROCEDURE — 96365 THER/PROPH/DIAG IV INF INIT: CPT

## 2019-04-05 PROCEDURE — 96372 THER/PROPH/DIAG INJ SC/IM: CPT

## 2019-04-05 PROCEDURE — G0463 HOSPITAL OUTPT CLINIC VISIT: HCPCS | Mod: ZF,25

## 2019-04-05 PROCEDURE — 80048 BASIC METABOLIC PNL TOTAL CA: CPT | Performed by: STUDENT IN AN ORGANIZED HEALTH CARE EDUCATION/TRAINING PROGRAM

## 2019-04-05 PROCEDURE — 85027 COMPLETE CBC AUTOMATED: CPT

## 2019-04-05 PROCEDURE — 25000128 H RX IP 250 OP 636: Mod: ZF | Performed by: NURSE PRACTITIONER

## 2019-04-05 RX ORDER — HEPARIN SODIUM,PORCINE 10 UNIT/ML
5 VIAL (ML) INTRAVENOUS
Status: DISCONTINUED | OUTPATIENT
Start: 2019-04-05 | End: 2019-04-05 | Stop reason: HOSPADM

## 2019-04-05 RX ORDER — HEPARIN SODIUM,PORCINE 10 UNIT/ML
5 VIAL (ML) INTRAVENOUS
Status: CANCELLED | OUTPATIENT
Start: 2019-04-05

## 2019-04-05 RX ADMIN — Medication 5 ML: at 06:57

## 2019-04-05 RX ADMIN — MAGNESIUM SULFATE HEPTAHYDRATE: 500 INJECTION, SOLUTION INTRAMUSCULAR; INTRAVENOUS at 08:09

## 2019-04-05 RX ADMIN — Medication 5 ML: at 06:59

## 2019-04-05 RX ADMIN — FILGRASTIM 300 MCG: 300 INJECTION, SOLUTION INTRAVENOUS; SUBCUTANEOUS at 07:33

## 2019-04-05 ASSESSMENT — PAIN SCALES - GENERAL: PAINLEVEL: NO PAIN (0)

## 2019-04-05 ASSESSMENT — MIFFLIN-ST. JEOR: SCORE: 1244.1

## 2019-04-05 NOTE — NURSING NOTE
Chief Complaint   Patient presents with     RECHECK     Return: Multiple myeloma     Blood Draw     labs drawn from cvc by rn.  vs taken     Labs drawn from CVC by rn.  Good blood return noted in both lumens.  Both lumens flushed with NS and heparin.  Pt tolerated well.  VS taken.  Pt checked in for next appt.  Charlene Landry RN

## 2019-04-05 NOTE — PROGRESS NOTES
"BMT Clinic Progress Note    Patient ID:  Adrianna Palomino is a 63 year old female, currently day +7 with BMT today for Multiple Myeloma.      Interval History:   No vomiting overnight. Taking ativan and compazine with control of nausea. She has noticed new left buccal mucosa tenderness and slight pain with swallowing which is new. No fevers. Stools are soft and a few times per day but not diarrhea. Eating albeit much less than baseline.     Review of Systems                                                                                                                           10-point review of systems negative except as above.      PHYSICAL EXAM                                                                                                                                   /86 (BP Location: Right arm, Patient Position: Sitting, Cuff Size: Adult Regular)   Pulse 106   Temp 97.7  F (36.5  C) (Oral)   Resp 16   Ht 1.665 m (5' 5.55\")   Wt 67.9 kg (149 lb 12.8 oz)   LMP 03/01/2005   SpO2 95%   BMI 24.51 kg/m     General: NAD   Eyes: PERRL, sclera anicteric   Nose/Mouth/Throat: OP clear, buccal mucosa moist, left tongue ulceration.   Lungs: CTA bilaterally  Cardiovascular: mildly tachycardic, regular, no M/R/G   Abdominal/Rectal: +BS, soft, NT, ND  Lymphatics: No edema  Skin: No rashes or petechaie. 1mm skin colored firm nodule at mid left shin, very close to the tibia bone. Fixed, non moveable.  Additional Findings: Carrion site NT, no drainage.      Labs:   Lab Results   Component Value Date    WBC 0.1 (LL) 04/05/2019    ANEU 0.9 (L) 04/04/2019    HGB 11.9 04/05/2019    HCT 35.2 04/05/2019    PLT 49 (LL) 04/05/2019     04/05/2019    POTASSIUM 3.1 (L) 04/05/2019    CHLORIDE 100 04/05/2019    CO2 24 04/05/2019     (H) 04/05/2019    BUN 13 04/05/2019    CR 0.63 04/05/2019    MAG 2.0 04/01/2019    INR 0.95 03/28/2019    BILITOTAL 0.8 04/01/2019    AST 19 04/01/2019    ALT 18 04/01/2019    " ALKPHOS 49 04/01/2019    PROTTOTAL 6.0 (L) 04/01/2019    ALBUMIN 3.4 04/01/2019       ASSESSMENT BY SYSTEMS   Adrianna VALVERDE Georgette is a 63 year old female with standard risk IgG kappa multiple myeloma, s/p auto BMT in 2013. Now day +7 of her second auto PBSCT with melphalan prep     1.  BMT:   - Received 8 million cells.   - GCSF started d+5 and cont to until ANC>2500 x2 consecutive days.   - Re-stage per protocol.     2.  HEME: Keep Hgb>8 and plts>10K. No pre-meds.  4/4 Plt rapidly declining- 69-49- unlikely plts tomorrow- but possible this weekend/Monday.                             3.  ID: Afebrile  - prophy levo, Fluc, and LD ACV (CMV-, HSV+, EBV+) prophy.   Bactrim or appropriate PCP therapy to start d+28.                                       4.  GI: New mild mucositis   - New sore throat- left later tongue with sore, buccal mucosa is erythematous but no ld ulcers. MMW script sent today - pt to  if she feels she needs.   - Nausea/Vomiting: improved 4/4, tolerating some oral intake, water and meds. No diarrhea.   - Protonix for GI prophy.      5.  FEN/Renal:   - Monitor creat and lytes. Replete lytes PRN per SS.  - Poor intake, hypokalemia + migraine today: Give 20meq oral potassium + 20mg IV K + 2g IV mag in 1L NS.     6. Skin:  Small, firm immobile nodule left shin, follow      Plan: gcsf, IV K+mag/oral K replacement with 1L NS    RTC: Daily.     Naomi Thayer, PAC  [phone]

## 2019-04-05 NOTE — NURSING NOTE
"Oncology Rooming Note    April 5, 2019 7:11 AM   Adrianna Palomino is a 63 year old female who presents for:    Chief Complaint   Patient presents with     RECHECK     Return: Multiple myeloma     Blood Draw     labs drawn from cvc by rn.  vs taken     Initial Vitals: /86 (BP Location: Right arm, Patient Position: Sitting, Cuff Size: Adult Regular)   Pulse 106   Temp 97.7  F (36.5  C) (Oral)   Resp 16   Ht 1.665 m (5' 5.55\")   Wt 67.9 kg (149 lb 12.8 oz)   LMP 03/01/2005   SpO2 95%   BMI 24.51 kg/m   Estimated body mass index is 24.51 kg/m  as calculated from the following:    Height as of this encounter: 1.665 m (5' 5.55\").    Weight as of this encounter: 67.9 kg (149 lb 12.8 oz). Body surface area is 1.77 meters squared.  No Pain (0) Comment: Data Unavailable   Patient's last menstrual period was 03/01/2005.  Allergies reviewed: Yes  Medications reviewed: Yes    Medications: Medication refills not needed today.  Pharmacy name entered into [employer]:    [facility]  [facility] [place], [place] - [address] [place] [address] [phone]    Clinical concerns: N/A       Jennifer Soriano CMA              "

## 2019-04-05 NOTE — NURSING NOTE
Administered Neupogen 300mcg into the abdominal tissue (right side), see MARIO ALBERTO Soriano MA

## 2019-04-05 NOTE — PROGRESS NOTES
Infusion Nursing Note:  Adrianna Palomino presents today for IV fluids with K and Mag.    Patient seen by provider today: Yes: Naomi Thayer   present during visit today: Not Applicable.    Note: Patient here for add on IVF with 20 of potassium and 2 g of Magnesium. Patient tolerated infusion well without any issues or side affects.     Intravenous Access:  Carrion.    Treatment Conditions:  Lab Results   Component Value Date    HGB 11.9 04/05/2019     Lab Results   Component Value Date    WBC 0.1 04/05/2019      Lab Results   Component Value Date    ANEU 0.9 04/04/2019     Lab Results   Component Value Date    PLT 49 04/05/2019      Lab Results   Component Value Date     04/05/2019                   Lab Results   Component Value Date    POTASSIUM 3.1 04/05/2019           Lab Results   Component Value Date    MAG 2.0 04/01/2019            Lab Results   Component Value Date    CR 0.63 04/05/2019                   Lab Results   Component Value Date    LEE 8.9 04/05/2019                Lab Results   Component Value Date    BILITOTAL 0.8 04/01/2019           Lab Results   Component Value Date    ALBUMIN 3.4 04/01/2019                    Lab Results   Component Value Date    ALT 18 04/01/2019           Lab Results   Component Value Date    AST 19 04/01/2019       Results reviewed, labs MET treatment parameters, ok to proceed with treatment.      Post Infusion Assessment:  Patient tolerated infusion without incident.  Blood return noted pre and post infusion.  Site patent and intact, free from redness, edema or discomfort.  No evidence of extravasations.  Access discontinued per protocol.       Discharge Plan:   Patient discharged in stable condition accompanied by: self.  Departure Mode: Ambulatory.    ALAINA GUPTA RN

## 2019-04-05 NOTE — NURSING NOTE
Chief Complaint   Patient presents with     Infusion     pt here for add on fluids s/p bmt for MM

## 2019-04-06 ENCOUNTER — APPOINTMENT (OUTPATIENT)
Dept: LAB | Facility: CLINIC | Age: 64
End: 2019-04-06
Attending: PHYSICIAN ASSISTANT
Payer: COMMERCIAL

## 2019-04-06 ENCOUNTER — ONCOLOGY VISIT (OUTPATIENT)
Dept: TRANSPLANT | Facility: CLINIC | Age: 64
End: 2019-04-06
Attending: PHYSICIAN ASSISTANT
Payer: COMMERCIAL

## 2019-04-06 VITALS
OXYGEN SATURATION: 94 % | DIASTOLIC BLOOD PRESSURE: 81 MMHG | SYSTOLIC BLOOD PRESSURE: 123 MMHG | WEIGHT: 150.4 LBS | TEMPERATURE: 98.8 F | BODY MASS INDEX: 24.61 KG/M2 | HEART RATE: 99 BPM

## 2019-04-06 DIAGNOSIS — C90.00 MULTIPLE MYELOMA NOT HAVING ACHIEVED REMISSION (H): ICD-10-CM

## 2019-04-06 DIAGNOSIS — Z94.81 STATUS POST BONE MARROW TRANSPLANT (H): Primary | ICD-10-CM

## 2019-04-06 LAB
ANION GAP SERPL CALCULATED.3IONS-SCNC: 9 MMOL/L (ref 3–14)
BLD PROD TYP BPU: NORMAL
BLD PROD TYP BPU: NORMAL
BLD UNIT ID BPU: 0
BLOOD PRODUCT CODE: NORMAL
BPU ID: NORMAL
BUN SERPL-MCNC: 7 MG/DL (ref 7–30)
CALCIUM SERPL-MCNC: 8.4 MG/DL (ref 8.5–10.1)
CHLORIDE SERPL-SCNC: 102 MMOL/L (ref 94–109)
CO2 SERPL-SCNC: 23 MMOL/L (ref 20–32)
CREAT SERPL-MCNC: 0.62 MG/DL (ref 0.52–1.04)
DIFFERENTIAL METHOD BLD: ABNORMAL
ERYTHROCYTE [DISTWIDTH] IN BLOOD BY AUTOMATED COUNT: 11 % (ref 10–15)
GFR SERPL CREATININE-BSD FRML MDRD: >90 ML/MIN/{1.73_M2}
GLUCOSE SERPL-MCNC: 136 MG/DL (ref 70–99)
HCT VFR BLD AUTO: 33.4 % (ref 35–47)
HGB BLD-MCNC: 11.3 G/DL (ref 11.7–15.7)
MAGNESIUM SERPL-MCNC: 1.9 MG/DL (ref 1.6–2.3)
MCH RBC QN AUTO: 35.8 PG (ref 26.5–33)
MCHC RBC AUTO-ENTMCNC: 33.8 G/DL (ref 31.5–36.5)
MCV RBC AUTO: 106 FL (ref 78–100)
NUM BPU REQUESTED: 1
PLATELET # BLD AUTO: 27 10E9/L (ref 150–450)
POTASSIUM SERPL-SCNC: 3.2 MMOL/L (ref 3.4–5.3)
RBC # BLD AUTO: 3.16 10E12/L (ref 3.8–5.2)
SODIUM SERPL-SCNC: 135 MMOL/L (ref 133–144)
TRANSFUSION STATUS PATIENT QL: NORMAL
TRANSFUSION STATUS PATIENT QL: NORMAL
WBC # BLD AUTO: 0 10E9/L (ref 4–11)

## 2019-04-06 PROCEDURE — G0463 HOSPITAL OUTPT CLINIC VISIT: HCPCS | Mod: 25

## 2019-04-06 PROCEDURE — 83735 ASSAY OF MAGNESIUM: CPT | Performed by: PHYSICIAN ASSISTANT

## 2019-04-06 PROCEDURE — 96372 THER/PROPH/DIAG INJ SC/IM: CPT | Mod: 59

## 2019-04-06 PROCEDURE — 25000128 H RX IP 250 OP 636: Mod: ZF | Performed by: NURSE PRACTITIONER

## 2019-04-06 PROCEDURE — 25000128 H RX IP 250 OP 636: Mod: ZF | Performed by: PHYSICIAN ASSISTANT

## 2019-04-06 PROCEDURE — 85027 COMPLETE CBC AUTOMATED: CPT

## 2019-04-06 PROCEDURE — 25000128 H RX IP 250 OP 636: Mod: ZF | Performed by: INTERNAL MEDICINE

## 2019-04-06 PROCEDURE — 96365 THER/PROPH/DIAG IV INF INIT: CPT

## 2019-04-06 PROCEDURE — 80048 BASIC METABOLIC PNL TOTAL CA: CPT | Performed by: PHYSICIAN ASSISTANT

## 2019-04-06 RX ORDER — HEPARIN SODIUM,PORCINE 10 UNIT/ML
5 VIAL (ML) INTRAVENOUS ONCE
Status: COMPLETED | OUTPATIENT
Start: 2019-04-06 | End: 2019-04-06

## 2019-04-06 RX ORDER — HEPARIN SODIUM,PORCINE 10 UNIT/ML
5 VIAL (ML) INTRAVENOUS
Status: CANCELLED | OUTPATIENT
Start: 2019-04-06

## 2019-04-06 RX ADMIN — Medication 5 ML: at 07:31

## 2019-04-06 RX ADMIN — MAGNESIUM SULFATE HEPTAHYDRATE: 500 INJECTION, SOLUTION INTRAMUSCULAR; INTRAVENOUS at 07:57

## 2019-04-06 RX ADMIN — FILGRASTIM 300 MCG: 300 INJECTION, SOLUTION INTRAVENOUS; SUBCUTANEOUS at 07:57

## 2019-04-06 ASSESSMENT — PAIN SCALES - GENERAL
PAINLEVEL: NO PAIN (0)
PAINLEVEL: NO PAIN (0)

## 2019-04-06 NOTE — PROGRESS NOTES
BMT Clinic Progress Note    Patient ID:  Adrianna Palomino is a 63 year old female, currently day +8 with BMT today for Multiple Myeloma.      Interval History:   No vomiting overnight. Taking ativan and compazine with control of nausea. She has noticed new left buccal mucosa tenderness and slight pain with swallowing which is new. No fevers. Stools are soft and a few times per day but not diarrhea. Eating albeit much less than baseline.     Review of Systems                                                                                                                           10-point review of systems negative except as above.      PHYSICAL EXAM                                                                                                                                   /83 (BP Location: Right arm, Patient Position: Chair, Cuff Size: Adult Regular)   Pulse 108   Temp 98.1  F (36.7  C) (Oral)   Wt 68.2 kg (150 lb 6.4 oz)   LMP 03/01/2005   SpO2 96%   BMI 24.61 kg/m     General: NAD   Eyes: PERRL, sclera anicteric   Nose/Mouth/Throat: OP clear, buccal mucosa moist, left tongue ulceration.   Lungs: CTA bilaterally  Cardiovascular: mildly tachycardic, regular, no M/R/G   Abdominal/Rectal: +BS, soft, NT, ND  Lymphatics: No edema  Skin: No rashes or petechaie. 1mm skin colored firm nodule at mid left shin, very close to the tibia bone. Fixed, non moveable.  Additional Findings: Carrion site NT, no drainage.      Labs:   Lab Results   Component Value Date    WBC 0.0 (LL) 04/06/2019    ANEU 0.9 (L) 04/04/2019    HGB 11.3 (L) 04/06/2019    HCT 33.4 (L) 04/06/2019    PLT 27 (LL) 04/06/2019     04/06/2019    POTASSIUM 3.2 (L) 04/06/2019    CHLORIDE 102 04/06/2019    CO2 23 04/06/2019     (H) 04/06/2019    BUN 7 04/06/2019    CR 0.62 04/06/2019    MAG 1.9 04/06/2019    INR 0.95 03/28/2019    BILITOTAL 0.8 04/01/2019    AST 19 04/01/2019    ALT 18 04/01/2019    ALKPHOS 49 04/01/2019     PROTTOTAL 6.0 (L) 04/01/2019    ALBUMIN 3.4 04/01/2019       ASSESSMENT BY SYSTEMS   Adrianna ABRAM Palomino is a 63 year old female with standard risk IgG kappa multiple myeloma, s/p auto BMT in 2013. Now day +8 of her second auto PBSCT with melphalan prep     1.  BMT:   - Received 8 million cells.   - GCSF started d+5 and cont to until ANC>2500 x2 consecutive days.   - Re-stage per protocol.     2.  HEME: Keep Hgb>8 and plts>10K. No pre-meds.  4/4 Plt rapidly declining- 69-49- unlikely plts tomorrow- but possible this weekend/Monday.                             3.  ID: Afebrile  - prophy levo, Fluc, and LD ACV (CMV-, HSV+, EBV+) prophy.   Bactrim or appropriate PCP therapy to start d+28.                                       4.  GI: New mild mucositis   - New sore throat- left later tongue with sore, buccal mucosa is erythematous but no ld ulcers. MMW script sent today - pt to  if she feels she needs.   - Nausea/Vomiting: improved 4/4, tolerating some oral intake, water and meds. No diarrhea.   - Protonix for GI prophy.      5.  FEN/Renal:   - Monitor creat and lytes. Replete lytes PRN per SS.  - Poor intake, hypokalemia + migraine today: Give 20meq oral potassium + 20mg IV K + 2g IV mag in 1L NS. Start 20 meq po BID.  6. Skin:  Small, firm immobile nodule left shin, follow      Plan: gcsf, IV K+mag/oral K replacement with 1L NS    RTC: Daily.     Galileo Shah M.D.

## 2019-04-06 NOTE — NURSING NOTE
Chief Complaint   Patient presents with     Blood Draw     labs drawn via cvc by RN     /83 (BP Location: Right arm, Patient Position: Chair, Cuff Size: Adult Regular)   Pulse 108   Temp 98.1  F (36.7  C) (Oral)   Wt 68.2 kg (150 lb 6.4 oz)   LMP 03/01/2005   SpO2 96%   BMI 24.61 kg/m      Lines accessed by RN in lab. Labs collected through red lumen and sent. Both caps changed, lines flushed with NS & Heparin. Pt tolerated well.   Pt checked in for next appointment.    Jillian Reeder RN

## 2019-04-07 ENCOUNTER — APPOINTMENT (OUTPATIENT)
Dept: LAB | Facility: CLINIC | Age: 64
End: 2019-04-07
Attending: PHYSICIAN ASSISTANT
Payer: COMMERCIAL

## 2019-04-07 ENCOUNTER — ONCOLOGY VISIT (OUTPATIENT)
Dept: TRANSPLANT | Facility: CLINIC | Age: 64
End: 2019-04-07
Attending: PHYSICIAN ASSISTANT
Payer: COMMERCIAL

## 2019-04-07 ENCOUNTER — INFUSION THERAPY VISIT (OUTPATIENT)
Dept: TRANSPLANT | Facility: CLINIC | Age: 64
End: 2019-04-07
Attending: INTERNAL MEDICINE
Payer: COMMERCIAL

## 2019-04-07 VITALS
TEMPERATURE: 99.1 F | WEIGHT: 149.2 LBS | BODY MASS INDEX: 24.41 KG/M2 | HEART RATE: 97 BPM | DIASTOLIC BLOOD PRESSURE: 79 MMHG | OXYGEN SATURATION: 96 % | SYSTOLIC BLOOD PRESSURE: 118 MMHG

## 2019-04-07 DIAGNOSIS — C90.00 MULTIPLE MYELOMA NOT HAVING ACHIEVED REMISSION (H): Primary | ICD-10-CM

## 2019-04-07 DIAGNOSIS — Z94.81 STATUS POST BONE MARROW TRANSPLANT (H): Primary | ICD-10-CM

## 2019-04-07 LAB
ANION GAP SERPL CALCULATED.3IONS-SCNC: 10 MMOL/L (ref 3–14)
BLD PROD TYP BPU: NORMAL
BLD PROD TYP BPU: NORMAL
BLD UNIT ID BPU: 0
BLOOD PRODUCT CODE: NORMAL
BPU ID: NORMAL
BUN SERPL-MCNC: 5 MG/DL (ref 7–30)
CALCIUM SERPL-MCNC: 8.6 MG/DL (ref 8.5–10.1)
CHLORIDE SERPL-SCNC: 100 MMOL/L (ref 94–109)
CO2 SERPL-SCNC: 22 MMOL/L (ref 20–32)
CREAT SERPL-MCNC: 0.62 MG/DL (ref 0.52–1.04)
DIFFERENTIAL METHOD BLD: ABNORMAL
ERYTHROCYTE [DISTWIDTH] IN BLOOD BY AUTOMATED COUNT: 10.8 % (ref 10–15)
GFR SERPL CREATININE-BSD FRML MDRD: >90 ML/MIN/{1.73_M2}
GLUCOSE SERPL-MCNC: 129 MG/DL (ref 70–99)
HCT VFR BLD AUTO: 33.1 % (ref 35–47)
HGB BLD-MCNC: 11.3 G/DL (ref 11.7–15.7)
MAGNESIUM SERPL-MCNC: 2 MG/DL (ref 1.6–2.3)
MCH RBC QN AUTO: 35.5 PG (ref 26.5–33)
MCHC RBC AUTO-ENTMCNC: 34.1 G/DL (ref 31.5–36.5)
MCV RBC AUTO: 104 FL (ref 78–100)
NUM BPU REQUESTED: 1
PLATELET # BLD AUTO: 16 10E9/L (ref 150–450)
POTASSIUM SERPL-SCNC: 3.2 MMOL/L (ref 3.4–5.3)
RBC # BLD AUTO: 3.18 10E12/L (ref 3.8–5.2)
SODIUM SERPL-SCNC: 133 MMOL/L (ref 133–144)
TRANSFUSION STATUS PATIENT QL: NORMAL
TRANSFUSION STATUS PATIENT QL: NORMAL
WBC # BLD AUTO: 0.1 10E9/L (ref 4–11)

## 2019-04-07 PROCEDURE — 25000128 H RX IP 250 OP 636: Mod: ZF | Performed by: NURSE PRACTITIONER

## 2019-04-07 PROCEDURE — 25000128 H RX IP 250 OP 636: Mod: ZF | Performed by: INTERNAL MEDICINE

## 2019-04-07 PROCEDURE — 85027 COMPLETE CBC AUTOMATED: CPT

## 2019-04-07 PROCEDURE — G0463 HOSPITAL OUTPT CLINIC VISIT: HCPCS

## 2019-04-07 PROCEDURE — 96372 THER/PROPH/DIAG INJ SC/IM: CPT | Mod: 59

## 2019-04-07 PROCEDURE — 25800030 ZZH RX IP 258 OP 636: Mod: ZF | Performed by: PHYSICIAN ASSISTANT

## 2019-04-07 PROCEDURE — 25000128 H RX IP 250 OP 636: Mod: ZF | Performed by: PHYSICIAN ASSISTANT

## 2019-04-07 PROCEDURE — 80048 BASIC METABOLIC PNL TOTAL CA: CPT | Performed by: INTERNAL MEDICINE

## 2019-04-07 PROCEDURE — 83735 ASSAY OF MAGNESIUM: CPT | Performed by: INTERNAL MEDICINE

## 2019-04-07 PROCEDURE — 96365 THER/PROPH/DIAG IV INF INIT: CPT

## 2019-04-07 RX ORDER — HEPARIN SODIUM,PORCINE 10 UNIT/ML
5 VIAL (ML) INTRAVENOUS
Status: CANCELLED | OUTPATIENT
Start: 2019-04-07

## 2019-04-07 RX ORDER — HEPARIN SODIUM,PORCINE 10 UNIT/ML
5 VIAL (ML) INTRAVENOUS ONCE
Status: COMPLETED | OUTPATIENT
Start: 2019-04-07 | End: 2019-04-07

## 2019-04-07 RX ADMIN — Medication 5 ML: at 07:33

## 2019-04-07 RX ADMIN — POTASSIUM CHLORIDE: 149 INJECTION, SOLUTION, CONCENTRATE INTRAVENOUS at 08:01

## 2019-04-07 RX ADMIN — FILGRASTIM 300 MCG: 300 INJECTION, SOLUTION INTRAVENOUS; SUBCUTANEOUS at 08:02

## 2019-04-07 ASSESSMENT — PAIN SCALES - GENERAL: PAINLEVEL: NO PAIN (0)

## 2019-04-07 NOTE — PROGRESS NOTES
BMT Clinic Progress Note    Patient ID:  Adrianna Palomino is a 63 year old female, currently day +9 with BMT today for Multiple Myeloma.      Interval History:   No vomiting overnight. Taking ativan and compazine with control of nausea. She has noticed new left buccal mucosa tenderness and slight pain with swallowing which is new. No fevers. Stools are soft and a few times per day but not diarrhea. Eating albeit much less than baseline.     Review of Systems                                                                                                                           10-point review of systems negative except as above.      PHYSICAL EXAM                                                                                                                                   /74 (BP Location: Right arm, Patient Position: Chair, Cuff Size: Adult Regular)   Pulse 115   Temp 96  F (35.6  C) (Oral)   Wt 67.7 kg (149 lb 3.2 oz)   LMP 03/01/2005   SpO2 96%   BMI 24.41 kg/m     General: NAD   Eyes: PERRL, sclera anicteric   Nose/Mouth/Throat: OP clear, buccal mucosa moist, left tongue ulceration.   Lungs: CTA bilaterally  Cardiovascular: mildly tachycardic, regular, no M/R/G   Abdominal/Rectal: +BS, soft, NT, ND  Lymphatics: No edema  Skin: No rashes or petechaie. 1mm skin colored firm nodule at mid left shin, very close to the tibia bone. Fixed, non moveable.  Additional Findings: Carrion site NT, no drainage.      Labs:   Lab Results   Component Value Date    WBC 0.1 (LL) 04/07/2019    ANEU 0.9 (L) 04/04/2019    HGB 11.3 (L) 04/07/2019    HCT 33.1 (L) 04/07/2019    PLT 16 (LL) 04/07/2019     04/07/2019    POTASSIUM 3.2 (L) 04/07/2019    CHLORIDE 100 04/07/2019    CO2 22 04/07/2019     (H) 04/07/2019    BUN 5 (L) 04/07/2019    CR 0.62 04/07/2019    MAG 2.0 04/07/2019    INR 0.95 03/28/2019    BILITOTAL 0.8 04/01/2019    AST 19 04/01/2019    ALT 18 04/01/2019    ALKPHOS 49 04/01/2019     PROTTOTAL 6.0 (L) 04/01/2019    ALBUMIN 3.4 04/01/2019       ASSESSMENT BY SYSTEMS   Adrianna ABRAM Palomino is a 63 year old female with standard risk IgG kappa multiple myeloma, s/p auto BMT in 2013. Now day +9 of her second auto PBSCT with melphalan prep     1.  BMT:   - Received 8 million cells.   - GCSF started d+5 and cont to until ANC>2500 x2 consecutive days.   - Re-stage per protocol.     2.  HEME: Keep Hgb>8 and plts>10K. No pre-meds.  4/4 Plt rapidly declining- 69-49- unlikely plts tomorrow- but possible this weekend/Monday.                             3.  ID: Afebrile  - prophy levo, Fluc, and LD ACV (CMV-, HSV+, EBV+) prophy.   Bactrim or appropriate PCP therapy to start d+28.                                       4.  GI: New mild mucositis   - New sore throat- left later tongue with sore, buccal mucosa is erythematous but no ld ulcers. MMW script sent today - pt to  if she feels she needs.   - Nausea/Vomiting: improved 4/4, tolerating some oral intake, water and meds. No diarrhea.   - Protonix for GI prophy.      5.  FEN/Renal:   - Monitor creat and lytes. Replete lytes PRN per SS.  - Poor intake, hypokalemia + migraine today: Give 20meq oral potassium + 20mg IV K + 2g IV mag in 1L NS. Increase to 40 KCl meq po BID.  6. Skin:  Small, firm immobile nodule left shin, follow      Plan: gcsf, IV K+mag/oral K replacement with 1L NS    RTC: Daily.     Galileo Shah M.D.

## 2019-04-07 NOTE — NURSING NOTE
Chief Complaint   Patient presents with     Oncology Clinic Visit     Multiple myeloma (H); labs drawn via cvc by RN     /74 (BP Location: Right arm, Patient Position: Chair, Cuff Size: Adult Regular)   Pulse 115   Temp 96  F (35.6  C) (Oral)   Wt 67.7 kg (149 lb 3.2 oz)   LMP 03/01/2005   SpO2 96%   BMI 24.41 kg/m      Lines accessed by RN in lab. Labs collected through red lumen and sent. Both lines flushed with NS & Heparin. Pt tolerated well.   Pt checked in for next appointment.    Jillian Reeder RN

## 2019-04-07 NOTE — NURSING NOTE
"Oncology Rooming Note    April 7, 2019 7:50 AM   Adrianna Palomino is a 63 year old female who presents for:    Chief Complaint   Patient presents with     RECHECK     Multiple myeloma (H); labs drawn via cvc by RN     Initial Vitals: /74 (BP Location: Right arm, Patient Position: Chair, Cuff Size: Adult Regular)   Pulse 115   Temp 96  F (35.6  C) (Oral)   Wt 67.7 kg (149 lb 3.2 oz)   LMP 03/01/2005   SpO2 96%   BMI 24.41 kg/m   Estimated body mass index is 24.41 kg/m  as calculated from the following:    Height as of 4/5/19: 1.665 m (5' 5.55\").    Weight as of this encounter: 67.7 kg (149 lb 3.2 oz). Body surface area is 1.77 meters squared.  No Pain (0) Comment: Data Unavailable   Patient's last menstrual period was 03/01/2005.  Allergies reviewed: Yes  Medications reviewed: Yes    Medications: Medication refills not needed today.  Pharmacy name entered into [employer]:    [facility]  [facility] [place], [place] - [address] [place] [address] [phone]    Clinical concerns: None      Willis Huitron RN              "

## 2019-04-07 NOTE — PROGRESS NOTES
Infusion Nursing Note:  Adrianna Palomino presents today for electrolyte replacement and growth factor.    Patient seen by provider today: Yes: Dr. Shah   present during visit today: Not Applicable.    Note: Labs monitored, VSS. K+ 3.2, unchanged from yesterday's interventions. 20meq IV K+ with 1L NS bolus infused over 1hr. Pt states she took 20meq oral K+ this am as well. Per provider, pt should take another 20meq of oral K+ this evening making it a total of 60meq K+ today and begin taking 40meq oral K+ twice daily starting tomorrow morning. Pt and  both acknowledged instructions. Growth factor administered in right abd.       Intravenous Access:  Carrion.    Treatment Conditions:  Results reviewed, labs MET treatment parameters, ok to proceed with treatment.      Post Infusion Assessment:  Patient tolerated infusion without incident.  Patient tolerated injection without incident.       Discharge Plan:   AVS to patient via MYCHART.  Patient will return tomorrow for next appointment.   Patient discharged in stable condition accompanied by: .  Departure Mode: Ambulatory.    Willis Huitron RN

## 2019-04-08 ENCOUNTER — INFUSION THERAPY VISIT (OUTPATIENT)
Dept: TRANSPLANT | Facility: CLINIC | Age: 64
End: 2019-04-08
Attending: PHYSICIAN ASSISTANT
Payer: COMMERCIAL

## 2019-04-08 ENCOUNTER — APPOINTMENT (OUTPATIENT)
Dept: LAB | Facility: CLINIC | Age: 64
End: 2019-04-08
Attending: PHYSICIAN ASSISTANT
Payer: COMMERCIAL

## 2019-04-08 VITALS
DIASTOLIC BLOOD PRESSURE: 72 MMHG | WEIGHT: 150.8 LBS | RESPIRATION RATE: 16 BRPM | SYSTOLIC BLOOD PRESSURE: 112 MMHG | TEMPERATURE: 98.4 F | HEART RATE: 95 BPM | BODY MASS INDEX: 24.68 KG/M2 | OXYGEN SATURATION: 97 %

## 2019-04-08 DIAGNOSIS — C90.00 MULTIPLE MYELOMA NOT HAVING ACHIEVED REMISSION (H): Primary | ICD-10-CM

## 2019-04-08 DIAGNOSIS — Z94.81 STATUS POST BONE MARROW TRANSPLANT (H): Primary | ICD-10-CM

## 2019-04-08 LAB
ACANTHOCYTES BLD QL SMEAR: SLIGHT
ALBUMIN SERPL-MCNC: 3.1 G/DL (ref 3.4–5)
ALP SERPL-CCNC: 51 U/L (ref 40–150)
ALT SERPL W P-5'-P-CCNC: 16 U/L (ref 0–50)
ANION GAP SERPL CALCULATED.3IONS-SCNC: 8 MMOL/L (ref 3–14)
AST SERPL W P-5'-P-CCNC: 17 U/L (ref 0–45)
BASOPHILS # BLD AUTO: 0 10E9/L (ref 0–0.2)
BASOPHILS NFR BLD AUTO: 0 %
BILIRUB SERPL-MCNC: 0.5 MG/DL (ref 0.2–1.3)
BLD PROD TYP BPU: NORMAL
BLD PROD TYP BPU: NORMAL
BLD UNIT ID BPU: 0
BLOOD PRODUCT CODE: NORMAL
BPU ID: NORMAL
BUN SERPL-MCNC: 4 MG/DL (ref 7–30)
BURR CELLS BLD QL SMEAR: SLIGHT
CALCIUM SERPL-MCNC: 8.2 MG/DL (ref 8.5–10.1)
CHLORIDE SERPL-SCNC: 104 MMOL/L (ref 94–109)
CO2 SERPL-SCNC: 22 MMOL/L (ref 20–32)
COPATH REPORT: NORMAL
CREAT SERPL-MCNC: 0.7 MG/DL (ref 0.52–1.04)
DIFFERENTIAL METHOD BLD: ABNORMAL
EOSINOPHIL # BLD AUTO: 0 10E9/L (ref 0–0.7)
EOSINOPHIL NFR BLD AUTO: 0 %
ERYTHROCYTE [DISTWIDTH] IN BLOOD BY AUTOMATED COUNT: 11 % (ref 10–15)
GFR SERPL CREATININE-BSD FRML MDRD: >90 ML/MIN/{1.73_M2}
GLUCOSE SERPL-MCNC: 95 MG/DL (ref 70–99)
HCT VFR BLD AUTO: 31.6 % (ref 35–47)
HGB BLD-MCNC: 10.7 G/DL (ref 11.7–15.7)
LYMPHOCYTES # BLD AUTO: 0.1 10E9/L (ref 0.8–5.3)
LYMPHOCYTES NFR BLD AUTO: 6.3 %
MAGNESIUM SERPL-MCNC: 1.8 MG/DL (ref 1.6–2.3)
MCH RBC QN AUTO: 35.5 PG (ref 26.5–33)
MCHC RBC AUTO-ENTMCNC: 33.9 G/DL (ref 31.5–36.5)
MCV RBC AUTO: 105 FL (ref 78–100)
METAMYELOCYTES # BLD: 0 10E9/L
METAMYELOCYTES NFR BLD MANUAL: 2.7 %
MONOCYTES # BLD AUTO: 0.3 10E9/L (ref 0–1.3)
MONOCYTES NFR BLD AUTO: 25 %
MYELOCYTES # BLD: 0 10E9/L
MYELOCYTES NFR BLD MANUAL: 4.5 %
NEUTROPHILS # BLD AUTO: 0.6 10E9/L (ref 1.6–8.3)
NEUTROPHILS NFR BLD AUTO: 59.7 %
NRBC # BLD AUTO: 0 10*3/UL
NRBC BLD AUTO-RTO: 2 /100
NUM BPU REQUESTED: 1
PLATELET # BLD AUTO: 13 10E9/L (ref 150–450)
PLATELET # BLD EST: ABNORMAL 10*3/UL
POIKILOCYTOSIS BLD QL SMEAR: SLIGHT
POTASSIUM SERPL-SCNC: 3.2 MMOL/L (ref 3.4–5.3)
PROMYELOCYTES # BLD MANUAL: 0 10E9/L
PROMYELOCYTES NFR BLD MANUAL: 1.8 %
PROT SERPL-MCNC: 5.9 G/DL (ref 6.8–8.8)
RBC # BLD AUTO: 3.01 10E12/L (ref 3.8–5.2)
SODIUM SERPL-SCNC: 134 MMOL/L (ref 133–144)
TRANSFUSION STATUS PATIENT QL: NORMAL
TRANSFUSION STATUS PATIENT QL: NORMAL
WBC # BLD AUTO: 1 10E9/L (ref 4–11)

## 2019-04-08 PROCEDURE — 25000128 H RX IP 250 OP 636: Mod: ZF | Performed by: NURSE PRACTITIONER

## 2019-04-08 PROCEDURE — 80053 COMPREHEN METABOLIC PANEL: CPT | Performed by: INTERNAL MEDICINE

## 2019-04-08 PROCEDURE — 25000128 H RX IP 250 OP 636: Mod: ZF | Performed by: PHYSICIAN ASSISTANT

## 2019-04-08 PROCEDURE — 96372 THER/PROPH/DIAG INJ SC/IM: CPT

## 2019-04-08 PROCEDURE — 85025 COMPLETE CBC W/AUTO DIFF WBC: CPT | Performed by: INTERNAL MEDICINE

## 2019-04-08 PROCEDURE — 40000268 ZZH STATISTIC NO CHARGES: Mod: ZF

## 2019-04-08 PROCEDURE — G0463 HOSPITAL OUTPT CLINIC VISIT: HCPCS | Mod: 25

## 2019-04-08 PROCEDURE — 83735 ASSAY OF MAGNESIUM: CPT | Performed by: INTERNAL MEDICINE

## 2019-04-08 RX ORDER — HEPARIN SODIUM,PORCINE 10 UNIT/ML
5 VIAL (ML) INTRAVENOUS ONCE
Status: COMPLETED | OUTPATIENT
Start: 2019-04-08 | End: 2019-04-08

## 2019-04-08 RX ORDER — HEPARIN SODIUM,PORCINE 10 UNIT/ML
5 VIAL (ML) INTRAVENOUS
Status: CANCELLED | OUTPATIENT
Start: 2019-04-08

## 2019-04-08 RX ADMIN — FILGRASTIM 300 MCG: 300 INJECTION, SOLUTION INTRAVENOUS; SUBCUTANEOUS at 07:48

## 2019-04-08 RX ADMIN — Medication 5 ML: at 06:37

## 2019-04-08 ASSESSMENT — PAIN SCALES - GENERAL: PAINLEVEL: NO PAIN (0)

## 2019-04-08 NOTE — NURSING NOTE
Chief Complaint   Patient presents with     Blood Draw     Labs drawn via CVC by RN in lab. VS taken. Patient checked in for next appointment.   Labs collected from CVC by RN, line flushed with saline and heparin.  Vitals taken. Pt checked in for appointment(s).    Marbella Redmond RN

## 2019-04-08 NOTE — PROGRESS NOTES
BMT Clinic Progress Note    Patient ID:  Adrianna Palomino is a 62 yo woman D+10 s/p auto PBSCT (#2) for Multiple Myeloma.      Interval History: Had some low back last night and took Tylenol with relief. Also reports some increased headaches recently. No fevers or URI sx. Minimal nausea, controlled with alternating anti-emetics. Stable, chronic intermittent loose stools. Taking Imodium prn. No bleeding. Oral lesions (2) resolved.    ROS: 10 point ROS neg other than the symptoms noted above in the HPI.       PHYSICAL EXAM                                                                                                                                   /72 (BP Location: Right arm, Patient Position: Sitting, Cuff Size: Adult Regular)   Pulse 95   Temp 98.4  F (36.9  C) (Oral)   Resp 16   Wt 68.4 kg (150 lb 12.8 oz)   LMP 03/01/2005   SpO2 97%   BMI 24.68 kg/m     General: NAD   Eyes: sclera anicteric   Nose/Mouth/Throat: OP moist without lesions.   Lungs: CTA bilaterally  Cardiovascular: mildly tachycardic, regular, no M/R/G   Abdominal/Rectal: +BS, soft, NT, ND  Lymphatics: No edema  Skin: No rash  Additional Findings: R chest CVC NT, dressing cdi.    Labs:   Lab Results   Component Value Date    WBC 0.1 (LL) 04/07/2019    ANEU 0.9 (L) 04/04/2019    HGB 11.3 (L) 04/07/2019    HCT 33.1 (L) 04/07/2019    PLT 16 (LL) 04/07/2019     04/07/2019    POTASSIUM 3.2 (L) 04/07/2019    CHLORIDE 100 04/07/2019    CO2 22 04/07/2019     (H) 04/07/2019    BUN 5 (L) 04/07/2019    CR 0.62 04/07/2019    MAG 2.0 04/07/2019    INR 0.95 03/28/2019    BILITOTAL 0.8 04/01/2019    AST 19 04/01/2019    ALT 18 04/01/2019    ALKPHOS 49 04/01/2019    PROTTOTAL 6.0 (L) 04/01/2019    ALBUMIN 3.4 04/01/2019       ASSESSMENT/PLAN   Adrianna Palomino is a 62 yo woman with standard risk IgG kappa multiple myeloma, s/p auto PBSCT in 2013. Now day +10 s/p second auto.     1.  BMT:   - Received 8 million cells.   - GCSF  started d+5, cont until ANC>2500 x2 consecutive days. WBC up to 1000 today. Low back pain overnight likely due to increased WBC on GCSF.  - Re-stage per protocol.     2.  HEME: Keep Hgb>8 and plts>10K.                             3.  ID: Afebrile  - prophy levo, Fluc, and LD ACV.  Bactrim or appropriate PCP therapy to start d+28.                                       4.  GI:   - nausea controlled with alternating Ativan, Compazine, Zofran.  - mild mucositis, resolved   - hx collagenous colitis, takes Imodium prn (stable per pt)  - Protonix for GI prophy.      5.  FEN/Renal: Creat wnl.  - mild hypokalemia: 3.2. Take KCl 40mEq po bid today and continue this.  - Ca 8.2, check ICa tomorrow.  - recent IVF, wt stable. Pt drinking well. No IV fluids today.      GCSF today  KCl 40mEq po bid  RTC tomorrow for GCSF, labs, possible IV fluid/lytes, possible plts.  Appts through Friday currently.    ARLEN LovingC  [phone]

## 2019-04-08 NOTE — NURSING NOTE
Administered Neupogen 300mcg into the abdominal tissue (left side), see MARIO ALBERTO Soriano MA

## 2019-04-09 ENCOUNTER — ONCOLOGY VISIT (OUTPATIENT)
Dept: TRANSPLANT | Facility: CLINIC | Age: 64
End: 2019-04-09
Attending: PHYSICIAN ASSISTANT
Payer: COMMERCIAL

## 2019-04-09 ENCOUNTER — APPOINTMENT (OUTPATIENT)
Dept: LAB | Facility: CLINIC | Age: 64
End: 2019-04-09
Attending: PHYSICIAN ASSISTANT
Payer: COMMERCIAL

## 2019-04-09 VITALS
SYSTOLIC BLOOD PRESSURE: 113 MMHG | TEMPERATURE: 97.7 F | DIASTOLIC BLOOD PRESSURE: 65 MMHG | HEART RATE: 94 BPM | OXYGEN SATURATION: 96 % | BODY MASS INDEX: 24.92 KG/M2 | RESPIRATION RATE: 16 BRPM | WEIGHT: 152.3 LBS

## 2019-04-09 DIAGNOSIS — Z94.81 STATUS POST BONE MARROW TRANSPLANT (H): ICD-10-CM

## 2019-04-09 DIAGNOSIS — C90.00 MULTIPLE MYELOMA NOT HAVING ACHIEVED REMISSION (H): Primary | ICD-10-CM

## 2019-04-09 DIAGNOSIS — C90.01 MULTIPLE MYELOMA IN REMISSION (H): Primary | ICD-10-CM

## 2019-04-09 LAB
ANION GAP SERPL CALCULATED.3IONS-SCNC: 9 MMOL/L (ref 3–14)
BASOPHILS # BLD AUTO: 0 10E9/L (ref 0–0.2)
BASOPHILS NFR BLD AUTO: 0 %
BLD PROD TYP BPU: NORMAL
BLD UNIT ID BPU: 0
BLOOD PRODUCT CODE: NORMAL
BPU ID: NORMAL
BUN SERPL-MCNC: 4 MG/DL (ref 7–30)
CA-I SERPL ISE-MCNC: 4.6 MG/DL (ref 4.4–5.2)
CALCIUM SERPL-MCNC: 8.3 MG/DL (ref 8.5–10.1)
CHLORIDE SERPL-SCNC: 104 MMOL/L (ref 94–109)
CO2 SERPL-SCNC: 23 MMOL/L (ref 20–32)
CREAT SERPL-MCNC: 0.67 MG/DL (ref 0.52–1.04)
DIFFERENTIAL METHOD BLD: ABNORMAL
EOSINOPHIL # BLD AUTO: 0 10E9/L (ref 0–0.7)
EOSINOPHIL NFR BLD AUTO: 0 %
ERYTHROCYTE [DISTWIDTH] IN BLOOD BY AUTOMATED COUNT: 11.4 % (ref 10–15)
GFR SERPL CREATININE-BSD FRML MDRD: >90 ML/MIN/{1.73_M2}
GLUCOSE SERPL-MCNC: 111 MG/DL (ref 70–99)
HCT VFR BLD AUTO: 31.7 % (ref 35–47)
HGB BLD-MCNC: 10.8 G/DL (ref 11.7–15.7)
LYMPHOCYTES # BLD AUTO: 0.2 10E9/L (ref 0.8–5.3)
LYMPHOCYTES NFR BLD AUTO: 2.6 %
MACROCYTES BLD QL SMEAR: PRESENT
MCH RBC QN AUTO: 35.8 PG (ref 26.5–33)
MCHC RBC AUTO-ENTMCNC: 34.1 G/DL (ref 31.5–36.5)
MCV RBC AUTO: 105 FL (ref 78–100)
METAMYELOCYTES # BLD: 0.1 10E9/L
METAMYELOCYTES NFR BLD MANUAL: 1.8 %
MONOCYTES # BLD AUTO: 0.3 10E9/L (ref 0–1.3)
MONOCYTES NFR BLD AUTO: 4.4 %
MYELOCYTES # BLD: 0.2 10E9/L
MYELOCYTES NFR BLD MANUAL: 2.7 %
NEUTROPHILS # BLD AUTO: 6.6 10E9/L (ref 1.6–8.3)
NEUTROPHILS NFR BLD AUTO: 86.7 %
NRBC # BLD AUTO: 0.2 10*3/UL
NRBC BLD AUTO-RTO: 3 /100
PLATELET # BLD AUTO: 20 10E9/L (ref 150–450)
PLATELET # BLD EST: ABNORMAL 10*3/UL
POTASSIUM SERPL-SCNC: 3.4 MMOL/L (ref 3.4–5.3)
PROMYELOCYTES # BLD MANUAL: 0.1 10E9/L
PROMYELOCYTES NFR BLD MANUAL: 1.8 %
RBC # BLD AUTO: 3.02 10E12/L (ref 3.8–5.2)
SODIUM SERPL-SCNC: 135 MMOL/L (ref 133–144)
TRANSFUSION STATUS PATIENT QL: NORMAL
TRANSFUSION STATUS PATIENT QL: NORMAL
WBC # BLD AUTO: 7.6 10E9/L (ref 4–11)

## 2019-04-09 PROCEDURE — G0463 HOSPITAL OUTPT CLINIC VISIT: HCPCS | Mod: ZF,25

## 2019-04-09 PROCEDURE — 82330 ASSAY OF CALCIUM: CPT | Performed by: PHYSICIAN ASSISTANT

## 2019-04-09 PROCEDURE — 25000128 H RX IP 250 OP 636: Mod: ZF | Performed by: NURSE PRACTITIONER

## 2019-04-09 PROCEDURE — 96372 THER/PROPH/DIAG INJ SC/IM: CPT

## 2019-04-09 PROCEDURE — 25000128 H RX IP 250 OP 636: Mod: ZF | Performed by: PHYSICIAN ASSISTANT

## 2019-04-09 PROCEDURE — 80048 BASIC METABOLIC PNL TOTAL CA: CPT | Performed by: PHYSICIAN ASSISTANT

## 2019-04-09 PROCEDURE — 85025 COMPLETE CBC W/AUTO DIFF WBC: CPT | Performed by: PHYSICIAN ASSISTANT

## 2019-04-09 PROCEDURE — 40000268 ZZH STATISTIC NO CHARGES: Mod: ZF

## 2019-04-09 RX ORDER — HEPARIN SODIUM,PORCINE 10 UNIT/ML
5 VIAL (ML) INTRAVENOUS
Status: DISCONTINUED | OUTPATIENT
Start: 2019-04-09 | End: 2019-04-09 | Stop reason: HOSPADM

## 2019-04-09 RX ORDER — HEPARIN SODIUM,PORCINE 10 UNIT/ML
5 VIAL (ML) INTRAVENOUS
OUTPATIENT
Start: 2019-04-09

## 2019-04-09 RX ADMIN — SODIUM CHLORIDE, PRESERVATIVE FREE 5 ML: 5 INJECTION INTRAVENOUS at 06:29

## 2019-04-09 RX ADMIN — FILGRASTIM 300 MCG: 300 INJECTION, SOLUTION INTRAVENOUS; SUBCUTANEOUS at 07:36

## 2019-04-09 ASSESSMENT — PAIN SCALES - GENERAL: PAINLEVEL: NO PAIN (0)

## 2019-04-09 NOTE — PROGRESS NOTES
BMT Clinic Progress Note    Patient ID:  Adrianna Palomino is a 64 yo woman D+11 s/p auto PBSCT (#2) for Multiple Myeloma.      Interval History: Returns for follow-up with her . She is feeling ok this morning. Had one episode of sudden onset vomiting last evening, but otherwise nausea has been controlled on anti-emetics. Stable, chronic intermittent loose stools. Mild back ache yesterday. Occasional HA. No fevers, cough, or bleeding.     ROS: 10 point ROS neg other than the symptoms noted above in the HPI.       PHYSICAL EXAM                                                                                                                                   /65 (BP Location: Right arm, Patient Position: Sitting, Cuff Size: Adult Regular)   Pulse 94   Temp 97.7  F (36.5  C) (Oral)   Resp 16   Wt 69.1 kg (152 lb 4.8 oz)   LMP 03/01/2005   SpO2 96%   BMI 24.92 kg/m     General: NAD   Eyes: sclera anicteric   Nose/Mouth/Throat: OP moist without lesions.   Lungs: CTAB  Cardiovascular: RRR, no M/R/G   Abdominal/Rectal: +BS, soft, NT, ND  Lymphatics: No edema  Skin: No rash  Additional Findings: R chest CVC NT, dressing cdi.    Labs:   Lab Results   Component Value Date    WBC 7.6 04/09/2019    ANEU 0.6 (L) 04/08/2019    HGB 10.8 (L) 04/09/2019    HCT 31.7 (L) 04/09/2019    PLT 20 (LL) 04/09/2019     04/09/2019    POTASSIUM 3.4 04/09/2019    CHLORIDE 104 04/09/2019    CO2 23 04/09/2019     (H) 04/09/2019    BUN 4 (L) 04/09/2019    CR 0.67 04/09/2019    MAG 1.8 04/08/2019    INR 0.95 03/28/2019    BILITOTAL 0.5 04/08/2019    AST 17 04/08/2019    ALT 16 04/08/2019    ALKPHOS 51 04/08/2019    PROTTOTAL 5.9 (L) 04/08/2019    ALBUMIN 3.1 (L) 04/08/2019       ASSESSMENT/PLAN   Adrianna Palomino is a 64 yo woman with standard risk IgG kappa multiple myeloma, s/p auto PBSCT in 2013. Now day +11 s/p second auto.     1.  BMT:   - Received 8 million cells.   - GCSF started d+5, cont until ANC>2500  x2 consecutive days. WBC up to 7.6 today (ANC pending). Give GCSF today, then likely. Low back pain overnight likely due to increased WBC on GCSF.  - Re-stage per protocol.     2.  HEME: Keep Hgb>8 and plts>10K. Plt jennifer at 13; up to 20k today. No bleeding, no transfusion needs.                            3.  ID: Afebrile  - prophy levo, Fluc, and LD ACV.  Bactrim or appropriate PCP therapy to start d+28.                                       4.  GI:   - nausea controlled with alternating Ativan, Compazine, Zofran.  - mild mucositis, resolved   - hx collagenous colitis, takes Imodium once daily; ok to resume psyllium (stable per pt)  - Protonix for GI prophy.      5.  FEN/Renal: Creat wnl.  - recent hypokalemia: 3.4 today; cont KCl 40mEq po bid for now.  - Ca 8.3, check ICa ok at 4.6 (4/9).      GCSF today  RTC tomorrow for labs, f/u.  Appts through Friday currently. May cancel Thurs if done with GCSF    Adia Stark PA-C  [phone]

## 2019-04-09 NOTE — NURSING NOTE
"Oncology Rooming Note    April 9, 2019 7:01 AM   Adrianna Palomino is a 63 year old female who presents for:    Chief Complaint   Patient presents with     Blood Draw     Labs drawn via CVC by RN in lab. VS taken.      Initial Vitals: /65 (BP Location: Right arm, Patient Position: Sitting, Cuff Size: Adult Regular)   Pulse 94   Temp 97.7  F (36.5  C) (Oral)   Resp 16   Wt 69.1 kg (152 lb 4.8 oz)   LMP 03/01/2005   SpO2 96%   BMI 24.92 kg/m   Estimated body mass index is 24.92 kg/m  as calculated from the following:    Height as of 4/5/19: 1.665 m (5' 5.55\").    Weight as of this encounter: 69.1 kg (152 lb 4.8 oz). Body surface area is 1.79 meters squared.  No Pain (0) Comment: Data Unavailable   Patient's last menstrual period was 03/01/2005.  Allergies reviewed: Yes  Medications reviewed: Yes    Medications: Medication refills not needed today.  Pharmacy name entered into [employer]:    [facility]  [facility] [place], [place] -  [facility] [phone]    Clinical concerns: None       Kiana Coleman CMA              "

## 2019-04-09 NOTE — NURSING NOTE
Chief Complaint   Patient presents with     Blood Draw     Labs drawn via CVC by RN in lab. VS taken.      CVC accessed, labs drawn. Line flushed and Heparin locked. Vital signs taken. Checked into next appointment.     Aileen Malagon RN

## 2019-04-10 ENCOUNTER — INFUSION THERAPY VISIT (OUTPATIENT)
Dept: TRANSPLANT | Facility: CLINIC | Age: 64
End: 2019-04-10
Attending: PHYSICIAN ASSISTANT
Payer: COMMERCIAL

## 2019-04-10 ENCOUNTER — APPOINTMENT (OUTPATIENT)
Dept: LAB | Facility: CLINIC | Age: 64
End: 2019-04-10
Attending: PHYSICIAN ASSISTANT
Payer: COMMERCIAL

## 2019-04-10 ENCOUNTER — TELEPHONE (OUTPATIENT)
Dept: TRANSPLANT | Facility: CLINIC | Age: 64
End: 2019-04-10

## 2019-04-10 VITALS
TEMPERATURE: 97.9 F | SYSTOLIC BLOOD PRESSURE: 109 MMHG | BODY MASS INDEX: 24.84 KG/M2 | WEIGHT: 151.8 LBS | DIASTOLIC BLOOD PRESSURE: 72 MMHG | HEART RATE: 109 BPM | RESPIRATION RATE: 16 BRPM | OXYGEN SATURATION: 98 %

## 2019-04-10 DIAGNOSIS — Z94.81 STATUS POST BONE MARROW TRANSPLANT (H): ICD-10-CM

## 2019-04-10 DIAGNOSIS — C90.01 MULTIPLE MYELOMA IN REMISSION (H): ICD-10-CM

## 2019-04-10 DIAGNOSIS — C90.00 MULTIPLE MYELOMA NOT HAVING ACHIEVED REMISSION (H): Primary | ICD-10-CM

## 2019-04-10 LAB
ANION GAP SERPL CALCULATED.3IONS-SCNC: 8 MMOL/L (ref 3–14)
ANISOCYTOSIS BLD QL SMEAR: SLIGHT
BASOPHILS # BLD AUTO: 0 10E9/L (ref 0–0.2)
BASOPHILS NFR BLD AUTO: 0 %
BLD PROD TYP BPU: NORMAL
BLD UNIT ID BPU: 0
BLOOD PRODUCT CODE: NORMAL
BPU ID: NORMAL
BUN SERPL-MCNC: 5 MG/DL (ref 7–30)
CALCIUM SERPL-MCNC: 8.8 MG/DL (ref 8.5–10.1)
CHLORIDE SERPL-SCNC: 102 MMOL/L (ref 94–109)
CO2 SERPL-SCNC: 24 MMOL/L (ref 20–32)
CREAT SERPL-MCNC: 0.78 MG/DL (ref 0.52–1.04)
DIFFERENTIAL METHOD BLD: ABNORMAL
EOSINOPHIL # BLD AUTO: 0 10E9/L (ref 0–0.7)
EOSINOPHIL NFR BLD AUTO: 0 %
ERYTHROCYTE [DISTWIDTH] IN BLOOD BY AUTOMATED COUNT: 11.6 % (ref 10–15)
GFR SERPL CREATININE-BSD FRML MDRD: 80 ML/MIN/{1.73_M2}
GLUCOSE SERPL-MCNC: 132 MG/DL (ref 70–99)
HCT VFR BLD AUTO: 33.3 % (ref 35–47)
HGB BLD-MCNC: 11.2 G/DL (ref 11.7–15.7)
LYMPHOCYTES # BLD AUTO: 0 10E9/L (ref 0.8–5.3)
LYMPHOCYTES NFR BLD AUTO: 0 %
MAGNESIUM SERPL-MCNC: 1.8 MG/DL (ref 1.6–2.3)
MCH RBC QN AUTO: 36.4 PG (ref 26.5–33)
MCHC RBC AUTO-ENTMCNC: 33.6 G/DL (ref 31.5–36.5)
MCV RBC AUTO: 108 FL (ref 78–100)
MONOCYTES # BLD AUTO: 0.2 10E9/L (ref 0–1.3)
MONOCYTES NFR BLD AUTO: 0.9 %
MYELOCYTES # BLD: 0.2 10E9/L
MYELOCYTES NFR BLD MANUAL: 0.9 %
NEUTROPHILS # BLD AUTO: 20.8 10E9/L (ref 1.6–8.3)
NEUTROPHILS NFR BLD AUTO: 93.9 %
NRBC # BLD AUTO: 0.2 10*3/UL
NRBC BLD AUTO-RTO: 1 /100
OVALOCYTES BLD QL SMEAR: SLIGHT
PLATELET # BLD AUTO: 30 10E9/L (ref 150–450)
PLATELET # BLD EST: ABNORMAL 10*3/UL
POIKILOCYTOSIS BLD QL SMEAR: SLIGHT
POTASSIUM SERPL-SCNC: 3.7 MMOL/L (ref 3.4–5.3)
PROMYELOCYTES # BLD MANUAL: 1 10E9/L
PROMYELOCYTES NFR BLD MANUAL: 4.3 %
RBC # BLD AUTO: 3.08 10E12/L (ref 3.8–5.2)
SODIUM SERPL-SCNC: 134 MMOL/L (ref 133–144)
TRANSFUSION STATUS PATIENT QL: NORMAL
TRANSFUSION STATUS PATIENT QL: NORMAL
WBC # BLD AUTO: 22.1 10E9/L (ref 4–11)

## 2019-04-10 PROCEDURE — 80048 BASIC METABOLIC PNL TOTAL CA: CPT | Performed by: PHYSICIAN ASSISTANT

## 2019-04-10 PROCEDURE — G0463 HOSPITAL OUTPT CLINIC VISIT: HCPCS

## 2019-04-10 PROCEDURE — 36592 COLLECT BLOOD FROM PICC: CPT

## 2019-04-10 PROCEDURE — 25000128 H RX IP 250 OP 636: Mod: ZF | Performed by: PHYSICIAN ASSISTANT

## 2019-04-10 PROCEDURE — 83735 ASSAY OF MAGNESIUM: CPT | Performed by: PHYSICIAN ASSISTANT

## 2019-04-10 PROCEDURE — 85025 COMPLETE CBC W/AUTO DIFF WBC: CPT | Performed by: PHYSICIAN ASSISTANT

## 2019-04-10 RX ORDER — HEPARIN SODIUM,PORCINE 10 UNIT/ML
5 VIAL (ML) INTRAVENOUS ONCE
Status: COMPLETED | OUTPATIENT
Start: 2019-04-10 | End: 2019-04-10

## 2019-04-10 RX ADMIN — Medication 5 ML: at 06:24

## 2019-04-10 RX ADMIN — Medication 5 ML: at 06:23

## 2019-04-10 ASSESSMENT — PAIN SCALES - GENERAL: PAINLEVEL: NO PAIN (0)

## 2019-04-10 NOTE — TELEPHONE ENCOUNTER
Pt requesting letter to be sent to her employer with FMLA status update to continuous effective 3/28/19. Letter faxed to Cecy Ornelas  work site, fax [phone]

## 2019-04-10 NOTE — NURSING NOTE
Chief Complaint   Patient presents with     Blood Draw     Labs drawn via CVC by RN in lab. VS taken. Pt checked in for next appt     Labs collected from CVC by RN, line flushed with saline and heparin.  Vitals taken. Pt checked in for appointment(s).    Bhavya HAAS RN PHN BSN  BMT/Oncology Lab

## 2019-04-10 NOTE — LETTER
4/10/2019      To Whom It May Concern:      Adrianna Palomino, 1955, was recently hospitalized at the [facility] on for an Autologous Transplant . She began her continuous FMLA on 3/28/19 the day she was admitted for the transplant.       Please do not hesitate to contact the [facility] Blood and Marrow Transplant Program at [phone] or Toll Free: [phone]  for any additional questions or concerns.        Sincerely,              Sunshine Gomez RN    Division of Hematology, Oncology, and Transplantation  [facility]  [address]  [address]  [facility] [place] [address]  Phone: [phone]  Fax: [phone]

## 2019-04-10 NOTE — PROGRESS NOTES
BMT Clinic Progress Note    Patient ID:  Adrianna Palomino is a 64 yo woman D+12 s/p auto PBSCT (#2) for Multiple Myeloma.      Interval History: Here for follow-up. No body aches, still occasional mild HA (early migraines but don't progress). Eating relatively well. Nausea controlled with 1 each of Zofran, Compazine, and ativan per day. Stable, chronic intermittent loose stools. No fevers, cough, or bleeding.     ROS: 10 point ROS neg other than the symptoms noted above in the HPI.       PHYSICAL EXAM                                                                                                                                   /72 (BP Location: Right arm, Patient Position: Sitting, Cuff Size: Adult Regular)   Pulse 109   Temp 97.9  F (36.6  C) (Oral)   Resp 16   Wt 68.9 kg (151 lb 12.8 oz)   LMP 03/01/2005   SpO2 98%   BMI 24.84 kg/m     Wt Readings from Last 4 Encounters:   04/10/19 68.9 kg (151 lb 12.8 oz)   04/09/19 69.1 kg (152 lb 4.8 oz)   04/08/19 68.4 kg (150 lb 12.8 oz)   04/07/19 67.7 kg (149 lb 3.2 oz)     General: NAD   Eyes: sclera anicteric   Nose/Mouth/Throat: OP moist without lesions.   Lungs: CTAB  Cardiovascular: RRR, no M/R/G   Abdominal/Rectal: +BS, soft, NT, ND  Lymphatics: No edema  Skin: No rash  Additional Findings: R chest CVC NT, dressing cdi.    Labs:   Lab Results   Component Value Date    WBC 22.1 (H) 04/10/2019    ANEU 20.8 (H) 04/10/2019    HGB 11.2 (L) 04/10/2019    HCT 33.3 (L) 04/10/2019    PLT 30 (LL) 04/10/2019     04/10/2019    POTASSIUM 3.7 04/10/2019    CHLORIDE 102 04/10/2019    CO2 24 04/10/2019     (H) 04/10/2019    BUN 5 (L) 04/10/2019    CR 0.78 04/10/2019    MAG 1.8 04/10/2019    INR 0.95 03/28/2019    BILITOTAL 0.5 04/08/2019    AST 17 04/08/2019    ALT 16 04/08/2019    ALKPHOS 51 04/08/2019    PROTTOTAL 5.9 (L) 04/08/2019    ALBUMIN 3.1 (L) 04/08/2019       ASSESSMENT/PLAN   Adrianna Palomino is a 64 yo woman with standard risk IgG  kappa multiple myeloma, s/p auto PBSCT in 2013. Now day +12 s/p second auto.     1.  BMT:   - Received 8 million cells.   - GCSF started d+5, WBC up to 22.1 today. Last dose GCSF 4/9.   - Re-stage per protocol.     2.  HEME: Keep Hgb>8 and plts>10K. Plt jennifer at 13; now trending up. No bleeding, no transfusion needs.                            3.  ID: Afebrile  - prophy levo, Fluc, and LD ACV.  Bactrim or appropriate PCP therapy to start d+28.                                       4.  GI:   - nausea controlled with alternating Ativan, Compazine, Zofran.  - mild mucositis, resolved   - hx collagenous colitis, takes Imodium once daily; ok to resume psyllium (stable per pt)  - Protonix for GI prophy.      5.  FEN/Renal: Creat wnl.  - recent hypokalemia: 3.7 today. Ok to decrease KCL from 40mEq po bid to 20 bid, possibly discontinue soon.     GCSF competed 4/9  Decrease KCL to 20mEq bid (or 40/d)  Off Thursday  RTC Friday for labs, f/u; sooner prn.    SABINA Loving-C  [phone]

## 2019-04-10 NOTE — NURSING NOTE
"Oncology Rooming Note    April 10, 2019 6:59 AM   Adrianna Palomino is a 63 year old female who presents for:    Chief Complaint   Patient presents with     Blood Draw     Labs drawn via CVC by RN in lab. VS taken. Pt checked in for next appt     RECHECK     Pt is here for labs and to see NP for S/P bMT  MM     Initial Vitals: Blood Pressure 109/72 (BP Location: Right arm, Patient Position: Sitting, Cuff Size: Adult Regular)   Pulse 109   Temperature 97.9  F (36.6  C) (Oral)   Respiration 16   Weight 68.9 kg (151 lb 12.8 oz)   Last Menstrual Period 03/01/2005   Oxygen Saturation 98%   Body Mass Index 24.84 kg/m   Estimated body mass index is 24.84 kg/m  as calculated from the following:    Height as of 4/5/19: 1.665 m (5' 5.55\").    Weight as of this encounter: 68.9 kg (151 lb 12.8 oz). Body surface area is 1.79 meters squared.  No Pain (0) Comment: Data Unavailable   Patient's last menstrual period was 03/01/2005.  Allergies reviewed: Yes  Medications reviewed: Yes    Medications: Medication refills not needed today.  Pharmacy name entered into [employer]:    [facility]  [facility] - [place], [place] - [phone]    Clinical concerns: none       Lisa Dorsey MA              "

## 2019-04-12 ENCOUNTER — TELEPHONE (OUTPATIENT)
Dept: NEPHROLOGY | Facility: CLINIC | Age: 64
End: 2019-04-12

## 2019-04-12 ENCOUNTER — ONCOLOGY VISIT (OUTPATIENT)
Dept: TRANSPLANT | Facility: CLINIC | Age: 64
End: 2019-04-12
Attending: PHYSICIAN ASSISTANT
Payer: COMMERCIAL

## 2019-04-12 ENCOUNTER — APPOINTMENT (OUTPATIENT)
Dept: LAB | Facility: CLINIC | Age: 64
End: 2019-04-12
Attending: PHYSICIAN ASSISTANT
Payer: COMMERCIAL

## 2019-04-12 VITALS
OXYGEN SATURATION: 96 % | HEART RATE: 96 BPM | HEIGHT: 66 IN | BODY MASS INDEX: 24.19 KG/M2 | WEIGHT: 150.5 LBS | DIASTOLIC BLOOD PRESSURE: 83 MMHG | TEMPERATURE: 98.6 F | SYSTOLIC BLOOD PRESSURE: 125 MMHG

## 2019-04-12 DIAGNOSIS — C90.01 MULTIPLE MYELOMA IN REMISSION (H): ICD-10-CM

## 2019-04-12 DIAGNOSIS — Z94.81 STATUS POST BONE MARROW TRANSPLANT (H): ICD-10-CM

## 2019-04-12 DIAGNOSIS — C90.00 MULTIPLE MYELOMA NOT HAVING ACHIEVED REMISSION (H): ICD-10-CM

## 2019-04-12 LAB
ANION GAP SERPL CALCULATED.3IONS-SCNC: 11 MMOL/L (ref 3–14)
BASOPHILS # BLD AUTO: 0 10E9/L (ref 0–0.2)
BASOPHILS NFR BLD AUTO: 0 %
BUN SERPL-MCNC: 6 MG/DL (ref 7–30)
CALCIUM SERPL-MCNC: 9 MG/DL (ref 8.5–10.1)
CHLORIDE SERPL-SCNC: 101 MMOL/L (ref 94–109)
CO2 SERPL-SCNC: 25 MMOL/L (ref 20–32)
CREAT SERPL-MCNC: 0.7 MG/DL (ref 0.52–1.04)
DIFFERENTIAL METHOD BLD: ABNORMAL
EOSINOPHIL # BLD AUTO: 0 10E9/L (ref 0–0.7)
EOSINOPHIL NFR BLD AUTO: 0 %
ERYTHROCYTE [DISTWIDTH] IN BLOOD BY AUTOMATED COUNT: 11.8 % (ref 10–15)
GFR SERPL CREATININE-BSD FRML MDRD: >90 ML/MIN/{1.73_M2}
GLUCOSE SERPL-MCNC: 119 MG/DL (ref 70–99)
HCT VFR BLD AUTO: 32.3 % (ref 35–47)
HGB BLD-MCNC: 10.8 G/DL (ref 11.7–15.7)
LYMPHOCYTES # BLD AUTO: 0.1 10E9/L (ref 0.8–5.3)
LYMPHOCYTES NFR BLD AUTO: 0.9 %
MACROCYTES BLD QL SMEAR: PRESENT
MCH RBC QN AUTO: 35.6 PG (ref 26.5–33)
MCHC RBC AUTO-ENTMCNC: 33.4 G/DL (ref 31.5–36.5)
MCV RBC AUTO: 107 FL (ref 78–100)
METAMYELOCYTES # BLD: 1.3 10E9/L
METAMYELOCYTES NFR BLD MANUAL: 9.4 %
MONOCYTES # BLD AUTO: 0.7 10E9/L (ref 0–1.3)
MONOCYTES NFR BLD AUTO: 5.1 %
MYELOCYTES # BLD: 1 10E9/L
MYELOCYTES NFR BLD MANUAL: 7.7 %
NEUTROPHILS # BLD AUTO: 9.9 10E9/L (ref 1.6–8.3)
NEUTROPHILS NFR BLD AUTO: 72.6 %
PLATELET # BLD AUTO: 46 10E9/L (ref 150–450)
PLATELET # BLD EST: ABNORMAL 10*3/UL
POLYCHROMASIA BLD QL SMEAR: SLIGHT
POTASSIUM SERPL-SCNC: 3.2 MMOL/L (ref 3.4–5.3)
PROMYELOCYTES # BLD MANUAL: 0.6 10E9/L
PROMYELOCYTES NFR BLD MANUAL: 4.3 %
RBC # BLD AUTO: 3.03 10E12/L (ref 3.8–5.2)
SODIUM SERPL-SCNC: 136 MMOL/L (ref 133–144)
WBC # BLD AUTO: 13.6 10E9/L (ref 4–11)

## 2019-04-12 PROCEDURE — 85025 COMPLETE CBC W/AUTO DIFF WBC: CPT | Performed by: PHYSICIAN ASSISTANT

## 2019-04-12 PROCEDURE — G0463 HOSPITAL OUTPT CLINIC VISIT: HCPCS | Mod: ZF

## 2019-04-12 PROCEDURE — 80048 BASIC METABOLIC PNL TOTAL CA: CPT | Performed by: PHYSICIAN ASSISTANT

## 2019-04-12 PROCEDURE — 36592 COLLECT BLOOD FROM PICC: CPT

## 2019-04-12 PROCEDURE — 25000128 H RX IP 250 OP 636: Mod: ZF | Performed by: PHYSICIAN ASSISTANT

## 2019-04-12 RX ORDER — FLUCONAZOLE 200 MG/1
100 TABLET ORAL DAILY
Qty: 30 TABLET | Refills: 1 | COMMUNITY
Start: 2019-04-12 | End: 2019-09-19

## 2019-04-12 RX ORDER — HEPARIN SODIUM,PORCINE 10 UNIT/ML
5 VIAL (ML) INTRAVENOUS ONCE
Status: COMPLETED | OUTPATIENT
Start: 2019-04-12 | End: 2019-04-12

## 2019-04-12 RX ADMIN — Medication 5 ML: at 07:03

## 2019-04-12 ASSESSMENT — MIFFLIN-ST. JEOR: SCORE: 1247.27

## 2019-04-12 ASSESSMENT — PAIN SCALES - GENERAL: PAINLEVEL: NO PAIN (0)

## 2019-04-12 NOTE — PROGRESS NOTES
"BMT Clinic Progress Note    Patient ID:  Adrianna Palomino is a 62 yo woman D+12 s/p auto PBSCT (#2) for Multiple Myeloma.      Interval History: Eating/drinking well.  No flare of her collagneous colitis diarrhea.  No fevers, cold, cough.  Doing quite well.  Mouth sores have resolved. Vomited once spontaneously yesterday.  Slightly queasy but nothing compared to the nausea she had after her first transplant.     ROS: 10 point ROS neg other than the symptoms noted above in the HPI.       PHYSICAL EXAM                                                                                                                                   /83 (BP Location: Left arm, Patient Position: Chair, Cuff Size: Adult Regular)   Pulse 96   Temp 98.6  F (37  C) (Oral)   Ht 1.665 m (5' 5.55\")   Wt 68.3 kg (150 lb 8 oz)   LMP 03/01/2005   SpO2 96%   BMI 24.63 kg/m     Wt Readings from Last 4 Encounters:   04/12/19 68.3 kg (150 lb 8 oz)   04/10/19 68.9 kg (151 lb 12.8 oz)   04/09/19 69.1 kg (152 lb 4.8 oz)   04/08/19 68.4 kg (150 lb 12.8 oz)     General: NAD   Eyes: sclera anicteric   Nose/Mouth/Throat: OP moist without lesions.   Lungs: CTAB  Cardiovascular: RRR, no M/R/G   Abdominal/Rectal: +BS, soft, NT, ND  Lymphatics: No edema  Skin: No rash  Additional Findings: R chest CVC NT, dressing cdi.    Labs:   Lab Results   Component Value Date    WBC 22.1 (H) 04/10/2019    ANEU 20.8 (H) 04/10/2019    HGB 11.2 (L) 04/10/2019    HCT 33.3 (L) 04/10/2019    PLT 30 (LL) 04/10/2019     04/10/2019    POTASSIUM 3.7 04/10/2019    CHLORIDE 102 04/10/2019    CO2 24 04/10/2019     (H) 04/10/2019    BUN 5 (L) 04/10/2019    CR 0.78 04/10/2019    MAG 1.8 04/10/2019    INR 0.95 03/28/2019    BILITOTAL 0.5 04/08/2019    AST 17 04/08/2019    ALT 16 04/08/2019    ALKPHOS 51 04/08/2019    PROTTOTAL 5.9 (L) 04/08/2019    ALBUMIN 3.1 (L) 04/08/2019       ASSESSMENT/PLAN   Adrianna Palomino is a 62 yo woman with standard risk IgG " kappa multiple myeloma, s/p auto PBSCT in 2013. Now day +14 s/p second auto.     1.  BMT:   - Received 8 million cells.   - Engrafted.  Last dose GCSF 4/9.      2.  HEME: Keep Hgb>8 and plts>10K. No transfusion needs today.                             3.  ID: Afebrile  - prophy Fluc, and LD ACV.  Stop levaquin.  Reduce fluconazole by 50%.   Bactrim or appropriate PCP therapy to start d+28.                                       4.  GI:   - nausea controlled with alternating Ativan, Compazine, Zofran.  - mild mucositis, resolved   - hx collagenous colitis, takes Imodium once daily; ok to resume psyllium (stable per pt)  - stop protonix due to its association with collagenous colitis.      5.  FEN/Renal: Creat wnl.  - hypokalemia: tried to drop to 20mEq bid, but her potassium dropped to 3.2.  Stay on 40 mEq bid.       GCSF competed 4/9  Resume 40 mEq bid of potassium  Stop protonix (associated with collagenous colitis)    Carolina HAAS Carrier  4/12/2019

## 2019-04-12 NOTE — NURSING NOTE
Chief Complaint   Patient presents with     RECHECK     Return: Multiple myeloma      Port Draw     labs drawn via cvc by RN     /83 (BP Location: Left arm, Patient Position: Chair, Cuff Size: Adult Regular)   Pulse 96   Temp 98.6  F (37  C) (Oral)   Wt 68.3 kg (150 lb 8 oz)   LMP 03/01/2005   SpO2 96%   BMI 24.63 kg/m      Lines accessed by RN in lab. Labs collected through red lumen and sent. Both caps changed, lines flushed with NS & Heparin. Pt tolerated well.   Pt checked in for next appointment.    Jillian Reeder, RN

## 2019-04-12 NOTE — NURSING NOTE
"Oncology Rooming Note    April 12, 2019 7:15 AM   Adrianna Palomino is a 63 year old female who presents for:    Chief Complaint   Patient presents with     RECHECK     Return: Multiple myeloma      Port Draw     labs drawn via cvc by RN     Initial Vitals: /83 (BP Location: Left arm, Patient Position: Chair, Cuff Size: Adult Regular)   Pulse 96   Temp 98.6  F (37  C) (Oral)   Ht 1.665 m (5' 5.55\")   Wt 68.3 kg (150 lb 8 oz)   LMP 03/01/2005   SpO2 96%   BMI 24.63 kg/m   Estimated body mass index is 24.63 kg/m  as calculated from the following:    Height as of this encounter: 1.665 m (5' 5.55\").    Weight as of this encounter: 68.3 kg (150 lb 8 oz). Body surface area is 1.78 meters squared.  No Pain (0) Comment: Data Unavailable   Patient's last menstrual period was 03/01/2005.  Allergies reviewed: Yes  Medications reviewed: Yes    Medications: Medication refills not needed today.  Pharmacy name entered into [employer]:    [facility]  [facility] [place], [place] - [phone]    Clinical concerns: Pt has question about her line.  Carolina was notified.      Jennifer Soriano CMA              "

## 2019-04-12 NOTE — TELEPHONE ENCOUNTER
Call to patient for BP check per Dr. Rendon. Patient reports BP to be ok (in epic) 14 days post BM transplant. 112//83 P 90's. Patient wanted to wait 1 month after her transplant to make any changes. Will send to Dr. Rendon for update.  Chika Wood LPN  Nephrology  [phone]

## 2019-04-15 ENCOUNTER — APPOINTMENT (OUTPATIENT)
Dept: LAB | Facility: CLINIC | Age: 64
End: 2019-04-15
Attending: STUDENT IN AN ORGANIZED HEALTH CARE EDUCATION/TRAINING PROGRAM
Payer: COMMERCIAL

## 2019-04-15 ENCOUNTER — ONCOLOGY VISIT (OUTPATIENT)
Dept: TRANSPLANT | Facility: CLINIC | Age: 64
End: 2019-04-15
Attending: STUDENT IN AN ORGANIZED HEALTH CARE EDUCATION/TRAINING PROGRAM
Payer: COMMERCIAL

## 2019-04-15 VITALS
HEART RATE: 106 BPM | TEMPERATURE: 98.2 F | RESPIRATION RATE: 16 BRPM | SYSTOLIC BLOOD PRESSURE: 115 MMHG | WEIGHT: 151.2 LBS | OXYGEN SATURATION: 98 % | BODY MASS INDEX: 24.3 KG/M2 | HEIGHT: 66 IN | DIASTOLIC BLOOD PRESSURE: 78 MMHG

## 2019-04-15 DIAGNOSIS — C90.01 MULTIPLE MYELOMA IN REMISSION (H): ICD-10-CM

## 2019-04-15 LAB
ACANTHOCYTES BLD QL SMEAR: SLIGHT
ALBUMIN SERPL-MCNC: 3.2 G/DL (ref 3.4–5)
ALP SERPL-CCNC: 60 U/L (ref 40–150)
ALT SERPL W P-5'-P-CCNC: 32 U/L (ref 0–50)
ANION GAP SERPL CALCULATED.3IONS-SCNC: 8 MMOL/L (ref 3–14)
AST SERPL W P-5'-P-CCNC: 39 U/L (ref 0–45)
BASOPHILS # BLD AUTO: 0 10E9/L (ref 0–0.2)
BASOPHILS NFR BLD AUTO: 0 %
BILIRUB SERPL-MCNC: 0.3 MG/DL (ref 0.2–1.3)
BUN SERPL-MCNC: 9 MG/DL (ref 7–30)
CALCIUM SERPL-MCNC: 9.4 MG/DL (ref 8.5–10.1)
CHLORIDE SERPL-SCNC: 103 MMOL/L (ref 94–109)
CO2 SERPL-SCNC: 24 MMOL/L (ref 20–32)
CREAT SERPL-MCNC: 0.65 MG/DL (ref 0.52–1.04)
DIFFERENTIAL METHOD BLD: ABNORMAL
EOSINOPHIL # BLD AUTO: 0 10E9/L (ref 0–0.7)
EOSINOPHIL NFR BLD AUTO: 0 %
ERYTHROCYTE [DISTWIDTH] IN BLOOD BY AUTOMATED COUNT: 12 % (ref 10–15)
GFR SERPL CREATININE-BSD FRML MDRD: >90 ML/MIN/{1.73_M2}
GLUCOSE SERPL-MCNC: 99 MG/DL (ref 70–99)
HCT VFR BLD AUTO: 32.6 % (ref 35–47)
HGB BLD-MCNC: 11 G/DL (ref 11.7–15.7)
LYMPHOCYTES # BLD AUTO: 0.2 10E9/L (ref 0.8–5.3)
LYMPHOCYTES NFR BLD AUTO: 2.6 %
MCH RBC QN AUTO: 35.9 PG (ref 26.5–33)
MCHC RBC AUTO-ENTMCNC: 33.7 G/DL (ref 31.5–36.5)
MCV RBC AUTO: 107 FL (ref 78–100)
METAMYELOCYTES # BLD: 0.5 10E9/L
METAMYELOCYTES NFR BLD MANUAL: 8.7 %
MONOCYTES # BLD AUTO: 0.6 10E9/L (ref 0–1.3)
MONOCYTES NFR BLD AUTO: 9.6 %
MYELOCYTES # BLD: 0.8 10E9/L
MYELOCYTES NFR BLD MANUAL: 13.9 %
NEUTROPHILS # BLD AUTO: 3.9 10E9/L (ref 1.6–8.3)
NEUTROPHILS NFR BLD AUTO: 65.2 %
PLATELET # BLD AUTO: 123 10E9/L (ref 150–450)
PLATELET # BLD EST: ABNORMAL 10*3/UL
POIKILOCYTOSIS BLD QL SMEAR: SLIGHT
POLYCHROMASIA BLD QL SMEAR: SLIGHT
POTASSIUM SERPL-SCNC: 3.6 MMOL/L (ref 3.4–5.3)
PROT SERPL-MCNC: 6.1 G/DL (ref 6.8–8.8)
RBC # BLD AUTO: 3.06 10E12/L (ref 3.8–5.2)
SODIUM SERPL-SCNC: 135 MMOL/L (ref 133–144)
WBC # BLD AUTO: 6 10E9/L (ref 4–11)

## 2019-04-15 PROCEDURE — 36592 COLLECT BLOOD FROM PICC: CPT

## 2019-04-15 PROCEDURE — 80053 COMPREHEN METABOLIC PANEL: CPT | Performed by: PHYSICIAN ASSISTANT

## 2019-04-15 PROCEDURE — G0463 HOSPITAL OUTPT CLINIC VISIT: HCPCS | Mod: ZF

## 2019-04-15 PROCEDURE — 85025 COMPLETE CBC W/AUTO DIFF WBC: CPT | Performed by: PHYSICIAN ASSISTANT

## 2019-04-15 PROCEDURE — 25000128 H RX IP 250 OP 636: Mod: ZF | Performed by: STUDENT IN AN ORGANIZED HEALTH CARE EDUCATION/TRAINING PROGRAM

## 2019-04-15 RX ORDER — HEPARIN SODIUM,PORCINE 10 UNIT/ML
5 VIAL (ML) INTRAVENOUS
Status: DISCONTINUED | OUTPATIENT
Start: 2019-04-15 | End: 2019-04-15 | Stop reason: HOSPADM

## 2019-04-15 RX ADMIN — SODIUM CHLORIDE, PRESERVATIVE FREE 5 ML: 5 INJECTION INTRAVENOUS at 06:29

## 2019-04-15 RX ADMIN — Medication 5 ML: at 06:29

## 2019-04-15 ASSESSMENT — MIFFLIN-ST. JEOR: SCORE: 1250.46

## 2019-04-15 ASSESSMENT — PAIN SCALES - GENERAL: PAINLEVEL: NO PAIN (0)

## 2019-04-15 NOTE — PROGRESS NOTES
"BMT Clinic Progress Note    Patient ID:  Adrianna Palomino is a 64 yo woman D+17 s/p auto PBSCT (#2) for Multiple Myeloma.      Interval History: Felt well over weekend. Stopped scheduled antiemetics. One episode nausea this morning, no vomiting and she took an antiemetic with relief. Eating every meal. Diarrhea a little increased, took a second imodium yesterday, but this is normal per her baseline. No new belly pain, no blood in stool.     ROS: 10 point ROS neg other than the symptoms noted above in the HPI.       PHYSICAL EXAM                                                                                                                                   /78 (BP Location: Right arm, Patient Position: Sitting, Cuff Size: Adult Regular)   Pulse 106   Temp 98.2  F (36.8  C) (Oral)   Resp 16   Ht 1.665 m (5' 5.55\")   Wt 68.6 kg (151 lb 3.2 oz)   LMP 03/01/2005   SpO2 98%   BMI 24.74 kg/m     Wt Readings from Last 4 Encounters:   04/15/19 68.6 kg (151 lb 3.2 oz)   04/12/19 68.3 kg (150 lb 8 oz)   04/10/19 68.9 kg (151 lb 12.8 oz)   04/09/19 69.1 kg (152 lb 4.8 oz)     General: NAD   Eyes: sclera anicteric   Nose/Mouth/Throat: OP moist without lesions.   Lungs: CTAB  Cardiovascular: RRR, no M/R/G   Abdominal/Rectal: +BS, soft, NT, ND  Lymphatics: No edema  Skin: No rash  Additional Findings: R chest CVC NT, dressing cdi.    Labs:   Lab Results   Component Value Date    WBC 6.0 04/15/2019    ANEU 3.9 04/15/2019    HGB 11.0 (L) 04/15/2019    HCT 32.6 (L) 04/15/2019     (L) 04/15/2019     04/15/2019    POTASSIUM 3.6 04/15/2019    CHLORIDE 103 04/15/2019    CO2 24 04/15/2019    GLC 99 04/15/2019    BUN 9 04/15/2019    CR 0.65 04/15/2019    MAG 1.8 04/10/2019    INR 0.95 03/28/2019    BILITOTAL 0.3 04/15/2019    AST 39 04/15/2019    ALT 32 04/15/2019    ALKPHOS 60 04/15/2019    PROTTOTAL 6.1 (L) 04/15/2019    ALBUMIN 3.2 (L) 04/15/2019       ASSESSMENT/PLAN   Adrianna Palomino is a 64 yo " woman with standard risk IgG kappa multiple myeloma, s/p auto PBSCT in 2013. Now day +17 s/p second auto.     1.  BMT:   - Received 8 million cells.   - Engrafted.  Last dose GCSF 4/9.      2.  HEME: Keep Hgb>8 and plts>10K. No transfusion needs today.                             3.  ID: Afebrile  - prophy Fluc, and LD ACV.  Stop levaquin.  Reduce fluconazole by 50%.   Bactrim or appropriate PCP therapy to start d+28.                                       4.  GI:   - nausea controlled with prn Compazine, Zofran.  - mild mucositis, resolved   - hx collagenous colitis, increased Imodium to twice daily 4/14, no flare per pt 4/15. Takes psyllium (stable per pt)  - 4/12 stop protonix due to its association with collagenous colitis.      5.  FEN/Renal: Creat wnl.  - hypokalemia: tried to drop to 20mEq bid, but her potassium dropped to 3.2.  Stay on 40 mEq bid.         Plan/RTC: cont potassium 40 meq bid: anticipate visit Friday then remove line  BMBx next week Hanh Nice PAC  [phone]

## 2019-04-15 NOTE — NURSING NOTE
"Oncology Rooming Note    April 15, 2019 6:59 AM   Adrianna Palomino is a 63 year old female who presents for:    Chief Complaint   Patient presents with     Blood Draw     Labs drawn via CVC by RN in lab. VS taken.      RECHECK     F/U BMT, Multiple Myeloma     Initial Vitals: /78 (BP Location: Right arm, Patient Position: Sitting, Cuff Size: Adult Regular)   Pulse 106   Temp 98.2  F (36.8  C) (Oral)   Resp 16   Ht 1.665 m (5' 5.55\")   Wt 68.6 kg (151 lb 3.2 oz)   LMP 03/01/2005   SpO2 98%   BMI 24.74 kg/m   Estimated body mass index is 24.74 kg/m  as calculated from the following:    Height as of this encounter: 1.665 m (5' 5.55\").    Weight as of this encounter: 68.6 kg (151 lb 3.2 oz). Body surface area is 1.78 meters squared.  No Pain (0) Comment: Data Unavailable   Patient's last menstrual period was 03/01/2005.  Allergies reviewed: Yes  Medications reviewed: Yes    Medications: Medication refills not needed today.  Pharmacy name entered into [employer]:    [facility]  [facility] [place], [place] - [address] [place] [address] [phone]    Clinical concerns: None, Renetta was NOT notified.      ARACELY HAYWOOD LPN            "

## 2019-04-18 ENCOUNTER — MEDICAL CORRESPONDENCE (OUTPATIENT)
Dept: TRANSPLANT | Facility: CLINIC | Age: 64
End: 2019-04-18

## 2019-04-19 ENCOUNTER — HOME INFUSION (PRE-WILLOW HOME INFUSION) (OUTPATIENT)
Dept: PHARMACY | Facility: CLINIC | Age: 64
End: 2019-04-19

## 2019-04-19 ENCOUNTER — APPOINTMENT (OUTPATIENT)
Dept: LAB | Facility: CLINIC | Age: 64
End: 2019-04-19
Attending: PHYSICIAN ASSISTANT
Payer: COMMERCIAL

## 2019-04-19 ENCOUNTER — ONCOLOGY VISIT (OUTPATIENT)
Dept: TRANSPLANT | Facility: CLINIC | Age: 64
End: 2019-04-19
Attending: PHYSICIAN ASSISTANT
Payer: COMMERCIAL

## 2019-04-19 ENCOUNTER — ANCILLARY PROCEDURE (OUTPATIENT)
Dept: ULTRASOUND IMAGING | Facility: CLINIC | Age: 64
End: 2019-04-19
Attending: STUDENT IN AN ORGANIZED HEALTH CARE EDUCATION/TRAINING PROGRAM
Payer: COMMERCIAL

## 2019-04-19 VITALS
BODY MASS INDEX: 23.88 KG/M2 | HEART RATE: 95 BPM | WEIGHT: 145.94 LBS | SYSTOLIC BLOOD PRESSURE: 105 MMHG | TEMPERATURE: 97.7 F | OXYGEN SATURATION: 96 % | DIASTOLIC BLOOD PRESSURE: 74 MMHG

## 2019-04-19 DIAGNOSIS — C90.01 MULTIPLE MYELOMA IN REMISSION (H): ICD-10-CM

## 2019-04-19 LAB
ANION GAP SERPL CALCULATED.3IONS-SCNC: 7 MMOL/L (ref 3–14)
BASOPHILS # BLD AUTO: 0.1 10E9/L (ref 0–0.2)
BASOPHILS NFR BLD AUTO: 1.3 %
BUN SERPL-MCNC: 12 MG/DL (ref 7–30)
CALCIUM SERPL-MCNC: 9.5 MG/DL (ref 8.5–10.1)
CHLORIDE SERPL-SCNC: 101 MMOL/L (ref 94–109)
CO2 SERPL-SCNC: 25 MMOL/L (ref 20–32)
CREAT SERPL-MCNC: 0.68 MG/DL (ref 0.52–1.04)
DIFFERENTIAL METHOD BLD: ABNORMAL
EOSINOPHIL # BLD AUTO: 0 10E9/L (ref 0–0.7)
EOSINOPHIL NFR BLD AUTO: 0 %
ERYTHROCYTE [DISTWIDTH] IN BLOOD BY AUTOMATED COUNT: 12.1 % (ref 10–15)
GFR SERPL CREATININE-BSD FRML MDRD: >90 ML/MIN/{1.73_M2}
GLUCOSE SERPL-MCNC: 100 MG/DL (ref 70–99)
HCT VFR BLD AUTO: 35 % (ref 35–47)
HGB BLD-MCNC: 12 G/DL (ref 11.7–15.7)
IMM GRANULOCYTES # BLD: 0.1 10E9/L (ref 0–0.4)
IMM GRANULOCYTES NFR BLD: 1.6 %
LYMPHOCYTES # BLD AUTO: 0.4 10E9/L (ref 0.8–5.3)
LYMPHOCYTES NFR BLD AUTO: 10.5 %
MCH RBC QN AUTO: 35.9 PG (ref 26.5–33)
MCHC RBC AUTO-ENTMCNC: 34.3 G/DL (ref 31.5–36.5)
MCV RBC AUTO: 105 FL (ref 78–100)
MONOCYTES # BLD AUTO: 1.1 10E9/L (ref 0–1.3)
MONOCYTES NFR BLD AUTO: 29.1 %
NEUTROPHILS # BLD AUTO: 2.2 10E9/L (ref 1.6–8.3)
NEUTROPHILS NFR BLD AUTO: 57.5 %
NRBC # BLD AUTO: 0 10*3/UL
NRBC BLD AUTO-RTO: 0 /100
PLATELET # BLD AUTO: 267 10E9/L (ref 150–450)
PLATELET # BLD EST: ABNORMAL 10*3/UL
POTASSIUM SERPL-SCNC: 4.2 MMOL/L (ref 3.4–5.3)
RBC # BLD AUTO: 3.34 10E12/L (ref 3.8–5.2)
SODIUM SERPL-SCNC: 133 MMOL/L (ref 133–144)
WBC # BLD AUTO: 3.8 10E9/L (ref 4–11)

## 2019-04-19 PROCEDURE — 36415 COLL VENOUS BLD VENIPUNCTURE: CPT

## 2019-04-19 PROCEDURE — G0463 HOSPITAL OUTPT CLINIC VISIT: HCPCS

## 2019-04-19 PROCEDURE — 80048 BASIC METABOLIC PNL TOTAL CA: CPT | Performed by: STUDENT IN AN ORGANIZED HEALTH CARE EDUCATION/TRAINING PROGRAM

## 2019-04-19 PROCEDURE — 85025 COMPLETE CBC W/AUTO DIFF WBC: CPT | Performed by: STUDENT IN AN ORGANIZED HEALTH CARE EDUCATION/TRAINING PROGRAM

## 2019-04-19 PROCEDURE — 25000128 H RX IP 250 OP 636: Mod: ZF | Performed by: PHYSICIAN ASSISTANT

## 2019-04-19 RX ORDER — HEPARIN SODIUM,PORCINE 10 UNIT/ML
5 VIAL (ML) INTRAVENOUS ONCE
Status: COMPLETED | OUTPATIENT
Start: 2019-04-19 | End: 2019-04-19

## 2019-04-19 RX ORDER — ALBUTEROL SULFATE 0.83 MG/ML
2.5 SOLUTION RESPIRATORY (INHALATION) ONCE
Status: CANCELLED
Start: 2019-04-26

## 2019-04-19 RX ORDER — PENTAMIDINE ISETHIONATE 300 MG/300MG
300 INHALANT RESPIRATORY (INHALATION) ONCE
Status: CANCELLED
Start: 2019-04-19

## 2019-04-19 RX ORDER — PENTAMIDINE ISETHIONATE 300 MG/300MG
300 INHALANT RESPIRATORY (INHALATION) ONCE
Status: CANCELLED
Start: 2019-04-26

## 2019-04-19 RX ORDER — LIDOCAINE HYDROCHLORIDE 10 MG/ML
30 INJECTION, SOLUTION EPIDURAL; INFILTRATION; INTRACAUDAL; PERINEURAL ONCE
Status: COMPLETED | OUTPATIENT
Start: 2019-04-19 | End: 2019-04-19

## 2019-04-19 RX ORDER — ALBUTEROL SULFATE 0.83 MG/ML
2.5 SOLUTION RESPIRATORY (INHALATION) ONCE
Status: CANCELLED
Start: 2019-04-19

## 2019-04-19 RX ORDER — POTASSIUM CHLORIDE 750 MG/1
20 TABLET, EXTENDED RELEASE ORAL 2 TIMES DAILY
COMMUNITY
Start: 2019-04-19 | End: 2019-09-19

## 2019-04-19 RX ADMIN — LIDOCAINE HYDROCHLORIDE 30 ML: 10 INJECTION, SOLUTION EPIDURAL; INFILTRATION; INTRACAUDAL; PERINEURAL at 10:28

## 2019-04-19 RX ADMIN — Medication 5 ML: at 09:07

## 2019-04-19 ASSESSMENT — PAIN SCALES - GENERAL: PAINLEVEL: NO PAIN (0)

## 2019-04-19 NOTE — PROGRESS NOTES
BMT Clinic Progress Note    Patient ID:  Adrianna Palomino is a 64 yo woman D+21 s/p auto PBSCT (#2) for Multiple Myeloma.      Interval History: Overall doing okay other then yesterday Vomiting and some diarrhea. Nothing since. Not sure what it was from. No other new issues. Line out today!    ROS: 10 point ROS neg other than the symptoms noted above in the HPI.       PHYSICAL EXAM                                                                                                                                   /74 (BP Location: Left arm, Patient Position: Chair, Cuff Size: Adult Regular)   Pulse 95   Temp 97.7  F (36.5  C) (Oral)   Wt 66.2 kg (145 lb 15.1 oz)   LMP 03/01/2005   SpO2 96%   BMI 23.88 kg/m     Wt Readings from Last 4 Encounters:   04/19/19 66.2 kg (145 lb 15.1 oz)   04/15/19 68.6 kg (151 lb 3.2 oz)   04/12/19 68.3 kg (150 lb 8 oz)   04/10/19 68.9 kg (151 lb 12.8 oz)     General: NAD   Eyes: sclera anicteric   Nose/Mouth/Throat: OP moist without lesions.   Lungs: CTAB  Cardiovascular: RRR, no M/R/G   Abdominal/Rectal: +BS, soft, NT, ND  Lymphatics: No edema  Skin: No rash  Additional Findings: R chest CVC NT, dressing cdi.    Labs:   Lab Results   Component Value Date    WBC 3.8 (L) 04/19/2019    ANEU 3.9 04/15/2019    HGB 12.0 04/19/2019    HCT 35.0 04/19/2019     04/19/2019     04/15/2019    POTASSIUM 3.6 04/15/2019    CHLORIDE 103 04/15/2019    CO2 24 04/15/2019    GLC 99 04/15/2019    BUN 9 04/15/2019    CR 0.65 04/15/2019    MAG 1.8 04/10/2019    INR 0.95 03/28/2019    BILITOTAL 0.3 04/15/2019    AST 39 04/15/2019    ALT 32 04/15/2019    ALKPHOS 60 04/15/2019    PROTTOTAL 6.1 (L) 04/15/2019    ALBUMIN 3.2 (L) 04/15/2019       ASSESSMENT/PLAN   Adrianna Palomino is a 64 yo woman with standard risk IgG kappa multiple myeloma, s/p auto PBSCT in 2013. Now day +21 s/p second auto.     1.  BMT:   - Received 8 million cells.   - Engrafted.  Last dose GCSF 4/9.      2.  HEME:  Keep Hgb>8 and plts>10K. No transfusion needs today.                             3.  ID: Afebrile  - prophy Fluc, and LD ACV.  Stop levaquin.  Reduce fluconazole by 50%.   - Will need to get pentamidine on 4/26 (Sulfa allergy). I ordered it in BMT therapy plan she is here seeing Dr. Shepard that day.                                    4.  GI:   - nausea controlled with prn Compazine, Zofran.  - mild mucositis, resolved.   - hx collagenous colitis, increased Imodium to twice daily 4/14, no flare per pt 4/15. Takes psyllium (stable per pt)  - 4/12 stopped protonix due to its association with collagenous colitis.      5.  FEN/Renal: Creat wnl.  - hypokalemia: Dropped K to 20meq BID--it's okay to let her run 3.2-3.3 as long as stable.     RTC: next week for BM BX then visit and pentamidine on Friday.    ARLEN LennonC  #[id number]

## 2019-04-19 NOTE — DISCHARGE INSTRUCTIONS
A collaboration between [facility] and [facility]  Experts in minimally invasive, targeted treatments performed using imaging guidance    Venous Access Device, Port Catheter or Tunneled Central Line Removal    Today you had your existing venous access device removed, either because it was no longer needed or because there was malfunction or infection issues.    One of our Radiology PAs performed this procedure for you today:  ? Bertin Justice, [facility], PAEverC  ? MACIEJ Ferrara, PA-C  ? Erik Vitale PA-C  ? Elicia Tran MS, PA-C  ? Cristiano Perez PA-C    After you go home:  - Drink plenty of fluids.  Generally 6-8 (8 ounce) glasses a day is recommended.  - Resume your regular diet unless otherwise ordered by a medical provider.  - Keep any applied tape/gauze dressings clean and dry.  Change tape/gauze dressings if they get wet or soiled.  - You may shower the following day after procedure, however cover and protect from moisture any tape/gauze dressings.  You may let water hit and run over dried skin glue, but do not scrub.  Pat the area dry after showering.  - Port removal incisions are closed with absorbable suture, meaning they do not need to be removed at a later date, and a topical skin adhesive (skin glue).  This glue will wear off in 7-14 days.  Do not remove before this time.  If 14 days have passed and residual glue is present, you may gently remove it.  - You may remove tape/gauze dressings after 5 days if the site looks closed and in the process of healing.  - Do not apply gels, lotions, or ointments to the glue site for the first 10 days as this may cause the glue to prematurely soften and fail.  - Do not perform strenuous activities or lift greater than 10 pounds for the next three days.  - If there is bleeding or oozing from the procedure site, apply firm pressure to the area for 5-10 minutes.  If the bleeding continues seek medical advice at the  numbers below.  - Mild procedure site discomfort can be treated with an ice pack and over-the-counter pain relievers.              For 24 hours after any sedation used:  - Relax and take it easy.  No strenuous activities.  - Do not drive or operate machines at home or at work.  - No alcohol consumption.  - Do not make any important or legal decisions.    Call our Interventional Radiology (IR) service if:  - If you start bleeding from the procedure site.  If you do start to bleed from the site, lie down and hold some pressure on the site.  Our radiology provider can help you decide if you need to return to the hospital.  - If you have new or worsening pain related to the procedure.  - If you have concerning swelling at the procedure site.  - If you develop persistent nausea or vomiting.  - If you develop hives or a rash or any unexplained itching.  - If you have a fever of greater than 100.5  F and chills in the first 5 days after procedure.  - Any other concerns related to your procedure.      [facility]  Interventional Radiology (IR)  [address] [place] [address]    Contact Number:  [phone]  (IR control desk)  - Monday - Friday 8:00 am - 4:30 pm    After hours for urgent concerns:  [phone]  - After 4:30 pm Monday - Friday, Weekends and Holidays.   - Ask for Interventional Radiology on-call.  Someone is available 24 hours a day.  - [employer] toll free number:  [phone]

## 2019-04-19 NOTE — NURSING NOTE
"Oncology Rooming Note    April 19, 2019 9:24 AM   Adrianna Palomino is a 63 year old female who presents for:    Chief Complaint   Patient presents with     Blood Draw     labs drawn via cvc by RN     RECHECK     RTN- multiple myeloma     Initial Vitals: /74 (BP Location: Left arm, Patient Position: Chair, Cuff Size: Adult Regular)   Pulse 95   Temp 97.7  F (36.5  C) (Oral)   Wt 66.2 kg (145 lb 15.1 oz)   LMP 03/01/2005   SpO2 96%   BMI 23.88 kg/m   Estimated body mass index is 23.88 kg/m  as calculated from the following:    Height as of 4/15/19: 1.665 m (5' 5.55\").    Weight as of this encounter: 66.2 kg (145 lb 15.1 oz). Body surface area is 1.75 meters squared.  No Pain (0) Comment: Data Unavailable   Patient's last menstrual period was 03/01/2005.  Allergies reviewed: Yes  Medications reviewed: Yes    Medications: Medication refills not needed today.  Pharmacy name entered into [employer]:    [facility]  [facility] [place], [place] - [address] [place] [address] [phone]    Clinical concerns:  none    Sulma Plasencia CMA              "

## 2019-04-19 NOTE — NURSING NOTE
Chief Complaint   Patient presents with     Blood Draw     labs drawn via cvc by RN     /74 (BP Location: Left arm, Patient Position: Chair, Cuff Size: Adult Regular)   Pulse 95   Temp 97.7  F (36.5  C) (Oral)   Wt 66.2 kg (145 lb 15.1 oz)   LMP 03/01/2005   SpO2 96%   BMI 23.88 kg/m      Lines accessed by RN in lab. Labs collected through red lumen and sent. Both lines flushed with NS & Heparin. Pt tolerated well.   Pt checked in for next appointment.    Jillian Reeder RN

## 2019-04-19 NOTE — PROGRESS NOTES
Interventional Radiology Brief Post Procedure Note    Procedure: Tunneled Central Venous Catheter Removal    Proceduralist: Elicia Tran PA-C    Assistant: None    Time Out: Prior to the start of the procedure and with procedural staff participation, I verbally confirmed the patient s identity using two indicators, relevant allergies, that the procedure was appropriate and matched the consent or emergent situation, and that the correct equipment/implants were available. Immediately prior to starting the procedure I conducted the Time Out with the procedural staff and re-confirmed the patient s name, procedure, and site/side. (The Joint Commission universal protocol was followed.)  Yes    Medications   Medication Event Details Admin User Admin Time       Sedation: None. Local Anesthestic used    Findings: Completed removal of right tunneled line in its entirety.    Estimated Blood Loss: Minimal    Fluoroscopy Time:  minute(s)    SPECIMENS: None    Complications: 1. None     Condition: Stable    Plan: Follow up per primary team    Comments: See dictated procedure note for full details.    Elicia Tran PA-C

## 2019-04-23 ENCOUNTER — APPOINTMENT (OUTPATIENT)
Dept: LAB | Facility: CLINIC | Age: 64
End: 2019-04-23
Payer: COMMERCIAL

## 2019-04-23 ENCOUNTER — OFFICE VISIT (OUTPATIENT)
Dept: TRANSPLANT | Facility: CLINIC | Age: 64
End: 2019-04-23
Attending: PHYSICIAN ASSISTANT
Payer: COMMERCIAL

## 2019-04-23 VITALS
TEMPERATURE: 98.9 F | OXYGEN SATURATION: 93 % | RESPIRATION RATE: 16 BRPM | BODY MASS INDEX: 24 KG/M2 | WEIGHT: 146.7 LBS | DIASTOLIC BLOOD PRESSURE: 73 MMHG | SYSTOLIC BLOOD PRESSURE: 108 MMHG | HEART RATE: 91 BPM

## 2019-04-23 DIAGNOSIS — C90.00 MULTIPLE MYELOMA NOT HAVING ACHIEVED REMISSION (H): Primary | ICD-10-CM

## 2019-04-23 LAB
ALBUMIN SERPL-MCNC: 3.7 G/DL (ref 3.4–5)
ALP SERPL-CCNC: 55 U/L (ref 40–150)
ALT SERPL W P-5'-P-CCNC: 22 U/L (ref 0–50)
ANION GAP SERPL CALCULATED.3IONS-SCNC: 9 MMOL/L (ref 3–14)
AST SERPL W P-5'-P-CCNC: 24 U/L (ref 0–45)
BASOPHILS # BLD AUTO: 0.1 10E9/L (ref 0–0.2)
BASOPHILS NFR BLD AUTO: 1.9 %
BILIRUB SERPL-MCNC: 0.3 MG/DL (ref 0.2–1.3)
BUN SERPL-MCNC: 10 MG/DL (ref 7–30)
CALCIUM SERPL-MCNC: 9.4 MG/DL (ref 8.5–10.1)
CHLORIDE SERPL-SCNC: 103 MMOL/L (ref 94–109)
CO2 SERPL-SCNC: 23 MMOL/L (ref 20–32)
COLLECT DURATION TIME UR: 24 H
CREAT SERPL-MCNC: 0.74 MG/DL (ref 0.52–1.04)
DIFFERENTIAL METHOD BLD: ABNORMAL
EOSINOPHIL # BLD AUTO: 0 10E9/L (ref 0–0.7)
EOSINOPHIL NFR BLD AUTO: 0 %
ERYTHROCYTE [DISTWIDTH] IN BLOOD BY AUTOMATED COUNT: 12.1 % (ref 10–15)
GFR SERPL CREATININE-BSD FRML MDRD: 86 ML/MIN/{1.73_M2}
GLUCOSE SERPL-MCNC: 94 MG/DL (ref 70–99)
HCT VFR BLD AUTO: 32.2 % (ref 35–47)
HGB BLD-MCNC: 11.3 G/DL (ref 11.7–15.7)
IMM GRANULOCYTES # BLD: 0 10E9/L (ref 0–0.4)
IMM GRANULOCYTES NFR BLD: 0.5 %
LDH SERPL L TO P-CCNC: 188 U/L (ref 81–234)
LYMPHOCYTES # BLD AUTO: 0.3 10E9/L (ref 0.8–5.3)
LYMPHOCYTES NFR BLD AUTO: 7.9 %
MAGNESIUM SERPL-MCNC: 2.1 MG/DL (ref 1.6–2.3)
MCH RBC QN AUTO: 36.7 PG (ref 26.5–33)
MCHC RBC AUTO-ENTMCNC: 35.1 G/DL (ref 31.5–36.5)
MCV RBC AUTO: 105 FL (ref 78–100)
MONOCYTES # BLD AUTO: 0.7 10E9/L (ref 0–1.3)
MONOCYTES NFR BLD AUTO: 20.1 %
NEUTROPHILS # BLD AUTO: 2.6 10E9/L (ref 1.6–8.3)
NEUTROPHILS NFR BLD AUTO: 69.6 %
NRBC # BLD AUTO: 0 10*3/UL
NRBC BLD AUTO-RTO: 0 /100
PHOSPHATE SERPL-MCNC: 4.1 MG/DL (ref 2.5–4.5)
PLATELET # BLD AUTO: 292 10E9/L (ref 150–450)
PLATELET # BLD EST: ABNORMAL 10*3/UL
POTASSIUM SERPL-SCNC: 3.9 MMOL/L (ref 3.4–5.3)
PROT SERPL-MCNC: 6.8 G/DL (ref 6.8–8.8)
RBC # BLD AUTO: 3.08 10E12/L (ref 3.8–5.2)
SODIUM SERPL-SCNC: 135 MMOL/L (ref 133–144)
SPECIMEN VOL UR: 1610 ML
URATE SERPL-MCNC: 3.6 MG/DL (ref 2.6–6)
WBC # BLD AUTO: 3.7 10E9/L (ref 4–11)

## 2019-04-23 PROCEDURE — 88311 DECALCIFY TISSUE: CPT | Performed by: PHYSICIAN ASSISTANT

## 2019-04-23 PROCEDURE — 84550 ASSAY OF BLOOD/URIC ACID: CPT | Performed by: PHYSICIAN ASSISTANT

## 2019-04-23 PROCEDURE — 40000611 ZZHCL STATISTIC MORPHOLOGY W/INTERP HEMEPATH TC 85060: Performed by: PHYSICIAN ASSISTANT

## 2019-04-23 PROCEDURE — 40000795 ZZHCL STATISTIC DNA PROCESS AND HOLD: Performed by: PHYSICIAN ASSISTANT

## 2019-04-23 PROCEDURE — 88271 CYTOGENETICS DNA PROBE: CPT | Performed by: PHYSICIAN ASSISTANT

## 2019-04-23 PROCEDURE — 40000951 ZZHCL STATISTIC BONE MARROW INTERP TC 85097: Performed by: PHYSICIAN ASSISTANT

## 2019-04-23 PROCEDURE — 86335 IMMUNFIX E-PHORSIS/URINE/CSF: CPT | Performed by: PHYSICIAN ASSISTANT

## 2019-04-23 PROCEDURE — 88264 CHROMOSOME ANALYSIS 20-25: CPT | Performed by: PHYSICIAN ASSISTANT

## 2019-04-23 PROCEDURE — 85025 COMPLETE CBC W/AUTO DIFF WBC: CPT | Performed by: PHYSICIAN ASSISTANT

## 2019-04-23 PROCEDURE — 83615 LACTATE (LD) (LDH) ENZYME: CPT | Performed by: PHYSICIAN ASSISTANT

## 2019-04-23 PROCEDURE — 40000424 ZZHCL STATISTIC BONE MARROW CORE PERF TC 38221: Performed by: PHYSICIAN ASSISTANT

## 2019-04-23 PROCEDURE — 81050 URINALYSIS VOLUME MEASURE: CPT | Performed by: PHYSICIAN ASSISTANT

## 2019-04-23 PROCEDURE — 82784 ASSAY IGA/IGD/IGG/IGM EACH: CPT | Performed by: PHYSICIAN ASSISTANT

## 2019-04-23 PROCEDURE — 40001003 ZZHCL STATISTIC FLOW INT 2-8 ABY TC 88187: Performed by: PHYSICIAN ASSISTANT

## 2019-04-23 PROCEDURE — 38222 DX BONE MARROW BX & ASPIR: CPT | Mod: ZF

## 2019-04-23 PROCEDURE — 88185 FLOWCYTOMETRY/TC ADD-ON: CPT | Performed by: PHYSICIAN ASSISTANT

## 2019-04-23 PROCEDURE — 88280 CHROMOSOME KARYOTYPE STUDY: CPT | Performed by: PHYSICIAN ASSISTANT

## 2019-04-23 PROCEDURE — 83883 ASSAY NEPHELOMETRY NOT SPEC: CPT | Performed by: PHYSICIAN ASSISTANT

## 2019-04-23 PROCEDURE — 88341 IMHCHEM/IMCYTCHM EA ADD ANTB: CPT | Performed by: PHYSICIAN ASSISTANT

## 2019-04-23 PROCEDURE — 00000402 ZZHCL STATISTIC TOTAL PROTEIN: Performed by: PHYSICIAN ASSISTANT

## 2019-04-23 PROCEDURE — 00000058 ZZHCL STATISTIC BONE MARROW ASP PERF TC 38220: Performed by: PHYSICIAN ASSISTANT

## 2019-04-23 PROCEDURE — 84100 ASSAY OF PHOSPHORUS: CPT | Performed by: PHYSICIAN ASSISTANT

## 2019-04-23 PROCEDURE — 88184 FLOWCYTOMETRY/ TC 1 MARKER: CPT | Performed by: PHYSICIAN ASSISTANT

## 2019-04-23 PROCEDURE — 00000161 ZZHCL STATISTIC H-SPHEME PROCESS B/S: Performed by: PHYSICIAN ASSISTANT

## 2019-04-23 PROCEDURE — 88275 CYTOGENETICS 100-300: CPT | Performed by: PHYSICIAN ASSISTANT

## 2019-04-23 PROCEDURE — 80053 COMPREHEN METABOLIC PANEL: CPT | Performed by: PHYSICIAN ASSISTANT

## 2019-04-23 PROCEDURE — 88342 IMHCHEM/IMCYTCHM 1ST ANTB: CPT | Performed by: PHYSICIAN ASSISTANT

## 2019-04-23 PROCEDURE — 88305 TISSUE EXAM BY PATHOLOGIST: CPT | Performed by: PHYSICIAN ASSISTANT

## 2019-04-23 PROCEDURE — 88237 TISSUE CULTURE BONE MARROW: CPT | Performed by: PHYSICIAN ASSISTANT

## 2019-04-23 PROCEDURE — 83735 ASSAY OF MAGNESIUM: CPT | Performed by: PHYSICIAN ASSISTANT

## 2019-04-23 PROCEDURE — 86334 IMMUNOFIX E-PHORESIS SERUM: CPT | Performed by: PHYSICIAN ASSISTANT

## 2019-04-23 PROCEDURE — 84165 PROTEIN E-PHORESIS SERUM: CPT | Performed by: PHYSICIAN ASSISTANT

## 2019-04-23 PROCEDURE — 96374 THER/PROPH/DIAG INJ IV PUSH: CPT

## 2019-04-23 PROCEDURE — 25000128 H RX IP 250 OP 636: Mod: ZF | Performed by: PHYSICIAN ASSISTANT

## 2019-04-23 PROCEDURE — 84166 PROTEIN E-PHORESIS/URINE/CSF: CPT | Performed by: PHYSICIAN ASSISTANT

## 2019-04-23 PROCEDURE — 88161 CYTOPATH SMEAR OTHER SOURCE: CPT | Performed by: PHYSICIAN ASSISTANT

## 2019-04-23 RX ADMIN — MIDAZOLAM HYDROCHLORIDE 2 MG: 1 INJECTION, SOLUTION INTRAMUSCULAR; INTRAVENOUS at 13:43

## 2019-04-23 NOTE — NURSING NOTE
Patient supine for 30 minutes following biopsy. After 30 minutes, dressing clean, dry and intact. Vital signs stable. See DOC flow sheets for details. Left ambulatory with her .

## 2019-04-23 NOTE — PROGRESS NOTES
[facility] ONC Adult Bone Marrow Biopsy Procedure Note  April 23, 2019      Learning needs assessment complete within 12 months? YES    DIAGNOSIS: MM D+25 s/p auto PBSCT     PROCEDURE: Unilateral Bone Marrow Biopsy and Unilateral Aspirate    LOCATION: [facility]    Patient s identification was positively verified by verbal identification and invasive procedure safety checklist was completed. Informed consent was obtained. Following the administration of 2mg Midazolam as pre-medication, patient was placed in the prone position and prepped and draped in a sterile manner. Approximately 10 cc of 1% Lidocaine was used over the left posterior iliac spine. Following this a 3 mm incision was made. Trephine bone marrow core(s) was (were) obtained from the [facility]. Bone marrow aspirates were obtained from the [facility]. Aspirates were sent for morphology, immunophenotyping, cytogenetics and molecular diagnostics gene rearrangement. A total of approximately 20 ml of marrow was aspirated. Following this procedure a sterile dressing was applied to the bone marrow biopsy site(s). The patient was placed in the supine position to maintain pressure on the biopsy site. Post-procedure wound care instructions were given.     Complications: NO    Pre-procedural pain: 0 out of 10 on the numeric pain rating scale.     Procedural pain: 4 out of 10 on the numeric pain rating scale.     Post-procedural pain assessment: 0 out of 10 on the numeric pain rating scale.     Interventions: NO    Length of procedure:20 minutes or less      Procedure performed by: Kathi Sheets PA-C #[id number]

## 2019-04-23 NOTE — NURSING NOTE
"Oncology Rooming Note    April 23, 2019 1:12 PM   Adrianna Palomino is a 63 year old female who presents for:    Chief Complaint   Patient presents with     RECHECK     Pt is here for labs and bmbx for S/P BMT for MM     Initial Vitals: Blood Pressure 114/77   Pulse 97   Temperature 97  F (36.1  C) (Oral)   Respiration 16   Weight 66.5 kg (146 lb 11.2 oz)   Last Menstrual Period 03/01/2005   Oxygen Saturation 96%   Body Mass Index 24.00 kg/m   Estimated body mass index is 24 kg/m  as calculated from the following:    Height as of 4/15/19: 1.665 m (5' 5.55\").    Weight as of this encounter: 66.5 kg (146 lb 11.2 oz). Body surface area is 1.75 meters squared.  Data Unavailable Comment: Data Unavailable   Patient's last menstrual period was 03/01/2005.  Allergies reviewed: Yes  Medications reviewed: Yes    Medications: Medication refills not needed today.  Pharmacy name entered into [employer]:    [facility]  [facility] [place], [place] - [address] [place] [address] [phone]    Clinical concerns: none       Lisa Dorsey MA              "

## 2019-04-24 LAB
ALBUMIN SERPL ELPH-MCNC: 4 G/DL (ref 3.7–5.1)
ALPHA1 GLOB SERPL ELPH-MCNC: 0.4 G/DL (ref 0.2–0.4)
ALPHA2 GLOB SERPL ELPH-MCNC: 0.9 G/DL (ref 0.5–0.9)
B-GLOBULIN SERPL ELPH-MCNC: 0.7 G/DL (ref 0.6–1)
COPATH REPORT: NORMAL
COPATH REPORT: NORMAL
GAMMA GLOB SERPL ELPH-MCNC: 0.6 G/DL (ref 0.7–1.6)
IGA SERPL-MCNC: 9 MG/DL (ref 70–380)
IGG SERPL-MCNC: 573 MG/DL (ref 695–1620)
IGM SERPL-MCNC: <4 MG/DL (ref 60–265)
KAPPA LC UR-MCNC: 0.8 MG/DL (ref 0.33–1.94)
KAPPA LC/LAMBDA SER: 1.57 {RATIO} (ref 0.26–1.65)
LAMBDA LC SERPL-MCNC: 0.51 MG/DL (ref 0.57–2.63)
M PROTEIN SERPL ELPH-MCNC: 0.3 G/DL
PROT ELPH PNL UR ELPH: NORMAL
PROT PATTERN SERPL ELPH-IMP: ABNORMAL
PROT PATTERN SERPL IFE-IMP: NORMAL
PROT PATTERN UR ELPH-IMP: NORMAL

## 2019-04-25 NOTE — PROGRESS NOTES
BMT Clinic Progress Note    Patient ID:  Adrianna Palomino is a 64 yo woman D+28 s/p auto PBSCT (#2) for Multiple Myeloma.      Interval History:   Overall doing well, mild intermittent nausea, eating better, almost back to normal appetitive, weight stable, stools more formed/semi loose, no bleeding, no mucous, no bleeding or bruising.     Otherwise on review of systems denies any fevers chills night sweats, denies any respiratory symptoms any rhinorrhea sore throat cough chest pain shortness of breath palpitations, denies any nausea or vomiting bowel movements as above, denies any rashes bleeding or bruising, her PICC line was removed last week with no issues to date.  Denies any headache or focal sensory or motor deficits.    ROS: 10 point ROS neg other than the symptoms noted above in the HPI.    Oncology history from notes from Rubia Vinson and Dr. Crespo, records reviewed by me as well:  - Initially diagnosed and with standard-risk cytogenetics in 2006  - Started on Dexamethasone for 6 cycles obtaining a complete remission and then maintenance with thalidomide and dexamethasone for 1 year.   - Stopped all therapy in the summer of 2007 due to neuropathy, and was followed on 2 years of maintenance dexamethasone.    - She was noted to slowly relapse but didn't require therapy until she was started on RVD in 11/2012.   - Completed 5 cycles before obtaining a partial remission and came to transplant.    - Underwent an autologous stem cell transplant on 04/05/2013,  total cell dose was 8 x10(6) cells/kg, and she has 8 x10(6) cells/kg left in storage. Post ASCT course was unremarkable.     - Maintenance Regimen post transplant  (10mg q28 days), off for 1 month in 9/2016 to review marrow, resumed: 9/30/2016 and continued till disease progression  - Clinical progression [new bone lesions detected on bone survey from 8/29/2018 with Rising M Jace]  - 8/29/2018 Bone Marrow Biopsy: Normocellular BM (30%) with trilineage  hematopoiesis showing a decreased myeloid to erythroid ratio (erythroid hyperplasia). - Plasma cells 5%, polytypic for light chain. Flow cytometry negative for monoclonal plasma cell population   - 10/3/2018 Bone marrow Biopsy: CT Guided Needle Core Biopsy: POSITIVE FOR MALIGNANCY - Plasma cell neoplasm, consistent with recurrent plasma cell myeloma. Comment Large aggregates of + plasma cells are present. These are kappa monoclonal.   - 10/3/2018: FISH Myeloma Panel: Positive for additional copies of D5S23/X4G144 (5p15.2), CEP9 (centromere 9), CEP11 (centromere 11), and CEP15 (centromere 15) suggesting polysomy, and low level loss of IGH (14q32). FISH Negative for loss of CDKN2C (1p32.3), gain of CKS1B (1q21.3), loss of S08L022 (13q14.3), loss of TP53 (17p13.1), and additional copies of CEP7 (centromere 7), and IGH rearrangement. 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   DSS IIA, SPEP 10/18/2018: 0.9 g/dL, 10/18/2018: 6.69 , Kappa 10.03 mg/dl, lambda 1.5 mg/dl, albumin 3.4 g/dl, creatinine 0.82, calcium 9.3, LDH and B2 microglobulin Unknown/ not available   - 10/19/2018 C1D1 : VRd in relapsed setting (Velcade: 1.3mg/m2 subcutanously Day 1, 8, 15; Revlimid: 10mg (dose reduced) day 1-14; Dexamethasone: 40mg day 1,8,15)- completed 4 cycles, complicated by salmonella stool infections prior to C3.  - M biochemical markers:  3/11/2019:              UPEP Predominantly albumin is seen which suggests glomerular damage. No obvious monoclonal protein seen and           the absence of monoclonal immunoglobulins was confirmed by immunofixation of this same urine sample.               Urine creatinine and creatiunine clearance are WNL, 24 hour urine total protein elevated at 0.63 and total urine         protein/creatiine is elevated at 0.49              SPEP 3/7/2019 Small monoclonal protein (0.3 g/dL) seen  in the gamma fraction.  Monoclonal IgG immunoglobulin of  kappa light chain type.               Kappa slightly elevated at 2.42 mg/dl              Lambda light chain WNL              K/L ratio 1.79    - BMB 3/8/2019: PreHCT: Kappa monotypic plasma cells detected by flow cytometry, compatible with minimal residual disease -Concurrent flow cytometry ([id number]) detected 0.02% kappa monotypic plasma cells. 0.02% plasma cells which express dim CD19, CD38, dim CD45, CD56 and monotypic cytoplasmic kappa immunoglobulin light chains but lack CD20.          PHYSICAL EXAM                                                                                                                                   LMP 03/01/2005    Wt Readings from Last 4 Encounters:   04/23/19 66.5 kg (146 lb 11.2 oz)   04/19/19 66.2 kg (145 lb 15.1 oz)   04/15/19 68.6 kg (151 lb 3.2 oz)   04/12/19 68.3 kg (150 lb 8 oz)     General: NAD   Eyes: sclera anicteric   Nose/Mouth/Throat: OP moist without lesions.   Lungs: CTAB  Cardiovascular: RRR, no M/R/G   Abdominal/Rectal: +BS, soft, NT, ND  Lymphatics: No edema  Skin: No rash  Additional Findings: R chest CVC NT, dressing cdi.    Labs:   Lab Results   Component Value Date    WBC 3.7 (L) 04/23/2019    ANEU 2.6 04/23/2019    HGB 11.3 (L) 04/23/2019    HCT 32.2 (L) 04/23/2019     04/23/2019     04/23/2019    POTASSIUM 3.9 04/23/2019    CHLORIDE 103 04/23/2019    CO2 23 04/23/2019    GLC 94 04/23/2019    BUN 10 04/23/2019    CR 0.74 04/23/2019    MAG 2.1 04/23/2019    INR 0.95 03/28/2019    BILITOTAL 0.3 04/23/2019    AST 24 04/23/2019    ALT 22 04/23/2019    ALKPHOS 55 04/23/2019    PROTTOTAL 6.8 04/23/2019    ALBUMIN 3.7 04/23/2019       ASSESSMENT/PLAN   Adrianna Palomino is a 64 yo woman with standard risk IgG kappa multiple myeloma, s/p auto PBSCT in 2013. Now day +28 s/p second auto.     1.  BMT:   - Received 8 million cells.   - Engrafted.  Last dose GCSF 4/9.   - 4/23/2019: day 28  biochemical amrkers and restaging BMB:   ---- No morphologic or immunohistochemical evidence of plasma cell myeloma   ---- Immune phenotype: There is a minute population (estimated at 0.002%) of Kappa restricted plasma cells identified by aberrant expression of CD56 These plasma cells are very few, but they are forming a discrete group .  ---- Non elevated FLC, SPEP with IgG kappa Monoclonal protein (0.3 g/dL) seen in the gamma fraction. UPEP with no monoclonal proteins     2.  HEME: Keep Hgb>8 and plts>10K. No transfusion needs today.                             3.  ID: Afebrile  - prophy Fluc (decrease to 100mg 50% reduction till day 100), and LD ACV.  Off levaquin now post engraftment.  - Pentamidine on 4/26 (Sulfa allergy).                                4.  GI:   - nausea controlled with prn Compazine, Zofran.  - mild mucositis, resolved.   - hx collagenous colitis, increased Imodium to twice daily 4/14, no flare per pt 4/15. Takes psyllium (stable per pt)  - 4/12 stopped protonix due to its association with collagenous colitis.      5.  FEN/Renal: Creat wnl.  - hypokalemia: 20meq BID, normal today     RTC for anniversary visits.    Patient will go back to see Dr. Crespo and start post transplant maintenance therapy.     She knows to call with any questions or concerns.    Abdi Browning MD

## 2019-04-25 NOTE — PROGRESS NOTES
This is a recent snapshot of the patient's [facility] medical record.  For current drug dose and complete information and questions, call [phone] or In Basket pool, [facility] ([id number])  CSN Number:  [id number]

## 2019-04-26 ENCOUNTER — APPOINTMENT (OUTPATIENT)
Dept: LAB | Facility: CLINIC | Age: 64
End: 2019-04-26
Attending: INTERNAL MEDICINE
Payer: COMMERCIAL

## 2019-04-26 ENCOUNTER — OFFICE VISIT (OUTPATIENT)
Dept: TRANSPLANT | Facility: CLINIC | Age: 64
End: 2019-04-26
Attending: INTERNAL MEDICINE
Payer: COMMERCIAL

## 2019-04-26 VITALS
TEMPERATURE: 97.3 F | OXYGEN SATURATION: 95 % | WEIGHT: 144 LBS | HEART RATE: 80 BPM | SYSTOLIC BLOOD PRESSURE: 111 MMHG | BODY MASS INDEX: 23.56 KG/M2 | RESPIRATION RATE: 16 BRPM | DIASTOLIC BLOOD PRESSURE: 74 MMHG

## 2019-04-26 VITALS
TEMPERATURE: 97.3 F | RESPIRATION RATE: 16 BRPM | OXYGEN SATURATION: 95 % | BODY MASS INDEX: 23.66 KG/M2 | SYSTOLIC BLOOD PRESSURE: 111 MMHG | HEART RATE: 80 BPM | WEIGHT: 144.6 LBS | DIASTOLIC BLOOD PRESSURE: 74 MMHG

## 2019-04-26 DIAGNOSIS — C90.01 MULTIPLE MYELOMA IN REMISSION (H): ICD-10-CM

## 2019-04-26 DIAGNOSIS — Z94.81 STATUS POST BONE MARROW TRANSPLANT (H): Primary | ICD-10-CM

## 2019-04-26 LAB
ANION GAP SERPL CALCULATED.3IONS-SCNC: 8 MMOL/L (ref 3–14)
BASOPHILS # BLD AUTO: 0.1 10E9/L (ref 0–0.2)
BASOPHILS NFR BLD AUTO: 2.1 %
BUN SERPL-MCNC: 10 MG/DL (ref 7–30)
CALCIUM SERPL-MCNC: 9.7 MG/DL (ref 8.5–10.1)
CHLORIDE SERPL-SCNC: 102 MMOL/L (ref 94–109)
CO2 SERPL-SCNC: 24 MMOL/L (ref 20–32)
COPATH REPORT: NORMAL
CREAT SERPL-MCNC: 0.83 MG/DL (ref 0.52–1.04)
DIFFERENTIAL METHOD BLD: ABNORMAL
EOSINOPHIL # BLD AUTO: 0 10E9/L (ref 0–0.7)
EOSINOPHIL NFR BLD AUTO: 0.2 %
ERYTHROCYTE [DISTWIDTH] IN BLOOD BY AUTOMATED COUNT: 11.9 % (ref 10–15)
GFR SERPL CREATININE-BSD FRML MDRD: 75 ML/MIN/{1.73_M2}
GLUCOSE SERPL-MCNC: 100 MG/DL (ref 70–99)
HCT VFR BLD AUTO: 33.6 % (ref 35–47)
HGB BLD-MCNC: 11.6 G/DL (ref 11.7–15.7)
IMM GRANULOCYTES # BLD: 0 10E9/L (ref 0–0.4)
IMM GRANULOCYTES NFR BLD: 0.7 %
LYMPHOCYTES # BLD AUTO: 0.3 10E9/L (ref 0.8–5.3)
LYMPHOCYTES NFR BLD AUTO: 7.3 %
MCH RBC QN AUTO: 35.8 PG (ref 26.5–33)
MCHC RBC AUTO-ENTMCNC: 34.5 G/DL (ref 31.5–36.5)
MCV RBC AUTO: 104 FL (ref 78–100)
MONOCYTES # BLD AUTO: 0.8 10E9/L (ref 0–1.3)
MONOCYTES NFR BLD AUTO: 18.7 %
NEUTROPHILS # BLD AUTO: 3 10E9/L (ref 1.6–8.3)
NEUTROPHILS NFR BLD AUTO: 71 %
NRBC # BLD AUTO: 0 10*3/UL
NRBC BLD AUTO-RTO: 0 /100
PLATELET # BLD AUTO: 250 10E9/L (ref 150–450)
POTASSIUM SERPL-SCNC: 3.6 MMOL/L (ref 3.4–5.3)
RBC # BLD AUTO: 3.24 10E12/L (ref 3.8–5.2)
SODIUM SERPL-SCNC: 135 MMOL/L (ref 133–144)
WBC # BLD AUTO: 4.2 10E9/L (ref 4–11)

## 2019-04-26 PROCEDURE — 94642 AEROSOL INHALATION TREATMENT: CPT

## 2019-04-26 PROCEDURE — 25000125 ZZHC RX 250: Mod: ZF | Performed by: PHYSICIAN ASSISTANT

## 2019-04-26 PROCEDURE — 80048 BASIC METABOLIC PNL TOTAL CA: CPT | Performed by: PHYSICIAN ASSISTANT

## 2019-04-26 PROCEDURE — G0463 HOSPITAL OUTPT CLINIC VISIT: HCPCS | Mod: 25

## 2019-04-26 PROCEDURE — 85025 COMPLETE CBC W/AUTO DIFF WBC: CPT | Performed by: PHYSICIAN ASSISTANT

## 2019-04-26 RX ORDER — HEPARIN SODIUM,PORCINE 10 UNIT/ML
5 VIAL (ML) INTRAVENOUS
Status: CANCELLED | OUTPATIENT
Start: 2019-05-26

## 2019-04-26 RX ORDER — ALBUTEROL SULFATE 0.83 MG/ML
2.5 SOLUTION RESPIRATORY (INHALATION) ONCE
Status: COMPLETED | OUTPATIENT
Start: 2019-04-26 | End: 2019-04-26

## 2019-04-26 RX ORDER — PENTAMIDINE ISETHIONATE 300 MG/300MG
300 INHALANT RESPIRATORY (INHALATION) ONCE
Status: CANCELLED
Start: 2019-05-26

## 2019-04-26 RX ORDER — PENTAMIDINE ISETHIONATE 300 MG/300MG
300 INHALANT RESPIRATORY (INHALATION) ONCE
Status: COMPLETED | OUTPATIENT
Start: 2019-04-26 | End: 2019-04-26

## 2019-04-26 RX ORDER — ALBUTEROL SULFATE 0.83 MG/ML
2.5 SOLUTION RESPIRATORY (INHALATION) ONCE
Status: CANCELLED
Start: 2019-05-26

## 2019-04-26 RX ADMIN — ALBUTEROL SULFATE 2.5 MG: 2.5 SOLUTION RESPIRATORY (INHALATION) at 14:32

## 2019-04-26 RX ADMIN — PENTAMIDINE ISETHIONATE 300 MG: 300 INHALANT RESPIRATORY (INHALATION) at 14:53

## 2019-04-26 ASSESSMENT — PAIN SCALES - GENERAL
PAINLEVEL: NO PAIN (0)
PAINLEVEL: NO PAIN (0)

## 2019-04-26 NOTE — NURSING NOTE
Chief Complaint   Patient presents with     Pentamidine inhalation     Patient with Multiple Myeloma here for Pentamdine inhalation      Albuterol 2.5mg/3ml neb administered via inhalation at 1432.  Lot: [id number]  Exp: 10/20  NDC: 0341-6772-63      Darya Peck [facility] April 26, 2019

## 2019-04-26 NOTE — LETTER
4/26/2019      RE: Adrianna Palomino  [address]  [place] [address]       BMT Clinic Progress Note    Patient ID:  Adrianna Palomino is a 64 yo woman D+28 s/p auto PBSCT (#2) for Multiple Myeloma.      Interval History:   Overall doing well, mild intermittent nausea, eating better, almost back to normal appetitive, weight stable, stools more formed/semi loose, no bleeding, no mucous, no bleeding or bruising.     Otherwise on review of systems denies any fevers chills night sweats, denies any respiratory symptoms any rhinorrhea sore throat cough chest pain shortness of breath palpitations, denies any nausea or vomiting bowel movements as above, denies any rashes bleeding or bruising, her PICC line was removed last week with no issues to date.  Denies any headache or focal sensory or motor deficits.    ROS: 10 point ROS neg other than the symptoms noted above in the HPI.    Oncology history from notes from Rubia Vinson and Dr. Crespo, records reviewed by me as well:  - Initially diagnosed and with standard-risk cytogenetics in 2006  - Started on Dexamethasone for 6 cycles obtaining a complete remission and then maintenance with thalidomide and dexamethasone for 1 year.   - Stopped all therapy in the summer of 2007 due to neuropathy, and was followed on 2 years of maintenance dexamethasone.    - She was noted to slowly relapse but didn't require therapy until she was started on RVD in 11/2012.   - Completed 5 cycles before obtaining a partial remission and came to transplant.    - Underwent an autologous stem cell transplant on 04/05/2013,  total cell dose was 8 x10(6) cells/kg, and she has 8 x10(6) cells/kg left in storage. Post ASCT course was unremarkable.     - Maintenance Regimen post transplant  (10mg q28 days), off for 1 month in 9/2016 to review marrow, resumed: 9/30/2016 and continued till disease progression  - Clinical progression [new bone lesions detected on bone survey from 8/29/2018  with Rising M Jace]  - 8/29/2018 Bone Marrow Biopsy: Normocellular BM (30%) with trilineage hematopoiesis showing a decreased myeloid to erythroid ratio (erythroid hyperplasia). - Plasma cells 5%, polytypic for light chain. Flow cytometry negative for monoclonal plasma cell population   - 10/3/2018 Bone marrow Biopsy: CT Guided Needle Core Biopsy: POSITIVE FOR MALIGNANCY - Plasma cell neoplasm, consistent with recurrent plasma cell myeloma. Comment Large aggregates of + plasma cells are present. These are kappa monoclonal.   - 10/3/2018: FISH Myeloma Panel: Positive for additional copies of D5S23/P3X341 (5p15.2), CEP9 (centromere 9), CEP11 (centromere 11), and CEP15 (centromere 15) suggesting polysomy, and low level loss of IGH (14q32). FISH Negative for loss of CDKN2C (1p32.3), gain of CKS1B (1q21.3), loss of E13L309 (13q14.3), loss of TP53 (17p13.1), and additional copies of CEP7 (centromere 7), and IGH rearrangement. 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   DSS IIA, SPEP 10/18/2018: 0.9 g/dL, 10/18/2018: 6.69 , Kappa 10.03 mg/dl, lambda 1.5 mg/dl, albumin 3.4 g/dl, creatinine 0.82, calcium 9.3, LDH and B2 microglobulin Unknown/ not available   - 10/19/2018 C1D1 : VRd in relapsed setting (Velcade: 1.3mg/m2 subcutanously Day 1, 8, 15; Revlimid: 10mg (dose reduced) day 1-14; Dexamethasone: 40mg day 1,8,15)- completed 4 cycles, complicated by salmonella stool infections prior to C3.  - M biochemical markers:  3/11/2019:              UPEP Predominantly albumin is seen which suggests glomerular damage. No obvious monoclonal protein seen and           the absence of monoclonal immunoglobulins was confirmed by immunofixation of this same urine sample.               Urine creatinine and creatiunine clearance are WNL, 24 hour urine total protein elevated at 0.63 and total urine          protein/creatiine is elevated at 0.49              SPEP 3/7/2019 Small monoclonal protein (0.3 g/dL) seen in the gamma fraction.  Monoclonal IgG immunoglobulin of  kappa light chain type.               Kappa slightly elevated at 2.42 mg/dl              Lambda light chain WNL              K/L ratio 1.79    - BMB 3/8/2019: PreHCT: Kappa monotypic plasma cells detected by flow cytometry, compatible with minimal residual disease -Concurrent flow cytometry ([id number]) detected 0.02% kappa monotypic plasma cells. 0.02% plasma cells which express dim CD19, CD38, dim CD45, CD56 and monotypic cytoplasmic kappa immunoglobulin light chains but lack CD20.          PHYSICAL EXAM                                                                                                                                   LMP 03/01/2005    Wt Readings from Last 4 Encounters:   04/23/19 66.5 kg (146 lb 11.2 oz)   04/19/19 66.2 kg (145 lb 15.1 oz)   04/15/19 68.6 kg (151 lb 3.2 oz)   04/12/19 68.3 kg (150 lb 8 oz)     General: NAD   Eyes: sclera anicteric   Nose/Mouth/Throat: OP moist without lesions.   Lungs: CTAB  Cardiovascular: RRR, no M/R/G   Abdominal/Rectal: +BS, soft, NT, ND  Lymphatics: No edema  Skin: No rash  Additional Findings: R chest CVC NT, dressing cdi.    Labs:   Lab Results   Component Value Date    WBC 3.7 (L) 04/23/2019    ANEU 2.6 04/23/2019    HGB 11.3 (L) 04/23/2019    HCT 32.2 (L) 04/23/2019     04/23/2019     04/23/2019    POTASSIUM 3.9 04/23/2019    CHLORIDE 103 04/23/2019    CO2 23 04/23/2019    GLC 94 04/23/2019    BUN 10 04/23/2019    CR 0.74 04/23/2019    MAG 2.1 04/23/2019    INR 0.95 03/28/2019    BILITOTAL 0.3 04/23/2019    AST 24 04/23/2019    ALT 22 04/23/2019    ALKPHOS 55 04/23/2019    PROTTOTAL 6.8 04/23/2019    ALBUMIN 3.7 04/23/2019       ASSESSMENT/PLAN   Adrianna Palomino is a 64 yo woman with standard risk IgG kappa multiple myeloma, s/p auto PBSCT in 2013. Now day +2 8 s/p second  auto.     1.  BMT:   - Received 8 million cells.   - Engrafted.  Last dose GCSF 4/9.   - 4/23/2019: day 28 biochemical amrkers and restaging BMB:   ---- No morphologic or immunohistochemical evidence of plasma cell myeloma   ---- Immune phenotype: There is a minute population (estimated at 0.002%) of Kappa restricted plasma cells identified by aberrant expression of CD56 These plasma cells are very few, but they are forming a discrete group .  ---- Non elevated FLC, SPEP with IgG kappa Monoclonal protein (0.3 g/dL) seen in the gamma fraction. UPEP with no monoclonal proteins     2.  HEME: Keep Hgb>8 and plts>10K. No transfusion needs today.                             3.  ID: Afebrile  - prophy Fluc (decrease to 100mg 50% reduction till day 100), and LD ACV.  Off levaquin now post engraftment.  - Pentamidine on 4/26 (Sulfa allergy).                                4.  GI:   - nausea controlled with prn Compazine, Zofran.  - mild mucositis, resolved.   - hx collagenous colitis, increased Imodium to twice daily 4/14, no flare per pt 4/15. Takes psyllium (stable per pt)  - 4/12 stopped protonix due to its association with collagenous colitis.      5.  FEN/Renal: Creat wnl.  - hypokalemia: 20meq BID, normal today     RTC for anniversary visits.    Patient will go back to see Dr. Crespo and start post transplant maintenance therapy.     She knows to call with any questions or concerns.    Abdi Browning MD

## 2019-04-26 NOTE — NURSING NOTE
Patient tolerated Pentamidine with no incident.       Darya Peck [facility] April 26, 2019

## 2019-04-26 NOTE — NURSING NOTE
"Oncology Rooming Note    April 26, 2019 2:40 PM   Adrianna Palomino is a 63 year old female who presents for:    Chief Complaint   Patient presents with     Blood Draw     labs drawn with vpt by rn.  vs taken     Oncology Clinic Visit     Patient with Multiple Myeloma here for provider visit and lab review      Initial Vitals: /74 (BP Location: Right arm, Patient Position: Sitting, Cuff Size: Adult Regular)   Pulse 80   Temp 97.3  F (36.3  C) (Oral)   Resp 16   Wt 65.3 kg (144 lb)   LMP 03/01/2005   SpO2 95%   BMI 23.56 kg/m   Estimated body mass index is 23.56 kg/m  as calculated from the following:    Height as of 4/15/19: 1.665 m (5' 5.55\").    Weight as of this encounter: 65.3 kg (144 lb). Body surface area is 1.74 meters squared.  No Pain (0) Comment: Data Unavailable   Patient's last menstrual period was 03/01/2005.  Allergies reviewed: Yes  Medications reviewed: Yes    Medications: Medication refills not needed today.  Pharmacy name entered into iZ3[employer]:    [facility]  [facility] [place], [place] - [phone]    Clinical concerns:       Darya Peck CMA              "

## 2019-04-26 NOTE — LETTER
4/26/2019       RE: Adrianna Palomino  [address]  [place] [address]     Dear Colleague,    Thank you for referring your patient, Adrianna Palomino, to the [facility] at [facility]. Please see a copy of my visit note below.    BMT Clinic Progress Note    Patient ID:  Adrianna Palomino is a 64 yo woman D+28 s/p auto PBSCT (#2) for Multiple Myeloma.      Interval History:   Overall doing well, mild intermittent nausea, eating better, almost back to normal appetitive, weight stable, stools more formed/semi loose, no bleeding, no mucous, no bleeding or bruising.     Otherwise on review of systems denies any fevers chills night sweats, denies any respiratory symptoms any rhinorrhea sore throat cough chest pain shortness of breath palpitations, denies any nausea or vomiting bowel movements as above, denies any rashes bleeding or bruising, her PICC line was removed last week with no issues to date.  Denies any headache or focal sensory or motor deficits.    ROS: 10 point ROS neg other than the symptoms noted above in the HPI.    Oncology history from notes from Rubia Vinson and Dr. Crespo, records reviewed by me as well:  - Initially diagnosed and with standard-risk cytogenetics in 2006  - Started on Dexamethasone for 6 cycles obtaining a complete remission and then maintenance with thalidomide and dexamethasone for 1 year.   - Stopped all therapy in the summer of 2007 due to neuropathy, and was followed on 2 years of maintenance dexamethasone.    - She was noted to slowly relapse but didn't require therapy until she was started on RVD in 11/2012.   - Completed 5 cycles before obtaining a partial remission and came to transplant.    - Underwent an autologous stem cell transplant on 04/05/2013,  total cell dose was 8 x10(6) cells/kg, and she has 8 x10(6) cells/kg left in storage. Post ASCT course was unremarkable.     - Maintenance Regimen  post transplant  (10mg q28 days), off for 1 month in 9/2016 to review marrow, resumed: 9/30/2016 and continued till disease progression  - Clinical progression [new bone lesions detected on bone survey from 8/29/2018 with Rising M Jace]  - 8/29/2018 Bone Marrow Biopsy: Normocellular BM (30%) with trilineage hematopoiesis showing a decreased myeloid to erythroid ratio (erythroid hyperplasia). - Plasma cells 5%, polytypic for light chain. Flow cytometry negative for monoclonal plasma cell population   - 10/3/2018 Bone marrow Biopsy: CT Guided Needle Core Biopsy: POSITIVE FOR MALIGNANCY - Plasma cell neoplasm, consistent with recurrent plasma cell myeloma. Comment Large aggregates of + plasma cells are present. These are kappa monoclonal.   - 10/3/2018: FISH Myeloma Panel: Positive for additional copies of D5S23/M7R501 (5p15.2), CEP9 (centromere 9), CEP11 (centromere 11), and CEP15 (centromere 15) suggesting polysomy, and low level loss of IGH (14q32). FISH Negative for loss of CDKN2C (1p32.3), gain of CKS1B (1q21.3), loss of A95P903 (13q14.3), loss of TP53 (17p13.1), and additional copies of CEP7 (centromere 7), and IGH rearrangement. 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   DSS IIA, SPEP 10/18/2018: 0.9 g/dL, 10/18/2018: 6.69 , Kappa 10.03 mg/dl, lambda 1.5 mg/dl, albumin 3.4 g/dl, creatinine 0.82, calcium 9.3, LDH and B2 microglobulin Unknown/ not available   - 10/19/2018 C1D1 : VRd in relapsed setting (Velcade: 1.3mg/m2 subcutanously Day 1, 8, 15; Revlimid: 10mg (dose reduced) day 1-14; Dexamethasone: 40mg day 1,8,15)- completed 4 cycles, complicated by salmonella stool infections prior to C3.  - M biochemical markers:  3/11/2019:              UPEP Predominantly albumin is seen which suggests glomerular damage. No obvious monoclonal protein seen and           the absence of monoclonal  immunoglobulins was confirmed by immunofixation of this same urine sample.               Urine creatinine and creatiunine clearance are WNL, 24 hour urine total protein elevated at 0.63 and total urine         protein/creatiine is elevated at 0.49              SPEP 3/7/2019 Small monoclonal protein (0.3 g/dL) seen in the gamma fraction.  Monoclonal IgG immunoglobulin of  kappa light chain type.               Kappa slightly elevated at 2.42 mg/dl              Lambda light chain WNL              K/L ratio 1.79    - BMB 3/8/2019: PreHCT: Kappa monotypic plasma cells detected by flow cytometry, compatible with minimal residual disease -Concurrent flow cytometry ([id number]) detected 0.02% kappa monotypic plasma cells. 0.02% plasma cells which express dim CD19, CD38, dim CD45, CD56 and monotypic cytoplasmic kappa immunoglobulin light chains but lack CD20.          PHYSICAL EXAM                                                                                                                                   LMP 03/01/2005    Wt Readings from Last 4 Encounters:   04/23/19 66.5 kg (146 lb 11.2 oz)   04/19/19 66.2 kg (145 lb 15.1 oz)   04/15/19 68.6 kg (151 lb 3.2 oz)   04/12/19 68.3 kg (150 lb 8 oz)     General: NAD   Eyes: sclera anicteric   Nose/Mouth/Throat: OP moist without lesions.   Lungs: CTAB  Cardiovascular: RRR, no M/R/G   Abdominal/Rectal: +BS, soft, NT, ND  Lymphatics: No edema  Skin: No rash  Additional Findings: R chest CVC NT, dressing cdi.    Labs:   Lab Results   Component Value Date    WBC 3.7 (L) 04/23/2019    ANEU 2.6 04/23/2019    HGB 11.3 (L) 04/23/2019    HCT 32.2 (L) 04/23/2019     04/23/2019     04/23/2019    POTASSIUM 3.9 04/23/2019    CHLORIDE 103 04/23/2019    CO2 23 04/23/2019    GLC 94 04/23/2019    BUN 10 04/23/2019    CR 0.74 04/23/2019    MAG 2.1 04/23/2019    INR 0.95 03/28/2019    BILITOTAL 0.3 04/23/2019    AST 24 04/23/2019    ALT 22 04/23/2019    ALKPHOS 55 04/23/2019     PROTTOTAL 6.8 04/23/2019    ALBUMIN 3.7 04/23/2019       ASSESSMENT/PLAN   Adrianna Palomino is a 64 yo woman with standard risk IgG kappa multiple myeloma, s/p auto PBSCT in 2013. Now day +2 8 s/p second auto.     1.  BMT:   - Received 8 million cells.   - Engrafted.  Last dose GCSF 4/9.   - 4/23/2019: day 28 biochemical amrkers and restaging BMB:   ---- No morphologic or immunohistochemical evidence of plasma cell myeloma   ---- Immune phenotype: There is a minute population (estimated at 0.002%) of Kappa restricted plasma cells identified by aberrant expression of CD56 These plasma cells are very few, but they are forming a discrete group .  ---- Non elevated FLC, SPEP with IgG kappa Monoclonal protein (0.3 g/dL) seen in the gamma fraction. UPEP with no monoclonal proteins     2.  HEME: Keep Hgb>8 and plts>10K. No transfusion needs today.                             3.  ID: Afebrile  - prophy Fluc (decrease to 100mg 50% reduction till day 100), and LD ACV.  Off levaquin now post engraftment.  - Pentamidine on 4/26 (Sulfa allergy).                                4.  GI:   - nausea controlled with prn Compazine, Zofran.  - mild mucositis, resolved.   - hx collagenous colitis, increased Imodium to twice daily 4/14, no flare per pt 4/15. Takes psyllium (stable per pt)  - 4/12 stopped protonix due to its association with collagenous colitis.      5.  FEN/Renal: Creat wnl.  - hypokalemia: 20meq BID, normal today     RTC for anniversary visits.    Patient will go back to see Dr. Crespo and start post transplant maintenance therapy.     She knows to call with any questions or concerns.    Abdi Browning MD

## 2019-05-21 LAB — COPATH REPORT: NORMAL

## 2019-05-28 ENCOUNTER — TELEPHONE (OUTPATIENT)
Dept: TRANSPLANT | Facility: CLINIC | Age: 64
End: 2019-05-28

## 2019-05-28 NOTE — TELEPHONE ENCOUNTER
Faxed recent note, bmt vaccine schedule and pentamidine orders to Dr Crespo's office [phone]. Also contacted nurse at [facility] office to discuss transition of care.

## 2019-06-06 ENCOUNTER — TELEPHONE (OUTPATIENT)
Dept: TRANSPLANT | Facility: CLINIC | Age: 64
End: 2019-06-06

## 2019-06-06 RX ORDER — ALBUTEROL SULFATE 0.83 MG/ML
2.5 SOLUTION RESPIRATORY (INHALATION) ONCE
Status: CANCELLED
Start: 2019-06-06

## 2019-06-06 RX ORDER — PENTAMIDINE ISETHIONATE 300 MG/300MG
300 INHALANT RESPIRATORY (INHALATION) ONCE
Status: CANCELLED
Start: 2019-06-06

## 2019-06-06 NOTE — TELEPHONE ENCOUNTER
Orders for [facility]  Please administer pentamidine neb 300mg every month through 3/27/2020. Faxed to [phone]. Also faxed RTW note to [facility] fax #[phone] per Maeve's request.

## 2019-07-02 ENCOUNTER — HOSPITAL ENCOUNTER (OUTPATIENT)
Facility: CLINIC | Age: 64
Setting detail: SPECIMEN
Discharge: HOME | End: 2019-07-02
Admitting: PHYSICIAN ASSISTANT
Payer: COMMERCIAL

## 2019-07-02 PROCEDURE — 81050 URINALYSIS VOLUME MEASURE: CPT | Performed by: PHYSICIAN ASSISTANT

## 2019-07-02 PROCEDURE — 86335 IMMUNFIX E-PHORSIS/URINE/CSF: CPT | Performed by: PHYSICIAN ASSISTANT

## 2019-07-02 PROCEDURE — 84166 PROTEIN E-PHORESIS/URINE/CSF: CPT | Performed by: PHYSICIAN ASSISTANT

## 2019-07-03 ENCOUNTER — OFFICE VISIT (OUTPATIENT)
Dept: TRANSPLANT | Facility: CLINIC | Age: 64
End: 2019-07-03
Attending: NURSE PRACTITIONER
Payer: COMMERCIAL

## 2019-07-03 ENCOUNTER — HOSPITAL ENCOUNTER (OUTPATIENT)
Dept: PET IMAGING | Facility: CLINIC | Age: 64
Discharge: HOME | End: 2019-07-03
Attending: PHYSICIAN ASSISTANT | Admitting: PHYSICIAN ASSISTANT
Payer: COMMERCIAL

## 2019-07-03 ENCOUNTER — APPOINTMENT (OUTPATIENT)
Dept: LAB | Facility: CLINIC | Age: 64
End: 2019-07-03
Payer: COMMERCIAL

## 2019-07-03 ENCOUNTER — ONCOLOGY SURVIVORSHIP VISIT (OUTPATIENT)
Dept: TRANSPLANT | Facility: CLINIC | Age: 64
End: 2019-07-03
Attending: PHYSICIAN ASSISTANT
Payer: COMMERCIAL

## 2019-07-03 VITALS
SYSTOLIC BLOOD PRESSURE: 116 MMHG | RESPIRATION RATE: 16 BRPM | HEART RATE: 74 BPM | OXYGEN SATURATION: 96 % | DIASTOLIC BLOOD PRESSURE: 85 MMHG

## 2019-07-03 VITALS
BODY MASS INDEX: 23.68 KG/M2 | DIASTOLIC BLOOD PRESSURE: 80 MMHG | HEART RATE: 83 BPM | OXYGEN SATURATION: 95 % | WEIGHT: 144.7 LBS | RESPIRATION RATE: 16 BRPM | SYSTOLIC BLOOD PRESSURE: 120 MMHG | TEMPERATURE: 98.3 F

## 2019-07-03 DIAGNOSIS — C90.00 MULTIPLE MYELOMA NOT HAVING ACHIEVED REMISSION (H): Primary | ICD-10-CM

## 2019-07-03 DIAGNOSIS — C90.00 MULTIPLE MYELOMA NOT HAVING ACHIEVED REMISSION (H): ICD-10-CM

## 2019-07-03 LAB
ALBUMIN SERPL-MCNC: 3.9 G/DL (ref 3.4–5)
ALP SERPL-CCNC: 56 U/L (ref 40–150)
ALT SERPL W P-5'-P-CCNC: 18 U/L (ref 0–50)
ANION GAP SERPL CALCULATED.3IONS-SCNC: 6 MMOL/L (ref 3–14)
AST SERPL W P-5'-P-CCNC: 21 U/L (ref 0–45)
BASOPHILS # BLD AUTO: 0 10E9/L (ref 0–0.2)
BASOPHILS NFR BLD AUTO: 0.9 %
BILIRUB SERPL-MCNC: 0.5 MG/DL (ref 0.2–1.3)
BUN SERPL-MCNC: 11 MG/DL (ref 7–30)
CALCIUM SERPL-MCNC: 8.8 MG/DL (ref 8.5–10.1)
CHLORIDE SERPL-SCNC: 102 MMOL/L (ref 94–109)
CO2 SERPL-SCNC: 28 MMOL/L (ref 20–32)
COLLECT DURATION TIME UR: 24 H
COLLECT DURATION TIME UR: 24 H
COPATH REPORT: NORMAL
CREAT 24H UR-MRATE: 1.02 G/(24.H) (ref 0.8–1.8)
CREAT BLD-MCNC: 0.8 MG/DL (ref 0.52–1.04)
CREAT SERPL-MCNC: 0.75 MG/DL (ref 0.52–1.04)
CREAT UR-MCNC: 46 MG/DL
DIFFERENTIAL METHOD BLD: ABNORMAL
EOSINOPHIL # BLD AUTO: 0 10E9/L (ref 0–0.7)
EOSINOPHIL NFR BLD AUTO: 0.6 %
ERYTHROCYTE [DISTWIDTH] IN BLOOD BY AUTOMATED COUNT: 12.2 % (ref 10–15)
GFR SERPL CREATININE-BSD FRML MDRD: 72 ML/MIN/{1.73_M2}
GFR SERPL CREATININE-BSD FRML MDRD: 84 ML/MIN/{1.73_M2}
GLUCOSE BLDC GLUCOMTR-MCNC: 87 MG/DL (ref 70–99)
GLUCOSE SERPL-MCNC: 86 MG/DL (ref 70–99)
HCT VFR BLD AUTO: 36.3 % (ref 35–47)
HGB BLD-MCNC: 12.1 G/DL (ref 11.7–15.7)
IMM GRANULOCYTES # BLD: 0 10E9/L (ref 0–0.4)
IMM GRANULOCYTES NFR BLD: 0.3 %
LDH SERPL L TO P-CCNC: 147 U/L (ref 81–234)
LYMPHOCYTES # BLD AUTO: 0.6 10E9/L (ref 0.8–5.3)
LYMPHOCYTES NFR BLD AUTO: 17.1 %
MAGNESIUM SERPL-MCNC: 2.2 MG/DL (ref 1.6–2.3)
MCH RBC QN AUTO: 35.8 PG (ref 26.5–33)
MCHC RBC AUTO-ENTMCNC: 33.3 G/DL (ref 31.5–36.5)
MCV RBC AUTO: 107 FL (ref 78–100)
MONOCYTES # BLD AUTO: 0.3 10E9/L (ref 0–1.3)
MONOCYTES NFR BLD AUTO: 9.7 %
NEUTROPHILS # BLD AUTO: 2.5 10E9/L (ref 1.6–8.3)
NEUTROPHILS NFR BLD AUTO: 71.4 %
NRBC # BLD AUTO: 0 10*3/UL
NRBC BLD AUTO-RTO: 0 /100
PHOSPHATE SERPL-MCNC: 3.8 MG/DL (ref 2.5–4.5)
PLATELET # BLD AUTO: 195 10E9/L (ref 150–450)
POTASSIUM SERPL-SCNC: 3.6 MMOL/L (ref 3.4–5.3)
PROT 24H UR-MRATE: <0.11 G/(24.H) (ref 0.04–0.23)
PROT SERPL-MCNC: 6.4 G/DL (ref 6.8–8.8)
PROT SERPL-MCNC: 6.7 G/DL (ref 6.8–8.8)
PROT UR-MCNC: <0.05 G/L
PROT/CREAT 24H UR: NORMAL G/G CR (ref 0–0.2)
RBC # BLD AUTO: 3.38 10E12/L (ref 3.8–5.2)
SODIUM SERPL-SCNC: 136 MMOL/L (ref 133–144)
SPECIMEN VOL UR: 2220 ML
SPECIMEN VOL UR: 2220 ML
URATE SERPL-MCNC: 4.2 MG/DL (ref 2.6–6)
WBC # BLD AUTO: 3.5 10E9/L (ref 4–11)

## 2019-07-03 PROCEDURE — 25000128 H RX IP 250 OP 636: Performed by: PHYSICIAN ASSISTANT

## 2019-07-03 PROCEDURE — 71260 CT THORAX DX C+: CPT | Mod: PS

## 2019-07-03 PROCEDURE — 00000161 ZZHCL STATISTIC H-SPHEME PROCESS B/S: Performed by: PHYSICIAN ASSISTANT

## 2019-07-03 PROCEDURE — 84156 ASSAY OF PROTEIN URINE: CPT | Performed by: PHYSICIAN ASSISTANT

## 2019-07-03 PROCEDURE — 00000058 ZZHCL STATISTIC BONE MARROW ASP PERF TC 38220: Performed by: PHYSICIAN ASSISTANT

## 2019-07-03 PROCEDURE — 82962 GLUCOSE BLOOD TEST: CPT

## 2019-07-03 PROCEDURE — 80053 COMPREHEN METABOLIC PANEL: CPT | Performed by: PHYSICIAN ASSISTANT

## 2019-07-03 PROCEDURE — 88185 FLOWCYTOMETRY/TC ADD-ON: CPT | Performed by: PHYSICIAN ASSISTANT

## 2019-07-03 PROCEDURE — 83735 ASSAY OF MAGNESIUM: CPT | Performed by: PHYSICIAN ASSISTANT

## 2019-07-03 PROCEDURE — 88271 CYTOGENETICS DNA PROBE: CPT | Performed by: PHYSICIAN ASSISTANT

## 2019-07-03 PROCEDURE — 88275 CYTOGENETICS 100-300: CPT | Performed by: PHYSICIAN ASSISTANT

## 2019-07-03 PROCEDURE — 83615 LACTATE (LD) (LDH) ENZYME: CPT | Performed by: PHYSICIAN ASSISTANT

## 2019-07-03 PROCEDURE — 82784 ASSAY IGA/IGD/IGG/IGM EACH: CPT | Performed by: PHYSICIAN ASSISTANT

## 2019-07-03 PROCEDURE — 84550 ASSAY OF BLOOD/URIC ACID: CPT | Performed by: PHYSICIAN ASSISTANT

## 2019-07-03 PROCEDURE — 74177 CT ABD & PELVIS W/CONTRAST: CPT | Mod: PS

## 2019-07-03 PROCEDURE — 88237 TISSUE CULTURE BONE MARROW: CPT | Performed by: PHYSICIAN ASSISTANT

## 2019-07-03 PROCEDURE — 40000951 ZZHCL STATISTIC BONE MARROW INTERP TC 85097: Performed by: PHYSICIAN ASSISTANT

## 2019-07-03 PROCEDURE — 84155 ASSAY OF PROTEIN SERUM: CPT | Performed by: PHYSICIAN ASSISTANT

## 2019-07-03 PROCEDURE — 88305 TISSUE EXAM BY PATHOLOGIST: CPT | Performed by: PHYSICIAN ASSISTANT

## 2019-07-03 PROCEDURE — 00000402 ZZHCL STATISTIC TOTAL PROTEIN: Performed by: PHYSICIAN ASSISTANT

## 2019-07-03 PROCEDURE — 83883 ASSAY NEPHELOMETRY NOT SPEC: CPT | Performed by: PHYSICIAN ASSISTANT

## 2019-07-03 PROCEDURE — 81050 URINALYSIS VOLUME MEASURE: CPT | Performed by: PHYSICIAN ASSISTANT

## 2019-07-03 PROCEDURE — 40000795 ZZHCL STATISTIC DNA PROCESS AND HOLD: Performed by: PHYSICIAN ASSISTANT

## 2019-07-03 PROCEDURE — 84100 ASSAY OF PHOSPHORUS: CPT | Performed by: PHYSICIAN ASSISTANT

## 2019-07-03 PROCEDURE — 84165 PROTEIN E-PHORESIS SERUM: CPT | Performed by: PHYSICIAN ASSISTANT

## 2019-07-03 PROCEDURE — 88342 IMHCHEM/IMCYTCHM 1ST ANTB: CPT | Performed by: PHYSICIAN ASSISTANT

## 2019-07-03 PROCEDURE — G0463 HOSPITAL OUTPT CLINIC VISIT: HCPCS | Mod: 25,ZF

## 2019-07-03 PROCEDURE — A9552 F18 FDG: HCPCS | Performed by: PHYSICIAN ASSISTANT

## 2019-07-03 PROCEDURE — 82565 ASSAY OF CREATININE: CPT

## 2019-07-03 PROCEDURE — 25000128 H RX IP 250 OP 636: Mod: ZF | Performed by: NURSE PRACTITIONER

## 2019-07-03 PROCEDURE — 88341 IMHCHEM/IMCYTCHM EA ADD ANTB: CPT | Performed by: PHYSICIAN ASSISTANT

## 2019-07-03 PROCEDURE — 40000424 ZZHCL STATISTIC BONE MARROW CORE PERF TC 38221: Performed by: PHYSICIAN ASSISTANT

## 2019-07-03 PROCEDURE — 86334 IMMUNOFIX E-PHORESIS SERUM: CPT | Performed by: PHYSICIAN ASSISTANT

## 2019-07-03 PROCEDURE — 85025 COMPLETE CBC W/AUTO DIFF WBC: CPT | Performed by: PHYSICIAN ASSISTANT

## 2019-07-03 PROCEDURE — 88311 DECALCIFY TISSUE: CPT | Performed by: PHYSICIAN ASSISTANT

## 2019-07-03 PROCEDURE — 38222 DX BONE MARROW BX & ASPIR: CPT | Mod: ZF

## 2019-07-03 PROCEDURE — 34300033 ZZH RX 343: Performed by: PHYSICIAN ASSISTANT

## 2019-07-03 PROCEDURE — 88184 FLOWCYTOMETRY/ TC 1 MARKER: CPT | Performed by: PHYSICIAN ASSISTANT

## 2019-07-03 PROCEDURE — 40001003 ZZHCL STATISTIC FLOW INT 2-8 ABY TC 88187: Performed by: PHYSICIAN ASSISTANT

## 2019-07-03 PROCEDURE — 88264 CHROMOSOME ANALYSIS 20-25: CPT | Performed by: PHYSICIAN ASSISTANT

## 2019-07-03 PROCEDURE — 40000611 ZZHCL STATISTIC MORPHOLOGY W/INTERP HEMEPATH TC 85060: Performed by: PHYSICIAN ASSISTANT

## 2019-07-03 PROCEDURE — 88280 CHROMOSOME KARYOTYPE STUDY: CPT | Performed by: PHYSICIAN ASSISTANT

## 2019-07-03 PROCEDURE — 88161 CYTOPATH SMEAR OTHER SOURCE: CPT | Performed by: PHYSICIAN ASSISTANT

## 2019-07-03 RX ORDER — PETROLATUM,WHITE/LANOLIN
OINTMENT (GRAM) TOPICAL 2 TIMES DAILY
COMMUNITY

## 2019-07-03 RX ORDER — IOPAMIDOL 755 MG/ML
89 INJECTION, SOLUTION INTRAVASCULAR ONCE
Status: COMPLETED | OUTPATIENT
Start: 2019-07-03 | End: 2019-07-03

## 2019-07-03 RX ORDER — CHLORAL HYDRATE 500 MG
2 CAPSULE ORAL EVERY OTHER DAY
COMMUNITY

## 2019-07-03 RX ORDER — FERROUS SULFATE 325(65) MG
325 TABLET ORAL
COMMUNITY

## 2019-07-03 RX ADMIN — FLUDEOXYGLUCOSE F-18 10.16 MCI.: 500 INJECTION, SOLUTION INTRAVENOUS at 08:02

## 2019-07-03 RX ADMIN — MIDAZOLAM 2 MG: 1 INJECTION INTRAMUSCULAR; INTRAVENOUS at 12:32

## 2019-07-03 RX ADMIN — IOPAMIDOL 89 ML: 755 INJECTION, SOLUTION INTRAVENOUS at 08:29

## 2019-07-03 ASSESSMENT — PAIN SCALES - GENERAL
PAINLEVEL: NO PAIN (0)
PAINLEVEL: NO PAIN (0)

## 2019-07-03 NOTE — NURSING NOTE
BMT Teaching Flowsheet   Teaching Topic: post biopsy instructions    Person(s) involved in teaching: Patient  Motivation Level  Asks Questions: Yes  Eager to Learn: Yes  Cooperative: Yes  Receptive (willing/able to accept information): Yes    Patient demonstrates understanding of the following:   - Reason for the appointment, diagnosis and treatment plan: Yes  - Knowledge of proper use of medications and conditions for which they are ordered (with special attention to potential side effects or drug interactions): Yes  - Which situations necessitate calling provider and whom to contact: Yes    Teaching concerns addressed: reviewed activity restrictions if received premeds, potential for bleeding and actions to take if develops any of the issues below    Proper use and care of (medical equipment, care aids, etc.) Yes  Pain management techniques: Yes  Patient instructed on hand hygiene: Yes     Teaching concerns addressed: Bone marrow biopsy and infection prevention.      Instructional Materials Used/Given: Pt instructed to keep bmbx site clean and dry for 24hrs. Pt educated to monitor site for signs of infection such as redness, rash, oozing, puss, bleeding, pain, and elevated temp. Pt instructed to go to ER if any signs of infection should occur. Pt educated to not operate machinery due to receiving versed. Pt and  verbalize understanding.     Post procedure: Pt vss, ambulating, site is c,d,i. PIV d/c'd. Pt d/c'd with  as .      Time spent with patient: 0 minutes.

## 2019-07-03 NOTE — PROGRESS NOTES
BMT ONC Adult Bone Marrow Biopsy Procedure Note  July 3, 2019  LMP 03/01/2005      Learning needs assessment complete within 12 months? YES    DIAGNOSIS: MM     PROCEDURE: Unilateral Bone Marrow Biopsy and Unilateral Aspirate    LOCATION: [facility] [place] 2nd Floor    Patient s identification was positively verified by verbal identification and invasive procedure safety checklist was completed. Informed consent was obtained. Following the administration of Midazolam 2mg as pre-medication, patient was placed in the prone position and prepped and draped in a sterile manner. Approximately 10 cc of 1% Lidocaine was used over the left posterior iliac spine. Following this a 3 mm incision was made. Trephine bone marrow core(s) was (were) obtained from the [facility]. Bone marrow aspirates were obtained from the IC. Aspirates were sent for morphology, immunophenotyping, cytogenetics and molecular diagnostics . A total of approximately 20 ml of marrow was aspirated. Following this procedure a sterile dressing was applied to the bone marrow biopsy site(s). The patient was placed in the supine position to maintain pressure on the biopsy site. Post-procedure wound care instructions were given.     Complications: NO    Pre-procedural pain: 0 out of 10 on the numeric pain rating scale.     Procedural pain: 4 out of 10 on the numeric pain rating scale.     Post-procedural pain assessment: 0 out of 10 on the numeric pain rating scale.     Interventions: NO    Length of procedure:20 minutes or less      Procedure performed by: Kaylee Scott     No

## 2019-07-03 NOTE — PROGRESS NOTES
BMT Day 100 Post-Autologous Transplant   Survivorship Care Plan  Date: July 3, 2019    Treatment Team:  Patient Care Team:  Katiuska Dawson as PCP - General (Internal Medicine)  Avani Mera LICSW as   Swati Lacy MD as MD (Ophthalmology)  Bro Crespo as Referring Physician (Hematology & Oncology)  Abdi Bill MD as BMT Physician (Transplant)  Beth Pollack MSW as  (BMT - Adult)  Sunshine Gomez RN as BMT Nurse Coordinator (Transplant)    Date of Transplant: 3/29/19  Diagnosis MM Multiple myeloma  HCT Type Autologous    Prep Regimen Melphalan   Donor Source self    GVHD Prophylaxis Not neded  Clinical Trials None      Oncology Treatment History:   - Initially diagnosed and with standard-risk cytogenetics in 2006  - Started on Dexamethasone for 6 cycles obtaining a complete remission and then maintenance with thalidomide and dexamethasone for 1 year.   - Stopped all therapy in the summer of 2007 due to neuropathy, and was followed on 2 years of maintenance dexamethasone.    - She was noted to slowly relapse but didn't require therapy until she was started on RVD in 11/2012.   - Completed 5 cycles before obtaining a partial remission and came to transplant.    - Underwent an autologous stem cell transplant on 04/05/2013,  total cell dose was 8 x10(6) cells/kg, and she has 8 x10(6) cells/kg left in storage. Post ASCT course was unremarkable.     - Maintenance Regimen post transplant  (10mg q28 days), off for 1 month in 9/2016 to review marrow, resumed: 9/30/2016 and continued till disease progression  - Clinical progression [new bone lesions detected on bone survey from 8/29/2018 with [facility]]  - 8/29/2018 Bone Marrow Biopsy: Normocellular BM (30%) with trilineage hematopoiesis showing a decreased myeloid to erythroid ratio (erythroid hyperplasia). - Plasma cells 5%, polytypic for light chain. Flow cytometry negative for monoclonal plasma cell population    - 10/3/2018 Bone marrow Biopsy: CT Guided Needle Core Biopsy: POSITIVE FOR MALIGNANCY - Plasma cell neoplasm, consistent with recurrent plasma cell myeloma. Comment Large aggregates of + plasma cells are present. These are kappa monoclonal.   - 10/3/2018: FISH Myeloma Panel: Positive for additional copies of D5S23/J6W156 (5p15.2), CEP9 (centromere 9), CEP11 (centromere 11), and CEP15 (centromere 15) suggesting polysomy, and low level loss of IGH (14q32). FISH Negative for loss of CDKN2C (1p32.3), gain of CKS1B (1q21.3), loss of C81G861 (13q14.3), loss of TP53 (17p13.1), and additional copies of CEP7 (centromere 7), and IGH rearrangement. 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   DSS IIA, SPEP 10/18/2018: 0.9 g/dL, 10/18/2018: 6.69 , Kappa 10.03 mg/dl, lambda 1.5 mg/dl, albumin 3.4 g/dl, creatinine 0.82, calcium 9.3, LDH and B2 microglobulin Unknown/ not available   - 10/19/2018 C1D1 : VRd in relapsed setting (Velcade: 1.3mg/m2 subcutanously Day 1, 8, 15; Revlimid: 10mg (dose reduced) day 1-14; Dexamethasone: 40mg day 1,8,15)- completed 4 cycles, complicated by salmonella stool infections prior to C3.  - M biochemical markers:  3/11/2019:              UPEP Predominantly albumin is seen which suggests glomerular damage. No obvious monoclonal protein seen and           the absence of monoclonal immunoglobulins was confirmed by immunofixation of this same urine sample.               Urine creatinine and creatiunine clearance are WNL, 24 hour urine total protein elevated at 0.63 and total urine         protein/creatiine is elevated at 0.49              SPEP 3/7/2019 Small monoclonal protein (0.3 g/dL) seen in the gamma fraction.  Monoclonal IgG immunoglobulin of  kappa light chain type.               Kappa slightly elevated at 2.42 mg/dl              Lambda light chain WNL              K/L ratio  1.79    Survivorship Self-Management Goals:  Goal  Activities Resources   Dentist appt Routine cleaning    Eye appt Routine eye exam    Annual physical exam Healthcare maintenace      General Recommendations:     Ask your physician if you can return to work and usual activity. If you need more time for recovery, please let your physician know.    You can resume a regular diet.    Avoid large crowds and exposure to friends or family members with cold like symptoms while your immune system continues to rebuild.    You can drive without limitations as long as:  o Your platelet count is > 50,000 and your neutrophil count is >1,000.  o You are not taking any narcotics or sedative medications  o You feel well enough to preform driving activities     You can resume sexual activity with your partner. Women should avoid becoming pregnant for 2 years. Birth control should be used to prevent pregnancy.    Vaccinations will be given at your 1- and 2-year anniversary visits in the BMT clinic.  An exception is the influenza vaccine, which can be given after day +60 post-transplant during influenza season.    Continue to take prescribed medications to prevent infection     You can stop wearing your blue N95 mask after your first day + 28 anniversary visit.     For general health concerns you can be seen by your primary care provider.   o If you have questions about your transplant or follow up tests contact your BMT RN coordinator.      Survivorship Care Plan:     This individualized care plan is designed to inform you and your healthcare team of recommended follow-up visits, tests, health maintenance activities, and cancer screening you should receive after transplant.     Possible Late-Effects:  Possible late or long-term complications include infections, cataracts, cancer (oral, skin, blood, solid tumors), cavities, changes in lung function, pneumonia, heart failure, low thyroid and other gland function, kidney and liver  disease, nerve pain/neuropathy, altered cognitive function, muscle weakness, osteoporosis/weak bones, stress, depression, anxiety, sexual dysfunction, and infertility.    Health Monitoring: In the months following autologous stem cell transplant you may experience health conditions that require further evaluation by your healthcare team.     Contact your provider if you experience any of the following conditions or symptoms:   ? Cold symptoms or fever higher than 100.5 F  ? Blurry or double vision, eye pain  ? Persistent or recurrent headaches  ? High blood pressure or high blood sugar   ? Difficulty breathing, cough, shortness of breath  ? Chest pain or palpitations  ? Swelling in legs or extremities    ? Unusual bruising or bleeding  ? Symptoms of cancer recurrence   ? Changes in moles or new skin lesions   ? Increased or worsening fatigue  ? Heat or cold intolerance  ? Loss of interest in sex or erectile dysfunction  ? Muscle weakness or increasing difficulty with daily activates   ? Pain or tingling in hands or feet  ? Changes in memory or difficulty concentrating (chemo brain) that does not improve within 3 months after your transplant  ? Abdominal pain on the upper right side or yellowing of the skin  ? Mouth pain, oral lesions, bleeding gums, or chronic dry mouth   ? Stress, depression, or anxiety  ? Inability to stop using narcotic pain medications or addictive substances    Healthy Behaviors & Lifestyle:    Schedule a full dental exam. Your mouth should be evaluated for oral cancer yearly. Follow your dentist s recommendations for cleanings.    Schedule an eye exam yearly. After transplant your risk for cataracts is higher. Let your eye doctor know you have had cancer treatment.    Avoid smoking or tobacco products.    Wear sunscreen and protect skin from sunburns.     Limit daily alcohol intake to less than 1 drink for women and 2 for men.    Follow general guidelines for physical activity as recommended  by the [place] Office of Disease Prevention & Health Promotion:    Avoid Inactivity: Some physical activity is better than none -- any amount has benefits.  Do Aerobic Activity: Do aerobic physical activity in episodes of at least 10 minutes, as many times as possible per day. This could include going for walks or using the elliptical or stationary bike.  Ask your doctor what aerobic activities would be safe and helpful for you, and set a goal for yourself!  Strengthen Muscles: Do muscle-strengthening activities (such as lifting light weights or using resistance bands and/or going up and down stairs) that are moderate or high intensity and involve all major muscle groups at least 4 days a week.      Recommendations & Follow-Up:    Referrals & tests placed during this visit:   Orders Placed This Encounter   Procedures     Protein timed urine with Creat Ratio     Protein electrophoresis timed urine     Protein immunofixation urine     Creatinine timed urine     Bone marrow biopsy     Leukemia Lymphoma Evaluation     CHROMOSOME BONE MARROW With Professional Interpretation     FISH With Professional Interpretation     Process and hold DNA     CBC with platelets differential     Comprehensive metabolic panel     Magnesium     Phosphorus     Lactate Dehydrogenase     Uric acid       When to see your BMT Provider: 7/9/19    When to see your Primary Care Provider or Local Hematologist/Oncologist: Defer to Dr Ganesh Browning regarding maintenance chemotherapy plan.       Miranda Thayer I spent 30 minutes with the patient and family, over half of which was spent discussing preventative care strategies, self-management practices, and potential complications after transplant.      Information used for these recommendations was obtained from: Flavio NRubia S., LILLY Metzger, JOSE Beach, SAMINA Holland, JYOTI Finn., ES Goode.,   Clair, K. M. (2013). Prevalence of Hematopoietic Cell Transplant Survivors in the [place].  Biology of Blood and Marrow Transplantation?: Journal of the American Society for Blood and Marrow Transplantation, 19(10), 4101-9287. doi 10.1016/j.bbmt.2013.07.020

## 2019-07-03 NOTE — NURSING NOTE
"Oncology Rooming Note    July 3, 2019 11:43 AM   Adrianna Palomino is a 64 year old female who presents for:    Chief Complaint   Patient presents with     RECHECK     survivorship visit with provider, labs, med review, Multiple Myeloma     Initial Vitals: /80   Pulse 83   Temp 98.3  F (36.8  C) (Oral)   Resp 16   Wt 65.6 kg (144 lb 11.2 oz)   LMP 03/01/2005   SpO2 95%   BMI 23.68 kg/m   Estimated body mass index is 23.68 kg/m  as calculated from the following:    Height as of 4/15/19: 1.665 m (5' 5.55\").    Weight as of this encounter: 65.6 kg (144 lb 11.2 oz). Body surface area is 1.74 meters squared.  No Pain (0) Comment: Data Unavailable   Patient's last menstrual period was 03/01/2005.  Allergies reviewed: Yes  Medications reviewed: Yes    Medications: Medication refills not needed today.  Pharmacy name entered into [facility]:    [facility]  [facility] [place], [place] - [phone]    Clinical concerns: none     Survivorship folder given to patient today and patient instructed to fill out survey.     TRACEY PARKER RN              "

## 2019-07-05 LAB
ALBUMIN SERPL ELPH-MCNC: 4.4 G/DL (ref 3.7–5.1)
ALPHA1 GLOB SERPL ELPH-MCNC: 0.3 G/DL (ref 0.2–0.4)
ALPHA2 GLOB SERPL ELPH-MCNC: 0.8 G/DL (ref 0.5–0.9)
B-GLOBULIN SERPL ELPH-MCNC: 0.5 G/DL (ref 0.6–1)
COPATH REPORT: NORMAL
COPATH REPORT: NORMAL
GAMMA GLOB SERPL ELPH-MCNC: 0.6 G/DL (ref 0.7–1.6)
IGA SERPL-MCNC: 8 MG/DL (ref 70–380)
IGG SERPL-MCNC: 631 MG/DL (ref 695–1620)
IGM SERPL-MCNC: 19 MG/DL (ref 60–265)
KAPPA LC UR-MCNC: NORMAL MG/DL (ref 0.33–1.94)
KAPPA LC/LAMBDA SER: NORMAL {RATIO} (ref 0.26–1.65)
LAMBDA LC SERPL-MCNC: NORMAL MG/DL (ref 0.57–2.63)
M PROTEIN SERPL ELPH-MCNC: 0.3 G/DL
PROT ELPH PNL UR ELPH: NORMAL
PROT PATTERN SERPL ELPH-IMP: ABNORMAL
PROT PATTERN SERPL IFE-IMP: NORMAL
PROT PATTERN UR ELPH-IMP: NORMAL

## 2019-07-06 LAB
KAPPA LC FREE SER-MCNC: 1.83 MG/DL (ref 0.33–1.94)
KAPPA LC FREE/LAMBDA FREE SER NEPH: 9.63 {RATIO} (ref 0.26–1.65)
LAMBDA LC FREE SERPL-MCNC: 0.19 MG/DL (ref 0.57–2.63)

## 2019-07-08 NOTE — PROGRESS NOTES
BMT Clinic Progress Note    Patient ID:  Adrianna Palomino is a 62 yo woman D+202 s/p auto PBSCT (#2) for Multiple Myeloma.      Interval History:   Overall doing well. Back to work part time.  Has not started on maintenance posttransplant.  Back to normal lifestyle and energy level.  Back to normal appetitive, weight stable, stools formed, no bleeding, no mucous, no bleeding or bruising.     Otherwise on review of systems denies any fevers chills night sweats, denies any respiratory symptoms any rhinorrhea sore throat cough chest pain shortness of breath palpitations, denies any nausea or vomiting bowel movements as above, denies any rashes bleeding or bruising, her PICC line was removed last week with no issues to date.  Denies any headache or focal sensory or motor deficits.    ROS: 10 point ROS neg other than the symptoms noted above in the HPI.    Oncology history from notes from Rubia Vinson and Dr. Crespo, records reviewed by me as well:  - Initially diagnosed and with standard-risk cytogenetics in 2006  - Started on Dexamethasone for 6 cycles obtaining a complete remission and then maintenance with thalidomide and dexamethasone for 1 year.   - Stopped all therapy in the summer of 2007 due to neuropathy, and was followed on 2 years of maintenance dexamethasone.    - She was noted to slowly relapse but didn't require therapy until she was started on RVD in 11/2012.   - Completed 5 cycles before obtaining a partial remission and came to transplant.    - Underwent an autologous stem cell transplant on 04/05/2013,  total cell dose was 8 x10(6) cells/kg, and she has 8 x10(6) cells/kg left in storage. Post ASCT course was unremarkable.     - Maintenance Regimen post transplant  (10mg q28 days), off for 1 month in 9/2016 to review marrow, resumed: 9/30/2016 and continued till disease progression  - Clinical progression [new bone lesions detected on bone survey from 8/29/2018 with [facility]]  - 8/29/2018 Bone  Marrow Biopsy: Normocellular BM (30%) with trilineage hematopoiesis showing a decreased myeloid to erythroid ratio (erythroid hyperplasia). - Plasma cells 5%, polytypic for light chain. Flow cytometry negative for monoclonal plasma cell population   - 10/3/2018 Bone marrow Biopsy: CT Guided Needle Core Biopsy: POSITIVE FOR MALIGNANCY - Plasma cell neoplasm, consistent with recurrent plasma cell myeloma. Comment Large aggregates of + plasma cells are present. These are kappa monoclonal.   - 10/3/2018: FISH Myeloma Panel: Positive for additional copies of D5S23/W8T879 (5p15.2), CEP9 (centromere 9), CEP11 (centromere 11), and CEP15 (centromere 15) suggesting polysomy, and low level loss of IGH (14q32). FISH Negative for loss of CDKN2C (1p32.3), gain of CKS1B (1q21.3), loss of [id number] (13q14.3), loss of TP53 (17p13.1), and additional copies of CEP7 (centromere 7), and IGH rearrangement. 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   DSS IIA, SPEP 10/18/2018: 0.9 g/dL, 10/18/2018: 6.69 , Kappa 10.03 mg/dl, lambda 1.5 mg/dl, albumin 3.4 g/dl, creatinine 0.82, calcium 9.3, LDH and B2 microglobulin Unknown/ not available   - 10/19/2018 C1D1 : VRd in relapsed setting (Velcade: 1.3mg/m2 subcutanously Day 1, 8, 15; Revlimid: 10mg (dose reduced) day 1-14; Dexamethasone: 40mg day 1,8,15)- completed 4 cycles, complicated by salmonella stool infections prior to C3.  - M biochemical markers:  3/11/2019:              UPEP Predominantly albumin is seen which suggests glomerular damage. No obvious monoclonal protein seen and           the absence of monoclonal immunoglobulins was confirmed by immunofixation of this same urine sample.               Urine creatinine and creatiunine clearance are WNL, 24 hour urine total protein elevated at 0.63 and total urine         protein/creatiine is elevated at 0.49               SPEP 3/7/2019 Small monoclonal protein (0.3 g/dL) seen in the gamma fraction.  Monoclonal IgG immunoglobulin of  kappa light chain type.               Kappa slightly elevated at 2.42 mg/dl              Lambda light chain WNL              K/L ratio 1.79    - BMB 3/8/2019: PreHCT: Kappa monotypic plasma cells detected by flow cytometry, compatible with minimal residual disease -Concurrent flow cytometry ([id number]) detected 0.02% kappa monotypic plasma cells. 0.02% plasma cells which express dim CD19, CD38, dim CD45, CD56 and monotypic cytoplasmic kappa immunoglobulin light chains but lack CD20.          PHYSICAL EXAM                                                                                                                                   LMP 03/01/2005    Wt Readings from Last 4 Encounters:   07/03/19 65.6 kg (144 lb 11.2 oz)   04/26/19 65.3 kg (144 lb)   04/26/19 65.6 kg (144 lb 9.6 oz)   04/23/19 66.5 kg (146 lb 11.2 oz)     General: NAD   Eyes: sclera anicteric   Nose/Mouth/Throat: OP moist without lesions.   Lungs: CTAB  Cardiovascular: RRR, no M/R/G   Abdominal/Rectal: +BS, soft, NT, ND  Lymphatics: No edema  Skin: No rash  Additional Findings: R chest CVC NT, dressing cdi.    Labs:   Lab Results   Component Value Date    WBC 3.5 (L) 07/03/2019    ANEU 2.5 07/03/2019    HGB 12.1 07/03/2019    HCT 36.3 07/03/2019     07/03/2019     07/03/2019    POTASSIUM 3.6 07/03/2019    CHLORIDE 102 07/03/2019    CO2 28 07/03/2019    GLC 86 07/03/2019    BUN 11 07/03/2019    CR 0.75 07/03/2019    MAG 2.2 07/03/2019    INR 0.95 03/28/2019    BILITOTAL 0.5 07/03/2019    AST 21 07/03/2019    ALT 18 07/03/2019    ALKPHOS 56 07/03/2019    PROTTOTAL 6.4 (L) 07/03/2019    PROTTOTAL 6.7 (L) 07/03/2019    ALBUMIN 3.9 07/03/2019   PET FDG/CT )SH  12/11/2017 2:24 PM    INDICATION: Multiple myeloma staging, initial treatment strategy.  TECHNIQUE: Serum glucose level 83 mg/dL. One hour post intravenous   administration  of 11.5 mCi F-18 FDG, PET imaging was performed from the   vertex to the distal thighs utilizing attenuation correction with   concurrent axial CT and PET/CT image fusion. Dose reduction techniques were   used.  COMPARISON: Skeletal survey from 07/12/2016 is reviewed.    FINDINGS: Scattered FDG avid lesions throughout the axial and appendicular   skeleton, a few examples of which include in the right ilium (SUVmax 8.5),   anterior left acetabulum (SUVmax 16.6), and mid left femoral diaphysis   (SUVmax 3.5).    3 mm right upper lobe pulmonary nodule, too small for PET characterization.   Tortuous aorta. Trace calcified atherosclerosis. Hysterectomy. Left os   acromiale. Old bilateral rib fractures. Old, non-FDG avid compression   fractures of T11, T12, L1, and L4. Mild degenerative change in the spine.    CONCLUSION:  Scattered FDG avid skeletal lesions consistent with active myeloma. No   evidence of extraosseous disease.    PET/CT 7/8/2019  BONES:   Multiple FDG avid lesions are present in the axial skeleton as  follows:   - 3.0 x 1.7 cm sclerotic lesion in the right iliac bone with Max SUV  9.3.    - 2.1 x 1.5 x 1.9 cm lesion in the anterior medial left acetabulum  with max SUV 9.7.   - Nodular focus of increased FDG uptake in the right inferior sacrum  (coronal image 64) with max SUV 6.3.  Focal uptake in the right T2 facet, likely metastatic.  Focal uptake in the left rib indeterminant.     No other sites of suspicious FDG uptake within the axial or  appendicular skeleton. Numerous sclerotic lesions throughout the  thoracolumbar spine, consistent with known myeloma, without suspicious  FDG uptake. Multilevel disc height narrowing most severe at T11, T12,  and L1 with exaggerated thoracic kyphosis. Non-FDG avid sclerotic  focus in the posterior left fourth rib. Healed fractures of the right  3rd rib (posteriorly) and left 6th / 8th ribs.                                                                       IMPRESSION:   In this patient with known multiple myeloma s/p recent bone marrow  transplant:  1.  Multiple FDG-avid foci within the pelvic and sacral bones  consistent with active myeloma. Focal uptake in right T2 facet  concerning for metastasis. Numerous sclerotic lesions throughout the  remaining skeleton do not demonstrate FDG uptake.  2.  No evidence of extraosseous disease progression.  3.  Colonic hyperenhancement and submucosal edema with increased FDG  uptake most likely represents infectious or inflammatory colitis.  [id number]-[id number] disease is also on the differential.    ASSESSMENT/PLAN   Adrianna Palomino is a 62 yo woman with standard risk IgG kappa multiple myeloma, s/p auto PBSCT in 2013. Now day +28 s/p second auto.     1.  BMT:   - Received 8 million cells.   - Engrafted.  Last dose GCSF 4/9.   - 4/23/2019: day 28 biochemical amrkers and restaging BMB:   ---- No morphologic or immunohistochemical evidence of plasma cell myeloma   ---- Immune phenotype: There is a minute population (estimated at 0.002%) of Kappa restricted plasma cells identified by aberrant expression of CD56 These plasma cells are very few, but they are forming a discrete group .  ---- Non elevated FLC, SPEP with IgG kappa Monoclonal protein (0.3 g/dL) seen in the gamma fraction. UPEP with no monoclonal proteins  - 7/3/2019: day 100 biochemical amrkers and restaging BMB:   ---- BMB:  Low level recurrent/persistent plasma cell neoplasm with very rare (less than 1%) Kappa monotypic plasma cells   ---- Immune phenotype: 0.02% plasma cells which express CD19 (dim), CD38, CD45 (dim), CD56 and monotypic cytoplasmic kappa immunoglobulin light chains but lack CD20. Also present are: 0.03% polytypic plasma cells  And 3% polytypic B cells T  ---- Non elevated FLC, SPEP with IgG kappa Monoclonal protein (0.3 g/dL) seen in the gamma fraction. UPEP with no monoclonal proteins   --------- hemoglobin improved to 12.1 now within normal,  creatinine stable, no hypercalcemia, no proteinuria, UA, LDH WNL, patient with no evidence of clinical relapse and with stable serum monoclonal proteins at 0.3g/dl not meeting criteria for an increase of 25% from the lowest confirmed response with no new urine monoclonal protein detected and no elevation in free light chains, therefore the patient does not meet criteria for clinical relapse or progressive disease per NCCN guidelines. However, PET/Ct with FDG active lesions, clinically asymptomatic, unclear if those are present NEW lesions on imaging a meeting criteria for progressive disease per NCCN guidelines/IM WG revised criteria.   --------- Will upload PET/CT from 2017 for comparison with 7/3/2019 PET/CT post ASCT, and requesting read here.    2.  HEME: Keep Hgb>8 and plts>10K. No transfusion needs today.                             3.  ID: Afebrile  - prophy Fluc (decrease to 100mg 50% reduction till day 100, stop back), and LD ACV (would keep for one year). Off levaquin now post engraftment.  - Pentamidine monthly for one year (Sulfa allergy).                                4.  GI:   - nausea controlled with prn Compazine, Zofran.  - mild mucositis, resolved.   - hx collagenous colitis, increased Imodium to twice daily 4/14, no flare per pt 4/15. Takes psyllium (stable per pt)  - 4/12 stopped protonix due to its association with collagenous colitis.      5.  FEN/Renal: Creat wnl.  - hypokalemia: resolved    RTC   -Based on above.    Plan:  - PET/Ct with FDG active lesions, clinically asymptomatic, unclear if those are present NEW lesions on imaging a meeting criteria for progressive disease per NCCN guidelines/IM WG revised criteria.   --------- Will upload PET/CT from 2017 for comparison with 7/3/2019 PET/CT post ASCT, and requesting read here.  - Patient will go back to see Dr. Crespo for post transplant maintenance therapy or more intense therapy for disease relapse depending on above.  For post transplant  maintenance, since the patient has been exposed to lenalidomide twice we would prefer bortezomib posttransplant maintenance.  In case the patient requires therapy for disease relapse post transplant we would prepare the daratumumab-based triple therapy regimen.    She knows to call with any questions or concerns.  Above plan was explained in detail with the patient and all her questions were answered to the best of my ability.  She knows to follow-up plan as well according to above.    Abdi Browning MD

## 2019-07-09 ENCOUNTER — OFFICE VISIT (OUTPATIENT)
Dept: TRANSPLANT | Facility: CLINIC | Age: 64
End: 2019-07-09
Attending: INTERNAL MEDICINE
Payer: COMMERCIAL

## 2019-07-09 VITALS
BODY MASS INDEX: 23.86 KG/M2 | DIASTOLIC BLOOD PRESSURE: 79 MMHG | TEMPERATURE: 98.1 F | SYSTOLIC BLOOD PRESSURE: 122 MMHG | WEIGHT: 145.8 LBS | RESPIRATION RATE: 16 BRPM | OXYGEN SATURATION: 98 % | HEART RATE: 72 BPM

## 2019-07-09 DIAGNOSIS — C90.00 MULTIPLE MYELOMA NOT HAVING ACHIEVED REMISSION (H): Primary | ICD-10-CM

## 2019-07-09 PROCEDURE — G0463 HOSPITAL OUTPT CLINIC VISIT: HCPCS

## 2019-07-09 ASSESSMENT — PAIN SCALES - GENERAL: PAINLEVEL: NO PAIN (0)

## 2019-07-09 NOTE — NURSING NOTE
"Oncology Rooming Note    July 9, 2019 10:04 AM   Adrianna Palomino is a 64 year old female who presents for:    Chief Complaint   Patient presents with     RECHECK     Pt is here for a rtn for BAN Review of S/P BMT MM     Initial Vitals: Blood Pressure 122/79   Pulse 72   Temperature 98.1  F (36.7  C) (Oral)   Respiration 16   Weight 66.1 kg (145 lb 12.8 oz)   Last Menstrual Period 03/01/2005   Oxygen Saturation 98%   Body Mass Index 23.86 kg/m   Estimated body mass index is 23.86 kg/m  as calculated from the following:    Height as of 4/15/19: 1.665 m (5' 5.55\").    Weight as of this encounter: 66.1 kg (145 lb 12.8 oz). Body surface area is 1.75 meters squared.  No Pain (0) Comment: Data Unavailable   Patient's last menstrual period was 03/01/2005.  Allergies reviewed: Yes  Medications reviewed: Yes    Medications: Medication refills not needed today.  Pharmacy name entered into [employer]:    [facility]  [facility] [place], [place] - [address] [place] [address] [phone]    Clinical concerns: none       Lisa Dorsey MA              "

## 2019-07-11 ENCOUNTER — TRANSFERRED RECORDS (OUTPATIENT)
Dept: HEALTH INFORMATION MANAGEMENT | Facility: CLINIC | Age: 64
End: 2019-07-11

## 2019-07-18 LAB — COPATH REPORT: NORMAL

## 2019-07-31 LAB — COPATH REPORT: NORMAL

## 2019-08-14 ENCOUNTER — TELEPHONE (OUTPATIENT)
Dept: NEPHROLOGY | Facility: CLINIC | Age: 64
End: 2019-08-14

## 2019-09-19 ENCOUNTER — OFFICE VISIT (OUTPATIENT)
Dept: TRANSPLANT | Facility: CLINIC | Age: 64
End: 2019-09-19
Attending: INTERNAL MEDICINE
Payer: COMMERCIAL

## 2019-09-19 ENCOUNTER — APPOINTMENT (OUTPATIENT)
Dept: LAB | Facility: CLINIC | Age: 64
End: 2019-09-19
Payer: COMMERCIAL

## 2019-09-19 VITALS
TEMPERATURE: 98.5 F | OXYGEN SATURATION: 98 % | HEART RATE: 68 BPM | DIASTOLIC BLOOD PRESSURE: 89 MMHG | RESPIRATION RATE: 16 BRPM | SYSTOLIC BLOOD PRESSURE: 132 MMHG

## 2019-09-19 DIAGNOSIS — C90.00 MULTIPLE MYELOMA NOT HAVING ACHIEVED REMISSION (H): Primary | ICD-10-CM

## 2019-09-19 LAB
ALBUMIN SERPL-MCNC: 3.9 G/DL (ref 3.4–5)
ALP SERPL-CCNC: 61 U/L (ref 40–150)
ALT SERPL W P-5'-P-CCNC: 24 U/L (ref 0–50)
ANION GAP SERPL CALCULATED.3IONS-SCNC: 6 MMOL/L (ref 3–14)
AST SERPL W P-5'-P-CCNC: 33 U/L (ref 0–45)
BASOPHILS # BLD AUTO: 0 10E9/L (ref 0–0.2)
BASOPHILS NFR BLD AUTO: 0.2 %
BILIRUB SERPL-MCNC: 0.8 MG/DL (ref 0.2–1.3)
BUN SERPL-MCNC: 13 MG/DL (ref 7–30)
CALCIUM SERPL-MCNC: 9 MG/DL (ref 8.5–10.1)
CHLORIDE SERPL-SCNC: 104 MMOL/L (ref 94–109)
CO2 SERPL-SCNC: 25 MMOL/L (ref 20–32)
COLLECT DURATION TIME UR: 24 H
COLLECT DURATION TIME UR: 24 H
CREAT 24H UR-MRATE: 0.99 G/(24.H) (ref 0.8–1.8)
CREAT SERPL-MCNC: 0.72 MG/DL (ref 0.52–1.04)
CREAT UR-MCNC: 47 MG/DL
DIFFERENTIAL METHOD BLD: ABNORMAL
EOSINOPHIL # BLD AUTO: 0 10E9/L (ref 0–0.7)
EOSINOPHIL NFR BLD AUTO: 0.9 %
ERYTHROCYTE [DISTWIDTH] IN BLOOD BY AUTOMATED COUNT: 13 % (ref 10–15)
GFR SERPL CREATININE-BSD FRML MDRD: 88 ML/MIN/{1.73_M2}
GLUCOSE SERPL-MCNC: 88 MG/DL (ref 70–99)
HCT VFR BLD AUTO: 34 % (ref 35–47)
HGB BLD-MCNC: 11.4 G/DL (ref 11.7–15.7)
IMM GRANULOCYTES # BLD: 0 10E9/L (ref 0–0.4)
IMM GRANULOCYTES NFR BLD: 0.2 %
LYMPHOCYTES # BLD AUTO: 1.1 10E9/L (ref 0.8–5.3)
LYMPHOCYTES NFR BLD AUTO: 26.8 %
MAGNESIUM SERPL-MCNC: 2.2 MG/DL (ref 1.6–2.3)
MCH RBC QN AUTO: 36.1 PG (ref 26.5–33)
MCHC RBC AUTO-ENTMCNC: 33.5 G/DL (ref 31.5–36.5)
MCV RBC AUTO: 108 FL (ref 78–100)
MONOCYTES # BLD AUTO: 0.4 10E9/L (ref 0–1.3)
MONOCYTES NFR BLD AUTO: 10.1 %
NEUTROPHILS # BLD AUTO: 2.6 10E9/L (ref 1.6–8.3)
NEUTROPHILS NFR BLD AUTO: 61.8 %
NRBC # BLD AUTO: 0 10*3/UL
NRBC BLD AUTO-RTO: 0 /100
PHOSPHATE SERPL-MCNC: 4.2 MG/DL (ref 2.5–4.5)
PLATELET # BLD AUTO: 212 10E9/L (ref 150–450)
POTASSIUM SERPL-SCNC: 5.1 MMOL/L (ref 3.4–5.3)
PROT 24H UR-MRATE: 0.13 G/(24.H) (ref 0.04–0.23)
PROT SERPL-MCNC: 7 G/DL (ref 6.8–8.8)
PROT UR-MCNC: 0.06 G/L
PROT/CREAT 24H UR: 0.13 G/G CR (ref 0–0.2)
RBC # BLD AUTO: 3.16 10E12/L (ref 3.8–5.2)
SODIUM SERPL-SCNC: 135 MMOL/L (ref 133–144)
SPECIMEN VOL UR: 2120 ML
SPECIMEN VOL UR: 2120 ML
URATE SERPL-MCNC: 4 MG/DL (ref 2.6–6)
WBC # BLD AUTO: 4.3 10E9/L (ref 4–11)

## 2019-09-19 PROCEDURE — 88271 CYTOGENETICS DNA PROBE: CPT | Performed by: PHYSICIAN ASSISTANT

## 2019-09-19 PROCEDURE — 81050 URINALYSIS VOLUME MEASURE: CPT | Performed by: PHYSICIAN ASSISTANT

## 2019-09-19 PROCEDURE — 88264 CHROMOSOME ANALYSIS 20-25: CPT | Performed by: PHYSICIAN ASSISTANT

## 2019-09-19 PROCEDURE — 84550 ASSAY OF BLOOD/URIC ACID: CPT | Performed by: INTERNAL MEDICINE

## 2019-09-19 PROCEDURE — 82784 ASSAY IGA/IGD/IGG/IGM EACH: CPT | Performed by: INTERNAL MEDICINE

## 2019-09-19 PROCEDURE — 86334 IMMUNOFIX E-PHORESIS SERUM: CPT | Performed by: INTERNAL MEDICINE

## 2019-09-19 PROCEDURE — 88280 CHROMOSOME KARYOTYPE STUDY: CPT | Performed by: PHYSICIAN ASSISTANT

## 2019-09-19 PROCEDURE — 25000128 H RX IP 250 OP 636: Mod: ZF | Performed by: NURSE PRACTITIONER

## 2019-09-19 PROCEDURE — 83735 ASSAY OF MAGNESIUM: CPT | Performed by: INTERNAL MEDICINE

## 2019-09-19 PROCEDURE — 88185 FLOWCYTOMETRY/TC ADD-ON: CPT | Performed by: PHYSICIAN ASSISTANT

## 2019-09-19 PROCEDURE — 88275 CYTOGENETICS 100-300: CPT | Performed by: PHYSICIAN ASSISTANT

## 2019-09-19 PROCEDURE — 83883 ASSAY NEPHELOMETRY NOT SPEC: CPT | Performed by: INTERNAL MEDICINE

## 2019-09-19 PROCEDURE — 40000795 ZZHCL STATISTIC DNA PROCESS AND HOLD: Performed by: PHYSICIAN ASSISTANT

## 2019-09-19 PROCEDURE — 88161 CYTOPATH SMEAR OTHER SOURCE: CPT | Performed by: PHYSICIAN ASSISTANT

## 2019-09-19 PROCEDURE — 80053 COMPREHEN METABOLIC PANEL: CPT | Performed by: INTERNAL MEDICINE

## 2019-09-19 PROCEDURE — 88305 TISSUE EXAM BY PATHOLOGIST: CPT | Performed by: PHYSICIAN ASSISTANT

## 2019-09-19 PROCEDURE — 84166 PROTEIN E-PHORESIS/URINE/CSF: CPT | Performed by: PHYSICIAN ASSISTANT

## 2019-09-19 PROCEDURE — 00000402 ZZHCL STATISTIC TOTAL PROTEIN: Performed by: INTERNAL MEDICINE

## 2019-09-19 PROCEDURE — 40000424 ZZHCL STATISTIC BONE MARROW CORE PERF TC 38221: Performed by: PHYSICIAN ASSISTANT

## 2019-09-19 PROCEDURE — 00000161 ZZHCL STATISTIC H-SPHEME PROCESS B/S: Performed by: PHYSICIAN ASSISTANT

## 2019-09-19 PROCEDURE — 88311 DECALCIFY TISSUE: CPT | Performed by: PHYSICIAN ASSISTANT

## 2019-09-19 PROCEDURE — 84156 ASSAY OF PROTEIN URINE: CPT | Performed by: PHYSICIAN ASSISTANT

## 2019-09-19 PROCEDURE — 82306 VITAMIN D 25 HYDROXY: CPT | Performed by: INTERNAL MEDICINE

## 2019-09-19 PROCEDURE — 40000951 ZZHCL STATISTIC BONE MARROW INTERP TC 85097: Performed by: PHYSICIAN ASSISTANT

## 2019-09-19 PROCEDURE — 00000058 ZZHCL STATISTIC BONE MARROW ASP PERF TC 38220: Performed by: PHYSICIAN ASSISTANT

## 2019-09-19 PROCEDURE — 88342 IMHCHEM/IMCYTCHM 1ST ANTB: CPT | Performed by: PHYSICIAN ASSISTANT

## 2019-09-19 PROCEDURE — 40001003 ZZHCL STATISTIC FLOW INT 2-8 ABY TC 88187: Performed by: PHYSICIAN ASSISTANT

## 2019-09-19 PROCEDURE — 40000611 ZZHCL STATISTIC MORPHOLOGY W/INTERP HEMEPATH TC 85060: Performed by: PHYSICIAN ASSISTANT

## 2019-09-19 PROCEDURE — 84100 ASSAY OF PHOSPHORUS: CPT | Performed by: INTERNAL MEDICINE

## 2019-09-19 PROCEDURE — 85025 COMPLETE CBC W/AUTO DIFF WBC: CPT | Performed by: INTERNAL MEDICINE

## 2019-09-19 PROCEDURE — 38222 DX BONE MARROW BX & ASPIR: CPT | Mod: ZF

## 2019-09-19 PROCEDURE — 86335 IMMUNFIX E-PHORSIS/URINE/CSF: CPT | Performed by: PHYSICIAN ASSISTANT

## 2019-09-19 PROCEDURE — 36415 COLL VENOUS BLD VENIPUNCTURE: CPT

## 2019-09-19 PROCEDURE — 88184 FLOWCYTOMETRY/ TC 1 MARKER: CPT | Performed by: PHYSICIAN ASSISTANT

## 2019-09-19 PROCEDURE — 88341 IMHCHEM/IMCYTCHM EA ADD ANTB: CPT | Performed by: PHYSICIAN ASSISTANT

## 2019-09-19 PROCEDURE — 83615 LACTATE (LD) (LDH) ENZYME: CPT | Performed by: INTERNAL MEDICINE

## 2019-09-19 PROCEDURE — 84165 PROTEIN E-PHORESIS SERUM: CPT | Performed by: INTERNAL MEDICINE

## 2019-09-19 PROCEDURE — 88237 TISSUE CULTURE BONE MARROW: CPT | Performed by: PHYSICIAN ASSISTANT

## 2019-09-19 RX ORDER — ASPIRIN 81 MG/1
81 TABLET, CHEWABLE ORAL DAILY
COMMUNITY

## 2019-09-19 RX ADMIN — MIDAZOLAM 1 MG: 1 INJECTION INTRAMUSCULAR; INTRAVENOUS at 13:07

## 2019-09-19 NOTE — NURSING NOTE
BMT Teaching Flowsheet        Teaching Topic: bmbx    Person(s) involved in teaching: Patient  Motivation Level  Asks Questions: Yes  Eager to Learn: Yes  Cooperative: Yes  Receptive (willing/able to accept information): Yes  Any cultural factors/Mosque beliefs that may influence understanding or compliance? No    Patient demonstrates understanding of the following:  - Reason for the appointment, diagnosis and treatment plan: Yes  - Knowledge of proper use of medications and conditions for which they are ordered (with special attention to potential side effects or drug interactions): Yes  - Which situations necessitate calling provider and whom to contact: Yes    Teaching concerns addressed: activity level, pain management, site care      Time spent with patient: 30 minutes.    Specific Concerns: No, explain: Patient received Versed.  Tolerated well.  Patient had no complaints of pain post procedure.  Dressing is clean, dry, and intact.  Vital signs are stable.

## 2019-09-19 NOTE — NURSING NOTE
Patient supine for 30 minutes following biopsy. After 30 minutes, dressing clean, dry and intact. Vital signs stable. See DOC flow sheets for details. Left ambulatory with family member.    SHIVA CHÁVEZ RN

## 2019-09-19 NOTE — PROGRESS NOTES
BMT ONC Adult Bone Marrow Biopsy Procedure Note  September 19, 2019  LMP 03/01/2005      Learning needs assessment complete within 12 months? YES    DIAGNOSIS: MM     PROCEDURE: Unilateral Bone Marrow Biopsy and Unilateral Aspirate    [place] [facility] [facility]    Patient s identification was positively verified by verbal identification and invasive procedure safety checklist was completed. Informed consent was obtained. Following the administration of Midazolam 1mgas pre-medication, patient was placed in the prone position and prepped and draped in a sterile manner. Approximately 15 cc of 1% Lidocaine was used over the left posterior iliac spine. Following this a 3 mm incision was made. Trephine bone marrow core(s) was (were) obtained from the [facility]. Bone marrow aspirates were obtained from the [facility]. Aspirates were sent for morphology, immunophenotyping, cytogenetics and molecular diagnostics . A total of approximately 20 ml of marrow was aspirated. Following this procedure a sterile dressing was applied to the bone marrow biopsy site(s). The patient was placed in the supine position to maintain pressure on the biopsy site. Post-procedure wound care instructions were given.     Complications: NO    Pre-procedural pain: 0 out of 10 on the numeric pain rating scale.     Procedural pain: 4 out of 10 on the numeric pain rating scale.     Post-procedural pain assessment: 0 out of 10 on the numeric pain rating scale.     Interventions: NO    Length of procedure:20 minutes or less      Procedure performed by: Kaylee Scott

## 2019-09-20 LAB
ALBUMIN MFR UR ELPH: 59.1 %
ALBUMIN SERPL ELPH-MCNC: 4.4 G/DL (ref 3.7–5.1)
ALPHA1 GLOB MFR UR ELPH: 7.3 %
ALPHA1 GLOB SERPL ELPH-MCNC: 0.3 G/DL (ref 0.2–0.4)
ALPHA2 GLOB MFR UR ELPH: 11.7 %
ALPHA2 GLOB SERPL ELPH-MCNC: 0.7 G/DL (ref 0.5–0.9)
B-GLOBULIN MFR UR ELPH: 7.6 %
B-GLOBULIN SERPL ELPH-MCNC: 0.6 G/DL (ref 0.6–1)
COPATH REPORT: NORMAL
GAMMA GLOB MFR UR ELPH: 14.3 %
GAMMA GLOB SERPL ELPH-MCNC: 0.6 G/DL (ref 0.7–1.6)
IGA SERPL-MCNC: 10 MG/DL (ref 70–380)
IGG SERPL-MCNC: 570 MG/DL (ref 695–1620)
IGM SERPL-MCNC: 26 MG/DL (ref 60–265)
KAPPA LC UR-MCNC: 1.66 MG/DL (ref 0.33–1.94)
KAPPA LC/LAMBDA SER: 2.52 {RATIO} (ref 0.26–1.65)
LAMBDA LC SERPL-MCNC: 0.66 MG/DL (ref 0.57–2.63)
M PROTEIN MFR UR ELPH: 0 %
M PROTEIN SERPL ELPH-MCNC: 0.3 G/DL
PROT ELPH PNL UR ELPH: NORMAL
PROT PATTERN SERPL ELPH-IMP: ABNORMAL
PROT PATTERN SERPL IFE-IMP: NORMAL
PROT PATTERN UR ELPH-IMP: ABNORMAL

## 2019-09-23 LAB
DEPRECATED CALCIDIOL+CALCIFEROL SERPL-MC: <45 UG/L (ref 20–75)
VITAMIN D2 SERPL-MCNC: <5 UG/L
VITAMIN D3 SERPL-MCNC: 40 UG/L

## 2019-09-24 ENCOUNTER — OFFICE VISIT (OUTPATIENT)
Dept: TRANSPLANT | Facility: CLINIC | Age: 64
End: 2019-09-24
Attending: INTERNAL MEDICINE
Payer: COMMERCIAL

## 2019-09-24 VITALS
SYSTOLIC BLOOD PRESSURE: 121 MMHG | DIASTOLIC BLOOD PRESSURE: 80 MMHG | HEIGHT: 66 IN | HEART RATE: 79 BPM | RESPIRATION RATE: 16 BRPM | BODY MASS INDEX: 23.96 KG/M2 | TEMPERATURE: 97.9 F | WEIGHT: 149.1 LBS | OXYGEN SATURATION: 97 %

## 2019-09-24 DIAGNOSIS — Z94.81 STATUS POST BONE MARROW TRANSPLANT (H): Primary | ICD-10-CM

## 2019-09-24 DIAGNOSIS — C90.00 MULTIPLE MYELOMA NOT HAVING ACHIEVED REMISSION (H): ICD-10-CM

## 2019-09-24 LAB
COPATH REPORT: NORMAL
COPATH REPORT: NORMAL

## 2019-09-24 PROCEDURE — G0463 HOSPITAL OUTPT CLINIC VISIT: HCPCS | Mod: ZF

## 2019-09-24 ASSESSMENT — MIFFLIN-ST. JEOR: SCORE: 1235.92

## 2019-09-24 ASSESSMENT — PAIN SCALES - GENERAL: PAINLEVEL: NO PAIN (0)

## 2019-09-24 NOTE — NURSING NOTE
"Oncology Rooming Note    September 24, 2019 9:12 AM   Adrianna Palomino is a 64 year old female who presents for:    Chief Complaint   Patient presents with     RECHECK     Day 180 ban review- Multiple myeloma     Initial Vitals: /80 (BP Location: Right arm, Patient Position: Sitting, Cuff Size: Adult Regular)   Pulse 79   Temp 97.9  F (36.6  C) (Oral)   Resp 16   Ht 1.665 m (5' 5.55\")   Wt 67.6 kg (149 lb 1.6 oz)   LMP 03/01/2005   SpO2 97%   BMI 24.40 kg/m   Estimated body mass index is 24.4 kg/m  as calculated from the following:    Height as of this encounter: 1.665 m (5' 5.55\").    Weight as of this encounter: 67.6 kg (149 lb 1.6 oz). Body surface area is 1.77 meters squared.  No Pain (0) Comment: Data Unavailable   Patient's last menstrual period was 03/01/2005.  Allergies reviewed: Yes  Medications reviewed: Yes    Medications: Medication refills not needed today.  Pharmacy name entered into [employer]:    [facility]  [facility] [place], [place] - [phone]    Clinical concerns: Pt complains of feeling out of breath.  Dr. Ganesh Browning was notified.      Jennifer Soriano CMA              "

## 2019-09-24 NOTE — PROGRESS NOTES
BMT Clinic Progress Note    Patient ID:  Adrianna Palomino is a 62 yo woman D+179 s/p auto PBSCT (#2) for Multiple Myeloma.      Interval History:   Overall doing well. Back to work full time now.  Notices [facility] for 2 days, recently had a cold one month ago, no F/C/NS. Stable rhinorrhea for her (years), no sore throat, no cough, no SOB at rest, no palpitations.  Started on maintenance posttransplant, valcade every other week. Stable neuropathy.  Back to normal lifestyle and energy level.  Back to normal appetitive, weight stable, stools formed, no bleeding, no mucous, no bleeding or bruising.     Otherwise on review of systems denies any fevers chills night sweats, denies any respiratory symptoms any rhinorrhea sore throat cough chest pain shortness of breath palpitations, denies any nausea or vomiting bowel movements as above, denies any rashes bleeding or bruising, her PICC line was removed last week with no issues to date.  Denies any headache or focal sensory or motor deficits.    ROS: 10 point ROS neg other than the symptoms noted above in the HPI.    Oncology history from notes from Rubia Vinson and Dr. Crespo, records reviewed by me as well:  - Initially diagnosed and with standard-risk cytogenetics in 2006  - Started on Dexamethasone for 6 cycles obtaining a complete remission and then maintenance with thalidomide and dexamethasone for 1 year.   - Stopped all therapy in the summer of 2007 due to neuropathy, and was followed on 2 years of maintenance dexamethasone.    - She was noted to slowly relapse but didn't require therapy until she was started on RVD in 11/2012.   - Completed 5 cycles before obtaining a partial remission and came to transplant.    - Underwent an autologous stem cell transplant on 04/05/2013,  total cell dose was 8 x10(6) cells/kg, and she has 8 x10(6) cells/kg left in storage. Post ASCT course was unremarkable.     - Maintenance Regimen post transplant  (10mg q28 days), off for 1 month  in 9/2016 to review marrow, resumed: 9/30/2016 and continued till disease progression  - Clinical progression [new bone lesions detected on bone survey from 8/29/2018 with [facility]]  - 8/29/2018 Bone Marrow Biopsy: Normocellular BM (30%) with trilineage hematopoiesis showing a decreased myeloid to erythroid ratio (erythroid hyperplasia). - Plasma cells 5%, polytypic for light chain. Flow cytometry negative for monoclonal plasma cell population   - 10/3/2018 Bone marrow Biopsy: CT Guided Needle Core Biopsy: POSITIVE FOR MALIGNANCY - Plasma cell neoplasm, consistent with recurrent plasma cell myeloma. Comment Large aggregates of + plasma cells are present. These are kappa monoclonal.   - 10/3/2018: FISH Myeloma Panel: Positive for additional copies of D5S23/S6T073 (5p15.2), CEP9 (centromere 9), CEP11 (centromere 11), and CEP15 (centromere 15) suggesting polysomy, and low level loss of IGH (14q32). FISH Negative for loss of CDKN2C (1p32.3), gain of CKS1B (1q21.3), loss of [id number] (13q14.3), loss of TP53 (17p13.1), and additional copies of CEP7 (centromere 7), and IGH rearrangement. 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   DSS IIA, SPEP 10/18/2018: 0.9 g/dL, 10/18/2018: 6.69 , Kappa 10.03 mg/dl, lambda 1.5 mg/dl, albumin 3.4 g/dl, creatinine 0.82, calcium 9.3, LDH and B2 microglobulin Unknown/ not available   - 10/19/2018 C1D1 : VRd in relapsed setting (Velcade: 1.3mg/m2 subcutanously Day 1, 8, 15; Revlimid: 10mg (dose reduced) day 1-14; Dexamethasone: 40mg day 1,8,15)- completed 4 cycles, complicated by salmonella stool infections prior to C3.  - M biochemical markers:  3/11/2019:              UPEP Predominantly albumin is seen which suggests glomerular damage. No obvious monoclonal protein seen and           the absence of monoclonal immunoglobulins was confirmed by immunofixation of this  "same urine sample.               Urine creatinine and creatiunine clearance are WNL, 24 hour urine total protein elevated at 0.63 and total urine         protein/creatiine is elevated at 0.49              SPEP 3/7/2019 Small monoclonal protein (0.3 g/dL) seen in the gamma fraction.  Monoclonal IgG immunoglobulin of  kappa light chain type.               Kappa slightly elevated at 2.42 mg/dl              Lambda light chain WNL              K/L ratio 1.79    - BMB 3/8/2019: PreHCT: Kappa monotypic plasma cells detected by flow cytometry, compatible with minimal residual disease -Concurrent flow cytometry ([id number]) detected 0.02% kappa monotypic plasma cells. 0.02% plasma cells which express dim CD19, CD38, dim CD45, CD56 and monotypic cytoplasmic kappa immunoglobulin light chains but lack CD20.          PHYSICAL EXAM                                                                                                                                   /80 (BP Location: Right arm, Patient Position: Sitting, Cuff Size: Adult Regular)   Pulse 79   Temp 97.9  F (36.6  C) (Oral)   Resp 16   Ht 1.665 m (5' 5.55\")   Wt 67.6 kg (149 lb 1.6 oz)   LMP 03/01/2005   SpO2 97%   BMI 24.40 kg/m     Wt Readings from Last 4 Encounters:   09/24/19 67.6 kg (149 lb 1.6 oz)   07/09/19 66.1 kg (145 lb 12.8 oz)   07/03/19 65.6 kg (144 lb 11.2 oz)   04/26/19 65.3 kg (144 lb)     General: NAD   Eyes: sclera anicteric   Nose/Mouth/Throat: OP moist without lesions.   Lungs: CTAB  Cardiovascular: RRR, no M/R/G   Abdominal/Rectal: +BS, soft, NT, ND  Lymphatics: No edema  Skin: No rash  Additional Findings: R chest CVC NT, dressing cdi.    Labs:   Lab Results   Component Value Date    WBC 4.3 09/19/2019    ANEU 2.6 09/19/2019    HGB 11.4 (L) 09/19/2019    HCT 34.0 (L) 09/19/2019     09/19/2019     09/19/2019    POTASSIUM 5.1 09/19/2019    CHLORIDE 104 09/19/2019    CO2 25 09/19/2019    GLC 88 09/19/2019    BUN 13 09/19/2019 "    CR 0.72 09/19/2019    MAG 2.2 09/19/2019    INR 0.95 03/28/2019    BILITOTAL 0.8 09/19/2019    AST 33 09/19/2019    ALT 24 09/19/2019    ALKPHOS 61 09/19/2019    PROTTOTAL 7.0 09/19/2019    ALBUMIN 3.9 09/19/2019   PET FDG/CT )SH  12/11/2017 2:24 PM    INDICATION: Multiple myeloma staging, initial treatment strategy.  TECHNIQUE: Serum glucose level 83 mg/dL. One hour post intravenous   administration of 11.5 mCi F-18 FDG, PET imaging was performed from the   vertex to the distal thighs utilizing attenuation correction with   concurrent axial CT and PET/CT image fusion. Dose reduction techniques were   used.  COMPARISON: Skeletal survey from 07/12/2016 is reviewed.    FINDINGS: Scattered FDG avid lesions throughout the axial and appendicular   skeleton, a few examples of which include in the right ilium (SUVmax 8.5),   anterior left acetabulum (SUVmax 16.6), and mid left femoral diaphysis   (SUVmax 3.5).    3 mm right upper lobe pulmonary nodule, too small for PET characterization.   Tortuous aorta. Trace calcified atherosclerosis. Hysterectomy. Left os   acromiale. Old bilateral rib fractures. Old, non-FDG avid compression   fractures of T11, T12, L1, and L4. Mild degenerative change in the spine.    CONCLUSION:  Scattered FDG avid skeletal lesions consistent with active myeloma. No   evidence of extraosseous disease.      PET/CT 7/3/2019  BONES:   Multiple FDG avid lesions are present in the axial skeleton as  follows:   - 3.0 x 1.7 cm sclerotic lesion in the right iliac bone with Max SUV  9.3.    - 2.1 x 1.5 x 1.9 cm lesion in the anterior medial left acetabulum  with max SUV 9.7.   - Nodular focus of increased FDG uptake in the right inferior sacrum  (coronal image 64) with max SUV 6.3.  Focal uptake in the right T2 facet, likely metastatic.  Focal uptake in the left rib indeterminant.     No other sites of suspicious FDG uptake within the axial or  appendicular skeleton. Numerous sclerotic lesions throughout  the  thoracolumbar spine, consistent with known myeloma, without suspicious  FDG uptake. Multilevel disc height narrowing most severe at T11, T12,  and L1 with exaggerated thoracic kyphosis. Non-FDG avid sclerotic  focus in the posterior left fourth rib. Healed fractures of the right  3rd rib (posteriorly) and left 6th / 8th ribs.                                                                      IMPRESSION:   In this patient with known multiple myeloma s/p recent bone marrow  transplant:  1.  Multiple FDG-avid foci within the pelvic and sacral bones  consistent with active myeloma. Focal uptake in right T2 facet  concerning for metastasis. Numerous sclerotic lesions throughout the  remaining skeleton do not demonstrate FDG uptake.  2.  No evidence of extraosseous disease progression.  3.  Colonic hyperenhancement and submucosal edema with increased FDG  uptake most likely represents infectious or inflammatory colitis.  [id number] disease is also on the differential.    PET/CT 7/3/2019  Since the original read a prior PET/CT performed 12/11/2017 has been  up loaded and ordering provider is asking for additional comparison.     Unchanged:   Right ilium lesion, current SUV max is 9.0, prior was 8.5.      Improved:  Right sacral lesion is slightly smaller max SUV 6.3 unchanged. Total  lesion glycolysis 3.8 previously 5.3. Metabolic volume 1 cc, prior 1.4  cc.   Right pubic symphysis lesion is less pronounced than prior exam.   Left femoral lesion is less pronounced than prior.   The left third rib lesion is less pronounced than on prior.   Right first rib resolved.       Progressed:  The T2 transverse process on the right is new (SUV of 4.8).  The left anterior acetabular lesion is more prominent on current exam  with more soft tissue component, current SUV max is 9.7, prior 16.6.  Hypermetabolic total volume is 3 cc were it was 1 cm.  Total lesion  glycolysis is 20 compared to 8.     Impression:  1. IMPeTus  criteria (a proposed Myeloma PET criteria)   1a. BM1 (low level bone marrow),   1b. F3 ExtraSp 5 (4 to 10 lesions, extraspinous, max deauville 5) - 3  lesions highly concerning    1c. No extra medullary, no para medullary disease.   2. Interval change from 12/11/2017  2a. One lesion consistent with active disease (left acetabulum larger  although less metabolic right sacral lesion max SUV 9.7)   2b. Indeterminant - 2 lesions hypermetabolic but concerning for active  disease  right sacrum smaller max SUV 6,   right T2 transverse process new max SUV 5)     2b. Several lesions resolved or decreased.      ASSESSMENT/PLAN   Adrianna Palomino is a 64 yo woman with standard risk IgG kappa multiple myeloma, s/p auto PBSCT in 2013. Now day +179 s/p second auto.     1.  BMT:   - Received 8 million cells.   - Engrafted.  Last dose GCSF 4/9.   - 4/23/2019: day 28 biochemical amrkers and restaging BMB:   ---- No morphologic or immunohistochemical evidence of plasma cell myeloma   ---- Immune phenotype: There is a minute population (estimated at 0.002%) of Kappa restricted plasma cells identified by aberrant expression of CD56 These plasma cells are very few, but they are forming a discrete group .  ---- Non elevated FLC, SPEP with IgG kappa Monoclonal protein (0.3 g/dL) seen in the gamma fraction. UPEP with no monoclonal proteins  - 7/3/2019: day 100 biochemical amrkers and restaging BMB:   ---- BMB:  Low level recurrent/persistent plasma cell neoplasm with very rare (less than 1%) Kappa monotypic plasma cells   ---- Immune phenotype: 0.02% plasma cells which express CD19 (dim), CD38, CD45 (dim), CD56 and monotypic cytoplasmic kappa immunoglobulin light chains but lack CD20. Also present are: 0.03% polytypic plasma cells  And 3% polytypic B cells T  ---- Non elevated FLC, SPEP with IgG kappa Monoclonal protein (0.3 g/dL) seen in the gamma fraction. UPEP with no monoclonal proteins   --------- hemoglobin improved to 12.1 now  "within normal, creatinine stable, no hypercalcemia, no proteinuria, UA, LDH WNL, patient with no evidence of clinical relapse and with stable serum monoclonal proteins at 0.3g/dl not meeting criteria for an increase of 25% from the lowest confirmed response with no new urine monoclonal protein detected and no elevation in free light chains, therefore the patient does not meet criteria for clinical relapse or progressive disease per NCCN guidelines. However, PET/Ct with FDG active lesions, clinically asymptomatic, unclear if those are present NEW lesions on imaging a meeting criteria for progressive disease per NCCN guidelines/IM WG revised criteria.   --------- Will upload PET/CT from 2017 for comparison with 7/3/2019 PET/CT post ASCT, and requesting read here.  - 9/19/2019: day 180 iochemical amrkers and restaging BMB:   ---- BMB:Marrow cellularity of 30-40%, with trilineage hematopoiesis; recurrent/persistent myeloma is present - single sheet of kappa monotypic plasma cells at edge of biopsy, comprising less than 5% of cellularity   ---- Flow cytometry: There is a minute population (estimated at 0.005%) of kappa restricted plasma cells identified by aberrant expression of CD56 . This immunophenotype is similar to that reported previously in this patient   ---- Non elevated FLC, SPEP with IgG kappa Monoclonal protein (0.3 g/dL) seen in the gamma fraction. UPEP with no monoclonal proteins  ---- Discussed the uploaded PET/CT from 2017 for comparison with 7/3/2019 PET/CT with radiology today with the new addendum, now concerned for T2 lytic lesion vs degenerative disease as compared to the prior PET/CT with SUV 4.8 with gray zone as to calling it an \"active lytic lesions\". The left acetabular and sacral lesion in report stable in size and metabolic activity per today's discussion and not meeting 50% change criteria for radiologic progression.    2.  HEME: Keep Hgb>8 and plts>10K. No transfusion needs today.            "                  3.  ID: Afebrile  - prophy Fluc (decrease to 100mg 50% reduction till day 100, stop back), and LD ACV (would keep for one year). Off levaquin now post engraftment.  - Pentamidine monthly for one year (Sulfa allergy).                                4.  GI:   - nausea controlled with prn Compazine, Zofran.  - mild mucositis, resolved.   - hx collagenous colitis, increased Imodium to twice daily 4/14, no flare per pt 4/15. Takes psyllium (stable per pt)  - 4/12 stopped protonix due to its association with collagenous colitis.      5.  FEN/Renal: Creat wnl.  - hypokalemia: resolved    RTC   -Based on above.    Plan:  - Patient will closely follow up with Dr. Crespo for post transplant maintenance therapy or more intense therapy for disease relapse if needed.  For post transplant maintenance, since the patient has been exposed to lenalidomide twice we agree with bortezomib posttransplant maintenance. Would repeat PET scan earlier in case of biochemical progression, otherwise will add PET/CT for one year disease restaging.  In case the patient requires therapy for disease relapse post transplant we would prepare the daratumumab-based triple therapy regimen.    I discussed the results with Dr. Crespo. She knows to call with any questions or concerns.  Above plan was explained in detail with the patient and all her questions were answered to the best of my ability.  She knows to follow-up plan as well according to above.    Abdi Browning MD

## 2019-11-04 ENCOUNTER — HEALTH MAINTENANCE LETTER (OUTPATIENT)
Age: 64
End: 2019-11-04

## 2020-01-13 DIAGNOSIS — C90.00 MULTIPLE MYELOMA NOT HAVING ACHIEVED REMISSION (H): ICD-10-CM

## 2020-01-23 ENCOUNTER — TRANSFERRED RECORDS (OUTPATIENT)
Dept: HEALTH INFORMATION MANAGEMENT | Facility: CLINIC | Age: 65
End: 2020-01-23

## 2020-03-19 ENCOUNTER — TRANSFERRED RECORDS (OUTPATIENT)
Dept: HEALTH INFORMATION MANAGEMENT | Facility: CLINIC | Age: 65
End: 2020-03-19

## 2020-03-24 ENCOUNTER — TELEPHONE (OUTPATIENT)
Dept: TRANSPLANT | Facility: CLINIC | Age: 65
End: 2020-03-24

## 2020-03-24 NOTE — TELEPHONE ENCOUNTER
Contacted Maeve to discuss follow up plan for 1 year post bmt follow up scheduled on 3/27. Per Dr Ganesh Browning, she would like to keep follow up appts as scheduled and Maeve is agreeable to this. She does report she had labs at the D office on 3/17 so MM restaging anniversary labs will be canceled for 3/27 visit. Results are in Care Everywhere.

## 2020-03-26 NOTE — PROGRESS NOTES
BMT Clinic Progress Note- telephone visit    Patient ID:  Adrianna Palomino is a 64 yo woman 1 year (T0=3/29/2019) s/p auto PBSCT (#2) for Multiple Myeloma.      Interval History:   Overall doing well. Back to work full time now.  No F/C/NS. Stable rhinorrhea for her (years), no sore throat, no cough, no SOB at rest, no palpitations.  Started on maintenance posttransplant, valcade every other week. Stable neuropathy.  Normal lifestyle and energy level.  Back to normal appetitive, weight stable, stools formed, no bleeding, no mucous, no bleeding or bruising.     Otherwise on review of systems denies any fevers chills night sweats, denies any respiratory symptoms any rhinorrhea sore throat cough chest pain shortness of breath palpitations, denies any nausea or vomiting bowel movements as above, denies any rashes bleeding or bruising.  Denies any headache or other focal sensory or motor deficits.    ROS: 10 point ROS neg other than the symptoms noted above in the HPI.    Oncology history from notes from Rubia Vinson and Dr. Crespo, records reviewed by me as well:  - Initially diagnosed and with standard-risk cytogenetics in 2006  - Started on Dexamethasone for 6 cycles obtaining a complete remission and then maintenance with thalidomide and dexamethasone for 1 year.   - Stopped all therapy in the summer of 2007 due to neuropathy, and was followed on 2 years of maintenance dexamethasone.    - She was noted to slowly relapse but didn't require therapy until she was started on RVD in 11/2012.   - Completed 5 cycles before obtaining a partial remission and came to transplant.    - Underwent an autologous stem cell transplant on 04/05/2013,  total cell dose was 8 x10(6) cells/kg, and she has 8 x10(6) cells/kg left in storage. Post ASCT course was unremarkable.     - Maintenance Regimen post transplant  (10mg q28 days), off for 1 month in 9/2016 to review marrow, resumed: 9/30/2016 and continued till  disease progression  - Clinical progression [new bone lesions detected on bone survey from 8/29/2018 with [facility]]  - 8/29/2018 Bone Marrow Biopsy: Normocellular BM (30%) with trilineage hematopoiesis showing a decreased myeloid to erythroid ratio (erythroid hyperplasia). - Plasma cells 5%, polytypic for light chain. Flow cytometry negative for monoclonal plasma cell population   - 10/3/2018 Bone marrow Biopsy: CT Guided Needle Core Biopsy: POSITIVE FOR MALIGNANCY - Plasma cell neoplasm, consistent with recurrent plasma cell myeloma. Comment Large aggregates of + plasma cells are present. These are kappa monoclonal.   - 10/3/2018: FISH Myeloma Panel: Positive for additional copies of D5S23/F6U828 (5p15.2), CEP9 (centromere 9), CEP11 (centromere 11), and CEP15 (centromere 15) suggesting polysomy, and low level loss of IGH (14q32). FISH Negative for loss of CDKN2C (1p32.3), gain of CKS1B (1q21.3), loss of D46T520 (13q14.3), loss of TP53 (17p13.1), and additional copies of CEP7 (centromere 7), and IGH rearrangement. 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   DSS IIA, SPEP 10/18/2018: 0.9 g/dL, 10/18/2018: 6.69 , Kappa 10.03 mg/dl, lambda 1.5 mg/dl, albumin 3.4 g/dl, creatinine 0.82, calcium 9.3, LDH and B2 microglobulin Unknown/ not available   - 10/19/2018 C1D1 : VRd in relapsed setting (Velcade: 1.3mg/m2 subcutanously Day 1, 8, 15; Revlimid: 10mg (dose reduced) day 1-14; Dexamethasone: 40mg day 1,8,15)- completed 4 cycles, complicated by salmonella stool infections prior to C3.  - M biochemical markers:  3/11/2019:              UPEP Predominantly albumin is seen which suggests glomerular damage. No obvious monoclonal protein seen and           the absence of monoclonal immunoglobulins was confirmed by immunofixation of this same urine sample.               Urine creatinine and creatiunine  clearance are WNL, 24 hour urine total protein elevated at 0.63 and total urine         protein/creatiine is elevated at 0.49              SPEP 3/7/2019 Small monoclonal protein (0.3 g/dL) seen in the gamma fraction.  Monoclonal IgG immunoglobulin of  kappa light chain type.               Kappa slightly elevated at 2.42 mg/dl              Lambda light chain WNL              K/L ratio 1.79    - BMB 3/8/2019: PreHCT: Kappa monotypic plasma cells detected by flow cytometry, compatible with minimal residual disease -Concurrent flow cytometry ([id number]) detected 0.02% kappa monotypic plasma cells. 0.02% plasma cells which express dim CD19, CD38, dim CD45, CD56 and monotypic cytoplasmic kappa immunoglobulin light chains but lack CD20.    - BMB 3/27/2020 pending        PHYSICAL EXAM                                                                                                                                   LMP 03/01/2005    Wt Readings from Last 4 Encounters:   09/24/19 67.6 kg (149 lb 1.6 oz)   07/09/19 66.1 kg (145 lb 12.8 oz)   07/03/19 65.6 kg (144 lb 11.2 oz)   04/26/19 65.3 kg (144 lb)     General: NAD   Eyes: sclera anicteric   Nose/Mouth/Throat: OP moist without lesions.   Lungs: CTAB  Cardiovascular: RRR, no M/R/G   Abdominal/Rectal: +BS, soft, NT, ND  Lymphatics: No edema  Skin: No rash  Additional Findings: R chest CVC NT, dressing cdi.    Labs:   Lab Results   Component Value Date    WBC 4.3 09/19/2019    ANEU 2.6 09/19/2019    HGB 11.4 (L) 09/19/2019    HCT 34.0 (L) 09/19/2019     09/19/2019     09/19/2019    POTASSIUM 5.1 09/19/2019    CHLORIDE 104 09/19/2019    CO2 25 09/19/2019    GLC 88 09/19/2019    BUN 13 09/19/2019    CR 0.72 09/19/2019    MAG 2.2 09/19/2019    INR 0.95 03/28/2019    BILITOTAL 0.8 09/19/2019    AST 33 09/19/2019    ALT 24 09/19/2019    ALKPHOS 61 09/19/2019    PROTTOTAL 7.0 09/19/2019    ALBUMIN 3.9 09/19/2019   PET FDG/CT )SH  12/11/2017 2:24 PM    INDICATION:  Multiple myeloma staging, initial treatment strategy.  TECHNIQUE: Serum glucose level 83 mg/dL. One hour post intravenous   administration of 11.5 mCi F-18 FDG, PET imaging was performed from the   vertex to the distal thighs utilizing attenuation correction with   concurrent axial CT and PET/CT image fusion. Dose reduction techniques were   used.  COMPARISON: Skeletal survey from 07/12/2016 is reviewed.    FINDINGS: Scattered FDG avid lesions throughout the axial and appendicular   skeleton, a few examples of which include in the right ilium (SUVmax 8.5),   anterior left acetabulum (SUVmax 16.6), and mid left femoral diaphysis   (SUVmax 3.5).    3 mm right upper lobe pulmonary nodule, too small for PET characterization.   Tortuous aorta. Trace calcified atherosclerosis. Hysterectomy. Left os   acromiale. Old bilateral rib fractures. Old, non-FDG avid compression   fractures of T11, T12, L1, and L4. Mild degenerative change in the spine.    CONCLUSION:  Scattered FDG avid skeletal lesions consistent with active myeloma. No   evidence of extraosseous disease.      PET/CT 7/3/2019  BONES:   Multiple FDG avid lesions are present in the axial skeleton as  follows:   - 3.0 x 1.7 cm sclerotic lesion in the right iliac bone with Max SUV  9.3.    - 2.1 x 1.5 x 1.9 cm lesion in the anterior medial left acetabulum  with max SUV 9.7.   - Nodular focus of increased FDG uptake in the right inferior sacrum  (coronal image 64) with max SUV 6.3.  Focal uptake in the right T2 facet, likely metastatic.  Focal uptake in the left rib indeterminant.     No other sites of suspicious FDG uptake within the axial or  appendicular skeleton. Numerous sclerotic lesions throughout the  thoracolumbar spine, consistent with known myeloma, without suspicious  FDG uptake. Multilevel disc height narrowing most severe at T11, T12,  and L1 with exaggerated thoracic kyphosis. Non-FDG avid sclerotic  focus in the posterior left fourth rib. Healed  fractures of the right  3rd rib (posteriorly) and left 6th / 8th ribs.                                                                      IMPRESSION:   In this patient with known multiple myeloma s/p recent bone marrow  transplant:  1.  Multiple FDG-avid foci within the pelvic and sacral bones  consistent with active myeloma. Focal uptake in right T2 facet  concerning for metastasis. Numerous sclerotic lesions throughout the  remaining skeleton do not demonstrate FDG uptake.  2.  No evidence of extraosseous disease progression.  3.  Colonic hyperenhancement and submucosal edema with increased FDG  uptake most likely represents infectious or inflammatory colitis.  Cwyot-gmooax-rjtg disease is also on the differential.    PET/CT 7/3/2019  Since the original read a prior PET/CT performed 12/11/2017 has been  up loaded and ordering provider is asking for additional comparison.     Unchanged:   Right ilium lesion, current SUV max is 9.0, prior was 8.5.      Improved:  Right sacral lesion is slightly smaller max SUV 6.3 unchanged. Total  lesion glycolysis 3.8 previously 5.3. Metabolic volume 1 cc, prior 1.4  cc.   Right pubic symphysis lesion is less pronounced than prior exam.   Left femoral lesion is less pronounced than prior.   The left third rib lesion is less pronounced than on prior.   Right first rib resolved.       Progressed:  The T2 transverse process on the right is new (SUV of 4.8).  The left anterior acetabular lesion is more prominent on current exam  with more soft tissue component, current SUV max is 9.7, prior 16.6.  Hypermetabolic total volume is 3 cc were it was 1 cm.  Total lesion  glycolysis is 20 compared to 8.     Impression:  1. IMPeTus criteria (a proposed Myeloma PET criteria)   1a. BM1 (low level bone marrow),   1b. F3 ExtraSp 5 (4 to 10 lesions, extraspinous, max deauville 5) - 3  lesions highly concerning    1c. No extra medullary, no para medullary disease.   2. Interval change from  12/11/2017  2a. One lesion consistent with active disease (left acetabulum larger  although less metabolic right sacral lesion max SUV 9.7)   2b. Indeterminant - 2 lesions hypermetabolic but concerning for active  disease  right sacrum smaller max SUV 6,   right T2 transverse process new max SUV 5)     2b. Several lesions resolved or decreased.      3/27/2020 PET/CT 1 year                                                                   IMPRESSION: History of multiple myeloma status post multiple therapies  including bone marrow transplant:   - In summary four prior hypermetabolic bone lesions: 2 resolved, 2  partial response.  1. Resolved hypermetabolism since 7/3/2019: Right T2 facet lesion,  right sacral lesion     2. Partial response (>50% decrease in uptake) since PET 7/3/2019.  2a. Left anterior acetabulum (max SUV 3.4), prior max SUV 9.4.  2b. Right iliac (max SUV 4.2), prior max SUV 9.3.     3. No extraosseous evidence of disease progression.       ASSESSMENT/PLAN   Adrianna Palomino is a 62 yo woman with standard risk IgG kappa multiple myeloma, s/p auto PBSCT in 2013. Now day +179 s/p second auto.     1.  BMT:   - Received 8 million cells.   - Engrafted.  Last dose GCSF 4/9.   - 4/23/2019: day 28 biochemical amrkers and restaging BMB:   ---- No morphologic or immunohistochemical evidence of plasma cell myeloma   ---- Immune phenotype: There is a minute population (estimated at 0.002%) of Kappa restricted plasma cells identified by aberrant expression of CD56 These plasma cells are very few, but they are forming a discrete group .  ---- Non elevated FLC, SPEP with IgG kappa Monoclonal protein (0.3 g/dL) seen in the gamma fraction. UPEP with no monoclonal proteins  - 7/3/2019: day 100 biochemical amrkers and restaging BMB:   ---- BMB:  Low level recurrent/persistent plasma cell neoplasm with very rare (less than 1%) Kappa monotypic plasma cells   ---- Immune phenotype: 0.02% plasma cells which express  CD19 (dim), CD38, CD45 (dim), CD56 and monotypic cytoplasmic kappa immunoglobulin light chains but lack CD20. Also present are: 0.03% polytypic plasma cells  And 3% polytypic B cells T  ---- Non elevated FLC, SPEP with IgG kappa Monoclonal protein (0.3 g/dL) seen in the gamma fraction. UPEP with no monoclonal proteins   --------- hemoglobin improved to 12.1 now within normal, creatinine stable, no hypercalcemia, no proteinuria, UA, LDH WNL, patient with no evidence of clinical relapse and with stable serum monoclonal proteins at 0.3g/dl not meeting criteria for an increase of 25% from the lowest confirmed response with no new urine monoclonal protein detected and no elevation in free light chains, therefore the patient does not meet criteria for clinical relapse or progressive disease per NCCN guidelines. However, PET/Ct with FDG active lesions, clinically asymptomatic, unclear if those are present NEW lesions on imaging a meeting criteria for progressive disease per NCCN guidelines/IM WG revised criteria.   --------- Uploaded PET/CT from 2017 for comparison with 7/3/2019 PET/CT post ASCT, and requesting read here.  - 9/19/2019: day 180 iochemical amrkers and restaging BMB:   ---- BMB:Marrow cellularity of 30-40%, with trilineage hematopoiesis; recurrent/persistent myeloma is present - single sheet of kappa monotypic plasma cells at edge of biopsy, comprising less than 5% of cellularity   ---- Flow cytometry: There is a minute population (estimated at 0.005%) of kappa restricted plasma cells identified by aberrant expression of CD56 . This immunophenotype is similar to that reported previously in this patient   ---- Non elevated FLC, SPEP with IgG kappa Monoclonal protein (0.3 g/dL) seen in the gamma fraction. UPEP with no monoclonal proteins  ---- Discussed the uploaded PET/CT from 2017 for comparison with 7/3/2019 PET/CT with radiology  with the new addendum, now concerned for T2 lytic lesion vs degenerative  "disease as compared to the prior PET/CT with SUV 4.8 with gray zone as to calling it an \"active lytic lesions\". The left acetabular and sacral lesion in report stable in size and metabolic activity per discussion and not meeting 50% change criteria for radiologic progression.  - 3/27/2020 Day 365 assessment:   ---- 3/27 BMB: pending morphology/FISH/CG. Immunophenotype with a very small population (less than 0.01%, 4-10 events) of kappa restricted plasma cells identified by aberrant expression of CD56, the cells are too few for a definitive diagnosis   ---- 3/27 PET/CT:  four prior hypermetabolic bone lesions: 2 resolved, 2 partial response.  ---- 3/17 OSH SPEP 0.2g/dl IgG kappa. UPEP no monoclonal protein, Kappa elevated at 2.05 (ULN 1.94) and K/L 2.3    2.  HEME: Keep Hgb>8 and plts>10K. No transfusion needs today.                             3.  ID: Afebrile  - off prophy Fluc , and LD ACV  - Pentamidine monthly for one year (Sulfa allergy)- completed  - Started one year vaccinations with Dr Crespo                               4.  GI:   - hx collagenous colitis, 4/12 stopped protonix due to its association with collagenous colitis.      5.  FEN/Renal: Creat wnl.      RTC   -Based on above.    Plan:  I will discussed the results when all available with Dr. Crespo. RTC locally for vaccinations and routine labs. Liocal maintenance therapy and zometa. She knows to call with any questions or concerns.  Above plan was explained in detail with the patient and all her questions were answered to the best of my ability.  RTC 1.5 year anniversary visit.    Abdi Browning MD                   "

## 2020-03-27 ENCOUNTER — TELEPHONE (OUTPATIENT)
Dept: TRANSPLANT | Facility: CLINIC | Age: 65
End: 2020-03-27

## 2020-03-27 ENCOUNTER — OFFICE VISIT (OUTPATIENT)
Dept: TRANSPLANT | Facility: CLINIC | Age: 65
End: 2020-03-27
Attending: PHYSICIAN ASSISTANT
Payer: COMMERCIAL

## 2020-03-27 ENCOUNTER — HOSPITAL ENCOUNTER (OUTPATIENT)
Dept: PET IMAGING | Facility: CLINIC | Age: 65
Setting detail: NUCLEAR MEDICINE
End: 2020-03-27
Attending: INTERNAL MEDICINE
Payer: COMMERCIAL

## 2020-03-27 VITALS
TEMPERATURE: 98.4 F | BODY MASS INDEX: 25.94 KG/M2 | OXYGEN SATURATION: 92 % | RESPIRATION RATE: 20 BRPM | HEART RATE: 98 BPM | SYSTOLIC BLOOD PRESSURE: 115 MMHG | DIASTOLIC BLOOD PRESSURE: 79 MMHG | WEIGHT: 158.51 LBS

## 2020-03-27 DIAGNOSIS — C90.00 MULTIPLE MYELOMA NOT HAVING ACHIEVED REMISSION (H): Primary | ICD-10-CM

## 2020-03-27 DIAGNOSIS — C90.00 MULTIPLE MYELOMA NOT HAVING ACHIEVED REMISSION (H): ICD-10-CM

## 2020-03-27 LAB
ALBUMIN SERPL-MCNC: 3.6 G/DL (ref 3.4–5)
ALP SERPL-CCNC: 63 U/L (ref 40–150)
ALT SERPL W P-5'-P-CCNC: 19 U/L (ref 0–50)
ANION GAP SERPL CALCULATED.3IONS-SCNC: 6 MMOL/L (ref 3–14)
AST SERPL W P-5'-P-CCNC: 24 U/L (ref 0–45)
BASOPHILS # BLD AUTO: 0 10E9/L (ref 0–0.2)
BASOPHILS NFR BLD AUTO: 0.4 %
BILIRUB SERPL-MCNC: 0.5 MG/DL (ref 0.2–1.3)
BUN SERPL-MCNC: 14 MG/DL (ref 7–30)
CALCIUM SERPL-MCNC: 9.3 MG/DL (ref 8.5–10.1)
CHLORIDE SERPL-SCNC: 106 MMOL/L (ref 94–109)
CO2 SERPL-SCNC: 26 MMOL/L (ref 20–32)
CREAT SERPL-MCNC: 0.82 MG/DL (ref 0.52–1.04)
DIFFERENTIAL METHOD BLD: ABNORMAL
EOSINOPHIL # BLD AUTO: 0.1 10E9/L (ref 0–0.7)
EOSINOPHIL NFR BLD AUTO: 1.4 %
ERYTHROCYTE [DISTWIDTH] IN BLOOD BY AUTOMATED COUNT: 12.2 % (ref 10–15)
GFR SERPL CREATININE-BSD FRML MDRD: 75 ML/MIN/{1.73_M2}
GLUCOSE SERPL-MCNC: 89 MG/DL (ref 70–99)
HCT VFR BLD AUTO: 36.9 % (ref 35–47)
HGB BLD-MCNC: 12.3 G/DL (ref 11.7–15.7)
IMM GRANULOCYTES # BLD: 0 10E9/L (ref 0–0.4)
IMM GRANULOCYTES NFR BLD: 0.4 %
LDH SERPL L TO P-CCNC: 165 U/L (ref 81–234)
LYMPHOCYTES # BLD AUTO: 1.7 10E9/L (ref 0.8–5.3)
LYMPHOCYTES NFR BLD AUTO: 32.6 %
MAGNESIUM SERPL-MCNC: 2 MG/DL (ref 1.6–2.3)
MCH RBC QN AUTO: 35.4 PG (ref 26.5–33)
MCHC RBC AUTO-ENTMCNC: 33.3 G/DL (ref 31.5–36.5)
MCV RBC AUTO: 106 FL (ref 78–100)
MONOCYTES # BLD AUTO: 0.6 10E9/L (ref 0–1.3)
MONOCYTES NFR BLD AUTO: 11.1 %
NEUTROPHILS # BLD AUTO: 2.8 10E9/L (ref 1.6–8.3)
NEUTROPHILS NFR BLD AUTO: 54.1 %
NRBC # BLD AUTO: 0 10*3/UL
NRBC BLD AUTO-RTO: 0 /100
PHOSPHATE SERPL-MCNC: 3.8 MG/DL (ref 2.5–4.5)
PLATELET # BLD AUTO: 185 10E9/L (ref 150–450)
POTASSIUM SERPL-SCNC: 4.1 MMOL/L (ref 3.4–5.3)
PROT SERPL-MCNC: 6.8 G/DL (ref 6.8–8.8)
RBC # BLD AUTO: 3.47 10E12/L (ref 3.8–5.2)
SODIUM SERPL-SCNC: 138 MMOL/L (ref 133–144)
URATE SERPL-MCNC: 4.3 MG/DL (ref 2.6–6)
WBC # BLD AUTO: 5.2 10E9/L (ref 4–11)

## 2020-03-27 PROCEDURE — 88342 IMHCHEM/IMCYTCHM 1ST ANTB: CPT | Performed by: PHYSICIAN ASSISTANT

## 2020-03-27 PROCEDURE — 40000951 ZZHCL STATISTIC BONE MARROW INTERP TC 85097: Performed by: PHYSICIAN ASSISTANT

## 2020-03-27 PROCEDURE — 85025 COMPLETE CBC W/AUTO DIFF WBC: CPT | Performed by: INTERNAL MEDICINE

## 2020-03-27 PROCEDURE — 84550 ASSAY OF BLOOD/URIC ACID: CPT | Performed by: INTERNAL MEDICINE

## 2020-03-27 PROCEDURE — 00000161 ZZHCL STATISTIC H-SPHEME PROCESS B/S: Performed by: PHYSICIAN ASSISTANT

## 2020-03-27 PROCEDURE — 88185 FLOWCYTOMETRY/TC ADD-ON: CPT | Performed by: PHYSICIAN ASSISTANT

## 2020-03-27 PROCEDURE — 88275 CYTOGENETICS 100-300: CPT | Performed by: PHYSICIAN ASSISTANT

## 2020-03-27 PROCEDURE — 88161 CYTOPATH SMEAR OTHER SOURCE: CPT | Performed by: PHYSICIAN ASSISTANT

## 2020-03-27 PROCEDURE — G0463 HOSPITAL OUTPT CLINIC VISIT: HCPCS | Mod: 25,ZF

## 2020-03-27 PROCEDURE — 82306 VITAMIN D 25 HYDROXY: CPT | Performed by: INTERNAL MEDICINE

## 2020-03-27 PROCEDURE — 88237 TISSUE CULTURE BONE MARROW: CPT | Performed by: PHYSICIAN ASSISTANT

## 2020-03-27 PROCEDURE — 88271 CYTOGENETICS DNA PROBE: CPT | Performed by: PHYSICIAN ASSISTANT

## 2020-03-27 PROCEDURE — 40000795 ZZHCL STATISTIC DNA PROCESS AND HOLD: Performed by: PHYSICIAN ASSISTANT

## 2020-03-27 PROCEDURE — 83615 LACTATE (LD) (LDH) ENZYME: CPT | Performed by: INTERNAL MEDICINE

## 2020-03-27 PROCEDURE — 88311 DECALCIFY TISSUE: CPT | Performed by: PHYSICIAN ASSISTANT

## 2020-03-27 PROCEDURE — 25000128 H RX IP 250 OP 636: Mod: ZF | Performed by: PHYSICIAN ASSISTANT

## 2020-03-27 PROCEDURE — 88305 TISSUE EXAM BY PATHOLOGIST: CPT | Performed by: PHYSICIAN ASSISTANT

## 2020-03-27 PROCEDURE — 88341 IMHCHEM/IMCYTCHM EA ADD ANTB: CPT | Performed by: PHYSICIAN ASSISTANT

## 2020-03-27 PROCEDURE — 78816 PET IMAGE W/CT FULL BODY: CPT | Mod: PS

## 2020-03-27 PROCEDURE — 84100 ASSAY OF PHOSPHORUS: CPT | Performed by: INTERNAL MEDICINE

## 2020-03-27 PROCEDURE — 88280 CHROMOSOME KARYOTYPE STUDY: CPT | Performed by: PHYSICIAN ASSISTANT

## 2020-03-27 PROCEDURE — 88264 CHROMOSOME ANALYSIS 20-25: CPT | Performed by: PHYSICIAN ASSISTANT

## 2020-03-27 PROCEDURE — 88184 FLOWCYTOMETRY/ TC 1 MARKER: CPT | Performed by: PHYSICIAN ASSISTANT

## 2020-03-27 PROCEDURE — 34300033 ZZH RX 343: Performed by: INTERNAL MEDICINE

## 2020-03-27 PROCEDURE — A9552 F18 FDG: HCPCS | Performed by: INTERNAL MEDICINE

## 2020-03-27 PROCEDURE — 83735 ASSAY OF MAGNESIUM: CPT | Performed by: INTERNAL MEDICINE

## 2020-03-27 PROCEDURE — 38222 DX BONE MARROW BX & ASPIR: CPT | Mod: ZF

## 2020-03-27 PROCEDURE — 40001003 ZZHCL STATISTIC FLOW INT 2-8 ABY TC 88187: Performed by: PHYSICIAN ASSISTANT

## 2020-03-27 PROCEDURE — 40000611 ZZHCL STATISTIC MORPHOLOGY W/INTERP HEMEPATH TC 85060: Performed by: PHYSICIAN ASSISTANT

## 2020-03-27 PROCEDURE — 80053 COMPREHEN METABOLIC PANEL: CPT | Performed by: INTERNAL MEDICINE

## 2020-03-27 RX ADMIN — FLUDEOXYGLUCOSE F-18 10.23 MCI.: 500 INJECTION, SOLUTION INTRAVENOUS at 08:16

## 2020-03-27 RX ADMIN — MIDAZOLAM 2 MG: 1 INJECTION INTRAMUSCULAR; INTRAVENOUS at 13:48

## 2020-03-27 ASSESSMENT — PAIN SCALES - GENERAL: PAINLEVEL: NO PAIN (0)

## 2020-03-27 NOTE — PROGRESS NOTES
BMT ONC Adult Bone Marrow Biopsy Procedure Note  March 27, 2020  Blood pressure (!) 145/94, pulse 83, temperature 98.8  F (37.1  C), temperature source Oral, resp. rate 16, weight 71.9 kg (158 lb 8.2 oz), last menstrual period 03/01/2005, SpO2 95 %, not currently breastfeeding.       Learning needs assessment complete within 12 months? YES    DIAGNOSIS: MM     PROCEDURE: Unilateral Bone Marrow Biopsy and Unilateral Aspirate    LOCATION: [facility] 2nd Floor    Patient s identification was positively verified by verbal identification and invasive procedure safety checklist was completed. Informed consent was obtained. Following the administration of 2mg IV Midazolam as pre-medication, patient was placed in the prone position and prepped and draped in a sterile manner. Approximately 10 cc of 1% Lidocaine was used over the left posterior iliac spine. Following this a 3 mm incision was made. Trephine bone marrow core(s) was (were) obtained from the [facility]. Bone marrow aspirates were obtained from the [facility]. Aspirates were sent for morphology, immunophenotyping, cytogenetics and molecular diagnostics process & hold. A total of approximately 16 ml of marrow was aspirated. Following this procedure a sterile dressing was applied to the bone marrow biopsy site(s). The patient was placed in the supine position to maintain pressure on the biopsy site. Post-procedure wound care instructions were given.     Complications: NO    Pre-procedural pain: 0 out of 10 on the numeric pain rating scale.     Procedural pain: 3 out of 10 on the numeric pain rating scale.     Post-procedural pain assessment: 0 out of 10 on the numeric pain rating scale.     Interventions: NO    Length of procedure:20 minutes or less      Procedure performed by: Lay Barajas pa-c  [phone]

## 2020-03-27 NOTE — TELEPHONE ENCOUNTER
Contacted Maeve to discuss plan to change ban review appt to phone visit per Dr Ganesh Browning's recommendation. Maeve is in agreement to plan and will call if she has questions.

## 2020-03-27 NOTE — NURSING NOTE
Pt tolerated BMBX very well with Versed 2 mg IV Push given prior. Procedure performed by SABINA Weaver. Following procedure Pt monitored in the Supine position x 30 minutes. Upon discharge VSS, pressure Drsg C/D/I, A&O x 3, Up Independently. Pt left clinic ambulatory. Pt's  to pick her up. Pt aware of post-procedure instructions and informational sheet given. Pt with no further questions at this time.

## 2020-03-27 NOTE — NURSING NOTE
"Oncology Rooming Note    March 27, 2020 12:36 PM   Adrianna Palomino is a 64 year old female who presents for:    Chief Complaint   Patient presents with     RECHECK     Pt here for 1 Year BMBX and Labs. Pt is s/p BMT for Multiple Myeloma.      Initial Vitals: BP (!) 145/94   Pulse 83   Temp 98.8  F (37.1  C) (Oral)   Resp 16   Wt 71.9 kg (158 lb 8.2 oz)   LMP 03/01/2005   SpO2 95%   BMI 25.94 kg/m   Estimated body mass index is 25.94 kg/m  as calculated from the following:    Height as of 9/24/19: 1.665 m (5' 5.55\").    Weight as of this encounter: 71.9 kg (158 lb 8.2 oz). Body surface area is 1.82 meters squared.  No Pain (0) Comment: Data Unavailable   Patient's last menstrual period was 03/01/2005.  Allergies reviewed: Yes  Medications reviewed: Yes    Medications: Medication refills not needed today.  Pharmacy name entered into [employer]:    [facility]  [facility] [place], [place] - [address] [place] [address] [phone]    Clinical concerns: Pt here for BMBX today. Pt feeling well- no Cough/Fevers/SOB. Pt would like IV Versed with today's procedure. Pt states her  is coming to pick her up. Pt is currently taking ASA 81 mg daily but no other blood thinners.       Siri Orr RN              "

## 2020-03-30 LAB
COPATH REPORT: NORMAL
COPATH REPORT: NORMAL

## 2020-03-31 ENCOUNTER — VIRTUAL VISIT (OUTPATIENT)
Dept: TRANSPLANT | Facility: CLINIC | Age: 65
End: 2020-03-31
Attending: INTERNAL MEDICINE
Payer: COMMERCIAL

## 2020-03-31 DIAGNOSIS — C90.00 MULTIPLE MYELOMA NOT HAVING ACHIEVED REMISSION (H): Primary | ICD-10-CM

## 2020-03-31 LAB
COPATH REPORT: NORMAL
DEPRECATED CALCIDIOL+CALCIFEROL SERPL-MC: <60 UG/L (ref 20–75)
VITAMIN D2 SERPL-MCNC: <5 UG/L
VITAMIN D3 SERPL-MCNC: 55 UG/L

## 2020-03-31 NOTE — PROGRESS NOTES
"Adrianna Palomino is a 64 year old female who is being evaluated via a billable telephone visit.      The patient has been notified of following:     \"This telephone visit will be conducted via a call between you and your physician/provider. We have found that certain health care needs can be provided without the need for a physical exam.  This service lets us provide the care you need with a short phone conversation.  If a prescription is necessary we can send it directly to your pharmacy.  If lab work is needed we can place an order for that and you can then stop by our lab to have the test done at a later time.    If during the course of the call the physician/provider feels a telephone visit is not appropriate, you will not be charged for this service.\"     Patient has given verbal consent for Telephone visit?  Yes    Adrianna Palomino complains of    Chief Complaint   Patient presents with     Telephone     Return:MDS        I have reviewed and updated the patient's Medication List.    ALLERGIES  Amoxicillin; Septra [bactrim]; and Sulfa drugs     SAURABH Abraham      Additional provider notes: please see my full progress note on same date    Assessment/Plan:  please see my full progress note on same date    Phone call duration: 25 minutes     "

## 2020-04-02 ENCOUNTER — TRANSFERRED RECORDS (OUTPATIENT)
Dept: HEALTH INFORMATION MANAGEMENT | Facility: CLINIC | Age: 65
End: 2020-04-02

## 2020-04-07 ENCOUNTER — TELEPHONE (OUTPATIENT)
Dept: TRANSPLANT | Facility: CLINIC | Age: 65
End: 2020-04-07

## 2020-04-07 DIAGNOSIS — C90.00 MULTIPLE MYELOMA NOT HAVING ACHIEVED REMISSION (H): ICD-10-CM

## 2020-04-07 NOTE — TELEPHONE ENCOUNTER
Per Dr Ganesh Browning, add 1.5 year anniversary to follow up schedule. She would like Maeve to get bone marrow bx, PET and results review appt. She may get MM restaging labs locally with Dr Crespo. Discussed with Maeve and message left with her care team at [facility].     Addendum: spoke with Constance from Dr Crespo's office regarding plan for restaging at the end of September. She verbalized understanding of plan.

## 2020-04-10 LAB
COPATH REPORT: NORMAL
COPATH REPORT: NORMAL

## 2020-09-08 DIAGNOSIS — C90.00 MULTIPLE MYELOMA NOT HAVING ACHIEVED REMISSION (H): Primary | ICD-10-CM

## 2020-09-29 ENCOUNTER — ANCILLARY PROCEDURE (OUTPATIENT)
Dept: PET IMAGING | Facility: CLINIC | Age: 65
End: 2020-09-29
Attending: INTERNAL MEDICINE
Payer: COMMERCIAL

## 2020-09-29 ENCOUNTER — OFFICE VISIT (OUTPATIENT)
Dept: TRANSPLANT | Facility: CLINIC | Age: 65
End: 2020-09-29
Attending: PHYSICIAN ASSISTANT
Payer: COMMERCIAL

## 2020-09-29 VITALS
DIASTOLIC BLOOD PRESSURE: 73 MMHG | BODY MASS INDEX: 24.46 KG/M2 | TEMPERATURE: 98.5 F | WEIGHT: 149.47 LBS | RESPIRATION RATE: 20 BRPM | SYSTOLIC BLOOD PRESSURE: 110 MMHG | OXYGEN SATURATION: 96 % | HEART RATE: 94 BPM

## 2020-09-29 DIAGNOSIS — C90.00 MULTIPLE MYELOMA NOT HAVING ACHIEVED REMISSION (H): ICD-10-CM

## 2020-09-29 DIAGNOSIS — Z94.81 STATUS POST BONE MARROW TRANSPLANT (H): Primary | ICD-10-CM

## 2020-09-29 LAB
ALBUMIN SERPL-MCNC: 3.8 G/DL (ref 3.4–5)
ALP SERPL-CCNC: 63 U/L (ref 40–150)
ALT SERPL W P-5'-P-CCNC: 20 U/L (ref 0–50)
ANION GAP SERPL CALCULATED.3IONS-SCNC: 7 MMOL/L (ref 3–14)
AST SERPL W P-5'-P-CCNC: 22 U/L (ref 0–45)
BASOPHILS # BLD AUTO: 0 10E9/L (ref 0–0.2)
BASOPHILS NFR BLD AUTO: 0.6 %
BILIRUB SERPL-MCNC: 0.7 MG/DL (ref 0.2–1.3)
BUN SERPL-MCNC: 13 MG/DL (ref 7–30)
CALCIUM SERPL-MCNC: 9.3 MG/DL (ref 8.5–10.1)
CHLORIDE SERPL-SCNC: 103 MMOL/L (ref 94–109)
CO2 SERPL-SCNC: 27 MMOL/L (ref 20–32)
CREAT SERPL-MCNC: 0.84 MG/DL (ref 0.52–1.04)
DIFFERENTIAL METHOD BLD: ABNORMAL
EOSINOPHIL # BLD AUTO: 0.1 10E9/L (ref 0–0.7)
EOSINOPHIL NFR BLD AUTO: 1.8 %
ERYTHROCYTE [DISTWIDTH] IN BLOOD BY AUTOMATED COUNT: 11.8 % (ref 10–15)
GFR SERPL CREATININE-BSD FRML MDRD: 73 ML/MIN/{1.73_M2}
GLUCOSE SERPL-MCNC: 136 MG/DL (ref 70–99)
GLUCOSE SERPL-MCNC: 81 MG/DL (ref 70–99)
HCT VFR BLD AUTO: 38.9 % (ref 35–47)
HGB BLD-MCNC: 12.9 G/DL (ref 11.7–15.7)
IMM GRANULOCYTES # BLD: 0 10E9/L (ref 0–0.4)
IMM GRANULOCYTES NFR BLD: 0.2 %
LDH SERPL L TO P-CCNC: 208 U/L (ref 81–234)
LYMPHOCYTES # BLD AUTO: 1.3 10E9/L (ref 0.8–5.3)
LYMPHOCYTES NFR BLD AUTO: 23.2 %
MAGNESIUM SERPL-MCNC: 2.1 MG/DL (ref 1.6–2.3)
MCH RBC QN AUTO: 36.1 PG (ref 26.5–33)
MCHC RBC AUTO-ENTMCNC: 33.2 G/DL (ref 31.5–36.5)
MCV RBC AUTO: 109 FL (ref 78–100)
MONOCYTES # BLD AUTO: 0.4 10E9/L (ref 0–1.3)
MONOCYTES NFR BLD AUTO: 7.4 %
NEUTROPHILS # BLD AUTO: 3.6 10E9/L (ref 1.6–8.3)
NEUTROPHILS NFR BLD AUTO: 66.8 %
NRBC # BLD AUTO: 0 10*3/UL
NRBC BLD AUTO-RTO: 0 /100
PHOSPHATE SERPL-MCNC: 3 MG/DL (ref 2.5–4.5)
PLATELET # BLD AUTO: 198 10E9/L (ref 150–450)
POTASSIUM SERPL-SCNC: 3.8 MMOL/L (ref 3.4–5.3)
PROT SERPL-MCNC: 7.3 G/DL (ref 6.8–8.8)
RBC # BLD AUTO: 3.57 10E12/L (ref 3.8–5.2)
SODIUM SERPL-SCNC: 137 MMOL/L (ref 133–144)
URATE SERPL-MCNC: 5 MG/DL (ref 2.6–6)
WBC # BLD AUTO: 5.4 10E9/L (ref 4–11)

## 2020-09-29 PROCEDURE — 88264 CHROMOSOME ANALYSIS 20-25: CPT | Performed by: INTERNAL MEDICINE

## 2020-09-29 PROCEDURE — 40001003 ZZHCL STATISTIC FLOW INT 2-8 ABY TC 88187: Performed by: INTERNAL MEDICINE

## 2020-09-29 PROCEDURE — 84550 ASSAY OF BLOOD/URIC ACID: CPT | Performed by: INTERNAL MEDICINE

## 2020-09-29 PROCEDURE — G0463 HOSPITAL OUTPT CLINIC VISIT: HCPCS | Mod: 25,ZF

## 2020-09-29 PROCEDURE — 88280 CHROMOSOME KARYOTYPE STUDY: CPT | Performed by: INTERNAL MEDICINE

## 2020-09-29 PROCEDURE — 40000951 ZZHCL STATISTIC BONE MARROW INTERP TC 85097: Performed by: PHYSICIAN ASSISTANT

## 2020-09-29 PROCEDURE — 40000795 ZZHCL STATISTIC DNA PROCESS AND HOLD: Performed by: INTERNAL MEDICINE

## 2020-09-29 PROCEDURE — 88237 TISSUE CULTURE BONE MARROW: CPT | Performed by: INTERNAL MEDICINE

## 2020-09-29 PROCEDURE — 80053 COMPREHEN METABOLIC PANEL: CPT | Performed by: PHYSICIAN ASSISTANT

## 2020-09-29 PROCEDURE — 88341 IMHCHEM/IMCYTCHM EA ADD ANTB: CPT | Performed by: PHYSICIAN ASSISTANT

## 2020-09-29 PROCEDURE — 83883 ASSAY NEPHELOMETRY NOT SPEC: CPT | Performed by: INTERNAL MEDICINE

## 2020-09-29 PROCEDURE — 88185 FLOWCYTOMETRY/TC ADD-ON: CPT | Performed by: INTERNAL MEDICINE

## 2020-09-29 PROCEDURE — 88184 FLOWCYTOMETRY/ TC 1 MARKER: CPT | Performed by: INTERNAL MEDICINE

## 2020-09-29 PROCEDURE — 40000611 ZZHCL STATISTIC MORPHOLOGY W/INTERP HEMEPATH TC 85060: Performed by: PHYSICIAN ASSISTANT

## 2020-09-29 PROCEDURE — 88305 TISSUE EXAM BY PATHOLOGIST: CPT | Performed by: PHYSICIAN ASSISTANT

## 2020-09-29 PROCEDURE — 82306 VITAMIN D 25 HYDROXY: CPT | Performed by: INTERNAL MEDICINE

## 2020-09-29 PROCEDURE — 88275 CYTOGENETICS 100-300: CPT | Performed by: INTERNAL MEDICINE

## 2020-09-29 PROCEDURE — 88271 CYTOGENETICS DNA PROBE: CPT | Performed by: INTERNAL MEDICINE

## 2020-09-29 PROCEDURE — 38222 DX BONE MARROW BX & ASPIR: CPT | Mod: ZF

## 2020-09-29 PROCEDURE — 00000161 ZZHCL STATISTIC H-SPHEME PROCESS B/S: Performed by: PHYSICIAN ASSISTANT

## 2020-09-29 PROCEDURE — 83735 ASSAY OF MAGNESIUM: CPT | Performed by: INTERNAL MEDICINE

## 2020-09-29 PROCEDURE — 88342 IMHCHEM/IMCYTCHM 1ST ANTB: CPT | Performed by: PHYSICIAN ASSISTANT

## 2020-09-29 PROCEDURE — 83615 LACTATE (LD) (LDH) ENZYME: CPT | Performed by: INTERNAL MEDICINE

## 2020-09-29 PROCEDURE — 88161 CYTOPATH SMEAR OTHER SOURCE: CPT | Performed by: PHYSICIAN ASSISTANT

## 2020-09-29 PROCEDURE — 88311 DECALCIFY TISSUE: CPT | Performed by: PHYSICIAN ASSISTANT

## 2020-09-29 PROCEDURE — 38221 DX BONE MARROW BIOPSIES: CPT | Mod: ZF

## 2020-09-29 PROCEDURE — 25000128 H RX IP 250 OP 636: Mod: ZF | Performed by: PHYSICIAN ASSISTANT

## 2020-09-29 PROCEDURE — 84100 ASSAY OF PHOSPHORUS: CPT | Performed by: INTERNAL MEDICINE

## 2020-09-29 PROCEDURE — 85025 COMPLETE CBC W/AUTO DIFF WBC: CPT | Performed by: PHYSICIAN ASSISTANT

## 2020-09-29 RX ADMIN — MIDAZOLAM HYDROCHLORIDE 2 MG: 2 INJECTION, SOLUTION INTRAMUSCULAR; INTRAVENOUS at 14:12

## 2020-09-29 ASSESSMENT — PAIN SCALES - GENERAL: PAINLEVEL: NO PAIN (0)

## 2020-09-29 NOTE — LETTER
9/29/2020         RE: Adrianna Palomino  [address]  [place] [address]        Dear Colleague,    Thank you for referring your patient, Adrianna Palomino, to the [facility]. Please see a copy of my visit note below.    BMT ONC Adult Bone Marrow Biopsy Procedure Note  September 29, 2020  /70   Pulse 122   Temp 99  F (37.2  C) (Oral)   Resp 20   Wt 67.8 kg (149 lb 7.6 oz)   LMP 03/01/2005   SpO2 95%   BMI 24.46 kg/m       Learning needs assessment complete within 12 months? NO    DIAGNOSIS: Multiple Myeloma    PROCEDURE: Unilateral Bone Marrow Biopsy and Unilateral Aspirate    LOCATION: [facility] 2nd Floor    Patient s identification was positively verified by verbal identification and invasive procedure safety checklist was completed. Informed consent was obtained. Following the administration of Midazolam as pre-medication, patient was placed in the prone position and prepped and draped in a sterile manner. Approximately 12 cc of 1% Lidocaine was used over the left posterior iliac spine. Following this a 3 mm incision was made. Trephine bone marrow core(s) was (were) obtained from the IC. Bone marrow aspirates were obtained from the [facility]. Aspirates were sent for morphology, immunophenotyping, cytogenetics and molecular diagnostics . A total of approximately 20 ml of marrow was aspirated. Following this procedure a sterile dressing was applied to the bone marrow biopsy site(s). The patient was placed in the supine position to maintain pressure on the biopsy site. Post-procedure wound care instructions were given.     Complications: NO    Pre-procedural pain: 0 out of 10 on the numeric pain rating scale.     Procedural pain: 3 out of 10 on the numeric pain rating scale.     Post-procedural pain assessment: 0 out of 10 on the numeric pain rating scale.     Interventions: NO    Length of procedure:20 minutes or less      Procedure performed by: Bolivar Willis  SHELL      Again, thank you for allowing me to participate in the care of your patient.        Sincerely,        [facility]ARR[facility]

## 2020-09-29 NOTE — NURSING NOTE
Pt's BP improved prior to BMBX- 111/79. Pt tolerated Procedure well with IV Versed 2 mg. Procedure performed by SABINA Gregory. Following BMBX Pt monitored in the Supine position x 30 minutes. Prior to discharge VSS, pressure drsg C/D/I, A&O x 3, Pt denies Pain, Up Independently. Post-Procedure instructions reviewed with Pt and post procedure instructional handout given. Pt with no further questions at this time. Pt left clinic ambulatory. Pt's  to pick her up at the front entrance.

## 2020-09-29 NOTE — NURSING NOTE
"Oncology Rooming Note    September 29, 2020 1:52 PM   Adrianna Palomino is a 65 year old female who presents for:    Chief Complaint   Patient presents with     RECHECK     Pt here for BMBX and Labs as part of 1.5 Year s/p BMT visit for Multiple Myeloma.      Initial Vitals: /70   Pulse 122   Temp 99  F (37.2  C) (Oral)   Resp 20   Wt 67.8 kg (149 lb 7.6 oz)   LMP 03/01/2005   SpO2 95%   BMI 24.46 kg/m   Estimated body mass index is 24.46 kg/m  as calculated from the following:    Height as of 9/24/19: 1.665 m (5' 5.55\").    Weight as of this encounter: 67.8 kg (149 lb 7.6 oz). Body surface area is 1.77 meters squared.  No Pain (0) Comment: Data Unavailable   Patient's last menstrual period was 03/01/2005.  Allergies reviewed: Yes  Medications reviewed: Yes    Medications: Medication refills not needed today.  Pharmacy name entered into [employer]:    [facility]  [facility] [place], [place] - [address] [place] [address] [phone]    Clinical concerns: Pt here for BMBX and Labs today. Feeling well, yet anxious about today's procedure. /70; . Pt denies any dizziness or lightheadedness with standing. Reports only having coffee to drink today. Pt would like IV Versed with today's Bx. Pt states her  is coming to pick her up. Pt takes ASA but no other blood thinners.  SABINA Reed was notified.      Siri Orr RN              "

## 2020-09-30 LAB
COPATH REPORT: NORMAL
COPATH REPORT: NORMAL
KAPPA LC UR-MCNC: 1.99 MG/DL (ref 0.33–1.94)
KAPPA LC/LAMBDA SER: 2.97 {RATIO} (ref 0.26–1.65)
LAMBDA LC SERPL-MCNC: 0.67 MG/DL (ref 0.57–2.63)

## 2020-10-01 LAB — COPATH REPORT: NORMAL

## 2020-10-02 ENCOUNTER — VIRTUAL VISIT (OUTPATIENT)
Dept: TRANSPLANT | Facility: CLINIC | Age: 65
End: 2020-10-02
Attending: INTERNAL MEDICINE
Payer: COMMERCIAL

## 2020-10-02 DIAGNOSIS — C90.00 MULTIPLE MYELOMA NOT HAVING ACHIEVED REMISSION (H): Primary | ICD-10-CM

## 2020-10-02 LAB
DEPRECATED CALCIDIOL+CALCIFEROL SERPL-MC: <73 UG/L (ref 20–75)
VITAMIN D2 SERPL-MCNC: <5 UG/L
VITAMIN D3 SERPL-MCNC: 68 UG/L

## 2020-10-02 PROCEDURE — 999N001193 HC VIDEO/TELEPHONE VISIT; NO CHARGE

## 2020-10-02 PROCEDURE — 99214 OFFICE O/P EST MOD 30 MIN: CPT | Mod: TEL | Performed by: INTERNAL MEDICINE

## 2020-10-02 NOTE — LETTER
10/2/2020         RE: Adrianna Palomino  [address]  [place] [address]        Dear Colleague,    Thank you for referring your patient, Adrianna Palomino, to the [facility]OW [facility] [place]. Please see a copy of my visit note below.    BMT Clinic Progress Note- telephone visit    Patient ID:  Adrianna Palomino is a 64 yo woman 1.5 years (T0=3/29/2019) s/p auto PBSCT (#2) for Multiple Myeloma.      Interval History:   Overall doing well. Back to work silvio time now.  No F/C/NS. Stable/improved rhinorrhea for her (years), no sore throat, no cough, no SOB at rest, no palpitations.  Still on maintenance posttransplant, valcade every other week. Stable/slightly increased neuropathy: feet not hands.  Normal lifestyle and energy level.  No bony/hip pain.  Back to normal appetitive, weight stable, stools formed, no bleeding or bruising.     Otherwise on review of systems denies any fevers chills night sweats, denies any respiratory symptoms any rhinorrhea sore throat cough chest pain shortness of breath palpitations, denies any nausea or vomiting or change bowel movements as above, denies any rashes bleeding or bruising. Denies any headache or other focal sensory or motor deficits.    ROS: 10 point ROS neg other than the symptoms noted above in the HPI.    Oncology history from notes from Rubia Vinson and Dr. Crespo, records reviewed by me as well:  - Initially diagnosed and with standard-risk cytogenetics in 2006  - Started on Dexamethasone for 6 cycles obtaining a complete remission and then maintenance with thalidomide and dexamethasone for 1 year.   - Stopped all therapy in the summer of 2007 due to neuropathy, and was followed on 2 years of maintenance dexamethasone.    - She was noted to slowly relapse but didn't require therapy until she was started on RVD in 11/2012.   - Completed 5 cycles before obtaining a partial remission and came to transplant.    -  Underwent an autologous stem cell transplant on 04/05/2013,  total cell dose was 8 x10(6) cells/kg, and she had 8 x10(6) cells/kg left in storage. Post ASCT course was unremarkable.     - Maintenance Regimen post transplant  (10mg q28 days), off for 1 month in 9/2016 to review marrow, resumed: 9/30/2016 and continued till disease progression  - Clinical progression [new bone lesions detected on bone survey from 8/29/2018 with [facility]]  - 8/29/2018 Bone Marrow Biopsy: Normocellular BM (30%) with trilineage hematopoiesis showing a decreased myeloid to erythroid ratio (erythroid hyperplasia). - Plasma cells 5%, polytypic for light chain. Flow cytometry negative for monoclonal plasma cell population   - 10/3/2018 Bone marrow Biopsy: CT Guided Needle Core Biopsy: POSITIVE FOR MALIGNANCY - Plasma cell neoplasm, consistent with recurrent plasma cell myeloma. Comment Large aggregates of + plasma cells are present. These are kappa monoclonal.   - 10/3/2018: FISH Myeloma Panel: Positive for additional copies of D5S23/O3S776 (5p15.2), CEP9 (centromere 9), CEP11 (centromere 11), and CEP15 (centromere 15) suggesting polysomy, and low level loss of IGH (14q32). FISH Negative for loss of CDKN2C (1p32.3), gain of CKS1B (1q21.3), loss of [id number] (13q14.3), loss of TP53 (17p13.1), and additional copies of CEP7 (centromere 7), and IGH rearrangement. 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   DSS IIA, SPEP 10/18/2018: 0.9 g/dL, 10/18/2018: 6.69 , Kappa 10.03 mg/dl, lambda 1.5 mg/dl, albumin 3.4 g/dl, creatinine 0.82, calcium 9.3, LDH and B2 microglobulin Unknown/ not available   - 10/19/2018 C1D1 : VRd in relapsed setting (Velcade: 1.3mg/m2 subcutanously Day 1, 8, 15; Revlimid: 10mg (dose reduced) day 1-14; Dexamethasone: 40mg day 1,8,15)- completed 4 cycles, complicated by salmonella stool infections prior  to C3.  - M biochemical markers:  3/11/2019:              UPEP Predominantly albumin is seen which suggests glomerular damage. No obvious monoclonal protein seen and           the absence of monoclonal immunoglobulins was confirmed by immunofixation of this same urine sample.               Urine creatinine and creatiunine clearance are WNL, 24 hour urine total protein elevated at 0.63 and total urine         protein/creatiine is elevated at 0.49              SPEP 3/7/2019 Small monoclonal protein (0.3 g/dL) seen in the gamma fraction.  Monoclonal IgG immunoglobulin of  kappa light chain type.               Kappa slightly elevated at 2.42 mg/dl              Lambda light chain WNL              K/L ratio 1.79    - BMB 3/8/2019: PreHCT: Kappa monotypic plasma cells detected by flow cytometry, compatible with minimal residual disease -Concurrent flow cytometry ([id number]) detected 0.02% kappa monotypic plasma cells. 0.02% plasma cells which express dim CD19, CD38, dim CD45, CD56 and monotypic cytoplasmic kappa immunoglobulin light chains but lack CD20.    - T0=3/29/2019 s/p auto PBSCT (#2)   - See post transplant course below        PHYSICAL EXAM                                                                                                                                   Vitals not taken telephone visit, no PE    Labs:   Lab Results   Component Value Date    WBC 5.4 09/29/2020    ANEU 3.6 09/29/2020    HGB 12.9 09/29/2020    HCT 38.9 09/29/2020     09/29/2020     09/29/2020    POTASSIUM 3.8 09/29/2020    CHLORIDE 103 09/29/2020    CO2 27 09/29/2020     (H) 09/29/2020    BUN 13 09/29/2020    CR 0.84 09/29/2020    MAG 2.1 09/29/2020    INR 0.95 03/28/2019    BILITOTAL 0.7 09/29/2020    AST 22 09/29/2020    ALT 20 09/29/2020    ALKPHOS 63 09/29/2020    PROTTOTAL 7.3 09/29/2020    ALBUMIN 3.8 09/29/2020   PET FDG/CT )SH  12/11/2017 2:24 PM    INDICATION: Multiple myeloma staging, initial treatment  strategy.  TECHNIQUE: Serum glucose level 83 mg/dL. One hour post intravenous   administration of 11.5 mCi F-18 FDG, PET imaging was performed from the   vertex to the distal thighs utilizing attenuation correction with   concurrent axial CT and PET/CT image fusion. Dose reduction techniques were   used.  COMPARISON: Skeletal survey from 07/12/2016 is reviewed.    FINDINGS: Scattered FDG avid lesions throughout the axial and appendicular   skeleton, a few examples of which include in the right ilium (SUVmax 8.5),   anterior left acetabulum (SUVmax 16.6), and mid left femoral diaphysis   (SUVmax 3.5).    3 mm right upper lobe pulmonary nodule, too small for PET characterization.   Tortuous aorta. Trace calcified atherosclerosis. Hysterectomy. Left os   acromiale. Old bilateral rib fractures. Old, non-FDG avid compression   fractures of T11, T12, L1, and L4. Mild degenerative change in the spine.    CONCLUSION:  Scattered FDG avid skeletal lesions consistent with active myeloma. No   evidence of extraosseous disease.      PET/CT 7/3/2019  BONES:   Multiple FDG avid lesions are present in the axial skeleton as  follows:   - 3.0 x 1.7 cm sclerotic lesion in the right iliac bone with Max SUV  9.3.    - 2.1 x 1.5 x 1.9 cm lesion in the anterior medial left acetabulum  with max SUV 9.7.   - Nodular focus of increased FDG uptake in the right inferior sacrum  (coronal image 64) with max SUV 6.3.  Focal uptake in the right T2 facet, likely metastatic.  Focal uptake in the left rib indeterminant.     No other sites of suspicious FDG uptake within the axial or  appendicular skeleton. Numerous sclerotic lesions throughout the  thoracolumbar spine, consistent with known myeloma, without suspicious  FDG uptake. Multilevel disc height narrowing most severe at T11, T12,  and L1 with exaggerated thoracic kyphosis. Non-FDG avid sclerotic  focus in the posterior left fourth rib. Healed fractures of the right  3rd rib (posteriorly) and  left 6th / 8th ribs.                                                                      IMPRESSION:   In this patient with known multiple myeloma s/p recent bone marrow  transplant:  1.  Multiple FDG-avid foci within the pelvic and sacral bones  consistent with active myeloma. Focal uptake in right T2 facet  concerning for metastasis. Numerous sclerotic lesions throughout the  remaining skeleton do not demonstrate FDG uptake.  2.  No evidence of extraosseous disease progression.  3.  Colonic hyperenhancement and submucosal edema with increased FDG  uptake most likely represents infectious or inflammatory colitis.  [id number] disease is also on the differential.    PET/CT 7/3/2019  Since the original read a prior PET/CT performed 12/11/2017 has been  up loaded and ordering provider is asking for additional comparison.     Unchanged:   Right ilium lesion, current SUV max is 9.0, prior was 8.5.      Improved:  Right sacral lesion is slightly smaller max SUV 6.3 unchanged. Total  lesion glycolysis 3.8 previously 5.3. Metabolic volume 1 cc, prior 1.4  cc.   Right pubic symphysis lesion is less pronounced than prior exam.   Left femoral lesion is less pronounced than prior.   The left third rib lesion is less pronounced than on prior.   Right first rib resolved.       Progressed:  The T2 transverse process on the right is new (SUV of 4.8).  The left anterior acetabular lesion is more prominent on current exam  with more soft tissue component, current SUV max is 9.7, prior 16.6.  Hypermetabolic total volume is 3 cc were it was 1 cm.  Total lesion  glycolysis is 20 compared to 8.     Impression:  1. IMPeTus criteria (a proposed Myeloma PET criteria)   1a. BM1 (low level bone marrow),   1b. F3 ExtraSp 5 (4 to 10 lesions, extraspinous, max deauville 5) - 3  lesions highly concerning    1c. No extra medullary, no para medullary disease.   2. Interval change from 12/11/2017  2a. One lesion consistent with active  disease (left acetabulum larger  although less metabolic right sacral lesion max SUV 9.7)   2b. Indeterminant - 2 lesions hypermetabolic but concerning for active  disease  right sacrum smaller max SUV 6,   right T2 transverse process new max SUV 5)     2b. Several lesions resolved or decreased.      3/27/2020 PET/CT 1 year                                                                   IMPRESSION: History of multiple myeloma status post multiple therapies  including bone marrow transplant:   - In summary four prior hypermetabolic bone lesions: 2 resolved, 2  partial response.  1. Resolved hypermetabolism since 7/3/2019: Right T2 facet lesion,  right sacral lesion     2. Partial response (>50% decrease in uptake) since PET 7/3/2019.  2a. Left anterior acetabulum (max SUV 3.4), prior max SUV 9.4.  2b. Right iliac (max SUV 4.2), prior max SUV 9.3.     3. No extraosseous evidence of disease progression.     9/29/2020 1.5 year PET    BONES:   Increased FDG uptake to three lesions in the pelvis (series 2):  - Image 347: Right iliac crest, SUV max 8.1, previously 4.2.  - Image 383: Left anterior acetabulum, SUV max 8.3, previously 3.4.  - Image 339: Right sacral ala near the right S1 neural foramen, SUV  max 3.6; previously 1.7.     No new hypermetabolic intraosseous lesion. Additional  nonhypermetabolic lucent lesions throughout the skeleton are similar  to prior, for example in the spine and sternum. No acute osseous  abnormality. Chronic bilateral rib fractures. Unchanged compression  deformities of T5, T6, T8, T9 T11, T12, L1, and L4.                                                                      IMPRESSION: In this patient with a history of multiple myeloma status  post chemotherapy and bone marrow transplant:     1. Increased FDG uptake to two lesions in the pelvis (right iliac  crest and left acetabulum) concerning for active myeloma.     2. Increased FDG uptake to the right sacral ala lesion. While this  is  below background levels, it is notable for having increased since  prior study and is in the same location as a metabolic lesion seen on  7/3/2019. Attention on follow-up to this location.     3. Additional diffuse lucent lesions without abnormal FDG uptake are  similar to previous study.     4. Unchanged multilevel compression deformities in the thoracolumbar  spine.    ASSESSMENT/PLAN   Adrianna Palomino is a 62 yo woman with standard risk IgG kappa multiple myeloma, s/p auto PBSCT in 2013. Now 1.5 years s/p second auto. On maintenance posttransplant, valcade every other week and zometa quarterly.     1.  BMT:   - T0=3/29/2019) s/p auto PBSCT (#2) Received 8 million cells.   - Engrafted.  Last dose GCSF 4/9/19.   - 4/23/2019: day 28 biochemical amrkers and restaging BMB:   ---- No morphologic or immunohistochemical evidence of plasma cell myeloma   ---- Immune phenotype: There is a minute population (estimated at 0.002%) of Kappa restricted plasma cells identified by aberrant expression of CD56 These plasma cells are very few, but they are forming a discrete group .  ---- Non elevated FLC, SPEP with IgG kappa Monoclonal protein (0.3 g/dL) seen in the gamma fraction. UPEP with no monoclonal proteins  - 7/3/2019: day 100 biochemical amrkers and restaging BMB:   ---- BMB:  Low level recurrent/persistent plasma cell neoplasm with very rare (less than 1%) Kappa monotypic plasma cells   ---- Immune phenotype: 0.02% plasma cells which express CD19 (dim), CD38, CD45 (dim), CD56 and monotypic cytoplasmic kappa immunoglobulin light chains but lack CD20. Also present are: 0.03% polytypic plasma cells  And 3% polytypic B cells T  ---- Non elevated FLC, SPEP with IgG kappa Monoclonal protein (0.3 g/dL) seen in the gamma fraction. UPEP with no monoclonal proteins   --------- hemoglobin improved to 12.1 now within normal, creatinine stable, no hypercalcemia, no proteinuria, UA, LDH WNL, patient with no evidence of  "clinical relapse and with stable serum monoclonal proteins at 0.3g/dl not meeting criteria for an increase of 25% from the lowest confirmed response with no new urine monoclonal protein detected and no elevation in free light chains, therefore the patient does not meet criteria for clinical relapse or progressive disease per NCCN guidelines. However, PET/CT with FDG active lesions, clinically asymptomatic, unclear if those are present NEW lesions on imaging a meeting criteria for progressive disease per NCCN guidelines/IMWG revised criteria.   - 9/19/2019: day 180 iochemical amrkers and restaging BMB:   ---- BMB:Marrow cellularity of 30-40%, with trilineage hematopoiesis; recurrent/persistent myeloma is present - single sheet of kappa monotypic plasma cells at edge of biopsy, comprising less than 5% of cellularity   ---- Flow cytometry: There is a minute population (estimated at 0.005%) of kappa restricted plasma cells identified by aberrant expression of CD56 . This immunophenotype is similar to that reported previously in this patient   ---- Non elevated FLC, SPEP with IgG kappa Monoclonal protein (0.3 g/dL) seen in the gamma fraction. UPEP with no monoclonal proteins  ---- Discussed the uploaded PET/CT from 2017 for comparison with 7/3/2019 PET/CT with radiology  with the new addendum, now concerned for T2 lytic lesion vs degenerative disease as compared to the prior PET/CT with SUV 4.8 with gray zone as to calling it an \"active lytic lesions\". The left acetabular and sacral lesion in report stable in size and metabolic activity per discussion and not meeting 50% change criteria for radiologic progression.  - 3/27/2020 Day 365 assessment:  ---- 3/27 BMB: Variable marrow cellularity, overall 10-20%; no morphologic or immunohistochemical evidence of plasma cell neoplasm . Immunophenotype with a very small population (less than 0.01%, 4-10 events) of kappa restricted plasma cells identified by aberrant expression of " CD56, the cells are too few for a definitive diagnosis   ---- 3/27 PET/CT:  four prior hypermetabolic bone lesions: 2 resolved, 2 partial response.  ---- 3/17 OSH SPEP 0.2g/dl IgG kappa. UPEP no monoclonal protein, Kappa elevated at 2.05 (ULN 1.94) and K/L 2.3  - 9/29/2020 Day 365 assessment:   ---- BMB: Trephine core biopsy suboptimal for interpretation. Cellular marrow aspirates showing trilineage hematopoietic maturation, no increase in plasma cells, and no increase in blasts. Immunophenotype  suspicious for rare kappa monotypic (0.003%) plasma cells   ---- 9/29/2020: PET/CT BONES: Increased FDG uptake to three lesions in the pelvis (series 2): Right iliac crest, SUV max 8.1 previously 4.2 and measures 1.3 x 0.7 cm (stable size). Left anterior acetabulum, SUV max 8.3, previously 3.4, measures 1.3 x 1.3 cm is stable from 7/3/2019 and 3/27/2020 PET CT scans. Right sacral ala near the right S1 neural foramen, SUV max 3.6; previously 1.7. No new hypermetabolic intraosseous lesion. Increased FDG uptake to two lesions in the pelvis (right iliac crest and left acetabulum) concerning for active myeloma. Increased FDG uptake to the right sacral ala lesion. While this is below background levels, it is notable for having increased since prior study and is in the same location as a metabolic lesion seen on 7/3/2019.   ---- Kappa FLC elevated at 1.99 (ULN 1.94) and K/L 2.97 (ULN 1.65). 9/16/2020 OSH SPEP with 0.1 g/dl IgG kappa    I reviewed the imaging myself and had discussion with the radiologist as well.  Overall on the current PET/CT the SPD is stable and therefore IWMG this is stable disease. The FDG metabolic results demonstrate active disease at both sites (IMPetUs PET criteria, Deauville 5 lesions); patient had these  lesions and they were hypermetabolic on PET CT 7/3/2019, metabolism had resolved on 3/27/2020, and has now returned on 9/29/2020.    Clinically the patient is asymptomatic, and biochemical markers  "actually improved, with no change in therapy since she has been on maintenance Velcade during this time.  I would recommend her referred to radiation oncologist to discuss local radiation to these active lesions to prevent any systemic disease progression, given the patient does not strictly meet criteria for radiologic progression necessitating systemic therapy.  After the completion of radiation I would recommend a PET/CT to be done in 3 months instead of at 2 years post ASCT.       2.  HEME: Keep Hgb>8 and plts>10K. No transfusion needs today.                             3.  ID: Afebrile  - off prophy Fluc , and LD ACV  - Pentamidine monthly for one year (Sulfa allergy)- completed  - Compelted one year vaccinations with Dr Crespo, Annual influenza vaccine                                4.  GI:   - hx collagenous colitis, 4/12 stopped protonix due to its association with collagenous colitis.      5.  FEN/Renal: Creat wnl.    Plan:  I will discussed the results when all available with Dr. Crespo. RTC locally for Radiation oncology, maintenance therapy and zometa. She knows to call with any questions or concerns.  Above plan was explained in detail with the patient and all her questions were answered to the best of my ability.  RTC 2 year anniversary visit or earlier as needed    MD Adrianna Ontiveros Caleblaura is a 65 year old female who is being evaluated via a billable telephone visit.      The patient has been notified of following:     \"This telephone visit will be conducted via a call between you and your physician/provider. We have found that certain health care needs can be provided without the need for a physical exam.  This service lets us provide the care you need with a short phone conversation.  If a prescription is necessary we can send it directly to your pharmacy.  If lab work is needed we can place an order for that and you can then stop by our lab to have the test done at " "a later time.    Telephone visits are billed at different rates depending on your insurance coverage. During this emergency period, for some insurers they may be billed the same as an in-person visit.  Please reach out to your insurance provider with any questions.    If during the course of the call the physician/provider feels a telephone visit is not appropriate, you will not be charged for this service.\"    Patient has given verbal consent for Telephone visit?  Yes    What phone number would you like to be contacted at? [phone]    How would you like to obtain your AVS? Ryanhart    I have reviewed and updated the patient's allergies and medication list.    Concerns: No new concerns.   Refills: None needed.     Vitals - Patient Reported  Weight (Patient Reported): 67.8 kg (149 lb 7.6 oz)  Height (Patient Reported): 168.9 cm (5' 6.5\")  BMI (Based on Pt Reported Ht/Wt): 23.76  Pain Score: No Pain (0)    Linh Collier CMA    Phone call duration: 22 minutes    Abdi Browning MD    "

## 2020-10-02 NOTE — PROGRESS NOTES
"Adrianna Palomino is a 65 year old female who is being evaluated via a billable telephone visit.      The patient has been notified of following:     \"This telephone visit will be conducted via a call between you and your physician/provider. We have found that certain health care needs can be provided without the need for a physical exam.  This service lets us provide the care you need with a short phone conversation.  If a prescription is necessary we can send it directly to your pharmacy.  If lab work is needed we can place an order for that and you can then stop by our lab to have the test done at a later time.    Telephone visits are billed at different rates depending on your insurance coverage. During this emergency period, for some insurers they may be billed the same as an in-person visit.  Please reach out to your insurance provider with any questions.    If during the course of the call the physician/provider feels a telephone visit is not appropriate, you will not be charged for this service.\"    Patient has given verbal consent for Telephone visit?  Yes    What phone number would you like to be contacted at? [phone]    How would you like to obtain your AVS? Tamika    I have reviewed and updated the patient's allergies and medication list.    Concerns: No new concerns.   Refills: None needed.     Vitals - Patient Reported  Weight (Patient Reported): 67.8 kg (149 lb 7.6 oz)  Height (Patient Reported): 168.9 cm (5' 6.5\")  BMI (Based on Pt Reported Ht/Wt): 23.76  Pain Score: No Pain (0)    Linh Collier CMA    Phone call duration: 22 minutes    Abdi Browning MD      "

## 2020-10-02 NOTE — PROGRESS NOTES
BMT Clinic Progress Note- telephone visit    Patient ID:  Adrianna Palomino is a 62 yo woman 1.5 years (T0=3/29/2019) s/p auto PBSCT (#2) for Multiple Myeloma.      Interval History:   Overall doing well. Back to work silvio time now.  No F/C/NS. Stable/improved rhinorrhea for her (years), no sore throat, no cough, no SOB at rest, no palpitations.  Still on maintenance posttransplant, valcade every other week. Stable/slightly increased neuropathy: feet not hands.  Normal lifestyle and energy level.  No bony/hip pain.  Back to normal appetitive, weight stable, stools formed, no bleeding or bruising.     Otherwise on review of systems denies any fevers chills night sweats, denies any respiratory symptoms any rhinorrhea sore throat cough chest pain shortness of breath palpitations, denies any nausea or vomiting or change bowel movements as above, denies any rashes bleeding or bruising. Denies any headache or other focal sensory or motor deficits.    ROS: 10 point ROS neg other than the symptoms noted above in the HPI.    Oncology history from notes from Rubia Vinson and Dr. Crespo, records reviewed by me as well:  - Initially diagnosed and with standard-risk cytogenetics in 2006  - Started on Dexamethasone for 6 cycles obtaining a complete remission and then maintenance with thalidomide and dexamethasone for 1 year.   - Stopped all therapy in the summer of 2007 due to neuropathy, and was followed on 2 years of maintenance dexamethasone.    - She was noted to slowly relapse but didn't require therapy until she was started on RVD in 11/2012.   - Completed 5 cycles before obtaining a partial remission and came to transplant.    - Underwent an autologous stem cell transplant on 04/05/2013,  total cell dose was 8 x10(6) cells/kg, and she had 8 x10(6) cells/kg left in storage. Post ASCT course was unremarkable.     - Maintenance Regimen post transplant  (10mg q28 days), off for 1 month in 9/2016 to review marrow, resumed:  9/30/2016 and continued till disease progression  - Clinical progression [new bone lesions detected on bone survey from 8/29/2018 with [facility]]  - 8/29/2018 Bone Marrow Biopsy: Normocellular BM (30%) with trilineage hematopoiesis showing a decreased myeloid to erythroid ratio (erythroid hyperplasia). - Plasma cells 5%, polytypic for light chain. Flow cytometry negative for monoclonal plasma cell population   - 10/3/2018 Bone marrow Biopsy: CT Guided Needle Core Biopsy: POSITIVE FOR MALIGNANCY - Plasma cell neoplasm, consistent with recurrent plasma cell myeloma. Comment Large aggregates of + plasma cells are present. These are kappa monoclonal.   - 10/3/2018: FISH Myeloma Panel: Positive for additional copies of D5S23/T7P445 (5p15.2), CEP9 (centromere 9), CEP11 (centromere 11), and CEP15 (centromere 15) suggesting polysomy, and low level loss of IGH (14q32). FISH Negative for loss of CDKN2C (1p32.3), gain of CKS1B (1q21.3), loss of D33E857 (13q14.3), loss of TP53 (17p13.1), and additional copies of CEP7 (centromere 7), and IGH rearrangement. 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   DSS IIA, SPEP 10/18/2018: 0.9 g/dL, 10/18/2018: 6.69 , Kappa 10.03 mg/dl, lambda 1.5 mg/dl, albumin 3.4 g/dl, creatinine 0.82, calcium 9.3, LDH and B2 microglobulin Unknown/ not available   - 10/19/2018 C1D1 : VRd in relapsed setting (Velcade: 1.3mg/m2 subcutanously Day 1, 8, 15; Revlimid: 10mg (dose reduced) day 1-14; Dexamethasone: 40mg day 1,8,15)- completed 4 cycles, complicated by salmonella stool infections prior to C3.  - M biochemical markers:  3/11/2019:              UPEP Predominantly albumin is seen which suggests glomerular damage. No obvious monoclonal protein seen and           the absence of monoclonal immunoglobulins was confirmed by immunofixation of this same urine sample.                Urine creatinine and creatiunine clearance are WNL, 24 hour urine total protein elevated at 0.63 and total urine         protein/creatiine is elevated at 0.49              SPEP 3/7/2019 Small monoclonal protein (0.3 g/dL) seen in the gamma fraction.  Monoclonal IgG immunoglobulin of  kappa light chain type.               Kappa slightly elevated at 2.42 mg/dl              Lambda light chain WNL              K/L ratio 1.79    - BMB 3/8/2019: PreHCT: Kappa monotypic plasma cells detected by flow cytometry, compatible with minimal residual disease -Concurrent flow cytometry ([id number]) detected 0.02% kappa monotypic plasma cells. 0.02% plasma cells which express dim CD19, CD38, dim CD45, CD56 and monotypic cytoplasmic kappa immunoglobulin light chains but lack CD20.    - T0=3/29/2019 s/p auto PBSCT (#2)   - See post transplant course below        PHYSICAL EXAM                                                                                                                                   Vitals not taken telephone visit, no PE    Labs:   Lab Results   Component Value Date    WBC 5.4 09/29/2020    ANEU 3.6 09/29/2020    HGB 12.9 09/29/2020    HCT 38.9 09/29/2020     09/29/2020     09/29/2020    POTASSIUM 3.8 09/29/2020    CHLORIDE 103 09/29/2020    CO2 27 09/29/2020     (H) 09/29/2020    BUN 13 09/29/2020    CR 0.84 09/29/2020    MAG 2.1 09/29/2020    INR 0.95 03/28/2019    BILITOTAL 0.7 09/29/2020    AST 22 09/29/2020    ALT 20 09/29/2020    ALKPHOS 63 09/29/2020    PROTTOTAL 7.3 09/29/2020    ALBUMIN 3.8 09/29/2020   PET FDG/CT )SH  12/11/2017 2:24 PM    INDICATION: Multiple myeloma staging, initial treatment strategy.  TECHNIQUE: Serum glucose level 83 mg/dL. One hour post intravenous   administration of 11.5 mCi F-18 FDG, PET imaging was performed from the   vertex to the distal thighs utilizing attenuation correction with   concurrent axial CT and PET/CT image fusion. Dose reduction techniques  were   used.  COMPARISON: Skeletal survey from 07/12/2016 is reviewed.    FINDINGS: Scattered FDG avid lesions throughout the axial and appendicular   skeleton, a few examples of which include in the right ilium (SUVmax 8.5),   anterior left acetabulum (SUVmax 16.6), and mid left femoral diaphysis   (SUVmax 3.5).    3 mm right upper lobe pulmonary nodule, too small for PET characterization.   Tortuous aorta. Trace calcified atherosclerosis. Hysterectomy. Left os   acromiale. Old bilateral rib fractures. Old, non-FDG avid compression   fractures of T11, T12, L1, and L4. Mild degenerative change in the spine.    CONCLUSION:  Scattered FDG avid skeletal lesions consistent with active myeloma. No   evidence of extraosseous disease.      PET/CT 7/3/2019  BONES:   Multiple FDG avid lesions are present in the axial skeleton as  follows:   - 3.0 x 1.7 cm sclerotic lesion in the right iliac bone with Max SUV  9.3.    - 2.1 x 1.5 x 1.9 cm lesion in the anterior medial left acetabulum  with max SUV 9.7.   - Nodular focus of increased FDG uptake in the right inferior sacrum  (coronal image 64) with max SUV 6.3.  Focal uptake in the right T2 facet, likely metastatic.  Focal uptake in the left rib indeterminant.     No other sites of suspicious FDG uptake within the axial or  appendicular skeleton. Numerous sclerotic lesions throughout the  thoracolumbar spine, consistent with known myeloma, without suspicious  FDG uptake. Multilevel disc height narrowing most severe at T11, T12,  and L1 with exaggerated thoracic kyphosis. Non-FDG avid sclerotic  focus in the posterior left fourth rib. Healed fractures of the right  3rd rib (posteriorly) and left 6th / 8th ribs.                                                                      IMPRESSION:   In this patient with known multiple myeloma s/p recent bone marrow  transplant:  1.  Multiple FDG-avid foci within the pelvic and sacral bones  consistent with active myeloma. Focal uptake  in right T2 facet  concerning for metastasis. Numerous sclerotic lesions throughout the  remaining skeleton do not demonstrate FDG uptake.  2.  No evidence of extraosseous disease progression.  3.  Colonic hyperenhancement and submucosal edema with increased FDG  uptake most likely represents infectious or inflammatory colitis.  Ttiqz-[id number]-cqyg disease is also on the differential.    PET/CT 7/3/2019  Since the original read a prior PET/CT performed 12/11/2017 has been  up loaded and ordering provider is asking for additional comparison.     Unchanged:   Right ilium lesion, current SUV max is 9.0, prior was 8.5.      Improved:  Right sacral lesion is slightly smaller max SUV 6.3 unchanged. Total  lesion glycolysis 3.8 previously 5.3. Metabolic volume 1 cc, prior 1.4  cc.   Right pubic symphysis lesion is less pronounced than prior exam.   Left femoral lesion is less pronounced than prior.   The left third rib lesion is less pronounced than on prior.   Right first rib resolved.       Progressed:  The T2 transverse process on the right is new (SUV of 4.8).  The left anterior acetabular lesion is more prominent on current exam  with more soft tissue component, current SUV max is 9.7, prior 16.6.  Hypermetabolic total volume is 3 cc were it was 1 cm.  Total lesion  glycolysis is 20 compared to 8.     Impression:  1. IMPeTus criteria (a proposed Myeloma PET criteria)   1a. BM1 (low level bone marrow),   1b. F3 ExtraSp 5 (4 to 10 lesions, extraspinous, max deauville 5) - 3  lesions highly concerning    1c. No extra medullary, no para medullary disease.   2. Interval change from 12/11/2017  2a. One lesion consistent with active disease (left acetabulum larger  although less metabolic right sacral lesion max SUV 9.7)   2b. Indeterminant - 2 lesions hypermetabolic but concerning for active  disease  right sacrum smaller max SUV 6,   right T2 transverse process new max SUV 5)     2b. Several lesions resolved or decreased.       3/27/2020 PET/CT 1 year                                                                   IMPRESSION: History of multiple myeloma status post multiple therapies  including bone marrow transplant:   - In summary four prior hypermetabolic bone lesions: 2 resolved, 2  partial response.  1. Resolved hypermetabolism since 7/3/2019: Right T2 facet lesion,  right sacral lesion     2. Partial response (>50% decrease in uptake) since PET 7/3/2019.  2a. Left anterior acetabulum (max SUV 3.4), prior max SUV 9.4.  2b. Right iliac (max SUV 4.2), prior max SUV 9.3.     3. No extraosseous evidence of disease progression.     9/29/2020 1.5 year PET    BONES:   Increased FDG uptake to three lesions in the pelvis (series 2):  - Image 347: Right iliac crest, SUV max 8.1, previously 4.2.  - Image 383: Left anterior acetabulum, SUV max 8.3, previously 3.4.  - Image 339: Right sacral ala near the right S1 neural foramen, SUV  max 3.6; previously 1.7.     No new hypermetabolic intraosseous lesion. Additional  nonhypermetabolic lucent lesions throughout the skeleton are similar  to prior, for example in the spine and sternum. No acute osseous  abnormality. Chronic bilateral rib fractures. Unchanged compression  deformities of T5, T6, T8, T9 T11, T12, L1, and L4.                                                                      IMPRESSION: In this patient with a history of multiple myeloma status  post chemotherapy and bone marrow transplant:     1. Increased FDG uptake to two lesions in the pelvis (right iliac  crest and left acetabulum) concerning for active myeloma.     2. Increased FDG uptake to the right sacral ala lesion. While this is  below background levels, it is notable for having increased since  prior study and is in the same location as a metabolic lesion seen on  7/3/2019. Attention on follow-up to this location.     3. Additional diffuse lucent lesions without abnormal FDG uptake are  similar to previous study.     4.  Unchanged multilevel compression deformities in the thoracolumbar  spine.    ASSESSMENT/PLAN   Adrianna Palomino is a 62 yo woman with standard risk IgG kappa multiple myeloma, s/p auto PBSCT in 2013. Now 1.5 years s/p second auto. On maintenance posttransplant, valcade every other week and zometa quarterly.     1.  BMT:   - T0=3/29/2019) s/p auto PBSCT (#2) Received 8 million cells.   - Engrafted.  Last dose GCSF 4/9/19.   - 4/23/2019: day 28 biochemical amrkers and restaging BMB:   ---- No morphologic or immunohistochemical evidence of plasma cell myeloma   ---- Immune phenotype: There is a minute population (estimated at 0.002%) of Kappa restricted plasma cells identified by aberrant expression of CD56 These plasma cells are very few, but they are forming a discrete group .  ---- Non elevated FLC, SPEP with IgG kappa Monoclonal protein (0.3 g/dL) seen in the gamma fraction. UPEP with no monoclonal proteins  - 7/3/2019: day 100 biochemical amrkers and restaging BMB:   ---- BMB:  Low level recurrent/persistent plasma cell neoplasm with very rare (less than 1%) Kappa monotypic plasma cells   ---- Immune phenotype: 0.02% plasma cells which express CD19 (dim), CD38, CD45 (dim), CD56 and monotypic cytoplasmic kappa immunoglobulin light chains but lack CD20. Also present are: 0.03% polytypic plasma cells  And 3% polytypic B cells T  ---- Non elevated FLC, SPEP with IgG kappa Monoclonal protein (0.3 g/dL) seen in the gamma fraction. UPEP with no monoclonal proteins   --------- hemoglobin improved to 12.1 now within normal, creatinine stable, no hypercalcemia, no proteinuria, UA, LDH WNL, patient with no evidence of clinical relapse and with stable serum monoclonal proteins at 0.3g/dl not meeting criteria for an increase of 25% from the lowest confirmed response with no new urine monoclonal protein detected and no elevation in free light chains, therefore the patient does not meet criteria for clinical relapse or  "progressive disease per NCCN guidelines. However, PET/CT with FDG active lesions, clinically asymptomatic, unclear if those are present NEW lesions on imaging a meeting criteria for progressive disease per NCCN guidelines/IMWG revised criteria.   - 9/19/2019: day 180 iochemical amrkers and restaging BMB:   ---- BMB:Marrow cellularity of 30-40%, with trilineage hematopoiesis; recurrent/persistent myeloma is present - single sheet of kappa monotypic plasma cells at edge of biopsy, comprising less than 5% of cellularity   ---- Flow cytometry: There is a minute population (estimated at 0.005%) of kappa restricted plasma cells identified by aberrant expression of CD56 . This immunophenotype is similar to that reported previously in this patient   ---- Non elevated FLC, SPEP with IgG kappa Monoclonal protein (0.3 g/dL) seen in the gamma fraction. UPEP with no monoclonal proteins  ---- Discussed the uploaded PET/CT from 2017 for comparison with 7/3/2019 PET/CT with radiology  with the new addendum, now concerned for T2 lytic lesion vs degenerative disease as compared to the prior PET/CT with SUV 4.8 with gray zone as to calling it an \"active lytic lesions\". The left acetabular and sacral lesion in report stable in size and metabolic activity per discussion and not meeting 50% change criteria for radiologic progression.  - 3/27/2020 Day 365 assessment:  ---- 3/27 BMB: Variable marrow cellularity, overall 10-20%; no morphologic or immunohistochemical evidence of plasma cell neoplasm . Immunophenotype with a very small population (less than 0.01%, 4-10 events) of kappa restricted plasma cells identified by aberrant expression of CD56, the cells are too few for a definitive diagnosis   ---- 3/27 PET/CT:  four prior hypermetabolic bone lesions: 2 resolved, 2 partial response.  ---- 3/17 OSH SPEP 0.2g/dl IgG kappa. UPEP no monoclonal protein, Kappa elevated at 2.05 (ULN 1.94) and K/L 2.3  - 9/29/2020 Day 365 assessment:   ---- " BMB: Trephine core biopsy suboptimal for interpretation. Cellular marrow aspirates showing trilineage hematopoietic maturation, no increase in plasma cells, and no increase in blasts. Immunophenotype  suspicious for rare kappa monotypic (0.003%) plasma cells   ---- 9/29/2020: PET/CT BONES: Increased FDG uptake to three lesions in the pelvis (series 2): Right iliac crest, SUV max 8.1 previously 4.2 and measures 1.3 x 0.7 cm (stable size). Left anterior acetabulum, SUV max 8.3, previously 3.4, measures 1.3 x 1.3 cm is stable from 7/3/2019 and 3/27/2020 PET CT scans. Right sacral ala near the right S1 neural foramen, SUV max 3.6; previously 1.7. No new hypermetabolic intraosseous lesion. Increased FDG uptake to two lesions in the pelvis (right iliac crest and left acetabulum) concerning for active myeloma. Increased FDG uptake to the right sacral ala lesion. While this is below background levels, it is notable for having increased since prior study and is in the same location as a metabolic lesion seen on 7/3/2019.   ---- Kappa FLC elevated at 1.99 (ULN 1.94) and K/L 2.97 (ULN 1.65). 9/16/2020 OSH SPEP with 0.1 g/dl IgG kappa    I reviewed the imaging myself and had discussion with the radiologist as well.  Overall on the current PET/CT the SPD is stable and therefore IWMG this is stable disease. The FDG metabolic results demonstrate active disease at both sites (IMPetUs PET criteria, Deauville 5 lesions); patient had these  lesions and they were hypermetabolic on PET CT 7/3/2019, metabolism had resolved on 3/27/2020, and has now returned on 9/29/2020.    Clinically the patient is asymptomatic, and biochemical markers actually improved, with no change in therapy since she has been on maintenance Velcade during this time.  I would recommend her referred to radiation oncologist to discuss local radiation to these active lesions to prevent any systemic disease progression, given the patient does not strictly meet criteria  for radiologic progression necessitating systemic therapy.  After the completion of radiation I would recommend a PET/CT to be done in 3 months instead of at 2 years post ASCT.       2.  HEME: Keep Hgb>8 and plts>10K. No transfusion needs today.                             3.  ID: Afebrile  - off prophy Fluc , and LD ACV  - Pentamidine monthly for one year (Sulfa allergy)- completed  - Compelted one year vaccinations with Dr Crespo, Annual influenza vaccine                                4.  GI:   - hx collagenous colitis, 4/12 stopped protonix due to its association with collagenous colitis.      5.  FEN/Renal: Creat wnl.    Plan:  I will discussed the results when all available with Dr. Crespo. RTC locally for Radiation oncology, maintenance therapy and zometa. She knows to call with any questions or concerns.  Above plan was explained in detail with the patient and all her questions were answered to the best of my ability.  RTC 2 year anniversary visit or earlier as needed    Abdi Browning MD

## 2020-10-15 LAB
COPATH REPORT: NORMAL
COPATH REPORT: NORMAL

## 2020-11-22 ENCOUNTER — HEALTH MAINTENANCE LETTER (OUTPATIENT)
Age: 65
End: 2020-11-22

## 2021-01-04 DIAGNOSIS — Z94.81 STATUS POST BONE MARROW TRANSPLANT (H): ICD-10-CM

## 2021-01-04 DIAGNOSIS — C90.00 MULTIPLE MYELOMA NOT HAVING ACHIEVED REMISSION (H): Primary | ICD-10-CM

## 2021-01-08 DIAGNOSIS — Z94.81 STATUS POST BONE MARROW TRANSPLANT (H): ICD-10-CM

## 2021-01-08 DIAGNOSIS — C90.00 MULTIPLE MYELOMA NOT HAVING ACHIEVED REMISSION (H): Primary | ICD-10-CM

## 2021-01-21 ENCOUNTER — TRANSFERRED RECORDS (OUTPATIENT)
Dept: HEALTH INFORMATION MANAGEMENT | Facility: CLINIC | Age: 66
End: 2021-01-21

## 2021-02-08 ENCOUNTER — VIRTUAL VISIT (OUTPATIENT)
Dept: TRANSPLANT | Facility: CLINIC | Age: 66
End: 2021-02-08
Attending: PHYSICIAN ASSISTANT
Payer: COMMERCIAL

## 2021-02-08 DIAGNOSIS — Z94.81 STATUS POST BONE MARROW TRANSPLANT (H): ICD-10-CM

## 2021-02-08 DIAGNOSIS — C90.00 MULTIPLE MYELOMA NOT HAVING ACHIEVED REMISSION (H): Primary | ICD-10-CM

## 2021-02-08 PROCEDURE — 999N001193 HC VIDEO/TELEPHONE VISIT; NO CHARGE

## 2021-02-08 PROCEDURE — 99214 OFFICE O/P EST MOD 30 MIN: CPT | Mod: TEL

## 2021-02-08 RX ORDER — PROCHLORPERAZINE MALEATE 10 MG
TABLET ORAL PRN
COMMUNITY
Start: 2020-11-19

## 2021-02-08 RX ORDER — LOPERAMIDE HCL 2 MG
2 CAPSULE ORAL DAILY
COMMUNITY

## 2021-02-08 RX ORDER — TIZANIDINE 2 MG/1
TABLET ORAL PRN
COMMUNITY
Start: 2020-12-15

## 2021-02-08 RX ORDER — SENNOSIDES A AND B 8.6 MG/1
1 TABLET, FILM COATED ORAL DAILY
COMMUNITY

## 2021-02-08 NOTE — PROGRESS NOTES
BMT 2-Year Post-Autologous Transplant   Survivorship Care Plan      Date: February 8, 2021    Treatment Team:  Patient Care Team:  Katiuska Dawson as PCP - General (Internal Medicine)  Swati Lacy MD as MD (Ophthalmology)  Bro Crespo as Referring Physician (Hematology & Oncology)  Abdi Bill MD as BMT Physician (Transplant)  Beth Pollack, MSW as  (BMT - Adult)  Abdi Bill MD as Assigned Cancer Care Provider  Edna Gonzalez RN as BMT Nurse Coordinator    Date of Transplant: 3/29/19    Post-Transplant Treatment or Maintenance Therapy:   Oncology history from notes from Rubia Vinson and Dr. Crespo, records reviewed by me as well:  - Initially diagnosed and with standard-risk cytogenetics in 2006  - Started on Dexamethasone for 6 cycles obtaining a complete remission and then maintenance with thalidomide and dexamethasone for 1 year.   - Stopped all therapy in the summer of 2007 due to neuropathy, and was followed on 2 years of maintenance dexamethasone.    - She was noted to slowly relapse but didn't require therapy until she was started on RVD in 11/2012.   - Completed 5 cycles before obtaining a partial remission and came to transplant.    - Underwent an autologous stem cell transplant on 04/05/2013,  total cell dose was 8 x10(6) cells/kg, and she had 8 x10(6) cells/kg left in storage. Post ASCT course was unremarkable.     - Maintenance Regimen post transplant  (10mg q28 days), off for 1 month in 9/2016 to review marrow, resumed: 9/30/2016 and continued till disease progression  - Clinical progression [new bone lesions detected on bone survey from 8/29/2018 with [facility]]  - 8/29/2018 Bone Marrow Biopsy: Normocellular BM (30%) with trilineage hematopoiesis showing a decreased myeloid to erythroid ratio (erythroid hyperplasia). - Plasma cells 5%, polytypic for light chain. Flow cytometry negative for monoclonal plasma cell population   - 10/3/2018 Bone  marrow Biopsy: CT Guided Needle Core Biopsy: POSITIVE FOR MALIGNANCY - Plasma cell neoplasm, consistent with recurrent plasma cell myeloma. Comment Large aggregates of + plasma cells are present. These are kappa monoclonal.   - 10/3/2018: FISH Myeloma Panel: Positive for additional copies of D5S23/R3W727 (5p15.2), CEP9 (centromere 9), CEP11 (centromere 11), and CEP15 (centromere 15) suggesting polysomy, and low level loss of IGH (14q32). FISH Negative for loss of CDKN2C (1p32.3), gain of CKS1B (1q21.3), loss of W98Q016 (13q14.3), loss of TP53 (17p13.1), and additional copies of CEP7 (centromere 7), and IGH rearrangement. 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   DSS IIA, SPEP 10/18/2018: 0.9 g/dL, 10/18/2018: 6.69 , Kappa 10.03 mg/dl, lambda 1.5 mg/dl, albumin 3.4 g/dl, creatinine 0.82, calcium 9.3, LDH and B2 microglobulin Unknown/ not available   - 10/19/2018 C1D1 : VRd in relapsed setting (Velcade: 1.3mg/m2 subcutanously Day 1, 8, 15; Revlimid: 10mg (dose reduced) day 1-14; Dexamethasone: 40mg day 1,8,15)- completed 4 cycles, complicated by salmonella stool infections prior to C3.  - M biochemical markers:  3/11/2019:              UPEP Predominantly albumin is seen which suggests glomerular damage. No obvious monoclonal protein seen and           the absence of monoclonal immunoglobulins was confirmed by immunofixation of this same urine sample.               Urine creatinine and creatiunine clearance are WNL, 24 hour urine total protein elevated at 0.63 and total urine         protein/creatiine is elevated at 0.49              SPEP 3/7/2019 Small monoclonal protein (0.3 g/dL) seen in the gamma fraction.  Monoclonal IgG immunoglobulin of  kappa light chain type.               Kappa slightly elevated at 2.42 mg/dl              Lambda light chain WNL              K/L ratio 1.79     - BMB  3/8/2019: PreHCT: Kappa monotypic plasma cells detected by flow cytometry, compatible with minimal residual disease -Concurrent flow cytometry (SP34-1080) detected 0.02% kappa monotypic plasma cells. 0.02% plasma cells which express dim CD19, CD38, dim CD45, CD56 and monotypic cytoplasmic kappa immunoglobulin light chains but lack CD20.    - T0=3/29/2019 s/p auto PBSCT (#2)   - See post transplant course below    Low dose radiation in October to pelvis. On Velcade maintenance.       Survivorship Self-Management Goals:  Goal  Activities Resources   PFT Will have Dr Crespo order routine post transplant to ensure no evidence of restrictive pattern.       General Health Maintenance:     Vaccinations should be given at 1 and 2 years after your transplant, these may be given at your annual anniversary visits in the BMT clinic, by your primary care provider, or your local oncologist. An exception is the influenza vaccine, which can be given after day +60 post-transplant during influenza season. See table below for more information.     For general health concerns you can be seen by your primary care provider.     If you have questions about your transplant or follow up tests contact your BMT RN coordinator.    Vaccinations for All Patients:    Vaccine Administration Schedule     Vaccinations Post-BMT: Please refer to our [facility] BMT Vaccination Guidelines updated in February of 2020.           Survivorship Care Plan:     This individualized care plan is designed to inform you and your healthcare team of the recommended follow-up visits, tests, health maintenance activities, and cancer screening you should receive after transplant.     Immune System:  Risks Preventative Measures Recommendations   Infections   Symptoms of a cold such as fever, cough, congestion, and shortness of breath should be reported to your primary care provider    Immunizations will be administered at 1 & 2 years after transplant. See table  above. None at this time     Eyes:   Risks Preventative Measures Recommendations   Cataracts, dry eyes, viral infections, and other eye changes   Yearly eye exam     Screening and treatment for high blood pressure or diabetes    Call if you have eye pain, visual changes, or floaters immediately Patient will schedule an annual routine exam with community ophthalmologist     Mouth:  Risks Preventative Measures Recommendations   Dry mouth, cavities, and oral cancer   You can use over the counter Biotene for dry mouth or try sucking on sour candy before meals    Get a dental checkup every year and a cleaning every 6 months    If you have a prosthetic heart valve or central venous catheter or  port , you may need to take an antibiotic before your dental visits Sees dentist routinely.        Lungs   Risks Preventative Measures Recommendations   Changes in function from chemotherapy or radiation; lung infections (pneumonia)   Tell your provider about difficulty breathing, a cough, or new shortness of breath     Avoid use of tobacco products or smoking    Routine lung exams Pulmonary Function Tests (Recommended annually post-transplant)  - For baseline post     Heart and Blood Vessels  Heart Disease Risk Score: The ASCVD Risk score (Maury FALCON Jr., et al., 2013) failed to calculate for the following reasons:    Cannot find a previous HDL lab    Cannot find a previous total cholesterol lab  Risks Preventative Measures Recommendations   Damage from chemotherapy and/or radiation; early development of heart valve disease    Ask your provider if you should receive consultation from a cardiologist (heart doctor) or have special screening    Follow a  heart healthy  lifestyle. For more information visit the National Heart, Lung, and Blood [place] website at: https://www.nhlbi.nih.gov/health/health-topics/topics/heart-healthy-lifestyle-changes     Don t smoke. If you currently smoke and are ready to quit, we can help you find ways  to quit    Maintain a healthy weight    Exercise regularly    Avoid foods that have high amounts of:  o Salt/sodium (less than 2,300 mg of sodium per day)  o Saturated and trans fats  o Limit alcohol to less than 1 drink for women and 2 drinks for men per day  o Sugar such as soft drinks or sugary snacks None at this time       Hormones  Risks Preventative Measures Recommendations   Low thyroid function, low function of other glands (adrenals and others)   Certain endocrine/hormone disorders are more common after transplant. For this reason, talk to your provider about:  o Fatigue  o Muscle weakness  o Changes in cold tolerance  o Reduced interest in sex  o Erectile dysfunction     Certain tests may be used to monitor for hormone changes if you are experiencing symptoms. These tests are done at 1 year None at this time       Liver:  Risks Preventative Measures Recommendations    Damage from chemotherapy or other drugs, buildup of iron from blood transfusions, and infections   Certain tests may be ordered at your next survivorship visit to evaluate liver function based on your individual risk factors.    Talk to your provider before taking herbal supplements or over the counter drugs like Tylenol.    Ask your doctor if you should be treated for iron overload    Avoid alcohol in excess   Hepatic Panel/LFTs (Recommended every 3-6 mos the 1st year post-transplant & annually)       Kidneys and Bladder:  Risks Preventative Measures Recommendations   Damage from chemotherapy or other drugs, infections, high blood pressure   Monitoring blood tests of kidney function during follow up visits    Treating high blood pressure and diabetes     Drink adequate amounts of water    Report symptoms of infection such as frequent urination, pain with urination, foul odor to urine, or blood in the urine    Talk to your provider before taking herbal supplements or over the counter drugs like Ibuprofen Serum creatinine checked as part of  anniversary labs or at least annually by primary provider       Nervous System:  Risks Preventative Measures Recommendations   Neuropathy from chemotherapy, changes in cognitive function   Report changes in sensation or discomfort in feet or hands    Tell your provider about ongoing changes in memory, ability to concentrate, or inability to make decisions   None at this time       Muscle & Connective Tissue  Risks Preventative Measures Recommendations   Reduced muscle strength & stamina   Let your provider know if:  o You notice changes in your muscle strength  o Require extra assistance with daily activities  o Need help creating an exercise routine  o Notice reduced range of motion of the arms, hips, or legs    Follow general guidelines for physical activity as recommended by the Office of Disease Prevention & Health Promotion:    Avoid Inactivity  Some physical activity is better than none -- any amount has benefits.    Do Aerobic Activity  Do aerobic physical activity in episodes of at least 10 minutes, as many times as possible per day. This could include going for walks or using the elliptical or stationary bike.  Ask your doctor what aerobic activities would be safe and helpful for you, and set a goal for yourself!    Strengthen Muscles  Do muscle-strengthening activities (such as lifting light weights or using resistance bands and/or going up and down stairs) that are moderate or high intensity and involve all major muscle groups at least 4 days a week. None at this time     Emotional Health  Risks Preventative Measures Recommendations   Stress, depression, anxiety   Talk to your provider about:  o Changes in feelings, mood, or emotional wellbeing  o Interest in support groups  o If you are concerned about your caregivers emotional wellbeing  o Desire to speak with a counselor for ongoing support None at this time       Sexual Health  Risks Preventative Measures Recommendations   Reduced libido due to  hormonal changes, erectile dysfunction, vaginal dryness, and sexually transmitted diseases  Talk to your provider about:    Reduced libido or concerns regarding your sexual health    Men: Erectile dysfunction     Women: Vaginal dryness, pain during intercourse, vaginal bleeding after intercourse, changes in vaginal discharge that may indicate infection (green/white/foul odor)   General Recommendations:    It is safe to have sex if your platelet count is > 50,000 and you feel physically and emotionally ready     Women can use water-based lubricants to reduce discomfort from dryness. Prescription topical estrogen may help as well.    Use barrier protection such as condoms to prevent sexually transmitted diseases, you are at higher risk for infections due to a weakened immune system    For more information check out the National Marrow [employer]or Program web page on this topic:  https://bethematch.org/patients-and-families/life-after-transplant/coping-with-life-after-transplant/relationships-and-sexual-health/  None at this time       Cancer Screening:  Risks Preventative Measures Recommendations   Higher risk for the development of solid tumors, PTLD, and blood cancers   Preform a full self skin exam monthly to assess for any changes in moles or signs of skin cancer    Minimize excessive sun exposure and wear sunscreen    Women should preform breast-self exams and report any changes such as a new lump, discharge from nipples, and red or dimpled areas of the breast.     Imaging for breast cancer known as mammography is recommended for women starting at age 40 or 8 years after radiation exposure     Screening for colorectal cancer should begin at age 50    All women should have a pap smear every 1-3 years beginning at age 21    Men should perform a monthly testicular self-exam if they have been exposed to radiation as part of their cancer treatment   Mammogram (Women, 1 year and annually based on radiation exposure/age) and  Colonoscopy (Recommended every 10 years after the age of 50)       Recommended Tests & Follow-Up:  Referrals & Tests Ordered Today:  Your BMT physician will review these tests and referral results. If you require treatment based on the results, a member of the BMT team will contact you.  Orders placed today:  No orders of the defined types were placed in this encounter.    (Note to providers: After signing orders use the refresh tool in the note window to display results)   Future Tests/Referrals:   All recommendations above should be completed within 2 months either by your primary care provider or local oncologist (cancer doctor).   Recommendations that were not ordered today should be completed by your:  Primary Care Provider and Local Oncologist   Follow Up Care:  Continue to see your care team members routinely after transplant.  BMT Provider: Dr Ganesh Browning   Hematologist/Oncologist: Dr Crespo   Primary Care Provider: Dr Bernstein    Referral to Transplant Late-Effects Clinic (TLC):  Our TLC clinic is designed to meet the needs of survivors more than 2 years post-transplant. You can call [phone] at anytime in the future to self-refer into this clinic if you feel like you need ongoing survivorship support. Recommend Future TLC Visit: n/a      Miranda Thayer    I spent  minutes with the patient and family, over half of which was spent discussing preventative care strategies, self-management practices, and potential complications after transplant.      Information used for these recommendations was obtained from: MAITE Muniz., LILLY Metzger, JOSE Beach, SAMINA Holland, JYOTI Finn., Russel JRubia L.,   Clair, K. M. (2013). Prevalence of Hematopoietic Cell Transplant Survivors in the [place]. Biology of Blood and Marrow Transplantation?: Journal of the American Society for Blood and Marrow Transplantation, 19(10), 0798-5997. doi [email or web address]/j.bbmt.2013.07.020

## 2021-02-08 NOTE — LETTER
"    2/8/2021         RE: Adrianna Palomino  [address]  [place] [address]        Dear Colleague,    Thank you for referring your patient, Adrianna Palomino, to the [facility] [place]. Please see a copy of my visit note below.    Adrianna is a 65 year old who is being evaluated via a billable telephone visit.      What phone number would you like to be contacted at? [phone]  How would you like to obtain your AVS? Mail a copy   Vitals - Patient Reported  Weight (Patient Reported): 70.3 kg (155 lb)  Height (Patient Reported): 168.9 cm (5' 6.5\")  BMI (Based on Pt Reported Ht/Wt): 24.64  Pain Score: No Pain (0)    Phone call duration: 30minutes    Cortney Schulte MA          BMT 2-Year Post-Autologous Transplant   Survivorship Care Plan      Date: February 8, 2021    Treatment Team:  Patient Care Team:  Katiuska Dawson as PCP - General (Internal Medicine)  Sidell, Swati Fournier MD as MD (Ophthalmology)  Bro Crespo as Referring Physician (Hematology & Oncology)  Abdi Bill MD as BMT Physician (Transplant)  Beth Pollack MSW as  (BMT - Adult)  Abdi Bill MD as Assigned Cancer Care Provider  Edna Gonzalez RN as BMT Nurse Coordinator    Date of Transplant: 3/29/19    Post-Transplant Treatment or Maintenance Therapy:   Oncology history from notes from Rubia Vinson and Dr. Crespo, records reviewed by me as well:  - Initially diagnosed and with standard-risk cytogenetics in 2006  - Started on Dexamethasone for 6 cycles obtaining a complete remission and then maintenance with thalidomide and dexamethasone for 1 year.   - Stopped all therapy in the summer of 2007 due to neuropathy, and was followed on 2 years of maintenance dexamethasone.    - She was noted to slowly relapse but didn't require therapy until she was started on RVD in 11/2012.   - Completed 5 cycles before obtaining a partial remission and came to " transplant.    - Underwent an autologous stem cell transplant on 04/05/2013,  total cell dose was 8 x10(6) cells/kg, and she had 8 x10(6) cells/kg left in storage. Post ASCT course was unremarkable.     - Maintenance Regimen post transplant  (10mg q28 days), off for 1 month in 9/2016 to review marrow, resumed: 9/30/2016 and continued till disease progression  - Clinical progression [new bone lesions detected on bone survey from 8/29/2018 with [facility]]  - 8/29/2018 Bone Marrow Biopsy: Normocellular BM (30%) with trilineage hematopoiesis showing a decreased myeloid to erythroid ratio (erythroid hyperplasia). - Plasma cells 5%, polytypic for light chain. Flow cytometry negative for monoclonal plasma cell population   - 10/3/2018 Bone marrow Biopsy: CT Guided Needle Core Biopsy: POSITIVE FOR MALIGNANCY - Plasma cell neoplasm, consistent with recurrent plasma cell myeloma. Comment Large aggregates of + plasma cells are present. These are kappa monoclonal.   - 10/3/2018: FISH Myeloma Panel: Positive for additional copies of D5S23/G8M245 (5p15.2), CEP9 (centromere 9), CEP11 (centromere 11), and CEP15 (centromere 15) suggesting polysomy, and low level loss of IGH (14q32). FISH Negative for loss of CDKN2C (1p32.3), gain of CKS1B (1q21.3), loss of U89Q952 (13q14.3), loss of TP53 (17p13.1), and additional copies of CEP7 (centromere 7), and IGH rearrangement. 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   DSS IIA, SPEP 10/18/2018: 0.9 g/dL, 10/18/2018: 6.69 , Kappa 10.03 mg/dl, lambda 1.5 mg/dl, albumin 3.4 g/dl, creatinine 0.82, calcium 9.3, LDH and B2 microglobulin Unknown/ not available   - 10/19/2018 C1D1 : VRd in relapsed setting (Velcade: 1.3mg/m2 subcutanously Day 1, 8, 15; Revlimid: 10mg (dose reduced) day 1-14; Dexamethasone: 40mg day 1,8,15)- completed 4 cycles, complicated by salmonella stool  infections prior to C3.  - M biochemical markers:  3/11/2019:              UPEP Predominantly albumin is seen which suggests glomerular damage. No obvious monoclonal protein seen and           the absence of monoclonal immunoglobulins was confirmed by immunofixation of this same urine sample.               Urine creatinine and creatiunine clearance are WNL, 24 hour urine total protein elevated at 0.63 and total urine         protein/creatiine is elevated at 0.49              SPEP 3/7/2019 Small monoclonal protein (0.3 g/dL) seen in the gamma fraction.  Monoclonal IgG immunoglobulin of  kappa light chain type.               Kappa slightly elevated at 2.42 mg/dl              Lambda light chain WNL              K/L ratio 1.79     - BMB 3/8/2019: PreHCT: Kappa monotypic plasma cells detected by flow cytometry, compatible with minimal residual disease -Concurrent flow cytometry ([id number]) detected 0.02% kappa monotypic plasma cells. 0.02% plasma cells which express dim CD19, CD38, dim CD45, CD56 and monotypic cytoplasmic kappa immunoglobulin light chains but lack CD20.    - T0=3/29/2019 s/p auto PBSCT (#2)   - See post transplant course below    Low dose radiation in October to pelvis. On Velcade maintenance.       Survivorship Self-Management Goals:  Goal  Activities Resources   PFT Will have Dr Crespo order routine post transplant to ensure no evidence of restrictive pattern.       General Health Maintenance:     Vaccinations should be given at 1 and 2 years after your transplant, these may be given at your annual anniversary visits in the BMT clinic, by your primary care provider, or your local oncologist. An exception is the influenza vaccine, which can be given after day +60 post-transplant during influenza season. See table below for more information.     For general health concerns you can be seen by your primary care provider.     If you have questions about your transplant or follow up tests contact your BMT RN  coordinator.    Vaccinations for All Patients:    Vaccine Administration Schedule     Vaccinations Post-BMT: Please refer to our [facility] BMT Vaccination Guidelines updated in February of 2020.           Survivorship Care Plan:     This individualized care plan is designed to inform you and your healthcare team of the recommended follow-up visits, tests, health maintenance activities, and cancer screening you should receive after transplant.     Immune System:  Risks Preventative Measures Recommendations   Infections   Symptoms of a cold such as fever, cough, congestion, and shortness of breath should be reported to your primary care provider    Immunizations will be administered at 1 & 2 years after transplant. See table above. None at this time     Eyes:   Risks Preventative Measures Recommendations   Cataracts, dry eyes, viral infections, and other eye changes   Yearly eye exam     Screening and treatment for high blood pressure or diabetes    Call if you have eye pain, visual changes, or floaters immediately Patient will schedule an annual routine exam with community ophthalmologist     Mouth:  Risks Preventative Measures Recommendations   Dry mouth, cavities, and oral cancer   You can use over the counter Biotene for dry mouth or try sucking on sour candy before meals    Get a dental checkup every year and a cleaning every 6 months    If you have a prosthetic heart valve or central venous catheter or  port , you may need to take an antibiotic before your dental visits Sees dentist routinely.        Lungs   Risks Preventative Measures Recommendations   Changes in function from chemotherapy or radiation; lung infections (pneumonia)   Tell your provider about difficulty breathing, a cough, or new shortness of breath     Avoid use of tobacco products or smoking    Routine lung exams Pulmonary Function Tests (Recommended annually post-transplant)  - For baseline post     Heart and Blood Vessels  Heart  Disease Risk Score: The ASCVD Risk score (Maury FALCON Jr., et al., 2013) failed to calculate for the following reasons:    Cannot find a previous HDL lab    Cannot find a previous total cholesterol lab  Risks Preventative Measures Recommendations   Damage from chemotherapy and/or radiation; early development of heart valve disease    Ask your provider if you should receive consultation from a cardiologist (heart doctor) or have special screening    Follow a  heart healthy  lifestyle. For more information visit the National Heart, Lung, and Blood [place] website at: https://www.nhlbi.nih.gov/health/health-topics/topics/heart-healthy-lifestyle-changes     Don t smoke. If you currently smoke and are ready to quit, we can help you find ways to quit    Maintain a healthy weight    Exercise regularly    Avoid foods that have high amounts of:  o Salt/sodium (less than 2,300 mg of sodium per day)  o Saturated and trans fats  o Limit alcohol to less than 1 drink for women and 2 drinks for men per day  o Sugar such as soft drinks or sugary snacks None at this time       Hormones  Risks Preventative Measures Recommendations   Low thyroid function, low function of other glands (adrenals and others)   Certain endocrine/hormone disorders are more common after transplant. For this reason, talk to your provider about:  o Fatigue  o Muscle weakness  o Changes in cold tolerance  o Reduced interest in sex  o Erectile dysfunction     Certain tests may be used to monitor for hormone changes if you are experiencing symptoms. These tests are done at 1 year None at this time       Liver:  Risks Preventative Measures Recommendations    Damage from chemotherapy or other drugs, buildup of iron from blood transfusions, and infections   Certain tests may be ordered at your next survivorship visit to evaluate liver function based on your individual risk factors.    Talk to your provider before taking herbal supplements or over the counter drugs  like Tylenol.    Ask your doctor if you should be treated for iron overload    Avoid alcohol in excess   Hepatic Panel/LFTs (Recommended every 3-6 mos the 1st year post-transplant & annually)       Kidneys and Bladder:  Risks Preventative Measures Recommendations   Damage from chemotherapy or other drugs, infections, high blood pressure   Monitoring blood tests of kidney function during follow up visits    Treating high blood pressure and diabetes     Drink adequate amounts of water    Report symptoms of infection such as frequent urination, pain with urination, foul odor to urine, or blood in the urine    Talk to your provider before taking herbal supplements or over the counter drugs like Ibuprofen Serum creatinine checked as part of anniversary labs or at least annually by primary provider       Nervous System:  Risks Preventative Measures Recommendations   Neuropathy from chemotherapy, changes in cognitive function   Report changes in sensation or discomfort in feet or hands    Tell your provider about ongoing changes in memory, ability to concentrate, or inability to make decisions   None at this time       Muscle & Connective Tissue  Risks Preventative Measures Recommendations   Reduced muscle strength & stamina   Let your provider know if:  o You notice changes in your muscle strength  o Require extra assistance with daily activities  o Need help creating an exercise routine  o Notice reduced range of motion of the arms, hips, or legs    Follow general guidelines for physical activity as recommended by the Office of Disease Prevention & Health Promotion:    Avoid Inactivity  Some physical activity is better than none -- any amount has benefits.    Do Aerobic Activity  Do aerobic physical activity in episodes of at least 10 minutes, as many times as possible per day. This could include going for walks or using the elliptical or stationary bike.  Ask your doctor what aerobic activities would be safe and helpful  for you, and set a goal for yourself!    Strengthen Muscles  Do muscle-strengthening activities (such as lifting light weights or using resistance bands and/or going up and down stairs) that are moderate or high intensity and involve all major muscle groups at least 4 days a week. None at this time     Emotional Health  Risks Preventative Measures Recommendations   Stress, depression, anxiety   Talk to your provider about:  o Changes in feelings, mood, or emotional wellbeing  o Interest in support groups  o If you are concerned about your caregivers emotional wellbeing  o Desire to speak with a counselor for ongoing support None at this time       Sexual Health  Risks Preventative Measures Recommendations   Reduced libido due to hormonal changes, erectile dysfunction, vaginal dryness, and sexually transmitted diseases  Talk to your provider about:    Reduced libido or concerns regarding your sexual health    Men: Erectile dysfunction     Women: Vaginal dryness, pain during intercourse, vaginal bleeding after intercourse, changes in vaginal discharge that may indicate infection (green/white/foul odor)   General Recommendations:    It is safe to have sex if your platelet count is > 50,000 and you feel physically and emotionally ready     Women can use water-based lubricants to reduce discomfort from dryness. Prescription topical estrogen may help as well.    Use barrier protection such as condoms to prevent sexually transmitted diseases, you are at higher risk for infections due to a weakened immune system    For more information check out the National Marrow Donor Program web page on this topic:  https://bethematch.org/patients-and-families/life-after-transplant/coping-with-life-after-transplant/relationships-and-sexual-health/  None at this time       Cancer Screening:  Risks Preventative Measures Recommendations   Higher risk for the development of solid tumors, PTLD, and blood cancers   Preform a full self skin  exam monthly to assess for any changes in moles or signs of skin cancer    Minimize excessive sun exposure and wear sunscreen    Women should preform breast-self exams and report any changes such as a new lump, discharge from nipples, and red or dimpled areas of the breast.     Imaging for breast cancer known as mammography is recommended for women starting at age 40 or 8 years after radiation exposure     Screening for colorectal cancer should begin at age 50    All women should have a pap smear every 1-3 years beginning at age 21    Men should perform a monthly testicular self-exam if they have been exposed to radiation as part of their cancer treatment   Mammogram (Women, 1 year and annually based on radiation exposure/age) and Colonoscopy (Recommended every 10 years after the age of 50)       Recommended Tests & Follow-Up:  Referrals & Tests Ordered Today:  Your BMT physician will review these tests and referral results. If you require treatment based on the results, a member of the BMT team will contact you.  Orders placed today:  No orders of the defined types were placed in this encounter.    (Note to providers: After signing orders use the refresh tool in the note window to display results)   Future Tests/Referrals:   All recommendations above should be completed within 2 months either by your primary care provider or local oncologist (cancer doctor).   Recommendations that were not ordered today should be completed by your:  Primary Care Provider and Local Oncologist   Follow Up Care:  Continue to see your care team members routinely after transplant.  BMT Provider: Dr Ganesh Browning   Hematologist/Oncologist: Dr Crespo   Primary Care Provider: Dr Bernstein    Referral to Transplant Late-Effects Clinic (TLC):  Our TLC clinic is designed to meet the needs of survivors more than 2 years post-transplant. You can call [phone] at anytime in the future to self-refer into this clinic if you feel like you need  ongoing survivorship support. Recommend Future TLC Visit: n/a      iMranda Thayer    I spent  minutes with the patient and family, over half of which was spent discussing preventative care strategies, self-management practices, and potential complications after transplant.      Information used for these recommendations was obtained from: MAITE Muniz., LILLY Metzger, JOSE Beach, SAMINA Holland, JYOTI Finn., ES Goode.,   Clair, IBRAHIMA VALVERDE. (2013). Prevalence of Hematopoietic Cell Transplant Survivors in the [place]. Biology of Blood and Marrow Transplantation?: Journal of the American Society for Blood and Marrow Transplantation, 19(10), 4523-6907. doi 10.1016/j.bbmt.2013.07.020        Again, thank you for allowing me to participate in the care of your patient.        Sincerely,        BMT Advanced Practice Provider

## 2021-02-08 NOTE — PROGRESS NOTES
"Adrianna is a 65 year old who is being evaluated via a billable telephone visit.      What phone number would you like to be contacted at? [phone]  How would you like to obtain your AVS? Mail a copy   Vitals - Patient Reported  Weight (Patient Reported): 70.3 kg (155 lb)  Height (Patient Reported): 168.9 cm (5' 6.5\")  BMI (Based on Pt Reported Ht/Wt): 24.64  Pain Score: No Pain (0)    Phone call duration: 30minutes    Cortney Schulte MA    "

## 2021-02-10 ENCOUNTER — HOSPITAL ENCOUNTER (OUTPATIENT)
Dept: PET IMAGING | Facility: CLINIC | Age: 66
End: 2021-02-10
Attending: INTERNAL MEDICINE
Payer: COMMERCIAL

## 2021-02-10 ENCOUNTER — HOSPITAL ENCOUNTER (OUTPATIENT)
Dept: PET IMAGING | Facility: CLINIC | Age: 66
Setting detail: NUCLEAR MEDICINE
Discharge: HOME | End: 2021-02-10
Attending: INTERNAL MEDICINE | Admitting: INTERNAL MEDICINE
Payer: COMMERCIAL

## 2021-02-10 DIAGNOSIS — Z94.81 STATUS POST BONE MARROW TRANSPLANT (H): ICD-10-CM

## 2021-02-10 DIAGNOSIS — C90.00 MULTIPLE MYELOMA NOT HAVING ACHIEVED REMISSION (H): ICD-10-CM

## 2021-02-10 LAB
CREAT BLD-MCNC: 0.8 MG/DL (ref 0.52–1.04)
GFR SERPL CREATININE-BSD FRML MDRD: 72 ML/MIN/{1.73_M2}
RADIOLOGIST FLAGS: NORMAL
RADIOLOGIST FLAGS: NORMAL

## 2021-02-10 PROCEDURE — 70491 CT SOFT TISSUE NECK W/DYE: CPT | Mod: 26

## 2021-02-10 PROCEDURE — 70491 CT SOFT TISSUE NECK W/DYE: CPT

## 2021-02-10 PROCEDURE — 78816 PET IMAGE W/CT FULL BODY: CPT | Mod: PS

## 2021-02-10 PROCEDURE — 250N000011 HC RX IP 250 OP 636: Performed by: INTERNAL MEDICINE

## 2021-02-10 PROCEDURE — 78816 PET IMAGE W/CT FULL BODY: CPT | Mod: 26

## 2021-02-10 PROCEDURE — 71260 CT THORAX DX C+: CPT | Mod: 26

## 2021-02-10 PROCEDURE — 82565 ASSAY OF CREATININE: CPT

## 2021-02-10 PROCEDURE — A9552 F18 FDG: HCPCS | Performed by: INTERNAL MEDICINE

## 2021-02-10 PROCEDURE — 71260 CT THORAX DX C+: CPT

## 2021-02-10 PROCEDURE — 74177 CT ABD & PELVIS W/CONTRAST: CPT | Mod: 26

## 2021-02-10 PROCEDURE — 343N000001 HC RX 343: Performed by: INTERNAL MEDICINE

## 2021-02-10 RX ORDER — IOPAMIDOL 755 MG/ML
95 INJECTION, SOLUTION INTRAVASCULAR ONCE
Status: COMPLETED | OUTPATIENT
Start: 2021-02-10 | End: 2021-02-10

## 2021-02-10 RX ADMIN — IOPAMIDOL 95 ML: 755 INJECTION, SOLUTION INTRAVENOUS at 10:56

## 2021-02-10 RX ADMIN — FLUDEOXYGLUCOSE F-18 10.27 MCI.: 500 INJECTION, SOLUTION INTRAVENOUS at 10:56

## 2021-02-16 ENCOUNTER — VIRTUAL VISIT (OUTPATIENT)
Dept: TRANSPLANT | Facility: CLINIC | Age: 66
End: 2021-02-16
Attending: INTERNAL MEDICINE
Payer: COMMERCIAL

## 2021-02-16 DIAGNOSIS — Z94.81 STATUS POST BONE MARROW TRANSPLANT (H): Primary | ICD-10-CM

## 2021-02-16 PROCEDURE — 999N001193 HC VIDEO/TELEPHONE VISIT; NO CHARGE

## 2021-02-16 PROCEDURE — 99212 OFFICE O/P EST SF 10 MIN: CPT | Mod: TEL | Performed by: INTERNAL MEDICINE

## 2021-02-16 NOTE — PROGRESS NOTES
BMT Clinic Progress Note- telephone visit    Patient ID:  Adrianna Palomino is a 64 yo woman 1 year 10 months (T0=3/29/2019) s/p auto PBSCT (#2) for Multiple Myeloma.      Interval History:   Overall doing well.   No F/C/NS. No sore throat, no cough, no SOB at rest, no palpitations.  Still on maintenance posttransplant, valcade every other week. Stable/slightly increased neuropathy: feet not hands.  Normal lifestyle and energy level.  No bony/hip pain.  Back to normal appetitive, weight stable, stools formed, no bleeding or bruising.     Otherwise on review of systems denies any fevers chills night sweats, denies any respiratory symptoms any rhinorrhea sore throat cough chest pain shortness of breath palpitations, denies any nausea or vomiting or change bowel movements as above, denies any rashes bleeding or bruising. Denies any headache or other focal sensory or motor deficits.    ROS: 10 point ROS neg other than the symptoms noted above in the HPI.    Oncology history from notes from Rubia Vinson and Dr. Crespo, records reviewed by me as well:  - Initially diagnosed and with standard-risk cytogenetics in 2006  - Started on Dexamethasone for 6 cycles obtaining a complete remission and then maintenance with thalidomide and dexamethasone for 1 year.   - Stopped all therapy in the summer of 2007 due to neuropathy, and was followed on 2 years of maintenance dexamethasone.    - She was noted to slowly relapse but didn't require therapy until she was started on RVD in 11/2012.   - Completed 5 cycles before obtaining a partial remission and came to transplant.    - Underwent an autologous stem cell transplant on 04/05/2013,  total cell dose was 8 x10(6) cells/kg, and she had 8 x10(6) cells/kg left in storage. Post ASCT course was unremarkable.     - Maintenance Regimen post transplant  (10mg q28 days), off for 1 month in 9/2016 to review marrow, resumed: 9/30/2016 and continued till disease progression  - Clinical  progression [new bone lesions detected on bone survey from 8/29/2018 with [facility]]  - 8/29/2018 Bone Marrow Biopsy: Normocellular BM (30%) with trilineage hematopoiesis showing a decreased myeloid to erythroid ratio (erythroid hyperplasia). - Plasma cells 5%, polytypic for light chain. Flow cytometry negative for monoclonal plasma cell population   - 10/3/2018 Bone marrow Biopsy: CT Guided Needle Core Biopsy: POSITIVE FOR MALIGNANCY - Plasma cell neoplasm, consistent with recurrent plasma cell myeloma. Comment Large aggregates of + plasma cells are present. These are kappa monoclonal.   - 10/3/2018: FISH Myeloma Panel: Positive for additional copies of D5S23/[id number] (5p15.2), CEP9 (centromere 9), CEP11 (centromere 11), and CEP15 (centromere 15) suggesting polysomy, and low level loss of IGH (14q32). FISH Negative for loss of CDKN2C (1p32.3), gain of CKS1B (1q21.3), loss of [id number] (13q14.3), loss of TP53 (17p13.1), and additional copies of CEP7 (centromere 7), and IGH rearrangement. 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   DSS IIA, SPEP 10/18/2018: 0.9 g/dL, 10/18/2018: 6.69 , Kappa 10.03 mg/dl, lambda 1.5 mg/dl, albumin 3.4 g/dl, creatinine 0.82, calcium 9.3, LDH and B2 microglobulin Unknown/ not available   - 10/19/2018 C1D1 : VRd in relapsed setting (Velcade: 1.3mg/m2 subcutanously Day 1, 8, 15; Revlimid: 10mg (dose reduced) day 1-14; Dexamethasone: 40mg day 1,8,15)- completed 4 cycles, complicated by salmonella stool infections prior to C3.  - M biochemical markers:  3/11/2019:              UPEP Predominantly albumin is seen which suggests glomerular damage. No obvious monoclonal protein seen and           the absence of monoclonal immunoglobulins was confirmed by immunofixation of this same urine sample.               Urine creatinine and creatiunine clearance are WNL, 24 hour  urine total protein elevated at 0.63 and total urine         protein/creatiine is elevated at 0.49              SPEP 3/7/2019 Small monoclonal protein (0.3 g/dL) seen in the gamma fraction.  Monoclonal IgG immunoglobulin of  kappa light chain type.               Kappa slightly elevated at 2.42 mg/dl              Lambda light chain WNL              K/L ratio 1.79    - BMB 3/8/2019: PreHCT: Kappa monotypic plasma cells detected by flow cytometry, compatible with minimal residual disease -Concurrent flow cytometry ([id number]) detected 0.02% kappa monotypic plasma cells. 0.02% plasma cells which express dim CD19, CD38, dim CD45, CD56 and monotypic cytoplasmic kappa immunoglobulin light chains but lack CD20.    - T0=3/29/2019 s/p auto PBSCT (#2)   - See post transplant course below        PHYSICAL EXAM                                                                                                                                   Vitals not taken telephone visit, no PE    Labs:   Lab Results   Component Value Date    WBC 5.4 09/29/2020    ANEU 3.6 09/29/2020    HGB 12.9 09/29/2020    HCT 38.9 09/29/2020     09/29/2020     09/29/2020    POTASSIUM 3.8 09/29/2020    CHLORIDE 103 09/29/2020    CO2 27 09/29/2020     (H) 09/29/2020    BUN 13 09/29/2020    CR 0.84 09/29/2020    MAG 2.1 09/29/2020    INR 0.95 03/28/2019    BILITOTAL 0.7 09/29/2020    AST 22 09/29/2020    ALT 20 09/29/2020    ALKPHOS 63 09/29/2020    PROTTOTAL 7.3 09/29/2020    ALBUMIN 3.8 09/29/2020   PET FDG/CT )SH  12/11/2017 2:24 PM    INDICATION: Multiple myeloma staging, initial treatment strategy.  TECHNIQUE: Serum glucose level 83 mg/dL. One hour post intravenous   administration of 11.5 mCi F-18 FDG, PET imaging was performed from the   vertex to the distal thighs utilizing attenuation correction with   concurrent axial CT and PET/CT image fusion. Dose reduction techniques were   used.  COMPARISON: Skeletal survey from 07/12/2016 is  reviewed.    FINDINGS: Scattered FDG avid lesions throughout the axial and appendicular   skeleton, a few examples of which include in the right ilium (SUVmax 8.5),   anterior left acetabulum (SUVmax 16.6), and mid left femoral diaphysis   (SUVmax 3.5).    3 mm right upper lobe pulmonary nodule, too small for PET characterization.   Tortuous aorta. Trace calcified atherosclerosis. Hysterectomy. Left os   acromiale. Old bilateral rib fractures. Old, non-FDG avid compression   fractures of T11, T12, L1, and L4. Mild degenerative change in the spine.    CONCLUSION:  Scattered FDG avid skeletal lesions consistent with active myeloma. No   evidence of extraosseous disease.      PET/CT 7/3/2019  BONES:   Multiple FDG avid lesions are present in the axial skeleton as  follows:   - 3.0 x 1.7 cm sclerotic lesion in the right iliac bone with Max SUV  9.3.    - 2.1 x 1.5 x 1.9 cm lesion in the anterior medial left acetabulum  with max SUV 9.7.   - Nodular focus of increased FDG uptake in the right inferior sacrum  (coronal image 64) with max SUV 6.3.  Focal uptake in the right T2 facet, likely metastatic.  Focal uptake in the left rib indeterminant.     No other sites of suspicious FDG uptake within the axial or  appendicular skeleton. Numerous sclerotic lesions throughout the  thoracolumbar spine, consistent with known myeloma, without suspicious  FDG uptake. Multilevel disc height narrowing most severe at T11, T12,  and L1 with exaggerated thoracic kyphosis. Non-FDG avid sclerotic  focus in the posterior left fourth rib. Healed fractures of the right  3rd rib (posteriorly) and left 6th / 8th ribs.                                                                      IMPRESSION:   In this patient with known multiple myeloma s/p recent bone marrow  transplant:  1.  Multiple FDG-avid foci within the pelvic and sacral bones  consistent with active myeloma. Focal uptake in right T2 facet  concerning for metastasis. Numerous  sclerotic lesions throughout the  remaining skeleton do not demonstrate FDG uptake.  2.  No evidence of extraosseous disease progression.  3.  Colonic hyperenhancement and submucosal edema with increased FDG  uptake most likely represents infectious or inflammatory colitis.  Bzvam-kjwznl-zgtw disease is also on the differential.    PET/CT 7/3/2019  Since the original read a prior PET/CT performed 12/11/2017 has been  up loaded and ordering provider is asking for additional comparison.     Unchanged:   Right ilium lesion, current SUV max is 9.0, prior was 8.5.      Improved:  Right sacral lesion is slightly smaller max SUV 6.3 unchanged. Total  lesion glycolysis 3.8 previously 5.3. Metabolic volume 1 cc, prior 1.4  cc.   Right pubic symphysis lesion is less pronounced than prior exam.   Left femoral lesion is less pronounced than prior.   The left third rib lesion is less pronounced than on prior.   Right first rib resolved.       Progressed:  The T2 transverse process on the right is new (SUV of 4.8).  The left anterior acetabular lesion is more prominent on current exam  with more soft tissue component, current SUV max is 9.7, prior 16.6.  Hypermetabolic total volume is 3 cc were it was 1 cm.  Total lesion  glycolysis is 20 compared to 8.     Impression:  1. IMPeTus criteria (a proposed Myeloma PET criteria)   1a. BM1 (low level bone marrow),   1b. F3 ExtraSp 5 (4 to 10 lesions, extraspinous, max deauville 5) - 3  lesions highly concerning    1c. No extra medullary, no para medullary disease.   2. Interval change from 12/11/2017  2a. One lesion consistent with active disease (left acetabulum larger  although less metabolic right sacral lesion max SUV 9.7)   2b. Indeterminant - 2 lesions hypermetabolic but concerning for active  disease  right sacrum smaller max SUV 6,   right T2 transverse process new max SUV 5)     2b. Several lesions resolved or decreased.      3/27/2020 PET/CT 1 year                                                                    IMPRESSION: History of multiple myeloma status post multiple therapies  including bone marrow transplant:   - In summary four prior hypermetabolic bone lesions: 2 resolved, 2  partial response.  1. Resolved hypermetabolism since 7/3/2019: Right T2 facet lesion,  right sacral lesion     2. Partial response (>50% decrease in uptake) since PET 7/3/2019.  2a. Left anterior acetabulum (max SUV 3.4), prior max SUV 9.4.  2b. Right iliac (max SUV 4.2), prior max SUV 9.3.     3. No extraosseous evidence of disease progression.     9/29/2020 1.5 year PET    BONES:   Increased FDG uptake to three lesions in the pelvis (series 2):  - Image 347: Right iliac crest, SUV max 8.1, previously 4.2.  - Image 383: Left anterior acetabulum, SUV max 8.3, previously 3.4.  - Image 339: Right sacral ala near the right S1 neural foramen, SUV  max 3.6; previously 1.7.     No new hypermetabolic intraosseous lesion. Additional  nonhypermetabolic lucent lesions throughout the skeleton are similar  to prior, for example in the spine and sternum. No acute osseous  abnormality. Chronic bilateral rib fractures. Unchanged compression  deformities of T5, T6, T8, T9 T11, T12, L1, and L4.                                                                      IMPRESSION: In this patient with a history of multiple myeloma status  post chemotherapy and bone marrow transplant:     1. Increased FDG uptake to two lesions in the pelvis (right iliac  crest and left acetabulum) concerning for active myeloma.     2. Increased FDG uptake to the right sacral ala lesion. While this is  below background levels, it is notable for having increased since  prior study and is in the same location as a metabolic lesion seen on  7/3/2019. Attention on follow-up to this location.     3. Additional diffuse lucent lesions without abnormal FDG uptake are  similar to previous study.     4. Unchanged multilevel compression deformities in the  thoracolumbar  Spine.    2/10/2021 PET/CT:  Impression: In this patient with the diagnosis of multiple myeloma:  1. On the fusion PET CT, there is no abnormal metabolic activity in  the mucosal space, soft tissues, or cervical ravinder chains.      2. Mottled appearance of the cervical spine and skull similar to prior  exams from involvement by multiple myeloma.    ABDOMEN AND PELVIS:  There is a focal area of radiotracer uptake within the transverse  colon associated with a soft tissue area is visualized on series 11,  image 86 which measures 17 x 15 mm and has an SUV max of 9.18.     There are no suspicious hepatic lesions. There is no splenomegaly or  evidence for splenic or pancreatic mass lesion.  There are no suspicious adrenal mass lesions or opaque gallbladder  calculi.  There is symmetric nephrographic renal phase without  hydronephrosis.     Diverticulosis without evidence for diverticulitis. There is no  evidence for bowel obstruction or free fluid.  Scattered areas of  radiotracer uptake throughout the bowel, likely physiologic.  BONES:   Area of radiotracer uptake in the anterior left acetabulum (series 11,  image 107). This finding is similar in appearance to exam 9/29/2020  although decreased in metabolic activity. Current SUV max is 3.95,  this was previously 8.28. Area of increased FDG avidity within the  right iliac wing is also again demonstrated which is decreased in  metabolic activity current SUV max is 2.71, previously this was 8.11.     There is an area of increased radiotracer uptake within the right  sacrum with SUV max of 4.08, this was 3.61 on prior exam.     Slight increased radiotracer uptake at the second and third posterior  costovertebral junctions on the right, this is below the level of the  liver and likely related to degenerative change.     Redemonstration of diffuse lytic lesions throughout the axial and  appendicular skeleton. Lesions are not similar distribution as  exam  9/29/2020. Chronic bilateral rib fractures and chronic compression  deformities of T5, T6, T8, T9, T11, T12, L1 and L4.                                                                      IMPRESSION: In this patient with a history of multiple myeloma status  post chemotherapy and bone marrow transplant with mixed response to  therapy:     1. Prominent areas of radiotracer uptake within the left acetabulum  and within the right iliac wing are decreased in metabolic activity  when compared with exam 9/29/2020. The lesion associated with the  right sacrum is slightly increased in FDG avidity when compared with  the prior although still below the level of the liver. Findings likely  indicate mixed response to treatment.     2. Focal area of hypermetabolism associated with the transverse colon  with an associated rounded area of soft tissue. Findings are  suspicious for a primary colonic neoplasm. Recommend colonoscopy.     3. Diffuse lucent lesions throughout the axial and appendicular  skeleton similar to prior exams.     4. Multilevel compression deformities also are again noted.        [Consider Follow Up: Colon lesion concerning for primary colonic  neoplasm.]    ASSESSMENT/PLAN   Adrianna Palomino is a 62 yo woman with standard risk IgG kappa multiple myeloma, s/p auto PBSCT in 2013. Now 1 year 10 months s/p second auto. On maintenance posttransplant, valcade every other week and zometa quarterly.     1.  BMT:   - T0=3/29/2019) s/p auto PBSCT (#2) Received 8 million cells.   - Engrafted.  Last dose GCSF 4/9/19.   - 4/23/2019: day 28 biochemical amrkers and restaging BMB:   ---- No morphologic or immunohistochemical evidence of plasma cell myeloma   ---- Immune phenotype: There is a minute population (estimated at 0.002%) of Kappa restricted plasma cells identified by aberrant expression of CD56 These plasma cells are very few, but they are forming a discrete group .  ---- Non elevated FLC, SPEP with IgG  kappa Monoclonal protein (0.3 g/dL) seen in the gamma fraction. UPEP with no monoclonal proteins  - 7/3/2019: day 100 biochemical amrkers and restaging BMB:   ---- BMB:  Low level recurrent/persistent plasma cell neoplasm with very rare (less than 1%) Kappa monotypic plasma cells   ---- Immune phenotype: 0.02% plasma cells which express CD19 (dim), CD38, CD45 (dim), CD56 and monotypic cytoplasmic kappa immunoglobulin light chains but lack CD20. Also present are: 0.03% polytypic plasma cells  And 3% polytypic B cells T  ---- Non elevated FLC, SPEP with IgG kappa Monoclonal protein (0.3 g/dL) seen in the gamma fraction. UPEP with no monoclonal proteins   --------- hemoglobin improved to 12.1 now within normal, creatinine stable, no hypercalcemia, no proteinuria, UA, LDH WNL, patient with no evidence of clinical relapse and with stable serum monoclonal proteins at 0.3g/dl not meeting criteria for an increase of 25% from the lowest confirmed response with no new urine monoclonal protein detected and no elevation in free light chains, therefore the patient does not meet criteria for clinical relapse or progressive disease per NCCN guidelines. However, PET/CT with FDG active lesions, clinically asymptomatic, unclear if those are present NEW lesions on imaging a meeting criteria for progressive disease per NCCN guidelines/IMWG revised criteria.   - 9/19/2019: day 180 iochemical amrkers and restaging BMB:   ---- BMB:Marrow cellularity of 30-40%, with trilineage hematopoiesis; recurrent/persistent myeloma is present - single sheet of kappa monotypic plasma cells at edge of biopsy, comprising less than 5% of cellularity   ---- Flow cytometry: There is a minute population (estimated at 0.005%) of kappa restricted plasma cells identified by aberrant expression of CD56 . This immunophenotype is similar to that reported previously in this patient   ---- Non elevated FLC, SPEP with IgG kappa Monoclonal protein (0.3 g/dL) seen in  "the gamma fraction. UPEP with no monoclonal proteins  ---- Discussed the uploaded PET/CT from 2017 for comparison with 7/3/2019 PET/CT with radiology  with the new addendum, now concerned for T2 lytic lesion vs degenerative disease as compared to the prior PET/CT with SUV 4.8 with gray zone as to calling it an \"active lytic lesions\". The left acetabular and sacral lesion in report stable in size and metabolic activity per discussion and not meeting 50% change criteria for radiologic progression.  - 3/27/2020 Day 365 assessment:  ---- 3/27 BMB: Variable marrow cellularity, overall 10-20%; no morphologic or immunohistochemical evidence of plasma cell neoplasm . Immunophenotype with a very small population (less than 0.01%, 4-10 events) of kappa restricted plasma cells identified by aberrant expression of CD56, the cells are too few for a definitive diagnosis   ---- 3/27 PET/CT:  four prior hypermetabolic bone lesions: 2 resolved, 2 partial response.  ---- 3/17 OSH SPEP 0.2g/dl IgG kappa. UPEP no monoclonal protein, Kappa elevated at 2.05 (ULN 1.94) and K/L 2.3  - 9/29/2020 Day 365 assessment:   ---- BMB: Trephine core biopsy suboptimal for interpretation. Cellular marrow aspirates showing trilineage hematopoietic maturation, no increase in plasma cells, and no increase in blasts. Immunophenotype  suspicious for rare kappa monotypic (0.003%) plasma cells   ---- 9/29/2020: PET/CT BONES: Increased FDG uptake to three lesions in the pelvis (series 2): Right iliac crest, SUV max 8.1 previously 4.2 and measures 1.3 x 0.7 cm (stable size). Left anterior acetabulum, SUV max 8.3, previously 3.4, measures 1.3 x 1.3 cm is stable from 7/3/2019 and 3/27/2020 PET CT scans. Right sacral ala near the right S1 neural foramen, SUV max 3.6; previously 1.7. No new hypermetabolic intraosseous lesion. Increased FDG uptake to two lesions in the pelvis (right iliac crest and left acetabulum) concerning for active myeloma. Increased FDG " uptake to the right sacral ala lesion. While this is below background levels, it is notable for having increased since prior study and is in the same location as a metabolic lesion seen on 7/3/2019.   ---- Kappa FLC elevated at 1.99 (ULN 1.94) and K/L 2.97 (ULN 1.65). 9/16/2020 OSH SPEP with 0.1 g/dl IgG kappa  I reviewed the imaging myself and had discussion with the radiologist as well.  Overall on the current PET/CT the SPD is stable and therefore IWMG this is stable disease. The FDG metabolic results demonstrate active disease at both sites (IMPetUs PET criteria, Deauville 5 lesions); patient had these  lesions and they were hypermetabolic on PET CT 7/3/2019, metabolism had resolved on 3/27/2020, and has now returned on 9/29/2020.    Clinically the patient is asymptomatic, and biochemical markers actually improved, with no change in therapy since she has been on maintenance Velcade during this time.  I would recommend her referred to radiation oncologist to discuss local radiation to these active lesions to prevent any systemic disease progression, given the patient does not strictly meet criteria for radiologic progression necessitating systemic therapy.  --- Radiation Therapy Treatment:   Site of Radiation Left Hip, Number of Fractions 10, Radiation Total Dosage (cGy) 3000 Treatment Start Date 10/19/2020 Treatment End Date 10/30/2020   2nd Site of Radiation Right Iliac Radiation Total Dosage 3000 Treatment Start Date 10/19/2020 Treatment End Date 10/30/2020   -- Repeat PET/CT on 2/10/2021 Prominent areas of radiotracer uptake within the left acetabulum and within the right iliac wing are decreased in metabolic activity when compared with exam 9/29/2020. The lesion associated with the right sacrum is slightly increased in FDG avidity when compared with the prior although still below the level of the liver (size from 0.5x0.7 to 1x0.7 subcentimetric)  -- Biochemical markers locally 1/20/2021 FLC WNL, SPEP No  monoclonal protein is detected in the serum. IFN immunofixation shows an IgG Kappa monoclonal protein. There is also a very small IgG Lambda of unknown significance that is not detected on the serum protein electrophoresis.       2.  HEME: Keep Hgb>8 and plts>10K. No transfusion needs today.                             3.  ID: Afebrile  - off prophy Fluc , and LD ACV  - Pentamidine monthly for one year (Sulfa allergy)- completed  - Compelted one year vaccinations with Dr Crespo, Annual influenza vaccine                                4.  GI:   - hx collagenous colitis, 4/12 stopped protonix due to its association with collagenous colitis.   - PET/CT 2/10/2021: Focal area of hypermetabolism associated with the transverse colon with an associated rounded area of soft tissue. 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 Recommend colonoscopy.       5.  FEN/Renal: Creat wnl.    Plan:  Colonoscopy as above locally (last was more than 10 years)  DEXA scan pending  RTC 2 year anniversary visit or earlier as needed, with BMB  She knows to call with any questions or concerns.  Above plan was explained in detail with the patient and all her questions were answered to the best of my ability.    Discussed with Dr. Crespo, no indication for changes in MM management at this time    Abdi Browning MD

## 2021-02-16 NOTE — LETTER
2/16/2021         RE: Adrianna Palomino  [address]  [place] [address]        Dear Colleague,    Thank you for referring your patient, Adrianna Palomino, to the [facility] [place]. Please see a copy of my visit note below.    BMT Clinic Progress Note- telephone visit    Patient ID:  Adrianna Palomino is a 64 yo woman 1 year 10 months (T0=3/29/2019) s/p auto PBSCT (#2) for Multiple Myeloma.      Interval History:   Overall doing well.   No F/C/NS. No sore throat, no cough, no SOB at rest, no palpitations.  Still on maintenance posttransplant, valcade every other week. Stable/slightly increased neuropathy: feet not hands.  Normal lifestyle and energy level.  No bony/hip pain.  Back to normal appetitive, weight stable, stools formed, no bleeding or bruising.     Otherwise on review of systems denies any fevers chills night sweats, denies any respiratory symptoms any rhinorrhea sore throat cough chest pain shortness of breath palpitations, denies any nausea or vomiting or change bowel movements as above, denies any rashes bleeding or bruising. Denies any headache or other focal sensory or motor deficits.    ROS: 10 point ROS neg other than the symptoms noted above in the HPI.    Oncology history from notes from Rubia Vinson and Dr. Crespo, records reviewed by me as well:  - Initially diagnosed and with standard-risk cytogenetics in 2006  - Started on Dexamethasone for 6 cycles obtaining a complete remission and then maintenance with thalidomide and dexamethasone for 1 year.   - Stopped all therapy in the summer of 2007 due to neuropathy, and was followed on 2 years of maintenance dexamethasone.    - She was noted to slowly relapse but didn't require therapy until she was started on RVD in 11/2012.   - Completed 5 cycles before obtaining a partial remission and came to transplant.    - Underwent an autologous stem cell transplant on 04/05/2013,   total cell dose was 8 x10(6) cells/kg, and she had 8 x10(6) cells/kg left in storage. Post ASCT course was unremarkable.     - Maintenance Regimen post transplant  (10mg q28 days), off for 1 month in 9/2016 to review marrow, resumed: 9/30/2016 and continued till disease progression  - Clinical progression [new bone lesions detected on bone survey from 8/29/2018 with [facility]]  - 8/29/2018 Bone Marrow Biopsy: Normocellular BM (30%) with trilineage hematopoiesis showing a decreased myeloid to erythroid ratio (erythroid hyperplasia). - Plasma cells 5%, polytypic for light chain. Flow cytometry negative for monoclonal plasma cell population   - 10/3/2018 Bone marrow Biopsy: CT Guided Needle Core Biopsy: POSITIVE FOR MALIGNANCY - Plasma cell neoplasm, consistent with recurrent plasma cell myeloma. Comment Large aggregates of + plasma cells are present. These are kappa monoclonal.   - 10/3/2018: FISH Myeloma Panel: Positive for additional copies of D5S23/F7U222 (5p15.2), CEP9 (centromere 9), CEP11 (centromere 11), and CEP15 (centromere 15) suggesting polysomy, and low level loss of IGH (14q32). FISH Negative for loss of CDKN2C (1p32.3), gain of CKS1B (1q21.3), loss of Q76O512 (13q14.3), loss of TP53 (17p13.1), and additional copies of CEP7 (centromere 7), and IGH rearrangement. 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   DSS IIA, SPEP 10/18/2018: 0.9 g/dL, 10/18/2018: 6.69 , Kappa 10.03 mg/dl, lambda 1.5 mg/dl, albumin 3.4 g/dl, creatinine 0.82, calcium 9.3, LDH and B2 microglobulin Unknown/ not available   - 10/19/2018 C1D1 : VRd in relapsed setting (Velcade: 1.3mg/m2 subcutanously Day 1, 8, 15; Revlimid: 10mg (dose reduced) day 1-14; Dexamethasone: 40mg day 1,8,15)- completed 4 cycles, complicated by salmonella stool infections prior to C3.  - M biochemical markers:  3/11/2019:               UPEP Predominantly albumin is seen which suggests glomerular damage. No obvious monoclonal protein seen and           the absence of monoclonal immunoglobulins was confirmed by immunofixation of this same urine sample.               Urine creatinine and creatiunine clearance are WNL, 24 hour urine total protein elevated at 0.63 and total urine         protein/creatiine is elevated at 0.49              SPEP 3/7/2019 Small monoclonal protein (0.3 g/dL) seen in the gamma fraction.  Monoclonal IgG immunoglobulin of  kappa light chain type.               Kappa slightly elevated at 2.42 mg/dl              Lambda light chain WNL              K/L ratio 1.79    - BMB 3/8/2019: PreHCT: Kappa monotypic plasma cells detected by flow cytometry, compatible with minimal residual disease -Concurrent flow cytometry ([id number]) detected 0.02% kappa monotypic plasma cells. 0.02% plasma cells which express dim CD19, CD38, dim CD45, CD56 and monotypic cytoplasmic kappa immunoglobulin light chains but lack CD20.    - T0=3/29/2019 s/p auto PBSCT (#2)   - See post transplant course below        PHYSICAL EXAM                                                                                                                                   Vitals not taken telephone visit, no PE    Labs:   Lab Results   Component Value Date    WBC 5.4 09/29/2020    ANEU 3.6 09/29/2020    HGB 12.9 09/29/2020    HCT 38.9 09/29/2020     09/29/2020     09/29/2020    POTASSIUM 3.8 09/29/2020    CHLORIDE 103 09/29/2020    CO2 27 09/29/2020     (H) 09/29/2020    BUN 13 09/29/2020    CR 0.84 09/29/2020    MAG 2.1 09/29/2020    INR 0.95 03/28/2019    BILITOTAL 0.7 09/29/2020    AST 22 09/29/2020    ALT 20 09/29/2020    ALKPHOS 63 09/29/2020    PROTTOTAL 7.3 09/29/2020    ALBUMIN 3.8 09/29/2020   PET FDG/CT )SH  12/11/2017 2:24 PM    INDICATION: Multiple myeloma staging, initial treatment strategy.  TECHNIQUE: Serum glucose level 83 mg/dL. One hour  post intravenous   administration of 11.5 mCi F-18 FDG, PET imaging was performed from the   vertex to the distal thighs utilizing attenuation correction with   concurrent axial CT and PET/CT image fusion. Dose reduction techniques were   used.  COMPARISON: Skeletal survey from 07/12/2016 is reviewed.    FINDINGS: Scattered FDG avid lesions throughout the axial and appendicular   skeleton, a few examples of which include in the right ilium (SUVmax 8.5),   anterior left acetabulum (SUVmax 16.6), and mid left femoral diaphysis   (SUVmax 3.5).    3 mm right upper lobe pulmonary nodule, too small for PET characterization.   Tortuous aorta. Trace calcified atherosclerosis. Hysterectomy. Left os   acromiale. Old bilateral rib fractures. Old, non-FDG avid compression   fractures of T11, T12, L1, and L4. Mild degenerative change in the spine.    CONCLUSION:  Scattered FDG avid skeletal lesions consistent with active myeloma. No   evidence of extraosseous disease.      PET/CT 7/3/2019  BONES:   Multiple FDG avid lesions are present in the axial skeleton as  follows:   - 3.0 x 1.7 cm sclerotic lesion in the right iliac bone with Max SUV  9.3.    - 2.1 x 1.5 x 1.9 cm lesion in the anterior medial left acetabulum  with max SUV 9.7.   - Nodular focus of increased FDG uptake in the right inferior sacrum  (coronal image 64) with max SUV 6.3.  Focal uptake in the right T2 facet, likely metastatic.  Focal uptake in the left rib indeterminant.     No other sites of suspicious FDG uptake within the axial or  appendicular skeleton. Numerous sclerotic lesions throughout the  thoracolumbar spine, consistent with known myeloma, without suspicious  FDG uptake. Multilevel disc height narrowing most severe at T11, T12,  and L1 with exaggerated thoracic kyphosis. Non-FDG avid sclerotic  focus in the posterior left fourth rib. Healed fractures of the right  3rd rib (posteriorly) and left 6th / 8th ribs.                                                                       IMPRESSION:   In this patient with known multiple myeloma s/p recent bone marrow  transplant:  1.  Multiple FDG-avid foci within the pelvic and sacral bones  consistent with active myeloma. Focal uptake in right T2 facet  concerning for metastasis. Numerous sclerotic lesions throughout the  remaining skeleton do not demonstrate FDG uptake.  2.  No evidence of extraosseous disease progression.  3.  Colonic hyperenhancement and submucosal edema with increased FDG  uptake most likely represents infectious or inflammatory colitis.  Egixi-[id number]-qqaz disease is also on the differential.    PET/CT 7/3/2019  Since the original read a prior PET/CT performed 12/11/2017 has been  up loaded and ordering provider is asking for additional comparison.     Unchanged:   Right ilium lesion, current SUV max is 9.0, prior was 8.5.      Improved:  Right sacral lesion is slightly smaller max SUV 6.3 unchanged. Total  lesion glycolysis 3.8 previously 5.3. Metabolic volume 1 cc, prior 1.4  cc.   Right pubic symphysis lesion is less pronounced than prior exam.   Left femoral lesion is less pronounced than prior.   The left third rib lesion is less pronounced than on prior.   Right first rib resolved.       Progressed:  The T2 transverse process on the right is new (SUV of 4.8).  The left anterior acetabular lesion is more prominent on current exam  with more soft tissue component, current SUV max is 9.7, prior 16.6.  Hypermetabolic total volume is 3 cc were it was 1 cm.  Total lesion  glycolysis is 20 compared to 8.     Impression:  1. IMPeTus criteria (a proposed Myeloma PET criteria)   1a. BM1 (low level bone marrow),   1b. F3 ExtraSp 5 (4 to 10 lesions, extraspinous, max deauville 5) - 3  lesions highly concerning    1c. No extra medullary, no para medullary disease.   2. Interval change from 12/11/2017  2a. One lesion consistent with active disease (left acetabulum larger  although less metabolic right  sacral lesion max SUV 9.7)   2b. Indeterminant - 2 lesions hypermetabolic but concerning for active  disease  right sacrum smaller max SUV 6,   right T2 transverse process new max SUV 5)     2b. Several lesions resolved or decreased.      3/27/2020 PET/CT 1 year                                                                   IMPRESSION: History of multiple myeloma status post multiple therapies  including bone marrow transplant:   - In summary four prior hypermetabolic bone lesions: 2 resolved, 2  partial response.  1. Resolved hypermetabolism since 7/3/2019: Right T2 facet lesion,  right sacral lesion     2. Partial response (>50% decrease in uptake) since PET 7/3/2019.  2a. Left anterior acetabulum (max SUV 3.4), prior max SUV 9.4.  2b. Right iliac (max SUV 4.2), prior max SUV 9.3.     3. No extraosseous evidence of disease progression.     9/29/2020 1.5 year PET    BONES:   Increased FDG uptake to three lesions in the pelvis (series 2):  - Image 347: Right iliac crest, SUV max 8.1, previously 4.2.  - Image 383: Left anterior acetabulum, SUV max 8.3, previously 3.4.  - Image 339: Right sacral ala near the right S1 neural foramen, SUV  max 3.6; previously 1.7.     No new hypermetabolic intraosseous lesion. Additional  nonhypermetabolic lucent lesions throughout the skeleton are similar  to prior, for example in the spine and sternum. No acute osseous  abnormality. Chronic bilateral rib fractures. Unchanged compression  deformities of T5, T6, T8, T9 T11, T12, L1, and L4.                                                                      IMPRESSION: In this patient with a history of multiple myeloma status  post chemotherapy and bone marrow transplant:     1. Increased FDG uptake to two lesions in the pelvis (right iliac  crest and left acetabulum) concerning for active myeloma.     2. Increased FDG uptake to the right sacral ala lesion. While this is  below background levels, it is notable for having increased  since  prior study and is in the same location as a metabolic lesion seen on  7/3/2019. Attention on follow-up to this location.     3. Additional diffuse lucent lesions without abnormal FDG uptake are  similar to previous study.     4. Unchanged multilevel compression deformities in the thoracolumbar  Spine.    2/10/2021 PET/CT:  Impression: In this patient with the diagnosis of multiple myeloma:  1. On the fusion PET CT, there is no abnormal metabolic activity in  the mucosal space, soft tissues, or cervical ravinder chains.      2. Mottled appearance of the cervical spine and skull similar to prior  exams from involvement by multiple myeloma.    ABDOMEN AND PELVIS:  There is a focal area of radiotracer uptake within the transverse  colon associated with a soft tissue area is visualized on series 11,  image 86 which measures 17 x 15 mm and has an SUV max of 9.18.     There are no suspicious hepatic lesions. There is no splenomegaly or  evidence for splenic or pancreatic mass lesion.  There are no suspicious adrenal mass lesions or opaque gallbladder  calculi.  There is symmetric nephrographic renal phase without  hydronephrosis.     Diverticulosis without evidence for diverticulitis. There is no  evidence for bowel obstruction or free fluid.  Scattered areas of  radiotracer uptake throughout the bowel, likely physiologic.  BONES:   Area of radiotracer uptake in the anterior left acetabulum (series 11,  image 107). This finding is similar in appearance to exam 9/29/2020  although decreased in metabolic activity. Current SUV max is 3.95,  this was previously 8.28. Area of increased FDG avidity within the  right iliac wing is also again demonstrated which is decreased in  metabolic activity current SUV max is 2.71, previously this was 8.11.     There is an area of increased radiotracer uptake within the right  sacrum with SUV max of 4.08, this was 3.61 on prior exam.     Slight increased radiotracer uptake at the second  and third posterior  costovertebral junctions on the right, this is below the level of the  liver and likely related to degenerative change.     Redemonstration of diffuse lytic lesions throughout the axial and  appendicular skeleton. Lesions are not similar distribution as exam  9/29/2020. Chronic bilateral rib fractures and chronic compression  deformities of T5, T6, T8, T9, T11, T12, L1 and L4.                                                                      IMPRESSION: In this patient with a history of multiple myeloma status  post chemotherapy and bone marrow transplant with mixed response to  therapy:     1. Prominent areas of radiotracer uptake within the left acetabulum  and within the right iliac wing are decreased in metabolic activity  when compared with exam 9/29/2020. The lesion associated with the  right sacrum is slightly increased in FDG avidity when compared with  the prior although still below the level of the liver. Findings likely  indicate mixed response to treatment.     2. Focal area of hypermetabolism associated with the transverse colon  with an associated rounded area of soft tissue. Findings are  suspicious for a primary colonic neoplasm. Recommend colonoscopy.     3. Diffuse lucent lesions throughout the axial and appendicular  skeleton similar to prior exams.     4. Multilevel compression deformities also are again noted.        [Consider Follow Up: Colon lesion concerning for primary colonic  neoplasm.]    ASSESSMENT/PLAN   Adrianna Palomino is a 64 yo woman with standard risk IgG kappa multiple myeloma, s/p auto PBSCT in 2013. Now 1 year 10 months s/p second auto. On maintenance posttransplant, valcade every other week and zometa quarterly.     1.  BMT:   - T0=3/29/2019) s/p auto PBSCT (#2) Received 8 million cells.   - Engrafted.  Last dose GCSF 4/9/19.   - 4/23/2019: day 28 biochemical amrkers and restaging BMB:   ---- No morphologic or immunohistochemical evidence of plasma  cell myeloma   ---- Immune phenotype: There is a minute population (estimated at 0.002%) of Kappa restricted plasma cells identified by aberrant expression of CD56 These plasma cells are very few, but they are forming a discrete group .  ---- Non elevated FLC, SPEP with IgG kappa Monoclonal protein (0.3 g/dL) seen in the gamma fraction. UPEP with no monoclonal proteins  - 7/3/2019: day 100 biochemical amrkers and restaging BMB:   ---- BMB:  Low level recurrent/persistent plasma cell neoplasm with very rare (less than 1%) Kappa monotypic plasma cells   ---- Immune phenotype: 0.02% plasma cells which express CD19 (dim), CD38, CD45 (dim), CD56 and monotypic cytoplasmic kappa immunoglobulin light chains but lack CD20. Also present are: 0.03% polytypic plasma cells  And 3% polytypic B cells T  ---- Non elevated FLC, SPEP with IgG kappa Monoclonal protein (0.3 g/dL) seen in the gamma fraction. UPEP with no monoclonal proteins   --------- hemoglobin improved to 12.1 now within normal, creatinine stable, no hypercalcemia, no proteinuria, UA, LDH WNL, patient with no evidence of clinical relapse and with stable serum monoclonal proteins at 0.3g/dl not meeting criteria for an increase of 25% from the lowest confirmed response with no new urine monoclonal protein detected and no elevation in free light chains, therefore the patient does not meet criteria for clinical relapse or progressive disease per NCCN guidelines. However, PET/CT with FDG active lesions, clinically asymptomatic, unclear if those are present NEW lesions on imaging a meeting criteria for progressive disease per NCCN guidelines/IMWG revised criteria.   - 9/19/2019: day 180 iochemical amrkers and restaging BMB:   ---- BMB:Marrow cellularity of 30-40%, with trilineage hematopoiesis; recurrent/persistent myeloma is present - single sheet of kappa monotypic plasma cells at edge of biopsy, comprising less than 5% of cellularity   ---- Flow cytometry: There is a  "minute population (estimated at 0.005%) of kappa restricted plasma cells identified by aberrant expression of CD56 . This immunophenotype is similar to that reported previously in this patient   ---- Non elevated FLC, SPEP with IgG kappa Monoclonal protein (0.3 g/dL) seen in the gamma fraction. UPEP with no monoclonal proteins  ---- Discussed the uploaded PET/CT from 2017 for comparison with 7/3/2019 PET/CT with radiology  with the new addendum, now concerned for T2 lytic lesion vs degenerative disease as compared to the prior PET/CT with SUV 4.8 with gray zone as to calling it an \"active lytic lesions\". The left acetabular and sacral lesion in report stable in size and metabolic activity per discussion and not meeting 50% change criteria for radiologic progression.  - 3/27/2020 Day 365 assessment:  ---- 3/27 BMB: Variable marrow cellularity, overall 10-20%; no morphologic or immunohistochemical evidence of plasma cell neoplasm . Immunophenotype with a very small population (less than 0.01%, 4-10 events) of kappa restricted plasma cells identified by aberrant expression of CD56, the cells are too few for a definitive diagnosis   ---- 3/27 PET/CT:  four prior hypermetabolic bone lesions: 2 resolved, 2 partial response.  ---- 3/17 OSH SPEP 0.2g/dl IgG kappa. UPEP no monoclonal protein, Kappa elevated at 2.05 (ULN 1.94) and K/L 2.3  - 9/29/2020 Day 365 assessment:   ---- BMB: Trephine core biopsy suboptimal for interpretation. Cellular marrow aspirates showing trilineage hematopoietic maturation, no increase in plasma cells, and no increase in blasts. Immunophenotype  suspicious for rare kappa monotypic (0.003%) plasma cells   ---- 9/29/2020: PET/CT BONES: Increased FDG uptake to three lesions in the pelvis (series 2): Right iliac crest, SUV max 8.1 previously 4.2 and measures 1.3 x 0.7 cm (stable size). Left anterior acetabulum, SUV max 8.3, previously 3.4, measures 1.3 x 1.3 cm is stable from 7/3/2019 and 3/27/2020 " PET CT scans. Right sacral ala near the right S1 neural foramen, SUV max 3.6; previously 1.7. No new hypermetabolic intraosseous lesion. Increased FDG uptake to two lesions in the pelvis (right iliac crest and left acetabulum) concerning for active myeloma. Increased FDG uptake to the right sacral ala lesion. While this is below background levels, it is notable for having increased since prior study and is in the same location as a metabolic lesion seen on 7/3/2019.   ---- Kappa FLC elevated at 1.99 (ULN 1.94) and K/L 2.97 (ULN 1.65). 9/16/2020 OSH SPEP with 0.1 g/dl IgG kappa  I reviewed the imaging myself and had discussion with the radiologist as well.  Overall on the current PET/CT the SPD is stable and therefore IWMG this is stable disease. The FDG metabolic results demonstrate active disease at both sites (IMPetUs PET criteria, Deauville 5 lesions); patient had these  lesions and they were hypermetabolic on PET CT 7/3/2019, metabolism had resolved on 3/27/2020, and has now returned on 9/29/2020.    Clinically the patient is asymptomatic, and biochemical markers actually improved, with no change in therapy since she has been on maintenance Velcade during this time.  I would recommend her referred to radiation oncologist to discuss local radiation to these active lesions to prevent any systemic disease progression, given the patient does not strictly meet criteria for radiologic progression necessitating systemic therapy.  --- Radiation Therapy Treatment:   Site of Radiation Left Hip, Number of Fractions 10, Radiation Total Dosage (cGy) 3000 Treatment Start Date 10/19/2020 Treatment End Date 10/30/2020   2nd Site of Radiation Right Iliac Radiation Total Dosage 3000 Treatment Start Date 10/19/2020 Treatment End Date 10/30/2020   -- Repeat PET/CT on 2/10/2021 Prominent areas of radiotracer uptake within the left acetabulum and within the right iliac wing are decreased in metabolic activity when compared with exam  "9/29/2020. The lesion associated with the right sacrum is slightly increased in FDG avidity when compared with the prior although still below the level of the liver (size from 0.5x0.7 to 1x0.7 subcentimetric)  -- Biochemical markers locally 1/20/2021 FLC WNL, SPEP No monoclonal protein is detected in the serum. IFN immunofixation shows an IgG Kappa monoclonal protein. There is also a very small IgG Lambda of unknown significance that is not detected on the serum protein electrophoresis.       2.  HEME: Keep Hgb>8 and plts>10K. No transfusion needs today.                             3.  ID: Afebrile  - off prophy Fluc , and LD ACV  - Pentamidine monthly for one year (Sulfa allergy)- completed  - Compelted one year vaccinations with Dr Crespo, Annual influenza vaccine                                4.  GI:   - hx collagenous colitis, 4/12 stopped protonix due to its association with collagenous colitis.   - PET/CT 2/10/2021: Focal area of hypermetabolism associated with the transverse colon with an associated rounded area of soft tissue. 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 Recommend colonoscopy.       5.  FEN/Renal: Creat wnl.    Plan:  Colonoscopy as above locally (last was more than 10 years)  DEXA scan pending  RTC 2 year anniversary visit or earlier as needed, with BMB  She knows to call with any questions or concerns.  Above plan was explained in detail with the patient and all her questions were answered to the best of my ability.    Discussed with Dr. Crespo, no indication for changes in MM management at this time    Abdi Browning MD                     Adrianna is a 65 year old who is being evaluated via a billable telephone visit.      What phone number would you like to be contacted at? [phone]  How would you like to obtain your AVS? Mail a copy     Vitals - Patient Reported  Weight (Patient Reported): 70.3 kg (155 lb)  Height (Patient Reported): 168.9 cm (5' 6.5\")  BMI (Based on Pt " Reported Ht/Wt): 24.64  Pain Score: No Pain (0)    Jennifer WILEY    Phone call duration: 13 minutes      Again, thank you for allowing me to participate in the care of your patient.        Sincerely,        Abdi Browning MD

## 2021-02-16 NOTE — PROGRESS NOTES
"Adrianna is a 65 year old who is being evaluated via a billable telephone visit.      What phone number would you like to be contacted at? [phone]  How would you like to obtain your AVS? Mail a copy     Vitals - Patient Reported  Weight (Patient Reported): 70.3 kg (155 lb)  Height (Patient Reported): 168.9 cm (5' 6.5\")  BMI (Based on Pt Reported Ht/Wt): 24.64  Pain Score: No Pain (0)    Jennifer WILEY    Phone call duration: 13 minutes  "

## 2021-02-18 ENCOUNTER — TRANSFERRED RECORDS (OUTPATIENT)
Dept: HEALTH INFORMATION MANAGEMENT | Facility: CLINIC | Age: 66
End: 2021-02-18

## 2021-03-19 ENCOUNTER — OFFICE VISIT (OUTPATIENT)
Dept: TRANSPLANT | Facility: CLINIC | Age: 66
End: 2021-03-19
Attending: NURSE PRACTITIONER
Payer: COMMERCIAL

## 2021-03-19 VITALS
DIASTOLIC BLOOD PRESSURE: 88 MMHG | WEIGHT: 151.9 LBS | RESPIRATION RATE: 16 BRPM | HEART RATE: 87 BPM | BODY MASS INDEX: 24.86 KG/M2 | TEMPERATURE: 98.2 F | OXYGEN SATURATION: 98 % | SYSTOLIC BLOOD PRESSURE: 136 MMHG

## 2021-03-19 DIAGNOSIS — Z94.81 STATUS POST BONE MARROW TRANSPLANT (H): ICD-10-CM

## 2021-03-19 DIAGNOSIS — C90.00 MULTIPLE MYELOMA NOT HAVING ACHIEVED REMISSION (H): Primary | ICD-10-CM

## 2021-03-19 LAB
ALBUMIN SERPL-MCNC: 3.7 G/DL (ref 3.4–5)
ALP SERPL-CCNC: 57 U/L (ref 40–150)
ALT SERPL W P-5'-P-CCNC: 20 U/L (ref 0–50)
ANION GAP SERPL CALCULATED.3IONS-SCNC: 6 MMOL/L (ref 3–14)
AST SERPL W P-5'-P-CCNC: 16 U/L (ref 0–45)
BASOPHILS # BLD AUTO: 0 10E9/L (ref 0–0.2)
BASOPHILS NFR BLD AUTO: 0.6 %
BILIRUB SERPL-MCNC: 0.5 MG/DL (ref 0.2–1.3)
BUN SERPL-MCNC: 10 MG/DL (ref 7–30)
C3 SERPL-MCNC: 117 MG/DL (ref 81–157)
C4 SERPL-MCNC: 34 MG/DL (ref 13–39)
CALCIUM SERPL-MCNC: 9.4 MG/DL (ref 8.5–10.1)
CHLORIDE SERPL-SCNC: 106 MMOL/L (ref 94–109)
CO2 SERPL-SCNC: 28 MMOL/L (ref 20–32)
COPATH REPORT: NORMAL
COPATH REPORT: NORMAL
CREAT SERPL-MCNC: 0.95 MG/DL (ref 0.52–1.04)
DIFFERENTIAL METHOD BLD: ABNORMAL
EOSINOPHIL # BLD AUTO: 0.1 10E9/L (ref 0–0.7)
EOSINOPHIL NFR BLD AUTO: 3 %
ERYTHROCYTE [DISTWIDTH] IN BLOOD BY AUTOMATED COUNT: 11.4 % (ref 10–15)
GFR SERPL CREATININE-BSD FRML MDRD: 62 ML/MIN/{1.73_M2}
GLUCOSE SERPL-MCNC: 74 MG/DL (ref 70–99)
HCT VFR BLD AUTO: 36.1 % (ref 35–47)
HGB BLD-MCNC: 12.3 G/DL (ref 11.7–15.7)
IMM GRANULOCYTES # BLD: 0 10E9/L (ref 0–0.4)
IMM GRANULOCYTES NFR BLD: 0.6 %
KAPPA LC UR-MCNC: 1.55 MG/DL (ref 0.33–1.94)
KAPPA LC/LAMBDA SER: 1.67 {RATIO} (ref 0.26–1.65)
LAMBDA LC SERPL-MCNC: 0.93 MG/DL (ref 0.57–2.63)
LDH SERPL L TO P-CCNC: 169 U/L (ref 81–234)
LYMPHOCYTES # BLD AUTO: 0.6 10E9/L (ref 0.8–5.3)
LYMPHOCYTES NFR BLD AUTO: 15.5 %
MAGNESIUM SERPL-MCNC: 2.1 MG/DL (ref 1.6–2.3)
MCH RBC QN AUTO: 36.7 PG (ref 26.5–33)
MCHC RBC AUTO-ENTMCNC: 34.1 G/DL (ref 31.5–36.5)
MCV RBC AUTO: 108 FL (ref 78–100)
MONOCYTES # BLD AUTO: 0.4 10E9/L (ref 0–1.3)
MONOCYTES NFR BLD AUTO: 12.2 %
NEUTROPHILS # BLD AUTO: 2.5 10E9/L (ref 1.6–8.3)
NEUTROPHILS NFR BLD AUTO: 68.1 %
NRBC # BLD AUTO: 0 10*3/UL
NRBC BLD AUTO-RTO: 0 /100
PHOSPHATE SERPL-MCNC: 3.4 MG/DL (ref 2.5–4.5)
PLATELET # BLD AUTO: 164 10E9/L (ref 150–450)
POTASSIUM SERPL-SCNC: 4.1 MMOL/L (ref 3.4–5.3)
PROT SERPL-MCNC: 6.9 G/DL (ref 6.8–8.8)
RBC # BLD AUTO: 3.35 10E12/L (ref 3.8–5.2)
SODIUM SERPL-SCNC: 139 MMOL/L (ref 133–144)
URATE SERPL-MCNC: 4 MG/DL (ref 2.6–6)
WBC # BLD AUTO: 3.6 10E9/L (ref 4–11)

## 2021-03-19 PROCEDURE — 88237 TISSUE CULTURE BONE MARROW: CPT | Performed by: INTERNAL MEDICINE

## 2021-03-19 PROCEDURE — 999N001022 HC STATISTIC H-SPHEME PROCESS B/S: Performed by: NURSE PRACTITIONER

## 2021-03-19 PROCEDURE — 38222 DX BONE MARROW BX & ASPIR: CPT

## 2021-03-19 PROCEDURE — 88271 CYTOGENETICS DNA PROBE: CPT | Performed by: INTERNAL MEDICINE

## 2021-03-19 PROCEDURE — 999N001126 HC STATISTIC BONE MARROW INTERP TC 85097: Performed by: NURSE PRACTITIONER

## 2021-03-19 PROCEDURE — 88184 FLOWCYTOMETRY/ TC 1 MARKER: CPT | Mod: TC | Performed by: INTERNAL MEDICINE

## 2021-03-19 PROCEDURE — 88342 IMHCHEM/IMCYTCHM 1ST ANTB: CPT | Mod: TC,XU | Performed by: NURSE PRACTITIONER

## 2021-03-19 PROCEDURE — 999N001135 HC STATISTIC FLOW INT 2-8 ABY TC 88187: Performed by: INTERNAL MEDICINE

## 2021-03-19 PROCEDURE — 82784 ASSAY IGA/IGD/IGG/IGM EACH: CPT | Performed by: INTERNAL MEDICINE

## 2021-03-19 PROCEDURE — 88161 CYTOPATH SMEAR OTHER SOURCE: CPT | Mod: TC,XU | Performed by: NURSE PRACTITIONER

## 2021-03-19 PROCEDURE — 88311 DECALCIFY TISSUE: CPT | Mod: TC | Performed by: NURSE PRACTITIONER

## 2021-03-19 PROCEDURE — 84165 PROTEIN E-PHORESIS SERUM: CPT | Mod: 26 | Performed by: PATHOLOGY

## 2021-03-19 PROCEDURE — 999N001086 HC STATISTIC MORPHOLOGY W/INTERP HEMEPATH TC 85060: Performed by: NURSE PRACTITIONER

## 2021-03-19 PROCEDURE — 86160 COMPLEMENT ANTIGEN: CPT | Performed by: INTERNAL MEDICINE

## 2021-03-19 PROCEDURE — 85097 BONE MARROW INTERPRETATION: CPT | Performed by: PATHOLOGY

## 2021-03-19 PROCEDURE — 83615 LACTATE (LD) (LDH) ENZYME: CPT | Performed by: INTERNAL MEDICINE

## 2021-03-19 PROCEDURE — 84165 PROTEIN E-PHORESIS SERUM: CPT | Mod: TC | Performed by: INTERNAL MEDICINE

## 2021-03-19 PROCEDURE — 88280 CHROMOSOME KARYOTYPE STUDY: CPT | Performed by: INTERNAL MEDICINE

## 2021-03-19 PROCEDURE — 88341 IMHCHEM/IMCYTCHM EA ADD ANTB: CPT | Mod: 26 | Performed by: PATHOLOGY

## 2021-03-19 PROCEDURE — 999N001068 HC STATISTIC BONE MARROW CORE PERF TC 38221: Performed by: NURSE PRACTITIONER

## 2021-03-19 PROCEDURE — 80053 COMPREHEN METABOLIC PANEL: CPT | Performed by: INTERNAL MEDICINE

## 2021-03-19 PROCEDURE — 83883 ASSAY NEPHELOMETRY NOT SPEC: CPT | Performed by: INTERNAL MEDICINE

## 2021-03-19 PROCEDURE — 88305 TISSUE EXAM BY PATHOLOGIST: CPT | Mod: TC | Performed by: NURSE PRACTITIONER

## 2021-03-19 PROCEDURE — 88187 FLOWCYTOMETRY/READ 2-8: CPT | Mod: 26 | Performed by: PATHOLOGY

## 2021-03-19 PROCEDURE — 88341 IMHCHEM/IMCYTCHM EA ADD ANTB: CPT | Mod: TC | Performed by: NURSE PRACTITIONER

## 2021-03-19 PROCEDURE — 85060 BLOOD SMEAR INTERPRETATION: CPT | Performed by: PATHOLOGY

## 2021-03-19 PROCEDURE — 86334 IMMUNOFIX E-PHORESIS SERUM: CPT | Mod: 26 | Performed by: PATHOLOGY

## 2021-03-19 PROCEDURE — 999N001102 HC STATISTIC DNA PROCESS AND HOLD: Performed by: INTERNAL MEDICINE

## 2021-03-19 PROCEDURE — 88185 FLOWCYTOMETRY/TC ADD-ON: CPT | Mod: TC | Performed by: INTERNAL MEDICINE

## 2021-03-19 PROCEDURE — 84550 ASSAY OF BLOOD/URIC ACID: CPT | Performed by: INTERNAL MEDICINE

## 2021-03-19 PROCEDURE — 85025 COMPLETE CBC W/AUTO DIFF WBC: CPT | Performed by: INTERNAL MEDICINE

## 2021-03-19 PROCEDURE — 84100 ASSAY OF PHOSPHORUS: CPT | Performed by: INTERNAL MEDICINE

## 2021-03-19 PROCEDURE — 86255 FLUORESCENT ANTIBODY SCREEN: CPT | Performed by: INTERNAL MEDICINE

## 2021-03-19 PROCEDURE — 88311 DECALCIFY TISSUE: CPT | Mod: 26 | Performed by: PATHOLOGY

## 2021-03-19 PROCEDURE — 88305 TISSUE EXAM BY PATHOLOGIST: CPT | Mod: 26 | Performed by: PATHOLOGY

## 2021-03-19 PROCEDURE — 88264 CHROMOSOME ANALYSIS 20-25: CPT | Performed by: INTERNAL MEDICINE

## 2021-03-19 PROCEDURE — 88275 CYTOGENETICS 100-300: CPT | Performed by: INTERNAL MEDICINE

## 2021-03-19 PROCEDURE — 86334 IMMUNOFIX E-PHORESIS SERUM: CPT | Mod: TC | Performed by: INTERNAL MEDICINE

## 2021-03-19 PROCEDURE — 82306 VITAMIN D 25 HYDROXY: CPT | Performed by: INTERNAL MEDICINE

## 2021-03-19 PROCEDURE — 999N001036 HC STATISTIC TOTAL PROTEIN: Performed by: INTERNAL MEDICINE

## 2021-03-19 PROCEDURE — 88342 IMHCHEM/IMCYTCHM 1ST ANTB: CPT | Mod: 26 | Performed by: PATHOLOGY

## 2021-03-19 PROCEDURE — 86038 ANTINUCLEAR ANTIBODIES: CPT | Performed by: INTERNAL MEDICINE

## 2021-03-19 PROCEDURE — 83735 ASSAY OF MAGNESIUM: CPT | Performed by: INTERNAL MEDICINE

## 2021-03-19 RX ORDER — SIMETHICONE 80 MG
80 TABLET,CHEWABLE ORAL EVERY 6 HOURS PRN
COMMUNITY

## 2021-03-19 ASSESSMENT — PAIN SCALES - GENERAL: PAINLEVEL: NO PAIN (0)

## 2021-03-19 NOTE — NURSING NOTE
"Oncology Rooming Note    March 19, 2021 10:50 AM   Adrianna Palomino is a 65 year old female who presents for:    Chief Complaint   Patient presents with     Bone Marrow Biopsy     Hx MM here for bmbx     Initial Vitals: /88 (BP Location: Left arm)   Pulse 87   Temp 98.2  F (36.8  C)   Resp 16   Wt 68.9 kg (151 lb 14.4 oz)   LMP 03/01/2005   SpO2 98%   BMI 24.86 kg/m   Estimated body mass index is 24.86 kg/m  as calculated from the following:    Height as of 9/24/19: 1.665 m (5' 5.55\").    Weight as of this encounter: 68.9 kg (151 lb 14.4 oz). Body surface area is 1.79 meters squared.  No Pain (0) Comment: Data Unavailable   Patient's last menstrual period was 03/01/2005.  Allergies reviewed: Yes  Medications reviewed: Yes    Medications: Medication refills not needed today.  Pharmacy name entered into [employer]:    [facility]  [facility] [place], [place] - [address] [place] [phone] [phone]    Clinical concerns: N/A      VSS, A&Ox4. RN and provider explained procedure, no questions at this time. Provider to obtain consent prior to procedure. Patient lying prone, call light within reach. Provider and hem teach at bedside.     Barbara Gonzalez RN            "

## 2021-03-19 NOTE — LETTER
3/19/2021         RE: Adrianna Palomino  [address] [place] [facility] [address]        Dear Colleague,    Thank you for referring your patient, Adrianna Palomino, to the [facility]LO[facility]ANS[facility] [place]. Please see a copy of my visit note below.    BMT ONC Adult Bone Marrow Biopsy Procedure Note  March 19, 2021  /88 (BP Location: Left arm)   Pulse 87   Temp 98.2  F (36.8  C)   Resp 16   Wt 68.9 kg (151 lb 14.4 oz)   LMP 03/01/2005   SpO2 98%   BMI 24.86 kg/m       Learning needs assessment complete within 12 months? YES    DIAGNOSIS: MM     PROCEDURE: Unilateral Bone Marrow Biopsy and Unilateral Aspirate    [place] [facility]    Patient s identification was positively verified by verbal identification and invasive procedure safety checklist was completed. Informed consent was obtained. The  patient was placed in the prone position and prepped and draped in a sterile manner. Approximately 15 cc of 1% Lidocaine was used over the right posterior iliac spine. Following this a 3 mm incision was made. Trephine bone marrow core(s) was (were) obtained from the [facility]. Bone marrow aspirates were obtained from the [facility]. Aspirates were sent for morphology, immunophenotyping, cytogenetics and molecular diagnostics . A total of approximately 20 ml of marrow was aspirated. Following this procedure a sterile dressing was applied to the bone marrow biopsy site(s). The patient was placed in the supine position to maintain pressure on the biopsy site. Post-procedure wound care instructions were given.     Complications: NO    Pre-procedural pain: 0 out of 10 on the numeric pain rating scale.     Procedural pain: 4 out of 10 on the numeric pain rating scale.     Post-procedural pain assessment: 0 out of 10 on the numeric pain rating scale.     Interventions: NO    Length of procedure:20 minutes or less      Procedure performed by: Kaylee Scott        Again, thank you  for allowing me to participate in the care of your patient.        Sincerely,        [facility]

## 2021-03-19 NOTE — LETTER
Date:October 21, 2021      Provider requested that no letter be sent. Do not send.       [facility]

## 2021-03-19 NOTE — NURSING NOTE
BMBx completed. Procedural consents witnessed prior to procedure. Patient laid supine for 30 minutes post procedure. Pt denies procedural pain. A&Ox4 and dressing CDI upon discharge. Discharge teaching provided.

## 2021-03-19 NOTE — PROGRESS NOTES
BMT ONC Adult Bone Marrow Biopsy Procedure Note  March 19, 2021  /88 (BP Location: Left arm)   Pulse 87   Temp 98.2  F (36.8  C)   Resp 16   Wt 68.9 kg (151 lb 14.4 oz)   LMP 03/01/2005   SpO2 98%   BMI 24.86 kg/m       Learning needs assessment complete within 12 months? YES    DIAGNOSIS: MM     PROCEDURE: Unilateral Bone Marrow Biopsy and Unilateral Aspirate    LOCATION: [facility] 2nd Floor    Patient s identification was positively verified by verbal identification and invasive procedure safety checklist was completed. Informed consent was obtained. The  patient was placed in the prone position and prepped and draped in a sterile manner. Approximately 15 cc of 1% Lidocaine was used over the right posterior iliac spine. Following this a 3 mm incision was made. Trephine bone marrow core(s) was (were) obtained from the [facility]. Bone marrow aspirates were obtained from the [facility]. Aspirates were sent for morphology, immunophenotyping, cytogenetics and molecular diagnostics . A total of approximately 20 ml of marrow was aspirated. Following this procedure a sterile dressing was applied to the bone marrow biopsy site(s). The patient was placed in the supine position to maintain pressure on the biopsy site. Post-procedure wound care instructions were given.     Complications: NO    Pre-procedural pain: 0 out of 10 on the numeric pain rating scale.     Procedural pain: 4 out of 10 on the numeric pain rating scale.     Post-procedural pain assessment: 0 out of 10 on the numeric pain rating scale.     Interventions: NO    Length of procedure:20 minutes or less      Procedure performed by: Kaylee Scott

## 2021-03-22 LAB
ALBUMIN SERPL ELPH-MCNC: 4 G/DL (ref 3.7–5.1)
ALPHA1 GLOB SERPL ELPH-MCNC: 0.4 G/DL (ref 0.2–0.4)
ALPHA2 GLOB SERPL ELPH-MCNC: 0.8 G/DL (ref 0.5–0.9)
ANA SER QL IF: NEGATIVE
ANCA AB PATTERN SER IF-IMP: NORMAL
B-GLOBULIN SERPL ELPH-MCNC: 0.6 G/DL (ref 0.6–1)
C-ANCA TITR SER IF: NORMAL {TITER}
COPATH REPORT: NORMAL
GAMMA GLOB SERPL ELPH-MCNC: 0.6 G/DL (ref 0.7–1.6)
IGA SERPL-MCNC: 31 MG/DL (ref 84–499)
IGG SERPL-MCNC: 697 MG/DL (ref 610–1616)
IGM SERPL-MCNC: 53 MG/DL (ref 35–242)
M PROTEIN SERPL ELPH-MCNC: 0.1 G/DL
PROT PATTERN SERPL ELPH-IMP: ABNORMAL
PROT PATTERN SERPL IFE-IMP: ABNORMAL

## 2021-03-23 ENCOUNTER — VIRTUAL VISIT (OUTPATIENT)
Dept: TRANSPLANT | Facility: CLINIC | Age: 66
End: 2021-03-23
Attending: INTERNAL MEDICINE
Payer: COMMERCIAL

## 2021-03-23 DIAGNOSIS — C90.00 MULTIPLE MYELOMA NOT HAVING ACHIEVED REMISSION (H): Primary | ICD-10-CM

## 2021-03-23 PROCEDURE — 99215 OFFICE O/P EST HI 40 MIN: CPT | Mod: TEL | Performed by: INTERNAL MEDICINE

## 2021-03-23 NOTE — LETTER
3/23/2021         RE: Adrianna Palomino  [address]  [place] [address]        Dear Colleague,    Thank you for referring your patient, Adrianna Palomino, to the [facility]. Please see a copy of my visit note below.    BMT Clinic Progress Note- telephone visit    Patient ID:  Adrianna Palomino is a 64 yo woman 1 year 11 months (T0=3/29/2019) s/p auto PBSCT (#2) for Multiple Myeloma.      Interval History:   Overall doing well.   No F/C/NS. No sore throat, no cough, no SOB at rest, no palpitations.  Still on maintenance posttransplant, valcade every other week. Stable/slightly increased neuropathy: feet not hands.  DEXA scan and PFT completed locally.  Normal lifestyle and energy level.  No bony/hip pain.  Back to normal appetitive, weight stable, stools formed, no bleeding or bruising.     Otherwise on review of systems denies any fevers chills night sweats, denies any respiratory symptoms any rhinorrhea sore throat cough chest pain shortness of breath palpitations, denies any nausea or vomiting or change bowel movements as above, denies any rashes bleeding or bruising. Denies any headache or other focal sensory or motor deficits.    ROS: 10 point ROS neg other than the symptoms noted above in the HPI.    Oncology history from notes from Rubia Vinson and Dr. Crespo, records reviewed by me as well:  - Initially diagnosed and with standard-risk cytogenetics in 2006  - Started on Dexamethasone for 6 cycles obtaining a complete remission and then maintenance with thalidomide and dexamethasone for 1 year.   - Stopped all therapy in the summer of 2007 due to neuropathy, and was followed on 2 years of maintenance dexamethasone.    - She was noted to slowly relapse but didn't require therapy until she was started on RVD in 11/2012.   - Completed 5 cycles before obtaining a partial remission and came to transplant.    - Underwent an autologous  stem cell transplant on 04/05/2013,  total cell dose was 8 x10(6) cells/kg, and she had 8 x10(6) cells/kg left in storage. Post ASCT course was unremarkable.     - Maintenance Regimen post transplant  (10mg q28 days), off for 1 month in 9/2016 to review marrow, resumed: 9/30/2016 and continued till disease progression  - Clinical progression [new bone lesions detected on bone survey from 8/29/2018 with [facility]]  - 8/29/2018 Bone Marrow Biopsy: Normocellular BM (30%) with trilineage hematopoiesis showing a decreased myeloid to erythroid ratio (erythroid hyperplasia). - Plasma cells 5%, polytypic for light chain. Flow cytometry negative for monoclonal plasma cell population   - 10/3/2018 Bone marrow Biopsy: CT Guided Needle Core Biopsy: POSITIVE FOR MALIGNANCY - Plasma cell neoplasm, consistent with recurrent plasma cell myeloma. Comment Large aggregates of + plasma cells are present. These are kappa monoclonal.   - 10/3/2018: FISH Myeloma Panel: Positive for additional copies of D5S23/J7C295 (5p15.2), CEP9 (centromere 9), CEP11 (centromere 11), and CEP15 (centromere 15) suggesting polysomy, and low level loss of IGH (14q32). FISH Negative for loss of CDKN2C (1p32.3), gain of CKS1B (1q21.3), loss of W59P877 (13q14.3), loss of TP53 (17p13.1), and additional copies of CEP7 (centromere 7), and IGH rearrangement. 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   DSS IIA, SPEP 10/18/2018: 0.9 g/dL, 10/18/2018: 6.69 , Kappa 10.03 mg/dl, lambda 1.5 mg/dl, albumin 3.4 g/dl, creatinine 0.82, calcium 9.3, LDH and B2 microglobulin Unknown/ not available   - 10/19/2018 C1D1 : VRd in relapsed setting (Velcade: 1.3mg/m2 subcutanously Day 1, 8, 15; Revlimid: 10mg (dose reduced) day 1-14; Dexamethasone: 40mg day 1,8,15)- completed 4 cycles, complicated by salmonella stool infections prior to C3.  - M biochemical  markers:  3/11/2019:              UPEP Predominantly albumin is seen which suggests glomerular damage. No obvious monoclonal protein seen and           the absence of monoclonal immunoglobulins was confirmed by immunofixation of this same urine sample.               Urine creatinine and creatiunine clearance are WNL, 24 hour urine total protein elevated at 0.63 and total urine         protein/creatiine is elevated at 0.49              SPEP 3/7/2019 Small monoclonal protein (0.3 g/dL) seen in the gamma fraction.  Monoclonal IgG immunoglobulin of  kappa light chain type.               Kappa slightly elevated at 2.42 mg/dl              Lambda light chain WNL              K/L ratio 1.79    - BMB 3/8/2019: PreHCT: Kappa monotypic plasma cells detected by flow cytometry, compatible with minimal residual disease -Concurrent flow cytometry ([id number]) detected 0.02% kappa monotypic plasma cells. 0.02% plasma cells which express dim CD19, CD38, dim CD45, CD56 and monotypic cytoplasmic kappa immunoglobulin light chains but lack CD20.    - T0=3/29/2019 s/p auto PBSCT (#2)   - See post transplant course below        PHYSICAL EXAM                                                                                                                                   Vitals not taken telephone visit, no PE    Labs:   Lab Results   Component Value Date    WBC 3.6 (L) 03/19/2021    ANEU 2.5 03/19/2021    HGB 12.3 03/19/2021    HCT 36.1 03/19/2021     03/19/2021     03/19/2021    POTASSIUM 4.1 03/19/2021    CHLORIDE 106 03/19/2021    CO2 28 03/19/2021    GLC 74 03/19/2021    BUN 10 03/19/2021    CR 0.95 03/19/2021    MAG 2.1 03/19/2021    INR 0.95 03/28/2019    BILITOTAL 0.5 03/19/2021    AST 16 03/19/2021    ALT 20 03/19/2021    ALKPHOS 57 03/19/2021    PROTTOTAL 6.9 03/19/2021    ALBUMIN 3.7 03/19/2021   PET FDG/CT )SH  12/11/2017 2:24 PM    INDICATION: Multiple myeloma staging, initial treatment strategy.  TECHNIQUE: Serum  glucose level 83 mg/dL. One hour post intravenous   administration of 11.5 mCi F-18 FDG, PET imaging was performed from the   vertex to the distal thighs utilizing attenuation correction with   concurrent axial CT and PET/CT image fusion. Dose reduction techniques were   used.  COMPARISON: Skeletal survey from 07/12/2016 is reviewed.    FINDINGS: Scattered FDG avid lesions throughout the axial and appendicular   skeleton, a few examples of which include in the right ilium (SUVmax 8.5),   anterior left acetabulum (SUVmax 16.6), and mid left femoral diaphysis   (SUVmax 3.5).    3 mm right upper lobe pulmonary nodule, too small for PET characterization.   Tortuous aorta. Trace calcified atherosclerosis. Hysterectomy. Left os   acromiale. Old bilateral rib fractures. Old, non-FDG avid compression   fractures of T11, T12, L1, and L4. Mild degenerative change in the spine.    CONCLUSION:  Scattered FDG avid skeletal lesions consistent with active myeloma. No   evidence of extraosseous disease.      PET/CT 7/3/2019  BONES:   Multiple FDG avid lesions are present in the axial skeleton as  follows:   - 3.0 x 1.7 cm sclerotic lesion in the right iliac bone with Max SUV  9.3.    - 2.1 x 1.5 x 1.9 cm lesion in the anterior medial left acetabulum  with max SUV 9.7.   - Nodular focus of increased FDG uptake in the right inferior sacrum  (coronal image 64) with max SUV 6.3.  Focal uptake in the right T2 facet, likely metastatic.  Focal uptake in the left rib indeterminant.     No other sites of suspicious FDG uptake within the axial or  appendicular skeleton. Numerous sclerotic lesions throughout the  thoracolumbar spine, consistent with known myeloma, without suspicious  FDG uptake. Multilevel disc height narrowing most severe at T11, T12,  and L1 with exaggerated thoracic kyphosis. Non-FDG avid sclerotic  focus in the posterior left fourth rib. Healed fractures of the right  3rd rib (posteriorly) and left 6th / 8th ribs.                                                                       IMPRESSION:   In this patient with known multiple myeloma s/p recent bone marrow  transplant:  1.  Multiple FDG-avid foci within the pelvic and sacral bones  consistent with active myeloma. Focal uptake in right T2 facet  concerning for metastasis. Numerous sclerotic lesions throughout the  remaining skeleton do not demonstrate FDG uptake.  2.  No evidence of extraosseous disease progression.  3.  Colonic hyperenhancement and submucosal edema with increased FDG  uptake most likely represents infectious or inflammatory colitis.  Defdi-[id number] disease is also on the differential.    PET/CT 7/3/2019  Since the original read a prior PET/CT performed 12/11/2017 has been  up loaded and ordering provider is asking for additional comparison.     Unchanged:   Right ilium lesion, current SUV max is 9.0, prior was 8.5.      Improved:  Right sacral lesion is slightly smaller max SUV 6.3 unchanged. Total  lesion glycolysis 3.8 previously 5.3. Metabolic volume 1 cc, prior 1.4  cc.   Right pubic symphysis lesion is less pronounced than prior exam.   Left femoral lesion is less pronounced than prior.   The left third rib lesion is less pronounced than on prior.   Right first rib resolved.       Progressed:  The T2 transverse process on the right is new (SUV of 4.8).  The left anterior acetabular lesion is more prominent on current exam  with more soft tissue component, current SUV max is 9.7, prior 16.6.  Hypermetabolic total volume is 3 cc were it was 1 cm.  Total lesion  glycolysis is 20 compared to 8.     Impression:  1. IMPeTus criteria (a proposed Myeloma PET criteria)   1a. BM1 (low level bone marrow),   1b. F3 ExtraSp 5 (4 to 10 lesions, extraspinous, max deauville 5) - 3  lesions highly concerning    1c. No extra medullary, no para medullary disease.   2. Interval change from 12/11/2017  2a. One lesion consistent with active disease (left acetabulum  larger  although less metabolic right sacral lesion max SUV 9.7)   2b. Indeterminant - 2 lesions hypermetabolic but concerning for active  disease  right sacrum smaller max SUV 6,   right T2 transverse process new max SUV 5)     2b. Several lesions resolved or decreased.      3/27/2020 PET/CT 1 year                                                                   IMPRESSION: History of multiple myeloma status post multiple therapies  including bone marrow transplant:   - In summary four prior hypermetabolic bone lesions: 2 resolved, 2  partial response.  1. Resolved hypermetabolism since 7/3/2019: Right T2 facet lesion,  right sacral lesion     2. Partial response (>50% decrease in uptake) since PET 7/3/2019.  2a. Left anterior acetabulum (max SUV 3.4), prior max SUV 9.4.  2b. Right iliac (max SUV 4.2), prior max SUV 9.3.     3. No extraosseous evidence of disease progression.     9/29/2020 1.5 year PET    BONES:   Increased FDG uptake to three lesions in the pelvis (series 2):  - Image 347: Right iliac crest, SUV max 8.1, previously 4.2.  - Image 383: Left anterior acetabulum, SUV max 8.3, previously 3.4.  - Image 339: Right sacral ala near the right S1 neural foramen, SUV  max 3.6; previously 1.7.     No new hypermetabolic intraosseous lesion. Additional  nonhypermetabolic lucent lesions throughout the skeleton are similar  to prior, for example in the spine and sternum. No acute osseous  abnormality. Chronic bilateral rib fractures. Unchanged compression  deformities of T5, T6, T8, T9 T11, T12, L1, and L4.                                                                      IMPRESSION: In this patient with a history of multiple myeloma status  post chemotherapy and bone marrow transplant:     1. Increased FDG uptake to two lesions in the pelvis (right iliac  crest and left acetabulum) concerning for active myeloma.     2. Increased FDG uptake to the right sacral ala lesion. While this is  below background  levels, it is notable for having increased since  prior study and is in the same location as a metabolic lesion seen on  7/3/2019. Attention on follow-up to this location.     3. Additional diffuse lucent lesions without abnormal FDG uptake are  similar to previous study.     4. Unchanged multilevel compression deformities in the thoracolumbar  Spine.    2/10/2021 PET/CT:  Impression: In this patient with the diagnosis of multiple myeloma:  1. On the fusion PET CT, there is no abnormal metabolic activity in  the mucosal space, soft tissues, or cervical ravinder chains.      2. Mottled appearance of the cervical spine and skull similar to prior  exams from involvement by multiple myeloma.    ABDOMEN AND PELVIS:  There is a focal area of radiotracer uptake within the transverse  colon associated with a soft tissue area is visualized on series 11,  image 86 which measures 17 x 15 mm and has an SUV max of 9.18.     There are no suspicious hepatic lesions. There is no splenomegaly or  evidence for splenic or pancreatic mass lesion.  There are no suspicious adrenal mass lesions or opaque gallbladder  calculi.  There is symmetric nephrographic renal phase without  hydronephrosis.     Diverticulosis without evidence for diverticulitis. There is no  evidence for bowel obstruction or free fluid.  Scattered areas of  radiotracer uptake throughout the bowel, likely physiologic.  BONES:   Area of radiotracer uptake in the anterior left acetabulum (series 11,  image 107). This finding is similar in appearance to exam 9/29/2020  although decreased in metabolic activity. Current SUV max is 3.95,  this was previously 8.28. Area of increased FDG avidity within the  right iliac wing is also again demonstrated which is decreased in  metabolic activity current SUV max is 2.71, previously this was 8.11.     There is an area of increased radiotracer uptake within the right  sacrum with SUV max of 4.08, this was 3.61 on prior exam.     Slight  increased radiotracer uptake at the second and third posterior  costovertebral junctions on the right, this is below the level of the  liver and likely related to degenerative change.     Redemonstration of diffuse lytic lesions throughout the axial and  appendicular skeleton. Lesions are not similar distribution as exam  9/29/2020. Chronic bilateral rib fractures and chronic compression  deformities of T5, T6, T8, T9, T11, T12, L1 and L4.                                                                      IMPRESSION: In this patient with a history of multiple myeloma status  post chemotherapy and bone marrow transplant with mixed response to  therapy:     1. Prominent areas of radiotracer uptake within the left acetabulum  and within the right iliac wing are decreased in metabolic activity  when compared with exam 9/29/2020. The lesion associated with the  right sacrum is slightly increased in FDG avidity when compared with  the prior although still below the level of the liver. Findings likely  indicate mixed response to treatment.     2. Focal area of hypermetabolism associated with the transverse colon  with an associated rounded area of soft tissue. Findings are  suspicious for a primary colonic neoplasm. Recommend colonoscopy.     3. Diffuse lucent lesions throughout the axial and appendicular  skeleton similar to prior exams.     4. Multilevel compression deformities also are again noted.        [Consider Follow Up: Colon lesion concerning for primary colonic  neoplasm.]    ASSESSMENT/PLAN   Adrianna Palomino is a 62 yo woman with standard risk IgG kappa multiple myeloma, s/p auto PBSCT in 2013. Now 1 year 11 months s/p second auto. On maintenance posttransplant, valcade every other week and zometa quarterly.     1.  BMT:   - T0=3/29/2019) s/p auto PBSCT (#2) Received 8 million cells.   - Engrafted.  Last dose GCSF 4/9/19.   - 4/23/2019: day 28 biochemical amrkers and restaging BMB:   ---- No morphologic  or immunohistochemical evidence of plasma cell myeloma   ---- Immune phenotype: There is a minute population (estimated at 0.002%) of Kappa restricted plasma cells identified by aberrant expression of CD56 These plasma cells are very few, but they are forming a discrete group .  ---- Non elevated FLC, SPEP with IgG kappa Monoclonal protein (0.3 g/dL) seen in the gamma fraction. UPEP with no monoclonal proteins  - 7/3/2019: day 100 biochemical amrkers and restaging BMB:   ---- BMB:  Low level recurrent/persistent plasma cell neoplasm with very rare (less than 1%) Kappa monotypic plasma cells   ---- Immune phenotype: 0.02% plasma cells which express CD19 (dim), CD38, CD45 (dim), CD56 and monotypic cytoplasmic kappa immunoglobulin light chains but lack CD20. Also present are: 0.03% polytypic plasma cells  And 3% polytypic B cells T  ---- Non elevated FLC, SPEP with IgG kappa Monoclonal protein (0.3 g/dL) seen in the gamma fraction. UPEP with no monoclonal proteins   --------- hemoglobin improved to 12.1 now within normal, creatinine stable, no hypercalcemia, no proteinuria, UA, LDH WNL, patient with no evidence of clinical relapse and with stable serum monoclonal proteins at 0.3g/dl not meeting criteria for an increase of 25% from the lowest confirmed response with no new urine monoclonal protein detected and no elevation in free light chains, therefore the patient does not meet criteria for clinical relapse or progressive disease per NCCN guidelines. However, PET/CT with FDG active lesions, clinically asymptomatic, unclear if those are present NEW lesions on imaging a meeting criteria for progressive disease per NCCN guidelines/IMWG revised criteria.   - 9/19/2019: day 180 iochemical amrkers and restaging BMB:   ---- BMB:Marrow cellularity of 30-40%, with trilineage hematopoiesis; recurrent/persistent myeloma is present - single sheet of kappa monotypic plasma cells at edge of biopsy, comprising less than 5% of  "cellularity   ---- Flow cytometry: There is a minute population (estimated at 0.005%) of kappa restricted plasma cells identified by aberrant expression of CD56 . This immunophenotype is similar to that reported previously in this patient   ---- Non elevated FLC, SPEP with IgG kappa Monoclonal protein (0.3 g/dL) seen in the gamma fraction. UPEP with no monoclonal proteins  ---- Discussed the uploaded PET/CT from 2017 for comparison with 7/3/2019 PET/CT with radiology  with the new addendum, now concerned for T2 lytic lesion vs degenerative disease as compared to the prior PET/CT with SUV 4.8 with gray zone as to calling it an \"active lytic lesions\". The left acetabular and sacral lesion in report stable in size and metabolic activity per discussion and not meeting 50% change criteria for radiologic progression.  - 3/27/2020 Day 365 assessment:  ---- 3/27 BMB: Variable marrow cellularity, overall 10-20%; no morphologic or immunohistochemical evidence of plasma cell neoplasm . Immunophenotype with a very small population (less than 0.01%, 4-10 events) of kappa restricted plasma cells identified by aberrant expression of CD56, the cells are too few for a definitive diagnosis   ---- 3/27 PET/CT:  four prior hypermetabolic bone lesions: 2 resolved, 2 partial response.  ---- 3/17 OSH SPEP 0.2g/dl IgG kappa. UPEP no monoclonal protein, Kappa elevated at 2.05 (ULN 1.94) and K/L 2.3  - 9/29/2020 Day 365 assessment:   ---- BMB: Trephine core biopsy suboptimal for interpretation. Cellular marrow aspirates showing trilineage hematopoietic maturation, no increase in plasma cells, and no increase in blasts. Immunophenotype  suspicious for rare kappa monotypic (0.003%) plasma cells   ---- 9/29/2020: PET/CT BONES: Increased FDG uptake to three lesions in the pelvis (series 2): Right iliac crest, SUV max 8.1 previously 4.2 and measures 1.3 x 0.7 cm (stable size). Left anterior acetabulum, SUV max 8.3, previously 3.4, measures 1.3 x " 1.3 cm is stable from 7/3/2019 and 3/27/2020 PET CT scans. Right sacral ala near the right S1 neural foramen, SUV max 3.6; previously 1.7. No new hypermetabolic intraosseous lesion. Increased FDG uptake to two lesions in the pelvis (right iliac crest and left acetabulum) concerning for active myeloma. Increased FDG uptake to the right sacral ala lesion. While this is below background levels, it is notable for having increased since prior study and is in the same location as a metabolic lesion seen on 7/3/2019.   ---- Kappa FLC elevated at 1.99 (ULN 1.94) and K/L 2.97 (ULN 1.65). 9/16/2020 OSH SPEP with 0.1 g/dl IgG kappa  I reviewed the imaging myself and had discussion with the radiologist as well.  Overall on the current PET/CT the SPD is stable and therefore IWMG this is stable disease. The FDG metabolic results demonstrate active disease at both sites (IMPetUs PET criteria, Deauville 5 lesions); patient had these  lesions and they were hypermetabolic on PET CT 7/3/2019, metabolism had resolved on 3/27/2020, and has now returned on 9/29/2020.    Clinically the patient is asymptomatic, and biochemical markers actually improved, with no change in therapy since she has been on maintenance Velcade during this time.  I would recommend her referred to radiation oncologist to discuss local radiation to these active lesions to prevent any systemic disease progression, given the patient does not strictly meet criteria for radiologic progression necessitating systemic therapy.  --- Radiation Therapy Treatment:   Site of Radiation Left Hip, Number of Fractions 10, Radiation Total Dosage (cGy) 3000 Treatment Start Date 10/19/2020 Treatment End Date 10/30/2020   2nd Site of Radiation Right Iliac Radiation Total Dosage 3000 Treatment Start Date 10/19/2020 Treatment End Date 10/30/2020   -- Repeat PET/CT on 2/10/2021 Prominent areas of radiotracer uptake within the left acetabulum and within the right iliac wing are decreased  in metabolic activity when compared with exam 9/29/2020. The lesion associated with the right sacrum is slightly increased in FDG avidity when compared with the prior although still below the level of the liver (size from 0.5x0.7 to 1x0.7 subcentimetric).  -- Biochemical markers locally 1/20/2021 FLC WNL, SPEP No monoclonal protein is detected in the serum. IFN immunofixation shows an IgG Kappa monoclonal protein. There is also a very small IgG Lambda of unknown significance that is not detected on the serum protein electrophoresis.   - 2 year restaging:  --- 3/19/2021 BMB Inadequate biopsy, minimal component of hematopoietic elements. No evidence for plasma cell neoplasm in this inadequate biopsy. Peripheral blood showing slight normochromic, macrocytic anemia; slight leukopenia; lymphocytopenia. Concurrent flow cytometry showed very rare polytypic plasma cells. There is no definitive immunophenotypic evidence for clonal plasma cell process. There are very rare (2) events that show bright expression of CD56 and kappa immunoglobulin light chain, an immunophenotype that was previously described but the findings are not sufficient for a definite diagnosis of persistent/recurrent disease. FISH/CG pending.  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  FLC WNL  - Discussed with her today that there is no high clinical suspicion for progression necessitating a repeat in biopsy but needs close biochemical assessment and repeat PET/CT in 3-6 months considering a BMB at that time as well    2.  HEME: Keep Hgb>8 and plts>10K. No transfusion needs today.                             3.  ID: Afebrile  - LD ACV  - Pentamidine monthly for one year (Sulfa allergy)- completed  - Compelted one year vaccinations with Dr Crespo, Annual influenza vaccine, will get 2 year  "vaccines with Dr. Crespo per her preference                                4.  GI:   - hx collagenous colitis, 4/12 stopped protonix due to its association with collagenous colitis.   - PET/CT 2/10/2021: Focal area of hypermetabolism associated with the transverse colon with an associated rounded area of soft tissue. 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 Recommend colonoscopy, needs laparoscopic poly removal per patient, meeting with surgery to discuss further.     5.  FEN/Renal: Creat wnl.    6. Health care maintanence  - Seen by PCP in 2/2021 for routine testing including TSH, lipid profile HbA1c etc  - Mammogram completed 3/21 locally  - PFTs and DEXA completed 3/21 [facility]    I spent 40 minutes in the care of this patient today, which included time necessary for preparation for the visit, obtaining history, ordering medications/tests/procedures as medically indicated, review of pertinent medical literature, counseling of the patient, communication of recommendations to the care team, and documentation time.     Plan:  She knows to call with any questions or concerns.  Above plan was explained in detail with the patient and all her questions were answered to the best of my ability.    Discussed with Dr. Crespo, he will update me on 3 vs 6 months decision for restaging here    Abdi Browning MD          Adrianna is a 65 year old who is being evaluated via a billable telephone visit.      Vitals - Patient Reported  Weight (Patient Reported): 69.2 kg (152 lb 8 oz)  Height (Patient Reported): 168.9 cm (5' 6.5\")  BMI (Based on Pt Reported Ht/Wt): 24.25  Pain Score: No Pain (0)    Jennifer WILEY    Phone call duration: 20 minutes        "

## 2021-03-23 NOTE — PROGRESS NOTES
"Adrianna is a 65 year old who is being evaluated via a billable telephone visit.      What phone number would you like to be contacted at? [phone]  How would you like to obtain your AVS? MyChart     Vitals - Patient Reported  Weight (Patient Reported): 69.2 kg (152 lb 8 oz)  Height (Patient Reported): 168.9 cm (5' 6.5\")  BMI (Based on Pt Reported Ht/Wt): 24.25  Pain Score: No Pain (0)    Jennifer WILEY    Phone call duration: 20 minutes  "

## 2021-03-23 NOTE — PROGRESS NOTES
BMT Clinic Progress Note- telephone visit    Patient ID:  Adrianna Palomino is a 64 yo woman 1 year 11 months (T0=3/29/2019) s/p auto PBSCT (#2) for Multiple Myeloma.      Interval History:   Overall doing well.   No F/C/NS. No sore throat, no cough, no SOB at rest, no palpitations.  Still on maintenance posttransplant, valcade every other week. Stable/slightly increased neuropathy: feet not hands.  DEXA scan and PFT completed locally.  Normal lifestyle and energy level.  No bony/hip pain.  Back to normal appetitive, weight stable, stools formed, no bleeding or bruising.     Otherwise on review of systems denies any fevers chills night sweats, denies any respiratory symptoms any rhinorrhea sore throat cough chest pain shortness of breath palpitations, denies any nausea or vomiting or change bowel movements as above, denies any rashes bleeding or bruising. Denies any headache or other focal sensory or motor deficits.    ROS: 10 point ROS neg other than the symptoms noted above in the HPI.    Oncology history from notes from Rubia Vinson and Dr. Crespo, records reviewed by me as well:  - Initially diagnosed and with standard-risk cytogenetics in 2006  - Started on Dexamethasone for 6 cycles obtaining a complete remission and then maintenance with thalidomide and dexamethasone for 1 year.   - Stopped all therapy in the summer of 2007 due to neuropathy, and was followed on 2 years of maintenance dexamethasone.    - She was noted to slowly relapse but didn't require therapy until she was started on RVD in 11/2012.   - Completed 5 cycles before obtaining a partial remission and came to transplant.    - Underwent an autologous stem cell transplant on 04/05/2013,  total cell dose was 8 x10(6) cells/kg, and she had 8 x10(6) cells/kg left in storage. Post ASCT course was unremarkable.     - Maintenance Regimen post transplant  (10mg q28 days), off for 1 month in 9/2016 to review marrow, resumed: 9/30/2016 and continued till  disease progression  - Clinical progression [new bone lesions detected on bone survey from 8/29/2018 with [facility]]  - 8/29/2018 Bone Marrow Biopsy: Normocellular BM (30%) with trilineage hematopoiesis showing a decreased myeloid to erythroid ratio (erythroid hyperplasia). - Plasma cells 5%, polytypic for light chain. Flow cytometry negative for monoclonal plasma cell population   - 10/3/2018 Bone marrow Biopsy: CT Guided Needle Core Biopsy: POSITIVE FOR MALIGNANCY - Plasma cell neoplasm, consistent with recurrent plasma cell myeloma. Comment Large aggregates of + plasma cells are present. These are kappa monoclonal.   - 10/3/2018: FISH Myeloma Panel: Positive for additional copies of D5S23/T0K218 (5p15.2), CEP9 (centromere 9), CEP11 (centromere 11), and CEP15 (centromere 15) suggesting polysomy, and low level loss of IGH (14q32). FISH Negative for loss of CDKN2C (1p32.3), gain of CKS1B (1q21.3), loss of [id number] (13q14.3), loss of TP53 (17p13.1), and additional copies of CEP7 (centromere 7), and IGH rearrangement. 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   DSS IIA, SPEP 10/18/2018: 0.9 g/dL, 10/18/2018: 6.69 , Kappa 10.03 mg/dl, lambda 1.5 mg/dl, albumin 3.4 g/dl, creatinine 0.82, calcium 9.3, LDH and B2 microglobulin Unknown/ not available   - 10/19/2018 C1D1 : VRd in relapsed setting (Velcade: 1.3mg/m2 subcutanously Day 1, 8, 15; Revlimid: 10mg (dose reduced) day 1-14; Dexamethasone: 40mg day 1,8,15)- completed 4 cycles, complicated by salmonella stool infections prior to C3.  - M biochemical markers:  3/11/2019:              UPEP Predominantly albumin is seen which suggests glomerular damage. No obvious monoclonal protein seen and           the absence of monoclonal immunoglobulins was confirmed by immunofixation of this same urine sample.               Urine creatinine and creatiunine  clearance are WNL, 24 hour urine total protein elevated at 0.63 and total urine         protein/creatiine is elevated at 0.49              SPEP 3/7/2019 Small monoclonal protein (0.3 g/dL) seen in the gamma fraction.  Monoclonal IgG immunoglobulin of  kappa light chain type.               Kappa slightly elevated at 2.42 mg/dl              Lambda light chain WNL              K/L ratio 1.79    - BMB 3/8/2019: PreHCT: Kappa monotypic plasma cells detected by flow cytometry, compatible with minimal residual disease -Concurrent flow cytometry ([id number]) detected 0.02% kappa monotypic plasma cells. 0.02% plasma cells which express dim CD19, CD38, dim CD45, CD56 and monotypic cytoplasmic kappa immunoglobulin light chains but lack CD20.    - T0=3/29/2019 s/p auto PBSCT (#2)   - See post transplant course below        PHYSICAL EXAM                                                                                                                                   Vitals not taken telephone visit, no PE    Labs:   Lab Results   Component Value Date    WBC 3.6 (L) 03/19/2021    ANEU 2.5 03/19/2021    HGB 12.3 03/19/2021    HCT 36.1 03/19/2021     03/19/2021     03/19/2021    POTASSIUM 4.1 03/19/2021    CHLORIDE 106 03/19/2021    CO2 28 03/19/2021    GLC 74 03/19/2021    BUN 10 03/19/2021    CR 0.95 03/19/2021    MAG 2.1 03/19/2021    INR 0.95 03/28/2019    BILITOTAL 0.5 03/19/2021    AST 16 03/19/2021    ALT 20 03/19/2021    ALKPHOS 57 03/19/2021    PROTTOTAL 6.9 03/19/2021    ALBUMIN 3.7 03/19/2021   PET FDG/CT )SH  12/11/2017 2:24 PM    INDICATION: Multiple myeloma staging, initial treatment strategy.  TECHNIQUE: Serum glucose level 83 mg/dL. One hour post intravenous   administration of 11.5 mCi F-18 FDG, PET imaging was performed from the   vertex to the distal thighs utilizing attenuation correction with   concurrent axial CT and PET/CT image fusion. Dose reduction techniques were   used.  COMPARISON: Skeletal  survey from 07/12/2016 is reviewed.    FINDINGS: Scattered FDG avid lesions throughout the axial and appendicular   skeleton, a few examples of which include in the right ilium (SUVmax 8.5),   anterior left acetabulum (SUVmax 16.6), and mid left femoral diaphysis   (SUVmax 3.5).    3 mm right upper lobe pulmonary nodule, too small for PET characterization.   Tortuous aorta. Trace calcified atherosclerosis. Hysterectomy. Left os   acromiale. Old bilateral rib fractures. Old, non-FDG avid compression   fractures of T11, T12, L1, and L4. Mild degenerative change in the spine.    CONCLUSION:  Scattered FDG avid skeletal lesions consistent with active myeloma. No   evidence of extraosseous disease.      PET/CT 7/3/2019  BONES:   Multiple FDG avid lesions are present in the axial skeleton as  follows:   - 3.0 x 1.7 cm sclerotic lesion in the right iliac bone with Max SUV  9.3.    - 2.1 x 1.5 x 1.9 cm lesion in the anterior medial left acetabulum  with max SUV 9.7.   - Nodular focus of increased FDG uptake in the right inferior sacrum  (coronal image 64) with max SUV 6.3.  Focal uptake in the right T2 facet, likely metastatic.  Focal uptake in the left rib indeterminant.     No other sites of suspicious FDG uptake within the axial or  appendicular skeleton. Numerous sclerotic lesions throughout the  thoracolumbar spine, consistent with known myeloma, without suspicious  FDG uptake. Multilevel disc height narrowing most severe at T11, T12,  and L1 with exaggerated thoracic kyphosis. Non-FDG avid sclerotic  focus in the posterior left fourth rib. Healed fractures of the right  3rd rib (posteriorly) and left 6th / 8th ribs.                                                                      IMPRESSION:   In this patient with known multiple myeloma s/p recent bone marrow  transplant:  1.  Multiple FDG-avid foci within the pelvic and sacral bones  consistent with active myeloma. Focal uptake in right T2 facet  concerning for  metastasis. Numerous sclerotic lesions throughout the  remaining skeleton do not demonstrate FDG uptake.  2.  No evidence of extraosseous disease progression.  3.  Colonic hyperenhancement and submucosal edema with increased FDG  uptake most likely represents infectious or inflammatory colitis.  Llgvs-dttzel-[id number] disease is also on the differential.    PET/CT 7/3/2019  Since the original read a prior PET/CT performed 12/11/2017 has been  up loaded and ordering provider is asking for additional comparison.     Unchanged:   Right ilium lesion, current SUV max is 9.0, prior was 8.5.      Improved:  Right sacral lesion is slightly smaller max SUV 6.3 unchanged. Total  lesion glycolysis 3.8 previously 5.3. Metabolic volume 1 cc, prior 1.4  cc.   Right pubic symphysis lesion is less pronounced than prior exam.   Left femoral lesion is less pronounced than prior.   The left third rib lesion is less pronounced than on prior.   Right first rib resolved.       Progressed:  The T2 transverse process on the right is new (SUV of 4.8).  The left anterior acetabular lesion is more prominent on current exam  with more soft tissue component, current SUV max is 9.7, prior 16.6.  Hypermetabolic total volume is 3 cc were it was 1 cm.  Total lesion  glycolysis is 20 compared to 8.     Impression:  1. IMPeTus criteria (a proposed Myeloma PET criteria)   1a. BM1 (low level bone marrow),   1b. F3 ExtraSp 5 (4 to 10 lesions, extraspinous, max deauville 5) - 3  lesions highly concerning    1c. No extra medullary, no para medullary disease.   2. Interval change from 12/11/2017  2a. One lesion consistent with active disease (left acetabulum larger  although less metabolic right sacral lesion max SUV 9.7)   2b. Indeterminant - 2 lesions hypermetabolic but concerning for active  disease  right sacrum smaller max SUV 6,   right T2 transverse process new max SUV 5)     2b. Several lesions resolved or decreased.      3/27/2020 PET/CT 1 year                                                                    IMPRESSION: History of multiple myeloma status post multiple therapies  including bone marrow transplant:   - In summary four prior hypermetabolic bone lesions: 2 resolved, 2  partial response.  1. Resolved hypermetabolism since 7/3/2019: Right T2 facet lesion,  right sacral lesion     2. Partial response (>50% decrease in uptake) since PET 7/3/2019.  2a. Left anterior acetabulum (max SUV 3.4), prior max SUV 9.4.  2b. Right iliac (max SUV 4.2), prior max SUV 9.3.     3. No extraosseous evidence of disease progression.     9/29/2020 1.5 year PET    BONES:   Increased FDG uptake to three lesions in the pelvis (series 2):  - Image 347: Right iliac crest, SUV max 8.1, previously 4.2.  - Image 383: Left anterior acetabulum, SUV max 8.3, previously 3.4.  - Image 339: Right sacral ala near the right S1 neural foramen, SUV  max 3.6; previously 1.7.     No new hypermetabolic intraosseous lesion. Additional  nonhypermetabolic lucent lesions throughout the skeleton are similar  to prior, for example in the spine and sternum. No acute osseous  abnormality. Chronic bilateral rib fractures. Unchanged compression  deformities of T5, T6, T8, T9 T11, T12, L1, and L4.                                                                      IMPRESSION: In this patient with a history of multiple myeloma status  post chemotherapy and bone marrow transplant:     1. Increased FDG uptake to two lesions in the pelvis (right iliac  crest and left acetabulum) concerning for active myeloma.     2. Increased FDG uptake to the right sacral ala lesion. While this is  below background levels, it is notable for having increased since  prior study and is in the same location as a metabolic lesion seen on  7/3/2019. Attention on follow-up to this location.     3. Additional diffuse lucent lesions without abnormal FDG uptake are  similar to previous study.     4. Unchanged multilevel compression  deformities in the thoracolumbar  Spine.    2/10/2021 PET/CT:  Impression: In this patient with the diagnosis of multiple myeloma:  1. On the fusion PET CT, there is no abnormal metabolic activity in  the mucosal space, soft tissues, or cervical ravinder chains.      2. Mottled appearance of the cervical spine and skull similar to prior  exams from involvement by multiple myeloma.    ABDOMEN AND PELVIS:  There is a focal area of radiotracer uptake within the transverse  colon associated with a soft tissue area is visualized on series 11,  image 86 which measures 17 x 15 mm and has an SUV max of 9.18.     There are no suspicious hepatic lesions. There is no splenomegaly or  evidence for splenic or pancreatic mass lesion.  There are no suspicious adrenal mass lesions or opaque gallbladder  calculi.  There is symmetric nephrographic renal phase without  hydronephrosis.     Diverticulosis without evidence for diverticulitis. There is no  evidence for bowel obstruction or free fluid.  Scattered areas of  radiotracer uptake throughout the bowel, likely physiologic.  BONES:   Area of radiotracer uptake in the anterior left acetabulum (series 11,  image 107). This finding is similar in appearance to exam 9/29/2020  although decreased in metabolic activity. Current SUV max is 3.95,  this was previously 8.28. Area of increased FDG avidity within the  right iliac wing is also again demonstrated which is decreased in  metabolic activity current SUV max is 2.71, previously this was 8.11.     There is an area of increased radiotracer uptake within the right  sacrum with SUV max of 4.08, this was 3.61 on prior exam.     Slight increased radiotracer uptake at the second and third posterior  costovertebral junctions on the right, this is below the level of the  liver and likely related to degenerative change.     Redemonstration of diffuse lytic lesions throughout the axial and  appendicular skeleton. Lesions are not similar  distribution as exam  9/29/2020. Chronic bilateral rib fractures and chronic compression  deformities of T5, T6, T8, T9, T11, T12, L1 and L4.                                                                      IMPRESSION: In this patient with a history of multiple myeloma status  post chemotherapy and bone marrow transplant with mixed response to  therapy:     1. Prominent areas of radiotracer uptake within the left acetabulum  and within the right iliac wing are decreased in metabolic activity  when compared with exam 9/29/2020. The lesion associated with the  right sacrum is slightly increased in FDG avidity when compared with  the prior although still below the level of the liver. Findings likely  indicate mixed response to treatment.     2. Focal area of hypermetabolism associated with the transverse colon  with an associated rounded area of soft tissue. Findings are  suspicious for a primary colonic neoplasm. Recommend colonoscopy.     3. Diffuse lucent lesions throughout the axial and appendicular  skeleton similar to prior exams.     4. Multilevel compression deformities also are again noted.        [Consider Follow Up: Colon lesion concerning for primary colonic  neoplasm.]    ASSESSMENT/PLAN   Adrianna Palomino is a 64 yo woman with standard risk IgG kappa multiple myeloma, s/p auto PBSCT in 2013. Now 1 year 11 months s/p second auto. On maintenance posttransplant, valcade every other week and zometa quarterly.     1.  BMT:   - T0=3/29/2019) s/p auto PBSCT (#2) Received 8 million cells.   - Engrafted.  Last dose GCSF 4/9/19.   - 4/23/2019: day 28 biochemical amrkers and restaging BMB:   ---- No morphologic or immunohistochemical evidence of plasma cell myeloma   ---- Immune phenotype: There is a minute population (estimated at 0.002%) of Kappa restricted plasma cells identified by aberrant expression of CD56 These plasma cells are very few, but they are forming a discrete group .  ---- Non elevated FLC,  SPEP with IgG kappa Monoclonal protein (0.3 g/dL) seen in the gamma fraction. UPEP with no monoclonal proteins  - 7/3/2019: day 100 biochemical amrkers and restaging BMB:   ---- BMB:  Low level recurrent/persistent plasma cell neoplasm with very rare (less than 1%) Kappa monotypic plasma cells   ---- Immune phenotype: 0.02% plasma cells which express CD19 (dim), CD38, CD45 (dim), CD56 and monotypic cytoplasmic kappa immunoglobulin light chains but lack CD20. Also present are: 0.03% polytypic plasma cells  And 3% polytypic B cells T  ---- Non elevated FLC, SPEP with IgG kappa Monoclonal protein (0.3 g/dL) seen in the gamma fraction. UPEP with no monoclonal proteins   --------- hemoglobin improved to 12.1 now within normal, creatinine stable, no hypercalcemia, no proteinuria, UA, LDH WNL, patient with no evidence of clinical relapse and with stable serum monoclonal proteins at 0.3g/dl not meeting criteria for an increase of 25% from the lowest confirmed response with no new urine monoclonal protein detected and no elevation in free light chains, therefore the patient does not meet criteria for clinical relapse or progressive disease per NCCN guidelines. However, PET/CT with FDG active lesions, clinically asymptomatic, unclear if those are present NEW lesions on imaging a meeting criteria for progressive disease per NCCN guidelines/IMWG revised criteria.   - 9/19/2019: day 180 iochemical amrkers and restaging BMB:   ---- BMB:Marrow cellularity of 30-40%, with trilineage hematopoiesis; recurrent/persistent myeloma is present - single sheet of kappa monotypic plasma cells at edge of biopsy, comprising less than 5% of cellularity   ---- Flow cytometry: There is a minute population (estimated at 0.005%) of kappa restricted plasma cells identified by aberrant expression of CD56 . This immunophenotype is similar to that reported previously in this patient   ---- Non elevated FLC, SPEP with IgG kappa Monoclonal protein (0.3  "g/dL) seen in the gamma fraction. UPEP with no monoclonal proteins  ---- Discussed the uploaded PET/CT from 2017 for comparison with 7/3/2019 PET/CT with radiology  with the new addendum, now concerned for T2 lytic lesion vs degenerative disease as compared to the prior PET/CT with SUV 4.8 with gray zone as to calling it an \"active lytic lesions\". The left acetabular and sacral lesion in report stable in size and metabolic activity per discussion and not meeting 50% change criteria for radiologic progression.  - 3/27/2020 Day 365 assessment:  ---- 3/27 BMB: Variable marrow cellularity, overall 10-20%; no morphologic or immunohistochemical evidence of plasma cell neoplasm . Immunophenotype with a very small population (less than 0.01%, 4-10 events) of kappa restricted plasma cells identified by aberrant expression of CD56, the cells are too few for a definitive diagnosis   ---- 3/27 PET/CT:  four prior hypermetabolic bone lesions: 2 resolved, 2 partial response.  ---- 3/17 OSH SPEP 0.2g/dl IgG kappa. UPEP no monoclonal protein, Kappa elevated at 2.05 (ULN 1.94) and K/L 2.3  - 9/29/2020 Day 365 assessment:   ---- BMB: Trephine core biopsy suboptimal for interpretation. Cellular marrow aspirates showing trilineage hematopoietic maturation, no increase in plasma cells, and no increase in blasts. Immunophenotype  suspicious for rare kappa monotypic (0.003%) plasma cells   ---- 9/29/2020: PET/CT BONES: Increased FDG uptake to three lesions in the pelvis (series 2): Right iliac crest, SUV max 8.1 previously 4.2 and measures 1.3 x 0.7 cm (stable size). Left anterior acetabulum, SUV max 8.3, previously 3.4, measures 1.3 x 1.3 cm is stable from 7/3/2019 and 3/27/2020 PET CT scans. Right sacral ala near the right S1 neural foramen, SUV max 3.6; previously 1.7. No new hypermetabolic intraosseous lesion. Increased FDG uptake to two lesions in the pelvis (right iliac crest and left acetabulum) concerning for active myeloma. " Increased FDG uptake to the right sacral ala lesion. While this is below background levels, it is notable for having increased since prior study and is in the same location as a metabolic lesion seen on 7/3/2019.   ---- Kappa FLC elevated at 1.99 (ULN 1.94) and K/L 2.97 (ULN 1.65). 9/16/2020 OSH SPEP with 0.1 g/dl IgG kappa  I reviewed the imaging myself and had discussion with the radiologist as well.  Overall on the current PET/CT the SPD is stable and therefore IWMG this is stable disease. The FDG metabolic results demonstrate active disease at both sites (IMPetUs PET criteria, Deauville 5 lesions); patient had these  lesions and they were hypermetabolic on PET CT 7/3/2019, metabolism had resolved on 3/27/2020, and has now returned on 9/29/2020.    Clinically the patient is asymptomatic, and biochemical markers actually improved, with no change in therapy since she has been on maintenance Velcade during this time.  I would recommend her referred to radiation oncologist to discuss local radiation to these active lesions to prevent any systemic disease progression, given the patient does not strictly meet criteria for radiologic progression necessitating systemic therapy.  --- Radiation Therapy Treatment:   Site of Radiation Left Hip, Number of Fractions 10, Radiation Total Dosage (cGy) 3000 Treatment Start Date 10/19/2020 Treatment End Date 10/30/2020   2nd Site of Radiation Right Iliac Radiation Total Dosage 3000 Treatment Start Date 10/19/2020 Treatment End Date 10/30/2020   -- Repeat PET/CT on 2/10/2021 Prominent areas of radiotracer uptake within the left acetabulum and within the right iliac wing are decreased in metabolic activity when compared with exam 9/29/2020. The lesion associated with the right sacrum is slightly increased in FDG avidity when compared with the prior although still below the level of the liver (size from 0.5x0.7 to 1x0.7 subcentimetric).  -- Biochemical markers locally 1/20/2021 FLC  WNL, SPEP No monoclonal protein is detected in the serum. IFN immunofixation shows an IgG Kappa monoclonal protein. There is also a very small IgG Lambda of unknown significance that is not detected on the serum protein electrophoresis.   - 2 year restaging:  --- 3/19/2021 BMB Inadequate biopsy, minimal component of hematopoietic elements. No evidence for plasma cell neoplasm in this inadequate biopsy. Peripheral blood showing slight normochromic, macrocytic anemia; slight leukopenia; lymphocytopenia. Concurrent flow cytometry showed very rare polytypic plasma cells. There is no definitive immunophenotypic evidence for clonal plasma cell process. There are very rare (2) events that show bright expression of CD56 and kappa immunoglobulin light chain, an immunophenotype that was previously described but the findings are not sufficient for a definite diagnosis of persistent/recurrent disease. FISH/CG pending.  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  FLC WNL  - Discussed with her today that there is no high clinical suspicion for progression necessitating a repeat in biopsy but needs close biochemical assessment and repeat PET/CT in 3-6 months considering a BMB at that time as well    2.  HEME: Keep Hgb>8 and plts>10K. No transfusion needs today.                             3.  ID: Afebrile  - LD ACV  - Pentamidine monthly for one year (Sulfa allergy)- completed  - Compelted one year vaccinations with Dr Crespo, Annual influenza vaccine, will get 2 year vaccines with Dr. Crespo per her preference                                4.  GI:   - hx collagenous colitis, 4/12 stopped protonix due to its association with collagenous colitis.   - PET/CT 2/10/2021: Focal area of hypermetabolism associated with the transverse colon with an associated rounded area of soft  tissue. 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 Recommend colonoscopy, needs laparoscopic poly removal per patient, meeting with surgery to discuss further.     5.  FEN/Renal: Creat wnl.    6. Health care maintanence  - Seen by PCP in 2/2021 for routine testing including TSH, lipid profile HbA1c etc  - Mammogram completed 3/21 locally  - PFTs and DEXA completed 3/21 lcoally    I spent 40 minutes in the care of this patient today, which included time necessary for preparation for the visit, obtaining history, ordering medications/tests/procedures as medically indicated, review of pertinent medical literature, counseling of the patient, communication of recommendations to the care team, and documentation time.     Plan:  She knows to call with any questions or concerns.  Above plan was explained in detail with the patient and all her questions were answered to the best of my ability.    Discussed with Dr. Crespo, he will update me on 3 vs 6 months decision for restaging here    Abdi Browning MD

## 2021-04-01 ENCOUNTER — TRANSFERRED RECORDS (OUTPATIENT)
Dept: HEALTH INFORMATION MANAGEMENT | Facility: CLINIC | Age: 66
End: 2021-04-01

## 2021-04-23 LAB
COPATH REPORT: NORMAL
COPATH REPORT: NORMAL

## 2021-05-13 ENCOUNTER — TRANSFERRED RECORDS (OUTPATIENT)
Dept: HEALTH INFORMATION MANAGEMENT | Facility: CLINIC | Age: 66
End: 2021-05-13

## 2021-07-08 ENCOUNTER — TRANSFERRED RECORDS (OUTPATIENT)
Dept: HEALTH INFORMATION MANAGEMENT | Facility: CLINIC | Age: 66
End: 2021-07-08

## 2021-07-20 DIAGNOSIS — C90.00 MULTIPLE MYELOMA, REMISSION STATUS UNSPECIFIED (H): ICD-10-CM

## 2021-07-20 DIAGNOSIS — Z94.81 STATUS POST BONE MARROW TRANSPLANT (H): Primary | ICD-10-CM

## 2021-09-01 ENCOUNTER — HOSPITAL ENCOUNTER (OUTPATIENT)
Dept: PET IMAGING | Facility: CLINIC | Age: 66
End: 2021-09-01
Attending: INTERNAL MEDICINE
Payer: COMMERCIAL

## 2021-09-01 DIAGNOSIS — C90.00 MULTIPLE MYELOMA, REMISSION STATUS UNSPECIFIED (H): ICD-10-CM

## 2021-09-01 DIAGNOSIS — Z94.81 STATUS POST BONE MARROW TRANSPLANT (H): ICD-10-CM

## 2021-09-01 PROCEDURE — 70491 CT SOFT TISSUE NECK W/DYE: CPT

## 2021-09-01 PROCEDURE — 74177 CT ABD & PELVIS W/CONTRAST: CPT

## 2021-09-01 PROCEDURE — 343N000001 HC RX 343: Performed by: INTERNAL MEDICINE

## 2021-09-01 PROCEDURE — 78816 PET IMAGE W/CT FULL BODY: CPT | Mod: PS,KX

## 2021-09-01 PROCEDURE — 250N000011 HC RX IP 250 OP 636: Performed by: INTERNAL MEDICINE

## 2021-09-01 PROCEDURE — 74177 CT ABD & PELVIS W/CONTRAST: CPT | Mod: 26 | Performed by: RADIOLOGY

## 2021-09-01 PROCEDURE — A9552 F18 FDG: HCPCS | Performed by: INTERNAL MEDICINE

## 2021-09-01 PROCEDURE — 71260 CT THORAX DX C+: CPT | Mod: 26 | Performed by: RADIOLOGY

## 2021-09-01 PROCEDURE — 70491 CT SOFT TISSUE NECK W/DYE: CPT | Mod: 26 | Performed by: RADIOLOGY

## 2021-09-01 PROCEDURE — 78816 PET IMAGE W/CT FULL BODY: CPT | Mod: 26 | Performed by: RADIOLOGY

## 2021-09-01 RX ORDER — IOPAMIDOL 755 MG/ML
20-135 INJECTION, SOLUTION INTRAVASCULAR ONCE
Status: COMPLETED | OUTPATIENT
Start: 2021-09-01 | End: 2021-09-01

## 2021-09-01 RX ADMIN — IOPAMIDOL 85 ML: 755 INJECTION, SOLUTION INTRAVENOUS at 10:35

## 2021-09-01 RX ADMIN — FLUDEOXYGLUCOSE F-18 10.15 MCI.: 500 INJECTION, SOLUTION INTRAVENOUS at 09:36

## 2021-09-07 ENCOUNTER — VIRTUAL VISIT (OUTPATIENT)
Dept: TRANSPLANT | Facility: CLINIC | Age: 66
End: 2021-09-07
Attending: INTERNAL MEDICINE
Payer: COMMERCIAL

## 2021-09-07 DIAGNOSIS — C90.00 MULTIPLE MYELOMA NOT HAVING ACHIEVED REMISSION (H): Primary | ICD-10-CM

## 2021-09-07 PROCEDURE — 99214 OFFICE O/P EST MOD 30 MIN: CPT | Mod: TEL

## 2021-09-07 NOTE — LETTER
9/7/2021         RE: Adrianna Palomino  [address]  [place] [address]        Dear Colleague,    Thank you for referring your patient, Adrianna Palomino, to the [facility] [place]. Please see a copy of my visit note below.    BMT Clinic Progress Note- telephone visit    Patient ID:  Adrianna Palomino is a 62 yo woman 2 year 5 months (T0=3/29/2019) s/p auto PBSCT (#2) for Multiple Myeloma.      Interval History:   Overall doing well, no concerns. Her  passed away recently.  She retired in May.  No F/C/NS. No sore throat, no cough, no SOB at rest, no palpitations.  Still on maintenance posttransplant, valcade every other week. Stable/slightly increased neuropathy: feet not hands.  No bony/hip pain.  Back to normal appetitive, weight stable, stools formed, no bleeding or bruising.     Otherwise on review of systems denies any fevers chills night sweats, denies any respiratory symptoms any rhinorrhea sore throat cough chest pain shortness of breath palpitations, denies any nausea or vomiting or change bowel movements as above, denies any rashes bleeding or bruising. Denies any headache or other focal sensory or motor deficits.    ROS: 10 point ROS neg other than the symptoms noted above in the HPI.    Oncology history from notes from Rubia Vinson and Dr. Crespo, records reviewed by me as well:  - Initially diagnosed and with standard-risk cytogenetics in 2006  - Started on Dexamethasone for 6 cycles obtaining a complete remission and then maintenance with thalidomide and dexamethasone for 1 year.   - Stopped all therapy in the summer of 2007 due to neuropathy, and was followed on 2 years of maintenance dexamethasone.    - She was noted to slowly relapse but didn't require therapy until she was started on RVD in 11/2012.   - Completed 5 cycles before obtaining a partial remission and came to transplant.    - Underwent an autologous stem cell  transplant on 04/05/2013,  total cell dose was 8 x10(6) cells/kg, and she had 8 x10(6) cells/kg left in storage. Post ASCT course was unremarkable.     - Maintenance Regimen post transplant  (10mg q28 days), off for 1 month in 9/2016 to review marrow, resumed: 9/30/2016 and continued till disease progression  - Clinical progression [new bone lesions detected on bone survey from 8/29/2018 with [facility]]  - 8/29/2018 Bone Marrow Biopsy: Normocellular BM (30%) with trilineage hematopoiesis showing a decreased myeloid to erythroid ratio (erythroid hyperplasia). - Plasma cells 5%, polytypic for light chain. Flow cytometry negative for monoclonal plasma cell population   - 10/3/2018 Bone marrow Biopsy: CT Guided Needle Core Biopsy: POSITIVE FOR MALIGNANCY - Plasma cell neoplasm, consistent with recurrent plasma cell myeloma. Comment Large aggregates of + plasma cells are present. These are kappa monoclonal.   - 10/3/2018: FISH Myeloma Panel: Positive for additional copies of [id number] (5p15.2), CEP9 (centromere 9), CEP11 (centromere 11), and CEP15 (centromere 15) suggesting polysomy, and low level loss of IGH (14q32). FISH Negative for loss of CDKN2C (1p32.3), gain of CKS1B (1q21.3), loss of [id number] (13q14.3), loss of TP53 (17p13.1), and additional copies of CEP7 (centromere 7), and IGH rearrangement. 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   DSS IIA, SPEP 10/18/2018: 0.9 g/dL, 10/18/2018: 6.69 , Kappa 10.03 mg/dl, lambda 1.5 mg/dl, albumin 3.4 g/dl, creatinine 0.82, calcium 9.3, LDH and B2 microglobulin Unknown/ not available   - 10/19/2018 C1D1 : VRd in relapsed setting (Velcade: 1.3mg/m2 subcutanously Day 1, 8, 15; Revlimid: 10mg (dose reduced) day 1-14; Dexamethasone: 40mg day 1,8,15)- completed 4 cycles, complicated by salmonella stool infections prior to C3.  - M biochemical  markers:  3/11/2019:              UPEP Predominantly albumin is seen which suggests glomerular damage. No obvious monoclonal protein seen and           the absence of monoclonal immunoglobulins was confirmed by immunofixation of this same urine sample.               Urine creatinine and creatiunine clearance are WNL, 24 hour urine total protein elevated at 0.63 and total urine         protein/creatiine is elevated at 0.49              SPEP 3/7/2019 Small monoclonal protein (0.3 g/dL) seen in the gamma fraction.  Monoclonal IgG immunoglobulin of  kappa light chain type.               Kappa slightly elevated at 2.42 mg/dl              Lambda light chain WNL              K/L ratio 1.79    - BMB 3/8/2019: PreHCT: Kappa monotypic plasma cells detected by flow cytometry, compatible with minimal residual disease -Concurrent flow cytometry ([id number]) detected 0.02% kappa monotypic plasma cells. 0.02% plasma cells which express dim CD19, CD38, dim CD45, CD56 and monotypic cytoplasmic kappa immunoglobulin light chains but lack CD20.    - T0=3/29/2019 s/p auto PBSCT (#2)   - See post transplant course below        PHYSICAL EXAM                                                                                                                                   Vitals not taken telephone visit, no PE    Labs:   Lab Results   Component Value Date    WBC 3.6 (L) 03/19/2021    ANEU 2.5 03/19/2021    HGB 12.3 03/19/2021    HCT 36.1 03/19/2021     03/19/2021     03/19/2021    POTASSIUM 4.1 03/19/2021    CHLORIDE 106 03/19/2021    CO2 28 03/19/2021    GLC 74 03/19/2021    BUN 10 03/19/2021    CR 0.95 03/19/2021    MAG 2.1 03/19/2021    INR 0.95 03/28/2019    BILITOTAL 0.5 03/19/2021    AST 16 03/19/2021    ALT 20 03/19/2021    ALKPHOS 57 03/19/2021    PROTTOTAL 6.9 03/19/2021    ALBUMIN 3.7 03/19/2021   PET FDG/CT )SH  12/11/2017 2:24 PM    INDICATION: Multiple myeloma staging, initial treatment strategy.  TECHNIQUE: Serum  glucose level 83 mg/dL. One hour post intravenous   administration of 11.5 mCi F-18 FDG, PET imaging was performed from the   vertex to the distal thighs utilizing attenuation correction with   concurrent axial CT and PET/CT image fusion. Dose reduction techniques were   used.  COMPARISON: Skeletal survey from 07/12/2016 is reviewed.    FINDINGS: Scattered FDG avid lesions throughout the axial and appendicular   skeleton, a few examples of which include in the right ilium (SUVmax 8.5),   anterior left acetabulum (SUVmax 16.6), and mid left femoral diaphysis   (SUVmax 3.5).    3 mm right upper lobe pulmonary nodule, too small for PET characterization.   Tortuous aorta. Trace calcified atherosclerosis. Hysterectomy. Left os   acromiale. Old bilateral rib fractures. Old, non-FDG avid compression   fractures of T11, T12, L1, and L4. Mild degenerative change in the spine.    CONCLUSION:  Scattered FDG avid skeletal lesions consistent with active myeloma. No   evidence of extraosseous disease.      PET/CT 7/3/2019  BONES:   Multiple FDG avid lesions are present in the axial skeleton as  follows:   - 3.0 x 1.7 cm sclerotic lesion in the right iliac bone with Max SUV  9.3.    - 2.1 x 1.5 x 1.9 cm lesion in the anterior medial left acetabulum  with max SUV 9.7.   - Nodular focus of increased FDG uptake in the right inferior sacrum  (coronal image 64) with max SUV 6.3.  Focal uptake in the right T2 facet, likely metastatic.  Focal uptake in the left rib indeterminant.     No other sites of suspicious FDG uptake within the axial or  appendicular skeleton. Numerous sclerotic lesions throughout the  thoracolumbar spine, consistent with known myeloma, without suspicious  FDG uptake. Multilevel disc height narrowing most severe at T11, T12,  and L1 with exaggerated thoracic kyphosis. Non-FDG avid sclerotic  focus in the posterior left fourth rib. Healed fractures of the right  3rd rib (posteriorly) and left 6th / 8th ribs.                                                                       IMPRESSION:   In this patient with known multiple myeloma s/p recent bone marrow  transplant:  1.  Multiple FDG-avid foci within the pelvic and sacral bones  consistent with active myeloma. Focal uptake in right T2 facet  concerning for metastasis. Numerous sclerotic lesions throughout the  remaining skeleton do not demonstrate FDG uptake.  2.  No evidence of extraosseous disease progression.  3.  Colonic hyperenhancement and submucosal edema with increased FDG  uptake most likely represents infectious or inflammatory colitis.  [id number] disease is also on the differential.    PET/CT 7/3/2019  Since the original read a prior PET/CT performed 12/11/2017 has been  up loaded and ordering provider is asking for additional comparison.     Unchanged:   Right ilium lesion, current SUV max is 9.0, prior was 8.5.      Improved:  Right sacral lesion is slightly smaller max SUV 6.3 unchanged. Total  lesion glycolysis 3.8 previously 5.3. Metabolic volume 1 cc, prior 1.4  cc.   Right pubic symphysis lesion is less pronounced than prior exam.   Left femoral lesion is less pronounced than prior.   The left third rib lesion is less pronounced than on prior.   Right first rib resolved.       Progressed:  The T2 transverse process on the right is new (SUV of 4.8).  The left anterior acetabular lesion is more prominent on current exam  with more soft tissue component, current SUV max is 9.7, prior 16.6.  Hypermetabolic total volume is 3 cc were it was 1 cm.  Total lesion  glycolysis is 20 compared to 8.     Impression:  1. IMPeTus criteria (a proposed Myeloma PET criteria)   1a. BM1 (low level bone marrow),   1b. F3 ExtraSp 5 (4 to 10 lesions, extraspinous, max deauville 5) - 3  lesions highly concerning    1c. No extra medullary, no para medullary disease.   2. Interval change from 12/11/2017  2a. One lesion consistent with active disease (left acetabulum  larger  although less metabolic right sacral lesion max SUV 9.7)   2b. Indeterminant - 2 lesions hypermetabolic but concerning for active  disease  right sacrum smaller max SUV 6,   right T2 transverse process new max SUV 5)     2b. Several lesions resolved or decreased.      3/27/2020 PET/CT 1 year                                                                   IMPRESSION: History of multiple myeloma status post multiple therapies  including bone marrow transplant:   - In summary four prior hypermetabolic bone lesions: 2 resolved, 2  partial response.  1. Resolved hypermetabolism since 7/3/2019: Right T2 facet lesion,  right sacral lesion     2. Partial response (>50% decrease in uptake) since PET 7/3/2019.  2a. Left anterior acetabulum (max SUV 3.4), prior max SUV 9.4.  2b. Right iliac (max SUV 4.2), prior max SUV 9.3.     3. No extraosseous evidence of disease progression.     9/29/2020 1.5 year PET    BONES:   Increased FDG uptake to three lesions in the pelvis (series 2):  - Image 347: Right iliac crest, SUV max 8.1, previously 4.2.  - Image 383: Left anterior acetabulum, SUV max 8.3, previously 3.4.  - Image 339: Right sacral ala near the right S1 neural foramen, SUV  max 3.6; previously 1.7.     No new hypermetabolic intraosseous lesion. Additional  nonhypermetabolic lucent lesions throughout the skeleton are similar  to prior, for example in the spine and sternum. No acute osseous  abnormality. Chronic bilateral rib fractures. Unchanged compression  deformities of T5, T6, T8, T9 T11, T12, L1, and L4.                                                                      IMPRESSION: In this patient with a history of multiple myeloma status  post chemotherapy and bone marrow transplant:     1. Increased FDG uptake to two lesions in the pelvis (right iliac  crest and left acetabulum) concerning for active myeloma.     2. Increased FDG uptake to the right sacral ala lesion. While this is  below background  levels, it is notable for having increased since  prior study and is in the same location as a metabolic lesion seen on  7/3/2019. Attention on follow-up to this location.     3. Additional diffuse lucent lesions without abnormal FDG uptake are  similar to previous study.     4. Unchanged multilevel compression deformities in the thoracolumbar  Spine.    2/10/2021 PET/CT:  Impression: In this patient with the diagnosis of multiple myeloma:  1. On the fusion PET CT, there is no abnormal metabolic activity in  the mucosal space, soft tissues, or cervical ravinder chains.      2. Mottled appearance of the cervical spine and skull similar to prior  exams from involvement by multiple myeloma.    ABDOMEN AND PELVIS:  There is a focal area of radiotracer uptake within the transverse  colon associated with a soft tissue area is visualized on series 11,  image 86 which measures 17 x 15 mm and has an SUV max of 9.18.     There are no suspicious hepatic lesions. There is no splenomegaly or  evidence for splenic or pancreatic mass lesion.  There are no suspicious adrenal mass lesions or opaque gallbladder  calculi.  There is symmetric nephrographic renal phase without  hydronephrosis.     Diverticulosis without evidence for diverticulitis. There is no  evidence for bowel obstruction or free fluid.  Scattered areas of  radiotracer uptake throughout the bowel, likely physiologic.  BONES:   Area of radiotracer uptake in the anterior left acetabulum (series 11,  image 107). This finding is similar in appearance to exam 9/29/2020  although decreased in metabolic activity. Current SUV max is 3.95,  this was previously 8.28. Area of increased FDG avidity within the  right iliac wing is also again demonstrated which is decreased in  metabolic activity current SUV max is 2.71, previously this was 8.11.     There is an area of increased radiotracer uptake within the right  sacrum with SUV max of 4.08, this was 3.61 on prior exam.     Slight  increased radiotracer uptake at the second and third posterior  costovertebral junctions on the right, this is below the level of the  liver and likely related to degenerative change.     Redemonstration of diffuse lytic lesions throughout the axial and  appendicular skeleton. Lesions are not similar distribution as exam  9/29/2020. Chronic bilateral rib fractures and chronic compression  deformities of T5, T6, T8, T9, T11, T12, L1 and L4.                                                                      IMPRESSION: In this patient with a history of multiple myeloma status  post chemotherapy and bone marrow transplant with mixed response to  therapy:     1. Prominent areas of radiotracer uptake within the left acetabulum  and within the right iliac wing are decreased in metabolic activity  when compared with exam 9/29/2020. The lesion associated with the  right sacrum is slightly increased in FDG avidity when compared with  the prior although still below the level of the liver. Findings likely  indicate mixed response to treatment.     2. Focal area of hypermetabolism associated with the transverse colon  with an associated rounded area of soft tissue. Findings are  suspicious for a primary colonic neoplasm. Recommend colonoscopy.     3. Diffuse lucent lesions throughout the axial and appendicular  skeleton similar to prior exams.     4. Multilevel compression deformities also are again noted.        [Consider Follow Up: Colon lesion concerning for primary colonic  Neoplasm.]    9/1/2021 PET/CT:  BONES:   Decreased FDG uptake, below the background liver activity, to the left  anterior acetabulum, and sacrum with overall unchanged mild uptake to  the right iliac wing since prior PET/CT 2/1/2021 Persistent diffuse  lytic lesions throughout the axial and appendicular skeleton, similar  distribution compared to prior PET/CT. Chronic bilateral rib fractures  and chronic compression deformities of T5, T6, T8, T9, T11,  T12, L1  and L4.                                                                      IMPRESSION: In this patient with multiple myeloma status post bone  marrow transplant, there is evidence of complete response;  1. Decreased mild FDG uptake to the left anterior acetabulum, sacrum  and right iliac wing below the background liver activity. No new  suspicious FDG uptake along the skeleton.  2. Diffuse lucent lesions throughout the axial and appendicular  skeleton, similar in distribution.  3. Overall unchanged multilevel spinal compression deformities.  4. Please see separate dictation for the high resolution PET-CT of the  head and neck performed at the same time for discussion of findings.    Neck  Impression: In this patient with multiple myeloma status post bone  marrow transplantation;  Unchanged asymmetric FDG avid effacement of the right Rosenmuller  fossa since September 2020. Given the stability of the finding, it is  likely benign. Otherwise no abnormal uptake along the mucosal spaces.  No FDG avid or abnormally enlarged lymphadenopathy.  Multilevel lytic areas throughout the cervical spine and skull most  prominent at C2 vertebra. similar in distribution. No new focal  suspicious hypermetabolism associating any osteolytic lesions.     ASSESSMENT/PLAN   Adrianna Palomino is a 62 yo woman with standard risk IgG kappa multiple myeloma, s/p auto PBSCT in 2013. Now 2 year 5 months s/p second auto. On maintenance posttransplant, valcade every other week and zometa quarterly.     1.  BMT:   - T0=3/29/2019) s/p auto PBSCT (#2) Received 8 million cells.   - Engrafted.  Last dose GCSF 4/9/19.   - 4/23/2019: day 28 biochemical amrkers and restaging BMB:   ---- No morphologic or immunohistochemical evidence of plasma cell myeloma   ---- Immune phenotype: There is a minute population (estimated at 0.002%) of Kappa restricted plasma cells identified by aberrant expression of CD56 These plasma cells are very few, but  they are forming a discrete group .  ---- Non elevated FLC, SPEP with IgG kappa Monoclonal protein (0.3 g/dL) seen in the gamma fraction. UPEP with no monoclonal proteins  - 7/3/2019: day 100 biochemical amrkers and restaging BMB:   ---- BMB:  Low level recurrent/persistent plasma cell neoplasm with very rare (less than 1%) Kappa monotypic plasma cells   ---- Immune phenotype: 0.02% plasma cells which express CD19 (dim), CD38, CD45 (dim), CD56 and monotypic cytoplasmic kappa immunoglobulin light chains but lack CD20. Also present are: 0.03% polytypic plasma cells  And 3% polytypic B cells T  ---- Non elevated FLC, SPEP with IgG kappa Monoclonal protein (0.3 g/dL) seen in the gamma fraction. UPEP with no monoclonal proteins   --------- hemoglobin improved to 12.1 now within normal, creatinine stable, no hypercalcemia, no proteinuria, UA, LDH WNL, patient with no evidence of clinical relapse and with stable serum monoclonal proteins at 0.3g/dl not meeting criteria for an increase of 25% from the lowest confirmed response with no new urine monoclonal protein detected and no elevation in free light chains, therefore the patient does not meet criteria for clinical relapse or progressive disease per NCCN guidelines. However, PET/CT with FDG active lesions, clinically asymptomatic, unclear if those are present NEW lesions on imaging a meeting criteria for progressive disease per NCCN guidelines/IMWG revised criteria.   - 9/19/2019: day 180 iochemical amrkers and restaging BMB:   ---- BMB:Marrow cellularity of 30-40%, with trilineage hematopoiesis; recurrent/persistent myeloma is present - single sheet of kappa monotypic plasma cells at edge of biopsy, comprising less than 5% of cellularity   ---- Flow cytometry: There is a minute population (estimated at 0.005%) of kappa restricted plasma cells identified by aberrant expression of CD56 . This immunophenotype is similar to that reported previously in this patient   ---- Non  "elevated FLC, SPEP with IgG kappa Monoclonal protein (0.3 g/dL) seen in the gamma fraction. UPEP with no monoclonal proteins  ---- Discussed the uploaded PET/CT from 2017 for comparison with 7/3/2019 PET/CT with radiology  with the new addendum, now concerned for T2 lytic lesion vs degenerative disease as compared to the prior PET/CT with SUV 4.8 with gray zone as to calling it an \"active lytic lesions\". The left acetabular and sacral lesion in report stable in size and metabolic activity per discussion and not meeting 50% change criteria for radiologic progression.  - 3/27/2020 Day 365 assessment:  ---- 3/27 BMB: Variable marrow cellularity, overall 10-20%; no morphologic or immunohistochemical evidence of plasma cell neoplasm . Immunophenotype with a very small population (less than 0.01%, 4-10 events) of kappa restricted plasma cells identified by aberrant expression of CD56, the cells are too few for a definitive diagnosis   ---- 3/27 PET/CT:  four prior hypermetabolic bone lesions: 2 resolved, 2 partial response.  ---- 3/17 OSH SPEP 0.2g/dl IgG kappa. UPEP no monoclonal protein, Kappa elevated at 2.05 (ULN 1.94) and K/L 2.3  - 9/29/2020 Day 365 assessment:   ---- BMB: Trephine core biopsy suboptimal for interpretation. Cellular marrow aspirates showing trilineage hematopoietic maturation, no increase in plasma cells, and no increase in blasts. Immunophenotype  suspicious for rare kappa monotypic (0.003%) plasma cells   ---- 9/29/2020: PET/CT BONES: Increased FDG uptake to three lesions in the pelvis (series 2): Right iliac crest, SUV max 8.1 previously 4.2 and measures 1.3 x 0.7 cm (stable size). Left anterior acetabulum, SUV max 8.3, previously 3.4, measures 1.3 x 1.3 cm is stable from 7/3/2019 and 3/27/2020 PET CT scans. Right sacral ala near the right S1 neural foramen, SUV max 3.6; previously 1.7. No new hypermetabolic intraosseous lesion. Increased FDG uptake to two lesions in the pelvis (right iliac crest " and left acetabulum) concerning for active myeloma. Increased FDG uptake to the right sacral ala lesion. While this is below background levels, it is notable for having increased since prior study and is in the same location as a metabolic lesion seen on 7/3/2019.   ---- Kappa FLC elevated at 1.99 (ULN 1.94) and K/L 2.97 (ULN 1.65). 9/16/2020 OSH SPEP with 0.1 g/dl IgG kappa  I reviewed the imaging myself and had discussion with the radiologist as well.  Overall on the current PET/CT the SPD is stable and therefore IWMG this is stable disease. The FDG metabolic results demonstrate active disease at both sites (IMPetUs PET criteria, Deauville 5 lesions); patient had these  lesions and they were hypermetabolic on PET CT 7/3/2019, metabolism had resolved on 3/27/2020, and has now returned on 9/29/2020.    Clinically the patient is asymptomatic, and biochemical markers actually improved, with no change in therapy since she has been on maintenance Velcade during this time.  I would recommend her referred to radiation oncologist to discuss local radiation to these active lesions to prevent any systemic disease progression, given the patient does not strictly meet criteria for radiologic progression necessitating systemic therapy.  --- Radiation Therapy Treatment:   Site of Radiation Left Hip, Number of Fractions 10, Radiation Total Dosage (cGy) 3000 Treatment Start Date 10/19/2020 Treatment End Date 10/30/2020   2nd Site of Radiation Right Iliac Radiation Total Dosage 3000 Treatment Start Date 10/19/2020 Treatment End Date 10/30/2020   -- Repeat PET/CT on 2/10/2021 Prominent areas of radiotracer uptake within the left acetabulum and within the right iliac wing are decreased in metabolic activity when compared with exam 9/29/2020. The lesion associated with the right sacrum is slightly increased in FDG avidity when compared with the prior although still below the level of the liver (size from 0.5x0.7 to 1x0.7  subcentimetric).  -- Biochemical markers locally 1/20/2021 FLC WNL, SPEP No monoclonal protein is detected in the serum. IFN immunofixation shows an IgG Kappa monoclonal protein. There is also a very small IgG Lambda of unknown significance that is not detected on the serum protein electrophoresis.   - 2 year restaging:  --- 3/19/2021 BMB Inadequate biopsy, minimal component of hematopoietic elements. No evidence for plasma cell neoplasm in this inadequate biopsy. Peripheral blood showing slight normochromic, macrocytic anemia; slight leukopenia; lymphocytopenia. Concurrent flow cytometry showed very rare polytypic plasma cells. There is no definitive immunophenotypic evidence for clonal plasma cell process. There are very rare (2) events that show bright expression of CD56 and kappa immunoglobulin light chain, an immunophenotype that was previously described but the findings are not sufficient for a definite diagnosis of persistent/recurrent disease. FISH/CG pending.  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  FLC WNL  - Discussed with her that there is no high clinical suspicion for progression necessitating a repeat in biopsy but needs close biochemical assessment and repeat PET/CT in 3-6 months considering a BMB at that time as well  - 9/1/2021 PET/CT: Decreased mild FDG uptake to the left anterior acetabulum, sacrum and right iliac wing below the background liver activity. No new suspicious FDG uptake along the skeleton.   - 8/4/2021 SPEP/IFN: no monoclonal protein detected. [facility] Kappa and lambda WNL  --- I would not recommend change in treatment/maintance at this time, will continue to have close biochemical assessment and consider follow up PET/CT in 6 months.    2.  HEME: Keep Hgb>8 and plts>10K. No transfusion needs today.                              3.  ID: Afebrile  - LD ACV  - Pentamidine monthly for one year (Sulfa allergy)- completed  - Compelted one year vaccinations with Dr Crespo, Annual influenza vaccine, completed 2 year vaccines with Dr. Crespo. Completed covid vaccines and plans for booster.                               4.  GI:   - hx collagenous colitis, 4/12 stopped protonix due to its association with collagenous colitis.   - PET/CT 2/10/2021: Focal area of hypermetabolism associated with the transverse colon with an associated rounded area of soft tissue. 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 Recommend colonoscopy, needs laparoscopic poly removal per patient, meeting with surgery to discuss further. S/p resection 4/2021, no adjuvant treatment, follows with Dr. Crespo.     5.  FEN/Renal: Creat wnl.    6. Health care maintanence  - Seen by PCP in 2/2021 for routine testing including TSH, lipid profile HbA1c etc  - Mammogram completed 3/21 locally  - PFTs and DEXA completed 3/21 oal    I spent 30 minutes in the care of this patient today, which included time necessary for preparation for the visit, obtaining history, ordering medications/tests/procedures as medically indicated, review of pertinent medical literature, counseling of the patient, communication of recommendations to the care team, and documentation time.     Plan:  She knows to call with any questions or concerns.  Above plan was explained in detail with the patient and all her questions were answered to the best of my ability.    Will discusse with Dr. Crespo, he will update me on 6 months decision for restaging here    Abdi Browning MD                     Maeve is a 66 year old who is being evaluated via a billable video visit.      How would you like to obtain your AVS? MyChart  If the video visit is dropped, the invitation should be resent by: Text to cell phone: [phone]  Will anyone else be joining your video visit? No      Video Start Time:  12:00  Video-Visit Details    Type of service:  Video Visit    Video End Time:12:22 PM    Originating Location (pt. Location): Home    Distant Location (provider location):  [facility] [place]     Platform used for Video Visit: Gladys      Again, thank you for allowing me to participate in the care of your patient.        Sincerely,        BMT DOM

## 2021-09-07 NOTE — PROGRESS NOTES
Maeve is a 66 year old who is being evaluated via a billable video visit.      How would you like to obtain your AVS? MyChart  If the video visit is dropped, the invitation should be resent by: Text to cell phone: [phone]  Will anyone else be joining your video visit? No      Video Start Time: 12:00  Video-Visit Details    Type of service:  Video Visit    Video End Time:12:22 PM    Originating Location (pt. Location): Home    Distant Location (provider location):  [facility] [place]     Platform used for Video Visit: Amp'd Mobile

## 2021-09-07 NOTE — PROGRESS NOTES
BMT Clinic Progress Note- telephone visit    Patient ID:  Adrianna Palomino is a 62 yo woman 2 year 5 months (T0=3/29/2019) s/p auto PBSCT (#2) for Multiple Myeloma.      Interval History:   Overall doing well, no concerns. Her  passed away recently.  She retired in May.  No F/C/NS. No sore throat, no cough, no SOB at rest, no palpitations.  Still on maintenance posttransplant, valcade every other week. Stable/slightly increased neuropathy: feet not hands.  No bony/hip pain.  Back to normal appetitive, weight stable, stools formed, no bleeding or bruising.     Otherwise on review of systems denies any fevers chills night sweats, denies any respiratory symptoms any rhinorrhea sore throat cough chest pain shortness of breath palpitations, denies any nausea or vomiting or change bowel movements as above, denies any rashes bleeding or bruising. Denies any headache or other focal sensory or motor deficits.    ROS: 10 point ROS neg other than the symptoms noted above in the HPI.    Oncology history from notes from Rubia Vinson and Dr. Crespo, records reviewed by me as well:  - Initially diagnosed and with standard-risk cytogenetics in 2006  - Started on Dexamethasone for 6 cycles obtaining a complete remission and then maintenance with thalidomide and dexamethasone for 1 year.   - Stopped all therapy in the summer of 2007 due to neuropathy, and was followed on 2 years of maintenance dexamethasone.    - She was noted to slowly relapse but didn't require therapy until she was started on RVD in 11/2012.   - Completed 5 cycles before obtaining a partial remission and came to transplant.    - Underwent an autologous stem cell transplant on 04/05/2013,  total cell dose was 8 x10(6) cells/kg, and she had 8 x10(6) cells/kg left in storage. Post ASCT course was unremarkable.     - Maintenance Regimen post transplant  (10mg q28 days), off for 1 month in 9/2016 to review marrow, resumed: 9/30/2016 and continued till  disease progression  - Clinical progression [new bone lesions detected on bone survey from 8/29/2018 with [facility]]  - 8/29/2018 Bone Marrow Biopsy: Normocellular BM (30%) with trilineage hematopoiesis showing a decreased myeloid to erythroid ratio (erythroid hyperplasia). - Plasma cells 5%, polytypic for light chain. Flow cytometry negative for monoclonal plasma cell population   - 10/3/2018 Bone marrow Biopsy: CT Guided Needle Core Biopsy: POSITIVE FOR MALIGNANCY - Plasma cell neoplasm, consistent with recurrent plasma cell myeloma. Comment Large aggregates of + plasma cells are present. These are kappa monoclonal.   - 10/3/2018: FISH Myeloma Panel: Positive for additional copies of D5S23/D1Y168 (5p15.2), CEP9 (centromere 9), CEP11 (centromere 11), and CEP15 (centromere 15) suggesting polysomy, and low level loss of IGH (14q32). FISH Negative for loss of CDKN2C (1p32.3), gain of CKS1B (1q21.3), loss of [id number] (13q14.3), loss of TP53 (17p13.1), and additional copies of CEP7 (centromere 7), and IGH rearrangement. 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   DSS IIA, SPEP 10/18/2018: 0.9 g/dL, 10/18/2018: 6.69 , Kappa 10.03 mg/dl, lambda 1.5 mg/dl, albumin 3.4 g/dl, creatinine 0.82, calcium 9.3, LDH and B2 microglobulin Unknown/ not available   - 10/19/2018 C1D1 : VRd in relapsed setting (Velcade: 1.3mg/m2 subcutanously Day 1, 8, 15; Revlimid: 10mg (dose reduced) day 1-14; Dexamethasone: 40mg day 1,8,15)- completed 4 cycles, complicated by salmonella stool infections prior to C3.  - M biochemical markers:  3/11/2019:              UPEP Predominantly albumin is seen which suggests glomerular damage. No obvious monoclonal protein seen and           the absence of monoclonal immunoglobulins was confirmed by immunofixation of this same urine sample.               Urine creatinine and creatiunine  clearance are WNL, 24 hour urine total protein elevated at 0.63 and total urine         protein/creatiine is elevated at 0.49              SPEP 3/7/2019 Small monoclonal protein (0.3 g/dL) seen in the gamma fraction.  Monoclonal IgG immunoglobulin of  kappa light chain type.               Kappa slightly elevated at 2.42 mg/dl              Lambda light chain WNL              K/L ratio 1.79    - BMB 3/8/2019: PreHCT: Kappa monotypic plasma cells detected by flow cytometry, compatible with minimal residual disease -Concurrent flow cytometry ([id number]) detected 0.02% kappa monotypic plasma cells. 0.02% plasma cells which express dim CD19, CD38, dim CD45, CD56 and monotypic cytoplasmic kappa immunoglobulin light chains but lack CD20.    - T0=3/29/2019 s/p auto PBSCT (#2)   - See post transplant course below        PHYSICAL EXAM                                                                                                                                   Vitals not taken telephone visit, no PE    Labs:   Lab Results   Component Value Date    WBC 3.6 (L) 03/19/2021    ANEU 2.5 03/19/2021    HGB 12.3 03/19/2021    HCT 36.1 03/19/2021     03/19/2021     03/19/2021    POTASSIUM 4.1 03/19/2021    CHLORIDE 106 03/19/2021    CO2 28 03/19/2021    GLC 74 03/19/2021    BUN 10 03/19/2021    CR 0.95 03/19/2021    MAG 2.1 03/19/2021    INR 0.95 03/28/2019    BILITOTAL 0.5 03/19/2021    AST 16 03/19/2021    ALT 20 03/19/2021    ALKPHOS 57 03/19/2021    PROTTOTAL 6.9 03/19/2021    ALBUMIN 3.7 03/19/2021   PET FDG/CT )SH  12/11/2017 2:24 PM    INDICATION: Multiple myeloma staging, initial treatment strategy.  TECHNIQUE: Serum glucose level 83 mg/dL. One hour post intravenous   administration of 11.5 mCi F-18 FDG, PET imaging was performed from the   vertex to the distal thighs utilizing attenuation correction with   concurrent axial CT and PET/CT image fusion. Dose reduction techniques were   used.  COMPARISON: Skeletal  survey from 07/12/2016 is reviewed.    FINDINGS: Scattered FDG avid lesions throughout the axial and appendicular   skeleton, a few examples of which include in the right ilium (SUVmax 8.5),   anterior left acetabulum (SUVmax 16.6), and mid left femoral diaphysis   (SUVmax 3.5).    3 mm right upper lobe pulmonary nodule, too small for PET characterization.   Tortuous aorta. Trace calcified atherosclerosis. Hysterectomy. Left os   acromiale. Old bilateral rib fractures. Old, non-FDG avid compression   fractures of T11, T12, L1, and L4. Mild degenerative change in the spine.    CONCLUSION:  Scattered FDG avid skeletal lesions consistent with active myeloma. No   evidence of extraosseous disease.      PET/CT 7/3/2019  BONES:   Multiple FDG avid lesions are present in the axial skeleton as  follows:   - 3.0 x 1.7 cm sclerotic lesion in the right iliac bone with Max SUV  9.3.    - 2.1 x 1.5 x 1.9 cm lesion in the anterior medial left acetabulum  with max SUV 9.7.   - Nodular focus of increased FDG uptake in the right inferior sacrum  (coronal image 64) with max SUV 6.3.  Focal uptake in the right T2 facet, likely metastatic.  Focal uptake in the left rib indeterminant.     No other sites of suspicious FDG uptake within the axial or  appendicular skeleton. Numerous sclerotic lesions throughout the  thoracolumbar spine, consistent with known myeloma, without suspicious  FDG uptake. Multilevel disc height narrowing most severe at T11, T12,  and L1 with exaggerated thoracic kyphosis. Non-FDG avid sclerotic  focus in the posterior left fourth rib. Healed fractures of the right  3rd rib (posteriorly) and left 6th / 8th ribs.                                                                      IMPRESSION:   In this patient with known multiple myeloma s/p recent bone marrow  transplant:  1.  Multiple FDG-avid foci within the pelvic and sacral bones  consistent with active myeloma. Focal uptake in right T2 facet  concerning for  metastasis. Numerous sclerotic lesions throughout the  remaining skeleton do not demonstrate FDG uptake.  2.  No evidence of extraosseous disease progression.  3.  Colonic hyperenhancement and submucosal edema with increased FDG  uptake most likely represents infectious or inflammatory colitis.  Etxdo-[id number]-boby disease is also on the differential.    PET/CT 7/3/2019  Since the original read a prior PET/CT performed 12/11/2017 has been  up loaded and ordering provider is asking for additional comparison.     Unchanged:   Right ilium lesion, current SUV max is 9.0, prior was 8.5.      Improved:  Right sacral lesion is slightly smaller max SUV 6.3 unchanged. Total  lesion glycolysis 3.8 previously 5.3. Metabolic volume 1 cc, prior 1.4  cc.   Right pubic symphysis lesion is less pronounced than prior exam.   Left femoral lesion is less pronounced than prior.   The left third rib lesion is less pronounced than on prior.   Right first rib resolved.       Progressed:  The T2 transverse process on the right is new (SUV of 4.8).  The left anterior acetabular lesion is more prominent on current exam  with more soft tissue component, current SUV max is 9.7, prior 16.6.  Hypermetabolic total volume is 3 cc were it was 1 cm.  Total lesion  glycolysis is 20 compared to 8.     Impression:  1. IMPeTus criteria (a proposed Myeloma PET criteria)   1a. BM1 (low level bone marrow),   1b. F3 ExtraSp 5 (4 to 10 lesions, extraspinous, max deauville 5) - 3  lesions highly concerning    1c. No extra medullary, no para medullary disease.   2. Interval change from 12/11/2017  2a. One lesion consistent with active disease (left acetabulum larger  although less metabolic right sacral lesion max SUV 9.7)   2b. Indeterminant - 2 lesions hypermetabolic but concerning for active  disease  right sacrum smaller max SUV 6,   right T2 transverse process new max SUV 5)     2b. Several lesions resolved or decreased.      3/27/2020 PET/CT 1 year                                                                    IMPRESSION: History of multiple myeloma status post multiple therapies  including bone marrow transplant:   - In summary four prior hypermetabolic bone lesions: 2 resolved, 2  partial response.  1. Resolved hypermetabolism since 7/3/2019: Right T2 facet lesion,  right sacral lesion     2. Partial response (>50% decrease in uptake) since PET 7/3/2019.  2a. Left anterior acetabulum (max SUV 3.4), prior max SUV 9.4.  2b. Right iliac (max SUV 4.2), prior max SUV 9.3.     3. No extraosseous evidence of disease progression.     9/29/2020 1.5 year PET    BONES:   Increased FDG uptake to three lesions in the pelvis (series 2):  - Image 347: Right iliac crest, SUV max 8.1, previously 4.2.  - Image 383: Left anterior acetabulum, SUV max 8.3, previously 3.4.  - Image 339: Right sacral ala near the right S1 neural foramen, SUV  max 3.6; previously 1.7.     No new hypermetabolic intraosseous lesion. Additional  nonhypermetabolic lucent lesions throughout the skeleton are similar  to prior, for example in the spine and sternum. No acute osseous  abnormality. Chronic bilateral rib fractures. Unchanged compression  deformities of T5, T6, T8, T9 T11, T12, L1, and L4.                                                                      IMPRESSION: In this patient with a history of multiple myeloma status  post chemotherapy and bone marrow transplant:     1. Increased FDG uptake to two lesions in the pelvis (right iliac  crest and left acetabulum) concerning for active myeloma.     2. Increased FDG uptake to the right sacral ala lesion. While this is  below background levels, it is notable for having increased since  prior study and is in the same location as a metabolic lesion seen on  7/3/2019. Attention on follow-up to this location.     3. Additional diffuse lucent lesions without abnormal FDG uptake are  similar to previous study.     4. Unchanged multilevel compression  deformities in the thoracolumbar  Spine.    2/10/2021 PET/CT:  Impression: In this patient with the diagnosis of multiple myeloma:  1. On the fusion PET CT, there is no abnormal metabolic activity in  the mucosal space, soft tissues, or cervical ravinder chains.      2. Mottled appearance of the cervical spine and skull similar to prior  exams from involvement by multiple myeloma.    ABDOMEN AND PELVIS:  There is a focal area of radiotracer uptake within the transverse  colon associated with a soft tissue area is visualized on series 11,  image 86 which measures 17 x 15 mm and has an SUV max of 9.18.     There are no suspicious hepatic lesions. There is no splenomegaly or  evidence for splenic or pancreatic mass lesion.  There are no suspicious adrenal mass lesions or opaque gallbladder  calculi.  There is symmetric nephrographic renal phase without  hydronephrosis.     Diverticulosis without evidence for diverticulitis. There is no  evidence for bowel obstruction or free fluid.  Scattered areas of  radiotracer uptake throughout the bowel, likely physiologic.  BONES:   Area of radiotracer uptake in the anterior left acetabulum (series 11,  image 107). This finding is similar in appearance to exam 9/29/2020  although decreased in metabolic activity. Current SUV max is 3.95,  this was previously 8.28. Area of increased FDG avidity within the  right iliac wing is also again demonstrated which is decreased in  metabolic activity current SUV max is 2.71, previously this was 8.11.     There is an area of increased radiotracer uptake within the right  sacrum with SUV max of 4.08, this was 3.61 on prior exam.     Slight increased radiotracer uptake at the second and third posterior  costovertebral junctions on the right, this is below the level of the  liver and likely related to degenerative change.     Redemonstration of diffuse lytic lesions throughout the axial and  appendicular skeleton. Lesions are not similar  distribution as exam  9/29/2020. Chronic bilateral rib fractures and chronic compression  deformities of T5, T6, T8, T9, T11, T12, L1 and L4.                                                                      IMPRESSION: In this patient with a history of multiple myeloma status  post chemotherapy and bone marrow transplant with mixed response to  therapy:     1. Prominent areas of radiotracer uptake within the left acetabulum  and within the right iliac wing are decreased in metabolic activity  when compared with exam 9/29/2020. The lesion associated with the  right sacrum is slightly increased in FDG avidity when compared with  the prior although still below the level of the liver. Findings likely  indicate mixed response to treatment.     2. Focal area of hypermetabolism associated with the transverse colon  with an associated rounded area of soft tissue. Findings are  suspicious for a primary colonic neoplasm. Recommend colonoscopy.     3. Diffuse lucent lesions throughout the axial and appendicular  skeleton similar to prior exams.     4. Multilevel compression deformities also are again noted.        [Consider Follow Up: Colon lesion concerning for primary colonic  Neoplasm.]    9/1/2021 PET/CT:  BONES:   Decreased FDG uptake, below the background liver activity, to the left  anterior acetabulum, and sacrum with overall unchanged mild uptake to  the right iliac wing since prior PET/CT 2/1/2021 Persistent diffuse  lytic lesions throughout the axial and appendicular skeleton, similar  distribution compared to prior PET/CT. Chronic bilateral rib fractures  and chronic compression deformities of T5, T6, T8, T9, T11, T12, L1  and L4.                                                                      IMPRESSION: In this patient with multiple myeloma status post bone  marrow transplant, there is evidence of complete response;  1. Decreased mild FDG uptake to the left anterior acetabulum, sacrum  and right iliac  wing below the background liver activity. No new  suspicious FDG uptake along the skeleton.  2. Diffuse lucent lesions throughout the axial and appendicular  skeleton, similar in distribution.  3. Overall unchanged multilevel spinal compression deformities.  4. Please see separate dictation for the high resolution PET-CT of the  head and neck performed at the same time for discussion of findings.    Neck  Impression: In this patient with multiple myeloma status post bone  marrow transplantation;  Unchanged asymmetric FDG avid effacement of the right Rosenmuller  fossa since September 2020. Given the stability of the finding, it is  likely benign. Otherwise no abnormal uptake along the mucosal spaces.  No FDG avid or abnormally enlarged lymphadenopathy.  Multilevel lytic areas throughout the cervical spine and skull most  prominent at C2 vertebra. similar in distribution. No new focal  suspicious hypermetabolism associating any osteolytic lesions.     ASSESSMENT/PLAN   Adrianna Palomino is a 64 yo woman with standard risk IgG kappa multiple myeloma, s/p auto PBSCT in 2013. Now 2 year 5 months s/p second auto. On maintenance posttransplant, valcade every other week and zometa quarterly.     1.  BMT:   - T0=3/29/2019) s/p auto PBSCT (#2) Received 8 million cells.   - Engrafted.  Last dose GCSF 4/9/19.   - 4/23/2019: day 28 biochemical amrkers and restaging BMB:   ---- No morphologic or immunohistochemical evidence of plasma cell myeloma   ---- Immune phenotype: There is a minute population (estimated at 0.002%) of Kappa restricted plasma cells identified by aberrant expression of CD56 These plasma cells are very few, but they are forming a discrete group .  ---- Non elevated FLC, SPEP with IgG kappa Monoclonal protein (0.3 g/dL) seen in the gamma fraction. UPEP with no monoclonal proteins  - 7/3/2019: day 100 biochemical amrkers and restaging BMB:   ---- BMB:  Low level recurrent/persistent plasma cell neoplasm with  very rare (less than 1%) Kappa monotypic plasma cells   ---- Immune phenotype: 0.02% plasma cells which express CD19 (dim), CD38, CD45 (dim), CD56 and monotypic cytoplasmic kappa immunoglobulin light chains but lack CD20. Also present are: 0.03% polytypic plasma cells  And 3% polytypic B cells T  ---- Non elevated FLC, SPEP with IgG kappa Monoclonal protein (0.3 g/dL) seen in the gamma fraction. UPEP with no monoclonal proteins   --------- hemoglobin improved to 12.1 now within normal, creatinine stable, no hypercalcemia, no proteinuria, UA, LDH WNL, patient with no evidence of clinical relapse and with stable serum monoclonal proteins at 0.3g/dl not meeting criteria for an increase of 25% from the lowest confirmed response with no new urine monoclonal protein detected and no elevation in free light chains, therefore the patient does not meet criteria for clinical relapse or progressive disease per NCCN guidelines. However, PET/CT with FDG active lesions, clinically asymptomatic, unclear if those are present NEW lesions on imaging a meeting criteria for progressive disease per NCCN guidelines/IMWG revised criteria.   - 9/19/2019: day 180 iochemical amrkers and restaging BMB:   ---- BMB:Marrow cellularity of 30-40%, with trilineage hematopoiesis; recurrent/persistent myeloma is present - single sheet of kappa monotypic plasma cells at edge of biopsy, comprising less than 5% of cellularity   ---- Flow cytometry: There is a minute population (estimated at 0.005%) of kappa restricted plasma cells identified by aberrant expression of CD56 . This immunophenotype is similar to that reported previously in this patient   ---- Non elevated FLC, SPEP with IgG kappa Monoclonal protein (0.3 g/dL) seen in the gamma fraction. UPEP with no monoclonal proteins  ---- Discussed the uploaded PET/CT from 2017 for comparison with 7/3/2019 PET/CT with radiology  with the new addendum, now concerned for T2 lytic lesion vs degenerative  "disease as compared to the prior PET/CT with SUV 4.8 with gray zone as to calling it an \"active lytic lesions\". The left acetabular and sacral lesion in report stable in size and metabolic activity per discussion and not meeting 50% change criteria for radiologic progression.  - 3/27/2020 Day 365 assessment:  ---- 3/27 BMB: Variable marrow cellularity, overall 10-20%; no morphologic or immunohistochemical evidence of plasma cell neoplasm . Immunophenotype with a very small population (less than 0.01%, 4-10 events) of kappa restricted plasma cells identified by aberrant expression of CD56, the cells are too few for a definitive diagnosis   ---- 3/27 PET/CT:  four prior hypermetabolic bone lesions: 2 resolved, 2 partial response.  ---- 3/17 OSH SPEP 0.2g/dl IgG kappa. UPEP no monoclonal protein, Kappa elevated at 2.05 (ULN 1.94) and K/L 2.3  - 9/29/2020 Day 365 assessment:   ---- BMB: Trephine core biopsy suboptimal for interpretation. Cellular marrow aspirates showing trilineage hematopoietic maturation, no increase in plasma cells, and no increase in blasts. Immunophenotype  suspicious for rare kappa monotypic (0.003%) plasma cells   ---- 9/29/2020: PET/CT BONES: Increased FDG uptake to three lesions in the pelvis (series 2): Right iliac crest, SUV max 8.1 previously 4.2 and measures 1.3 x 0.7 cm (stable size). Left anterior acetabulum, SUV max 8.3, previously 3.4, measures 1.3 x 1.3 cm is stable from 7/3/2019 and 3/27/2020 PET CT scans. Right sacral ala near the right S1 neural foramen, SUV max 3.6; previously 1.7. No new hypermetabolic intraosseous lesion. Increased FDG uptake to two lesions in the pelvis (right iliac crest and left acetabulum) concerning for active myeloma. Increased FDG uptake to the right sacral ala lesion. While this is below background levels, it is notable for having increased since prior study and is in the same location as a metabolic lesion seen on 7/3/2019.   ---- Kappa FLC elevated at " 1.99 (ULN 1.94) and K/L 2.97 (ULN 1.65). 9/16/2020 OSH SPEP with 0.1 g/dl IgG kappa  I reviewed the imaging myself and had discussion with the radiologist as well.  Overall on the current PET/CT the SPD is stable and therefore IWMG this is stable disease. The FDG metabolic results demonstrate active disease at both sites (IMPetUs PET criteria, Deauville 5 lesions); patient had these  lesions and they were hypermetabolic on PET CT 7/3/2019, metabolism had resolved on 3/27/2020, and has now returned on 9/29/2020.    Clinically the patient is asymptomatic, and biochemical markers actually improved, with no change in therapy since she has been on maintenance Velcade during this time.  I would recommend her referred to radiation oncologist to discuss local radiation to these active lesions to prevent any systemic disease progression, given the patient does not strictly meet criteria for radiologic progression necessitating systemic therapy.  --- Radiation Therapy Treatment:   Site of Radiation Left Hip, Number of Fractions 10, Radiation Total Dosage (cGy) 3000 Treatment Start Date 10/19/2020 Treatment End Date 10/30/2020   2nd Site of Radiation Right Iliac Radiation Total Dosage 3000 Treatment Start Date 10/19/2020 Treatment End Date 10/30/2020   -- Repeat PET/CT on 2/10/2021 Prominent areas of radiotracer uptake within the left acetabulum and within the right iliac wing are decreased in metabolic activity when compared with exam 9/29/2020. The lesion associated with the right sacrum is slightly increased in FDG avidity when compared with the prior although still below the level of the liver (size from 0.5x0.7 to 1x0.7 subcentimetric).  -- Biochemical markers locally 1/20/2021 FLC WNL, SPEP No monoclonal protein is detected in the serum. IFN immunofixation shows an IgG Kappa monoclonal protein. There is also a very small IgG Lambda of unknown significance that is not detected on the serum protein electrophoresis.   - 2  year restaging:  --- 3/19/2021 BMB Inadequate biopsy, minimal component of hematopoietic elements. No evidence for plasma cell neoplasm in this inadequate biopsy. Peripheral blood showing slight normochromic, macrocytic anemia; slight leukopenia; lymphocytopenia. Concurrent flow cytometry showed very rare polytypic plasma cells. There is no definitive immunophenotypic evidence for clonal plasma cell process. There are very rare (2) events that show bright expression of CD56 and kappa immunoglobulin light chain, an immunophenotype that was previously described but the findings are not sufficient for a definite diagnosis of persistent/recurrent disease. FISH/CG pending.  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  FLC WNL  - Discussed with her that there is no high clinical suspicion for progression necessitating a repeat in biopsy but needs close biochemical assessment and repeat PET/CT in 3-6 months considering a BMB at that time as well  - 9/1/2021 PET/CT: Decreased mild FDG uptake to the left anterior acetabulum, sacrum and right iliac wing below the background liver activity. No new suspicious FDG uptake along the skeleton.   - 8/4/2021 SPEP/IFN: no monoclonal protein detected. [facility] Kappa and lambda WNL  --- I would not recommend change in treatment/maintance at this time, will continue to have close biochemical assessment and consider follow up PET/CT in 6 months.    2.  HEME: Keep Hgb>8 and plts>10K. No transfusion needs today.                             3.  ID: Afebrile  - LD ACV  - Pentamidine monthly for one year (Sulfa allergy)- completed  - Compelted one year vaccinations with Dr Crespo, Annual influenza vaccine, completed 2 year vaccines with Dr. Crespo. Completed covid vaccines and plans for booster.                               4.  GI:   - hx  collagenous colitis, 4/12 stopped protonix due to its association with collagenous colitis.   - PET/CT 2/10/2021: Focal area of hypermetabolism associated with the transverse colon with an associated rounded area of soft tissue. 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 Recommend colonoscopy, needs laparoscopic poly removal per patient, meeting with surgery to discuss further. S/p resection 4/2021, no adjuvant treatment, follows with Dr. Crespo.     5.  FEN/Renal: Creat wnl.    6. Health care maintanence  - Seen by PCP in 2/2021 for routine testing including TSH, lipid profile HbA1c etc  - Mammogram completed 3/21 locally  - PFTs and DEXA completed 3/21 oally    I spent 30 minutes in the care of this patient today, which included time necessary for preparation for the visit, obtaining history, ordering medications/tests/procedures as medically indicated, review of pertinent medical literature, counseling of the patient, communication of recommendations to the care team, and documentation time.     Plan:  She knows to call with any questions or concerns.  Above plan was explained in detail with the patient and all her questions were answered to the best of my ability.    Will discusse with Dr. Crespo, he will update me on 6 months decision for restaging here    Abdi Browning MD

## 2021-09-07 NOTE — NURSING NOTE
Patient denies any changes since echeck-in regarding medication and allergies; notes Immodium is PRN; pt. notes main Pharmacy [facility]; Pt had another call coming in so n/a to complete dist. Screening.  Pt also notes doesn't have camera capability on computer and not option per pt to have link sent via phone; she prefers phone visit if possible instead.

## 2021-09-18 ENCOUNTER — HEALTH MAINTENANCE LETTER (OUTPATIENT)
Age: 66
End: 2021-09-18

## 2022-01-08 ENCOUNTER — HEALTH MAINTENANCE LETTER (OUTPATIENT)
Age: 67
End: 2022-01-08

## 2022-03-03 DIAGNOSIS — Z94.81 STATUS POST BONE MARROW TRANSPLANT (H): ICD-10-CM

## 2022-03-03 DIAGNOSIS — C90.00 MULTIPLE MYELOMA, REMISSION STATUS UNSPECIFIED (H): Primary | ICD-10-CM

## 2022-04-12 ENCOUNTER — HOSPITAL ENCOUNTER (OUTPATIENT)
Dept: PET IMAGING | Facility: CLINIC | Age: 67
Setting detail: NUCLEAR MEDICINE
Discharge: HOME | End: 2022-04-12
Attending: INTERNAL MEDICINE | Admitting: INTERNAL MEDICINE
Payer: COMMERCIAL

## 2022-04-12 DIAGNOSIS — Z94.81 STATUS POST BONE MARROW TRANSPLANT (H): ICD-10-CM

## 2022-04-12 DIAGNOSIS — C90.00 MULTIPLE MYELOMA, REMISSION STATUS UNSPECIFIED (H): ICD-10-CM

## 2022-04-12 PROCEDURE — A9552 F18 FDG: HCPCS | Performed by: INTERNAL MEDICINE

## 2022-04-12 PROCEDURE — 71260 CT THORAX DX C+: CPT | Mod: 26

## 2022-04-12 PROCEDURE — 74177 CT ABD & PELVIS W/CONTRAST: CPT | Mod: 26

## 2022-04-12 PROCEDURE — 74177 CT ABD & PELVIS W/CONTRAST: CPT

## 2022-04-12 PROCEDURE — 78816 PET IMAGE W/CT FULL BODY: CPT | Mod: 26

## 2022-04-12 PROCEDURE — 343N000001 HC RX 343: Performed by: INTERNAL MEDICINE

## 2022-04-12 PROCEDURE — 250N000011 HC RX IP 250 OP 636: Performed by: INTERNAL MEDICINE

## 2022-04-12 PROCEDURE — 78816 PET IMAGE W/CT FULL BODY: CPT | Mod: PS

## 2022-04-12 RX ORDER — IOPAMIDOL 755 MG/ML
45-150 INJECTION, SOLUTION INTRAVASCULAR ONCE
Status: COMPLETED | OUTPATIENT
Start: 2022-04-12 | End: 2022-04-12

## 2022-04-12 RX ADMIN — IOPAMIDOL 92 ML: 755 INJECTION, SOLUTION INTRAVENOUS at 10:40

## 2022-04-12 RX ADMIN — FLUDEOXYGLUCOSE F-18 10.13 MCI.: 500 INJECTION, SOLUTION INTRAVENOUS at 09:30

## 2022-06-03 DIAGNOSIS — Z94.81 STATUS POST BONE MARROW TRANSPLANT (H): ICD-10-CM

## 2022-06-03 DIAGNOSIS — C90.01 MULTIPLE MYELOMA IN REMISSION (H): ICD-10-CM

## 2022-06-03 DIAGNOSIS — C90.00 MULTIPLE MYELOMA, REMISSION STATUS UNSPECIFIED (H): Primary | ICD-10-CM

## 2022-06-25 ENCOUNTER — HEALTH MAINTENANCE LETTER (OUTPATIENT)
Age: 67
End: 2022-06-25

## 2022-07-13 ENCOUNTER — HOSPITAL ENCOUNTER (OUTPATIENT)
Dept: PET IMAGING | Facility: CLINIC | Age: 67
Setting detail: NUCLEAR MEDICINE
Discharge: HOME | End: 2022-07-13
Attending: INTERNAL MEDICINE | Admitting: INTERNAL MEDICINE
Payer: COMMERCIAL

## 2022-07-13 DIAGNOSIS — C90.01 MULTIPLE MYELOMA IN REMISSION (H): ICD-10-CM

## 2022-07-13 DIAGNOSIS — Z94.81 STATUS POST BONE MARROW TRANSPLANT (H): ICD-10-CM

## 2022-07-13 PROCEDURE — 250N000011 HC RX IP 250 OP 636: Performed by: INTERNAL MEDICINE

## 2022-07-13 PROCEDURE — 71260 CT THORAX DX C+: CPT | Mod: 26

## 2022-07-13 PROCEDURE — 78816 PET IMAGE W/CT FULL BODY: CPT | Mod: PS

## 2022-07-13 PROCEDURE — 74177 CT ABD & PELVIS W/CONTRAST: CPT

## 2022-07-13 PROCEDURE — 74177 CT ABD & PELVIS W/CONTRAST: CPT | Mod: 26

## 2022-07-13 PROCEDURE — 343N000001 HC RX 343: Performed by: INTERNAL MEDICINE

## 2022-07-13 PROCEDURE — A9552 F18 FDG: HCPCS | Performed by: INTERNAL MEDICINE

## 2022-07-13 PROCEDURE — 78816 PET IMAGE W/CT FULL BODY: CPT | Mod: 26

## 2022-07-13 RX ORDER — IOPAMIDOL 755 MG/ML
10-135 INJECTION, SOLUTION INTRAVASCULAR ONCE
Status: COMPLETED | OUTPATIENT
Start: 2022-07-13 | End: 2022-07-13

## 2022-07-13 RX ADMIN — FLUDEOXYGLUCOSE F-18 10.11 MCI.: 500 INJECTION, SOLUTION INTRAVENOUS at 10:06

## 2022-07-13 RX ADMIN — IOPAMIDOL 83 ML: 755 INJECTION, SOLUTION INTRAVENOUS at 10:59

## 2022-11-20 ENCOUNTER — HEALTH MAINTENANCE LETTER (OUTPATIENT)
Age: 67
End: 2022-11-20

## 2023-01-04 DIAGNOSIS — C90.01 MULTIPLE MYELOMA IN REMISSION (H): ICD-10-CM

## 2023-01-04 DIAGNOSIS — Z94.81 STATUS POST BONE MARROW TRANSPLANT (H): Primary | ICD-10-CM

## 2023-01-19 ENCOUNTER — HOSPITAL ENCOUNTER (OUTPATIENT)
Dept: PET IMAGING | Facility: CLINIC | Age: 68
Discharge: HOME | End: 2023-01-19
Attending: INTERNAL MEDICINE | Admitting: INTERNAL MEDICINE
Payer: COMMERCIAL

## 2023-01-19 DIAGNOSIS — Z94.81 STATUS POST BONE MARROW TRANSPLANT (H): ICD-10-CM

## 2023-01-19 DIAGNOSIS — C90.01 MULTIPLE MYELOMA IN REMISSION (H): ICD-10-CM

## 2023-01-19 PROCEDURE — A9552 F18 FDG: HCPCS | Performed by: INTERNAL MEDICINE

## 2023-01-19 PROCEDURE — 78816 PET IMAGE W/CT FULL BODY: CPT | Mod: PS

## 2023-01-19 PROCEDURE — 343N000001 HC RX 343: Performed by: INTERNAL MEDICINE

## 2023-01-19 PROCEDURE — 74177 CT ABD & PELVIS W/CONTRAST: CPT

## 2023-01-19 PROCEDURE — 74177 CT ABD & PELVIS W/CONTRAST: CPT | Mod: 26 | Performed by: RADIOLOGY

## 2023-01-19 PROCEDURE — 71260 CT THORAX DX C+: CPT | Mod: 26 | Performed by: RADIOLOGY

## 2023-01-19 PROCEDURE — 250N000011 HC RX IP 250 OP 636: Performed by: INTERNAL MEDICINE

## 2023-01-19 PROCEDURE — 78816 PET IMAGE W/CT FULL BODY: CPT | Mod: 26 | Performed by: RADIOLOGY

## 2023-01-19 RX ORDER — IOPAMIDOL 755 MG/ML
10-140 INJECTION, SOLUTION INTRAVASCULAR ONCE
Status: COMPLETED | OUTPATIENT
Start: 2023-01-19 | End: 2023-01-19

## 2023-01-19 RX ADMIN — FLUDEOXYGLUCOSE F-18 10.04 MCI.: 500 INJECTION, SOLUTION INTRAVENOUS at 08:23

## 2023-01-19 RX ADMIN — IOPAMIDOL 83 ML: 755 INJECTION, SOLUTION INTRAVENOUS at 09:22

## 2023-03-16 ENCOUNTER — TRANSFERRED RECORDS (OUTPATIENT)
Dept: TRANSPLANT | Facility: CLINIC | Age: 68
End: 2023-03-16
Payer: COMMERCIAL

## 2023-04-15 ENCOUNTER — HEALTH MAINTENANCE LETTER (OUTPATIENT)
Age: 68
End: 2023-04-15

## 2023-08-18 DIAGNOSIS — C90.00 MULTIPLE MYELOMA, REMISSION STATUS UNSPECIFIED (H): ICD-10-CM

## 2023-08-18 DIAGNOSIS — Z94.81 STATUS POST BONE MARROW TRANSPLANT (H): Primary | ICD-10-CM

## 2023-08-18 NOTE — PROGRESS NOTES
PET ordered; okay per Dr. Ganesh Browning. No review needed here.    Xena Mackenzie on 8/18/2023 at 9:41 AM

## 2023-09-12 ENCOUNTER — HOSPITAL ENCOUNTER (OUTPATIENT)
Dept: PET IMAGING | Facility: CLINIC | Age: 68
Discharge: HOME | End: 2023-09-12
Attending: INTERNAL MEDICINE | Admitting: INTERNAL MEDICINE
Payer: COMMERCIAL

## 2023-09-12 DIAGNOSIS — Z94.81 STATUS POST BONE MARROW TRANSPLANT (H): ICD-10-CM

## 2023-09-12 DIAGNOSIS — C90.00 MULTIPLE MYELOMA, REMISSION STATUS UNSPECIFIED (H): ICD-10-CM

## 2023-09-12 PROCEDURE — 343N000001 HC RX 343: Performed by: INTERNAL MEDICINE

## 2023-09-12 PROCEDURE — 78816 PET IMAGE W/CT FULL BODY: CPT | Mod: PS

## 2023-09-12 PROCEDURE — 78816 PET IMAGE W/CT FULL BODY: CPT | Mod: 26 | Performed by: STUDENT IN AN ORGANIZED HEALTH CARE EDUCATION/TRAINING PROGRAM

## 2023-09-12 PROCEDURE — A9552 F18 FDG: HCPCS | Performed by: INTERNAL MEDICINE

## 2023-09-12 RX ADMIN — FLUDEOXYGLUCOSE F-18 10.12 MILLICURIE: 500 INJECTION, SOLUTION INTRAVENOUS at 09:35

## 2024-02-23 DIAGNOSIS — Z94.81 STATUS POST BONE MARROW TRANSPLANT (H): Primary | ICD-10-CM

## 2024-02-23 DIAGNOSIS — C90.00 MULTIPLE MYELOMA, REMISSION STATUS UNSPECIFIED (H): ICD-10-CM

## 2024-03-14 ENCOUNTER — HOSPITAL ENCOUNTER (OUTPATIENT)
Dept: PET IMAGING | Facility: CLINIC | Age: 69
Discharge: HOME | End: 2024-03-14
Attending: INTERNAL MEDICINE
Payer: COMMERCIAL

## 2024-03-14 DIAGNOSIS — C90.00 MULTIPLE MYELOMA, REMISSION STATUS UNSPECIFIED (H): ICD-10-CM

## 2024-03-14 LAB
RADIOLOGIST FLAGS: NORMAL

## 2024-03-14 PROCEDURE — 70491 CT SOFT TISSUE NECK W/DYE: CPT

## 2024-03-14 PROCEDURE — 74177 CT ABD & PELVIS W/CONTRAST: CPT | Mod: 26 | Performed by: RADIOLOGY

## 2024-03-14 PROCEDURE — 71260 CT THORAX DX C+: CPT | Mod: 26 | Performed by: RADIOLOGY

## 2024-03-14 PROCEDURE — 343N000001 HC RX 343: Performed by: INTERNAL MEDICINE

## 2024-03-14 PROCEDURE — 70491 CT SOFT TISSUE NECK W/DYE: CPT | Mod: 26 | Performed by: RADIOLOGY

## 2024-03-14 PROCEDURE — 78816 PET IMAGE W/CT FULL BODY: CPT | Mod: PS

## 2024-03-14 PROCEDURE — 71260 CT THORAX DX C+: CPT

## 2024-03-14 PROCEDURE — 78816 PET IMAGE W/CT FULL BODY: CPT | Mod: 26 | Performed by: RADIOLOGY

## 2024-03-14 PROCEDURE — 250N000011 HC RX IP 250 OP 636: Performed by: INTERNAL MEDICINE

## 2024-03-14 PROCEDURE — A9552 F18 FDG: HCPCS | Performed by: INTERNAL MEDICINE

## 2024-03-14 RX ORDER — IOPAMIDOL 755 MG/ML
10-135 INJECTION, SOLUTION INTRAVASCULAR ONCE
Status: COMPLETED | OUTPATIENT
Start: 2024-03-14 | End: 2024-03-14

## 2024-03-14 RX ADMIN — FLUDEOXYGLUCOSE F-18 10.14 MILLICURIE: 500 INJECTION, SOLUTION INTRAVENOUS at 09:38

## 2024-03-14 RX ADMIN — IOPAMIDOL 95 ML: 755 INJECTION, SOLUTION INTRAVENOUS at 10:31

## 2024-06-22 ENCOUNTER — HEALTH MAINTENANCE LETTER (OUTPATIENT)
Age: 69
End: 2024-06-22

## 2024-09-18 ENCOUNTER — HOSPITAL ENCOUNTER (OUTPATIENT)
Dept: PET IMAGING | Facility: CLINIC | Age: 69
Discharge: HOME | End: 2024-09-18
Attending: INTERNAL MEDICINE | Admitting: INTERNAL MEDICINE
Payer: COMMERCIAL

## 2024-09-18 DIAGNOSIS — C90.00 MULTIPLE MYELOMA NOT HAVING ACHIEVED REMISSION (H): ICD-10-CM

## 2024-09-18 LAB — RADIOLOGIST FLAGS: ABNORMAL

## 2024-09-18 PROCEDURE — 78815 PET IMAGE W/CT SKULL-THIGH: CPT | Mod: PS

## 2024-09-18 PROCEDURE — A9552 F18 FDG: HCPCS

## 2024-09-18 PROCEDURE — 343N000001 HC RX 343

## 2024-09-18 PROCEDURE — 78815 PET IMAGE W/CT SKULL-THIGH: CPT | Mod: 26 | Performed by: RADIOLOGY

## 2024-09-18 RX ORDER — FLUDEOXYGLUCOSE F 18 200 MCI/ML
10-18 INJECTION, SOLUTION INTRAVENOUS ONCE
Status: COMPLETED | OUTPATIENT
Start: 2024-09-18 | End: 2024-09-18

## 2024-09-18 RX ADMIN — FLUDEOXYGLUCOSE F 18 10.06 MILLICURIE: 200 INJECTION, SOLUTION INTRAVENOUS at 10:18

## 2025-03-18 ENCOUNTER — HOSPITAL ENCOUNTER (OUTPATIENT)
Dept: PET IMAGING | Facility: CLINIC | Age: 70
Discharge: HOME | End: 2025-03-18
Attending: INTERNAL MEDICINE | Admitting: INTERNAL MEDICINE
Payer: COMMERCIAL

## 2025-03-18 DIAGNOSIS — C90.00 MULTIPLE MYELOMA NOT HAVING ACHIEVED REMISSION (H): ICD-10-CM

## 2025-03-18 PROCEDURE — 78816 PET IMAGE W/CT FULL BODY: CPT | Mod: PS

## 2025-03-18 PROCEDURE — 343N000001 HC RX 343 MED OP 636

## 2025-03-18 PROCEDURE — A9552 F18 FDG: HCPCS

## 2025-03-18 RX ORDER — FLUDEOXYGLUCOSE F 18 200 MCI/ML
10-18 INJECTION, SOLUTION INTRAVENOUS ONCE
Status: COMPLETED | OUTPATIENT
Start: 2025-03-18 | End: 2025-03-18

## 2025-03-18 RX ADMIN — FLUDEOXYGLUCOSE F 18 10.04 MILLICURIE: 200 INJECTION, SOLUTION INTRAVENOUS at 11:24

## 2025-03-29 ENCOUNTER — HEALTH MAINTENANCE LETTER (OUTPATIENT)
Age: 70
End: 2025-03-29

## 2025-07-12 ENCOUNTER — HEALTH MAINTENANCE LETTER (OUTPATIENT)
Age: 70
End: 2025-07-12

## (undated) RX ORDER — FENTANYL CITRATE 50 UG/ML
INJECTION, SOLUTION INTRAMUSCULAR; INTRAVENOUS
Status: DISPENSED
Start: 2019-03-28

## (undated) RX ORDER — LIDOCAINE HYDROCHLORIDE 10 MG/ML
INJECTION, SOLUTION EPIDURAL; INFILTRATION; INTRACAUDAL; PERINEURAL
Status: DISPENSED
Start: 2019-03-28

## (undated) RX ORDER — HEPARIN SODIUM,PORCINE 10 UNIT/ML
VIAL (ML) INTRAVENOUS
Status: DISPENSED
Start: 2019-03-28

## (undated) RX ORDER — DIPHENHYDRAMINE HYDROCHLORIDE 50 MG/ML
INJECTION INTRAMUSCULAR; INTRAVENOUS
Status: DISPENSED
Start: 2019-03-28

## (undated) RX ORDER — ALBUTEROL SULFATE 0.83 MG/ML
SOLUTION RESPIRATORY (INHALATION)
Status: DISPENSED
Start: 2019-03-12

## (undated) RX ORDER — CLINDAMYCIN PHOSPHATE 150 MG/ML
INJECTION, SOLUTION INTRAVENOUS
Status: DISPENSED
Start: 2019-03-28

## (undated) RX ORDER — LIDOCAINE HYDROCHLORIDE 10 MG/ML
INJECTION, SOLUTION EPIDURAL; INFILTRATION; INTRACAUDAL; PERINEURAL
Status: DISPENSED
Start: 2019-04-19

## (undated) RX ORDER — CEFAZOLIN SODIUM 2 G/100ML
INJECTION, SOLUTION INTRAVENOUS
Status: DISPENSED
Start: 2019-03-28